# Patient Record
Sex: MALE | Employment: OTHER | URBAN - METROPOLITAN AREA
[De-identification: names, ages, dates, MRNs, and addresses within clinical notes are randomized per-mention and may not be internally consistent; named-entity substitution may affect disease eponyms.]

---

## 2017-03-13 ENCOUNTER — TELEPHONE (OUTPATIENT)
Dept: CARDIOLOGY CLINIC | Age: 77
End: 2017-03-13

## 2017-03-13 DIAGNOSIS — R06.09 EXERTIONAL DYSPNEA: Primary | ICD-10-CM

## 2017-04-19 ENCOUNTER — OFFICE VISIT (OUTPATIENT)
Dept: CARDIOLOGY CLINIC | Age: 77
End: 2017-04-19

## 2017-04-19 VITALS
SYSTOLIC BLOOD PRESSURE: 110 MMHG | HEIGHT: 67 IN | HEART RATE: 60 BPM | DIASTOLIC BLOOD PRESSURE: 70 MMHG | WEIGHT: 184.4 LBS | BODY MASS INDEX: 28.94 KG/M2

## 2017-04-19 DIAGNOSIS — I25.10 ASHD (ARTERIOSCLEROTIC HEART DISEASE): Chronic | ICD-10-CM

## 2017-04-19 DIAGNOSIS — R06.09 EXERTIONAL DYSPNEA: ICD-10-CM

## 2017-04-19 DIAGNOSIS — Z95.1 S/P CABG X 2: Chronic | ICD-10-CM

## 2017-04-19 DIAGNOSIS — J44.9 COPD, MODERATE (HCC): Chronic | ICD-10-CM

## 2017-04-19 DIAGNOSIS — I25.810 CORONARY ARTERY DISEASE INVOLVING CORONARY BYPASS GRAFT OF NATIVE HEART WITHOUT ANGINA PECTORIS: Primary | ICD-10-CM

## 2017-04-19 DIAGNOSIS — R11.0 NAUSEA: ICD-10-CM

## 2017-04-19 DIAGNOSIS — E78.2 HYPERLIPIDEMIA, MIXED: Chronic | ICD-10-CM

## 2017-04-19 DIAGNOSIS — I21.11 ST ELEVATION MYOCARDIAL INFARCTION INVOLVING RIGHT CORONARY ARTERY (HCC): ICD-10-CM

## 2017-04-19 DIAGNOSIS — I73.9 PVD (PERIPHERAL VASCULAR DISEASE) (HCC): Chronic | ICD-10-CM

## 2017-04-19 DIAGNOSIS — I10 ESSENTIAL HYPERTENSION: Chronic | ICD-10-CM

## 2017-04-19 PROCEDURE — 99213 OFFICE O/P EST LOW 20 MIN: CPT | Performed by: INTERNAL MEDICINE

## 2017-04-19 RX ORDER — HYDROCHLOROTHIAZIDE 12.5 MG/1
12.5 TABLET ORAL DAILY
COMMUNITY
End: 2018-01-10

## 2017-04-24 ENCOUNTER — TELEPHONE (OUTPATIENT)
Dept: CARDIOLOGY CLINIC | Age: 77
End: 2017-04-24

## 2017-05-02 ENCOUNTER — HOSPITAL ENCOUNTER (OUTPATIENT)
Dept: GENERAL RADIOLOGY | Age: 77
Discharge: HOME | End: 2017-05-02
Attending: INTERNAL MEDICINE | Admitting: INTERNAL MEDICINE

## 2017-05-02 LAB
ALBUMIN SERPL-MCNC: 4.5 GM/DL (ref 3.4–5)
ALP BLD-CCNC: 112 IU/L (ref 40–129)
ALT SERPL-CCNC: 12 U/L (ref 10–40)
ANION GAP SERPL CALCULATED.3IONS-SCNC: 13 MMOL/L (ref 4–16)
AST SERPL-CCNC: 16 IU/L (ref 15–37)
BACTERIA: NEGATIVE /HPF
BASOPHILS ABSOLUTE: 0 K/CU MM
BASOPHILS RELATIVE PERCENT: 0.5 % (ref 0–1)
BILIRUB SERPL-MCNC: 0.5 MG/DL (ref 0–1)
BILIRUBIN URINE: NEGATIVE MG/DL
BLOOD, URINE: NEGATIVE
BUN BLDV-MCNC: 11 MG/DL (ref 6–23)
CALCIUM SERPL-MCNC: 9.4 MG/DL (ref 8.3–10.6)
CHLORIDE BLD-SCNC: 102 MMOL/L (ref 99–110)
CHOLESTEROL: 226 MG/DL
CLARITY: CLEAR
CO2: 28 MMOL/L (ref 21–32)
COLOR: ABNORMAL
CREAT SERPL-MCNC: 1.1 MG/DL (ref 0.9–1.3)
DIFFERENTIAL TYPE: ABNORMAL
EOSINOPHILS ABSOLUTE: 0.1 K/CU MM
EOSINOPHILS RELATIVE PERCENT: 2.2 % (ref 0–3)
ESTIMATED AVERAGE GLUCOSE: 131 MG/DL
GFR AFRICAN AMERICAN: >60 ML/MIN/1.73M2
GFR NON-AFRICAN AMERICAN: >60 ML/MIN/1.73M2
GLUCOSE FASTING: 87 MG/DL (ref 70–99)
GLUCOSE, URINE: NEGATIVE MG/DL
HBA1C MFR BLD: 6.2 % (ref 4.2–6.3)
HCT VFR BLD CALC: 46.7 % (ref 42–52)
HDLC SERPL-MCNC: 54 MG/DL
HEMOGLOBIN: 15.1 GM/DL (ref 13.5–18)
IMMATURE NEUTROPHIL %: 0.2 % (ref 0–0.43)
KETONES, URINE: NEGATIVE MG/DL
LDL CHOLESTEROL DIRECT: 148 MG/DL
LEUKOCYTE ESTERASE, URINE: NEGATIVE
LYMPHOCYTES ABSOLUTE: 1.3 K/CU MM
LYMPHOCYTES RELATIVE PERCENT: 20.6 % (ref 24–44)
MAGNESIUM: 2.2 MG/DL (ref 1.8–2.4)
MCH RBC QN AUTO: 30.1 PG (ref 27–31)
MCHC RBC AUTO-ENTMCNC: 32.3 % (ref 32–36)
MCV RBC AUTO: 93.2 FL (ref 78–100)
MONOCYTES ABSOLUTE: 0.5 K/CU MM
MONOCYTES RELATIVE PERCENT: 8.6 % (ref 0–4)
MUCUS: ABNORMAL HPF
NITRITE URINE, QUANTITATIVE: NEGATIVE
NUCLEATED RBC %: 0 %
PDW BLD-RTO: 13.8 % (ref 11.7–14.9)
PH, URINE: 5 (ref 5–8)
PLATELET # BLD: 125 K/CU MM (ref 140–440)
PMV BLD AUTO: 12.3 FL (ref 7.5–11.1)
POTASSIUM SERPL-SCNC: 4.3 MMOL/L (ref 3.5–5.1)
PROTEIN UA: NEGATIVE MG/DL
RBC # BLD: 5.01 M/CU MM (ref 4.6–6.2)
RBC URINE: <1 /HPF (ref 0–3)
SEGMENTED NEUTROPHILS ABSOLUTE COUNT: 4.3 K/CU MM
SEGMENTED NEUTROPHILS RELATIVE PERCENT: 67.9 % (ref 36–66)
SODIUM BLD-SCNC: 143 MMOL/L (ref 135–145)
SPECIFIC GRAVITY UA: 1.01 (ref 1–1.03)
SQUAMOUS EPITHELIAL: <1 /HPF
T4 FREE: 1.38 NG/DL (ref 0.9–1.8)
TOTAL IMMATURE NEUTOROPHIL: 0.01 K/CU MM
TOTAL NUCLEATED RBC: 0 K/CU MM
TOTAL PROTEIN: 6.9 GM/DL (ref 6.4–8.2)
TRIGL SERPL-MCNC: 224 MG/DL
TSH HIGH SENSITIVITY: 0.6 UIU/ML (ref 0.27–4.2)
URIC ACID: 3.7 MG/DL (ref 3.5–7.2)
UROBILINOGEN, URINE: NORMAL MG/DL (ref 0.2–1)
WBC # BLD: 6.3 K/CU MM (ref 4–10.5)
WBC UA: 1 /HPF (ref 0–2)

## 2017-05-09 ENCOUNTER — TELEPHONE (OUTPATIENT)
Dept: CARDIOLOGY CLINIC | Age: 77
End: 2017-05-09

## 2017-05-10 ENCOUNTER — NURSE ONLY (OUTPATIENT)
Dept: CARDIOLOGY CLINIC | Age: 77
End: 2017-05-10

## 2017-05-10 DIAGNOSIS — R07.89 OTHER CHEST PAIN: Primary | ICD-10-CM

## 2017-05-22 ENCOUNTER — HOSPITAL ENCOUNTER (OUTPATIENT)
Dept: GENERAL RADIOLOGY | Age: 77
Discharge: HOME | End: 2017-05-22
Attending: INTERNAL MEDICINE | Admitting: INTERNAL MEDICINE

## 2017-05-22 DIAGNOSIS — Z01.811 PRE-OP CHEST EXAM: ICD-10-CM

## 2017-05-22 LAB
ANION GAP SERPL CALCULATED.3IONS-SCNC: 14 MMOL/L (ref 4–16)
APTT: 27.1 SECONDS (ref 21.2–33)
BUN BLDV-MCNC: 14 MG/DL (ref 6–23)
CALCIUM SERPL-MCNC: 9.6 MG/DL (ref 8.3–10.6)
CHLORIDE BLD-SCNC: 100 MMOL/L (ref 99–110)
CO2: 29 MMOL/L (ref 21–32)
CREAT SERPL-MCNC: 1 MG/DL (ref 0.9–1.3)
GFR AFRICAN AMERICAN: >60 ML/MIN/1.73M2
GFR NON-AFRICAN AMERICAN: >60 ML/MIN/1.73M2
GLUCOSE BLD-MCNC: 102 MG/DL (ref 70–140)
HCT VFR BLD CALC: 47.3 % (ref 42–52)
HEMOGLOBIN: 15.1 GM/DL (ref 13.5–18)
INR BLD: 0.91 INDEX
MCH RBC QN AUTO: 29.7 PG (ref 27–31)
MCHC RBC AUTO-ENTMCNC: 31.9 % (ref 32–36)
MCV RBC AUTO: 92.9 FL (ref 78–100)
PDW BLD-RTO: 13.4 % (ref 11.7–14.9)
PLATELET # BLD: 152 K/CU MM (ref 140–440)
PMV BLD AUTO: 12.2 FL (ref 7.5–11.1)
POTASSIUM SERPL-SCNC: 3.8 MMOL/L (ref 3.5–5.1)
PROTHROMBIN TIME: 10.4 SECONDS (ref 9.12–12.5)
RBC # BLD: 5.09 M/CU MM (ref 4.6–6.2)
SODIUM BLD-SCNC: 143 MMOL/L (ref 135–145)
WBC # BLD: 5.5 K/CU MM (ref 4–10.5)

## 2017-05-23 ENCOUNTER — SURG/PROC ORDERS (OUTPATIENT)
Dept: CARDIOLOGY | Age: 77
End: 2017-05-23

## 2017-05-23 RX ORDER — DIAZEPAM 5 MG/1
5 TABLET ORAL
Status: CANCELLED | OUTPATIENT
Start: 2017-05-23 | End: 2017-05-23

## 2017-05-23 RX ORDER — SODIUM CHLORIDE 0.9 % (FLUSH) 0.9 %
10 SYRINGE (ML) INJECTION EVERY 12 HOURS SCHEDULED
Status: CANCELLED | OUTPATIENT
Start: 2017-05-23

## 2017-05-23 RX ORDER — SODIUM CHLORIDE 9 MG/ML
INJECTION, SOLUTION INTRAVENOUS CONTINUOUS
Status: CANCELLED | OUTPATIENT
Start: 2017-05-23

## 2017-05-23 RX ORDER — SODIUM CHLORIDE 0.9 % (FLUSH) 0.9 %
10 SYRINGE (ML) INJECTION PRN
Status: CANCELLED | OUTPATIENT
Start: 2017-05-23

## 2017-05-23 RX ORDER — DIPHENHYDRAMINE HCL 25 MG
25 TABLET ORAL
Status: CANCELLED | OUTPATIENT
Start: 2017-05-23 | End: 2017-05-23

## 2017-05-24 PROBLEM — I20.0 UNSTABLE ANGINA (HCC): Status: ACTIVE | Noted: 2017-05-24

## 2017-05-30 ENCOUNTER — HOSPITAL ENCOUNTER (OUTPATIENT)
Dept: GENERAL RADIOLOGY | Age: 77
Discharge: HOME | End: 2017-05-30
Attending: UROLOGY | Admitting: UROLOGY

## 2017-05-30 LAB — PROSTATE SPECIFIC ANTIGEN: 0.11 NG/ML (ref 0–4)

## 2017-05-31 ENCOUNTER — TELEPHONE (OUTPATIENT)
Dept: CARDIOLOGY CLINIC | Age: 77
End: 2017-05-31

## 2017-06-02 ENCOUNTER — TELEPHONE (OUTPATIENT)
Dept: CARDIOLOGY CLINIC | Age: 77
End: 2017-06-02

## 2017-06-05 ENCOUNTER — OFFICE VISIT (OUTPATIENT)
Dept: CARDIOLOGY CLINIC | Age: 77
End: 2017-06-05

## 2017-06-05 VITALS
HEART RATE: 60 BPM | BODY MASS INDEX: 28.09 KG/M2 | WEIGHT: 179 LBS | HEIGHT: 67 IN | DIASTOLIC BLOOD PRESSURE: 62 MMHG | SYSTOLIC BLOOD PRESSURE: 130 MMHG

## 2017-06-05 DIAGNOSIS — I73.9 PVD (PERIPHERAL VASCULAR DISEASE) (HCC): Chronic | ICD-10-CM

## 2017-06-05 DIAGNOSIS — I10 ESSENTIAL HYPERTENSION: Chronic | ICD-10-CM

## 2017-06-05 DIAGNOSIS — I21.11 ST ELEVATION MYOCARDIAL INFARCTION INVOLVING RIGHT CORONARY ARTERY (HCC): ICD-10-CM

## 2017-06-05 DIAGNOSIS — Z95.1 S/P CABG X 2: Chronic | ICD-10-CM

## 2017-06-05 DIAGNOSIS — I25.10 ASHD (ARTERIOSCLEROTIC HEART DISEASE): Chronic | ICD-10-CM

## 2017-06-05 DIAGNOSIS — I25.810 CORONARY ARTERY DISEASE INVOLVING CORONARY BYPASS GRAFT OF NATIVE HEART WITHOUT ANGINA PECTORIS: Primary | ICD-10-CM

## 2017-06-05 DIAGNOSIS — E78.2 HYPERLIPIDEMIA, MIXED: Chronic | ICD-10-CM

## 2017-06-05 PROCEDURE — 99213 OFFICE O/P EST LOW 20 MIN: CPT | Performed by: INTERNAL MEDICINE

## 2017-06-05 RX ORDER — ROSUVASTATIN CALCIUM 20 MG/1
20 TABLET, COATED ORAL DAILY
Qty: 30 TABLET | Refills: 5 | Status: SHIPPED | OUTPATIENT
Start: 2017-06-05 | End: 2018-01-10

## 2017-06-08 ENCOUNTER — HOSPITAL ENCOUNTER (OUTPATIENT)
Dept: GENERAL RADIOLOGY | Age: 77
Discharge: HOME | End: 2017-06-08
Attending: INTERNAL MEDICINE | Admitting: INTERNAL MEDICINE

## 2017-06-08 LAB
ALBUMIN SERPL-MCNC: 4.1 GM/DL (ref 3.4–5)
ALP BLD-CCNC: 109 IU/L (ref 40–129)
ALT SERPL-CCNC: 19 U/L (ref 10–40)
AST SERPL-CCNC: 16 IU/L (ref 15–37)
BILIRUB SERPL-MCNC: 0.5 MG/DL (ref 0–1)
BILIRUBIN DIRECT: 0.2 MG/DL (ref 0–0.3)
BILIRUBIN, INDIRECT: 0.3 MG/DL (ref 0–0.7)
CHOLESTEROL: 188 MG/DL
HDLC SERPL-MCNC: 43 MG/DL
LDL CHOLESTEROL CALCULATED: 87 MG/DL
TOTAL PROTEIN: 6.7 GM/DL (ref 6.4–8.2)
TRIGL SERPL-MCNC: 292 MG/DL

## 2017-06-09 ENCOUNTER — TELEPHONE (OUTPATIENT)
Dept: CARDIOLOGY CLINIC | Age: 77
End: 2017-06-09

## 2017-06-14 ENCOUNTER — TELEPHONE (OUTPATIENT)
Dept: CARDIOLOGY CLINIC | Age: 77
End: 2017-06-14

## 2017-06-14 DIAGNOSIS — E78.2 HYPERLIPIDEMIA, MIXED: Primary | Chronic | ICD-10-CM

## 2017-06-14 RX ORDER — FENOFIBRATE 145 MG/1
145 TABLET, COATED ORAL DAILY
Qty: 90 TABLET | Refills: 3 | Status: SHIPPED | OUTPATIENT
Start: 2017-06-14 | End: 2018-06-02 | Stop reason: SDUPTHER

## 2017-12-04 ENCOUNTER — HOSPITAL ENCOUNTER (OUTPATIENT)
Dept: GENERAL RADIOLOGY | Age: 77
Discharge: HOME | End: 2017-12-04
Attending: UROLOGY | Admitting: UROLOGY

## 2017-12-04 LAB — PROSTATE SPECIFIC ANTIGEN: 0.07 NG/ML (ref 0–4)

## 2018-01-10 ENCOUNTER — OFFICE VISIT (OUTPATIENT)
Dept: CARDIOLOGY CLINIC | Age: 78
End: 2018-01-10

## 2018-01-10 VITALS
WEIGHT: 183.2 LBS | HEIGHT: 68 IN | BODY MASS INDEX: 27.77 KG/M2 | DIASTOLIC BLOOD PRESSURE: 80 MMHG | HEART RATE: 70 BPM | SYSTOLIC BLOOD PRESSURE: 110 MMHG

## 2018-01-10 DIAGNOSIS — I25.810 CORONARY ARTERY DISEASE INVOLVING CORONARY BYPASS GRAFT OF NATIVE HEART WITHOUT ANGINA PECTORIS: Primary | ICD-10-CM

## 2018-01-10 DIAGNOSIS — I21.11 ST ELEVATION MYOCARDIAL INFARCTION INVOLVING RIGHT CORONARY ARTERY (HCC): ICD-10-CM

## 2018-01-10 DIAGNOSIS — I10 ESSENTIAL HYPERTENSION: Chronic | ICD-10-CM

## 2018-01-10 DIAGNOSIS — I73.9 PVD (PERIPHERAL VASCULAR DISEASE) (HCC): Chronic | ICD-10-CM

## 2018-01-10 DIAGNOSIS — E78.2 HYPERLIPIDEMIA, MIXED: Chronic | ICD-10-CM

## 2018-01-10 DIAGNOSIS — Z95.1 S/P CABG X 2: Chronic | ICD-10-CM

## 2018-01-10 DIAGNOSIS — R06.09 EXERTIONAL DYSPNEA: ICD-10-CM

## 2018-01-10 DIAGNOSIS — J44.9 COPD, MODERATE (HCC): Chronic | ICD-10-CM

## 2018-01-10 PROCEDURE — G8926 SPIRO NO PERF OR DOC: HCPCS | Performed by: INTERNAL MEDICINE

## 2018-01-10 PROCEDURE — 1036F TOBACCO NON-USER: CPT | Performed by: INTERNAL MEDICINE

## 2018-01-10 PROCEDURE — G8427 DOCREV CUR MEDS BY ELIG CLIN: HCPCS | Performed by: INTERNAL MEDICINE

## 2018-01-10 PROCEDURE — 3023F SPIROM DOC REV: CPT | Performed by: INTERNAL MEDICINE

## 2018-01-10 PROCEDURE — 4040F PNEUMOC VAC/ADMIN/RCVD: CPT | Performed by: INTERNAL MEDICINE

## 2018-01-10 PROCEDURE — G8484 FLU IMMUNIZE NO ADMIN: HCPCS | Performed by: INTERNAL MEDICINE

## 2018-01-10 PROCEDURE — 99214 OFFICE O/P EST MOD 30 MIN: CPT | Performed by: INTERNAL MEDICINE

## 2018-01-10 PROCEDURE — 1123F ACP DISCUSS/DSCN MKR DOCD: CPT | Performed by: INTERNAL MEDICINE

## 2018-01-10 PROCEDURE — G8419 CALC BMI OUT NRM PARAM NOF/U: HCPCS | Performed by: INTERNAL MEDICINE

## 2018-01-10 PROCEDURE — G8598 ASA/ANTIPLAT THER USED: HCPCS | Performed by: INTERNAL MEDICINE

## 2018-01-10 RX ORDER — CLOPIDOGREL BISULFATE 75 MG/1
75 TABLET ORAL DAILY
Status: ON HOLD | COMMUNITY
End: 2019-04-30 | Stop reason: HOSPADM

## 2018-01-10 RX ORDER — OMEPRAZOLE 20 MG/1
20 CAPSULE, DELAYED RELEASE ORAL DAILY
COMMUNITY
End: 2018-09-25

## 2018-01-12 ENCOUNTER — PROCEDURE VISIT (OUTPATIENT)
Dept: CARDIOLOGY CLINIC | Age: 78
End: 2018-01-12

## 2018-01-12 DIAGNOSIS — I10 ESSENTIAL HYPERTENSION: Chronic | ICD-10-CM

## 2018-01-12 DIAGNOSIS — E78.2 HYPERLIPIDEMIA, MIXED: Chronic | ICD-10-CM

## 2018-01-12 DIAGNOSIS — R06.09 EXERTIONAL DYSPNEA: Primary | ICD-10-CM

## 2018-01-12 DIAGNOSIS — I73.9 PVD (PERIPHERAL VASCULAR DISEASE) (HCC): Chronic | ICD-10-CM

## 2018-01-12 DIAGNOSIS — I21.11 ST ELEVATION MYOCARDIAL INFARCTION INVOLVING RIGHT CORONARY ARTERY (HCC): ICD-10-CM

## 2018-01-12 DIAGNOSIS — J44.9 COPD, MODERATE (HCC): Chronic | ICD-10-CM

## 2018-01-12 DIAGNOSIS — Z95.1 S/P CABG X 2: Chronic | ICD-10-CM

## 2018-01-12 DIAGNOSIS — I25.810 CORONARY ARTERY DISEASE INVOLVING CORONARY BYPASS GRAFT OF NATIVE HEART WITHOUT ANGINA PECTORIS: ICD-10-CM

## 2018-01-12 LAB
LV EF: 58 %
LVEF MODALITY: NORMAL

## 2018-01-12 PROCEDURE — 93306 TTE W/DOPPLER COMPLETE: CPT | Performed by: INTERNAL MEDICINE

## 2018-01-15 ENCOUNTER — TELEPHONE (OUTPATIENT)
Dept: CARDIOLOGY CLINIC | Age: 78
End: 2018-01-15

## 2018-01-15 NOTE — TELEPHONE ENCOUNTER
Notified Pt of echo results. Verbalized understanding. Left ventricular systolic function is normal with an ejection fraction of 55-60%. Grade I diastolic dysfunction. Mildly dilated left atrium. Moderate mitral regurgitation. Mild to moderate tricuspid regurgitation. No evidence of pericardial effusion.

## 2018-01-24 ENCOUNTER — HOSPITAL ENCOUNTER (OUTPATIENT)
Dept: GENERAL RADIOLOGY | Age: 78
Discharge: HOME | End: 2018-01-24
Attending: INTERNAL MEDICINE | Admitting: INTERNAL MEDICINE

## 2018-01-24 DIAGNOSIS — J98.01 ACUTE BRONCHOSPASM: ICD-10-CM

## 2018-01-24 DIAGNOSIS — J20.9 ACUTE BRONCHITIS, UNSPECIFIED ORGANISM: ICD-10-CM

## 2018-01-30 ENCOUNTER — HOSPITAL ENCOUNTER (OUTPATIENT)
Dept: GENERAL RADIOLOGY | Age: 78
Discharge: HOME | End: 2018-01-30
Attending: INTERNAL MEDICINE | Admitting: INTERNAL MEDICINE

## 2018-01-30 LAB — PRO-BNP: 436.8 PG/ML

## 2018-02-01 LAB — MYCOPLASMA PNEUMONIAE IGM: 0.13

## 2018-05-07 ENCOUNTER — HOSPITAL ENCOUNTER (OUTPATIENT)
Dept: GENERAL RADIOLOGY | Age: 78
Discharge: HOME | End: 2018-05-07
Attending: INTERNAL MEDICINE | Admitting: INTERNAL MEDICINE

## 2018-05-07 ENCOUNTER — HOSPITAL ENCOUNTER (OUTPATIENT)
Dept: OTHER | Age: 78
Discharge: HOME | End: 2018-05-07
Attending: INTERNAL MEDICINE | Admitting: INTERNAL MEDICINE

## 2018-05-07 LAB
ALBUMIN SERPL-MCNC: 4.4 GM/DL (ref 3.4–5)
ALP BLD-CCNC: 70 IU/L (ref 40–128)
ALT SERPL-CCNC: 12 U/L (ref 10–40)
ANION GAP SERPL CALCULATED.3IONS-SCNC: 13 MMOL/L (ref 4–16)
AST SERPL-CCNC: 15 IU/L (ref 15–37)
BASOPHILS ABSOLUTE: 0 K/CU MM
BASOPHILS RELATIVE PERCENT: 0.4 % (ref 0–1)
BILIRUB SERPL-MCNC: 0.2 MG/DL (ref 0–1)
BUN BLDV-MCNC: 20 MG/DL (ref 6–23)
CALCIUM SERPL-MCNC: 9.4 MG/DL (ref 8.3–10.6)
CHLORIDE BLD-SCNC: 105 MMOL/L (ref 99–110)
CHOLESTEROL: 200 MG/DL
CO2: 24 MMOL/L (ref 21–32)
CREAT SERPL-MCNC: 1.1 MG/DL (ref 0.9–1.3)
DIFFERENTIAL TYPE: ABNORMAL
EOSINOPHILS ABSOLUTE: 0.2 K/CU MM
EOSINOPHILS RELATIVE PERCENT: 3.3 % (ref 0–3)
ESTIMATED AVERAGE GLUCOSE: 134 MG/DL
GFR AFRICAN AMERICAN: >60 ML/MIN/1.73M2
GFR NON-AFRICAN AMERICAN: >60 ML/MIN/1.73M2
GLUCOSE FASTING: 102 MG/DL (ref 70–99)
HBA1C MFR BLD: 6.3 % (ref 4.2–6.3)
HCT VFR BLD CALC: 40.7 % (ref 42–52)
HDLC SERPL-MCNC: 55 MG/DL
HEMOGLOBIN: 13 GM/DL (ref 13.5–18)
IMMATURE NEUTROPHIL %: 0.3 % (ref 0–0.43)
LDL CHOLESTEROL DIRECT: 138 MG/DL
LYMPHOCYTES ABSOLUTE: 1.7 K/CU MM
LYMPHOCYTES RELATIVE PERCENT: 24 % (ref 24–44)
MCH RBC QN AUTO: 29.7 PG (ref 27–31)
MCHC RBC AUTO-ENTMCNC: 31.9 % (ref 32–36)
MCV RBC AUTO: 92.9 FL (ref 78–100)
MONOCYTES ABSOLUTE: 0.7 K/CU MM
MONOCYTES RELATIVE PERCENT: 9.4 % (ref 0–4)
NUCLEATED RBC %: 0 %
PDW BLD-RTO: 13.6 % (ref 11.7–14.9)
PLATELET # BLD: 180 K/CU MM (ref 140–440)
PMV BLD AUTO: 12.7 FL (ref 7.5–11.1)
POTASSIUM SERPL-SCNC: 4 MMOL/L (ref 3.5–5.1)
RBC # BLD: 4.38 M/CU MM (ref 4.6–6.2)
SEGMENTED NEUTROPHILS ABSOLUTE COUNT: 4.3 K/CU MM
SEGMENTED NEUTROPHILS RELATIVE PERCENT: 62.6 % (ref 36–66)
SODIUM BLD-SCNC: 142 MMOL/L (ref 135–145)
TOTAL IMMATURE NEUTOROPHIL: 0.02 K/CU MM
TOTAL NUCLEATED RBC: 0 K/CU MM
TOTAL PROTEIN: 6.4 GM/DL (ref 6.4–8.2)
TRIGL SERPL-MCNC: 115 MG/DL
TSH HIGH SENSITIVITY: 0.94 UIU/ML (ref 0.27–4.2)
URIC ACID: 1.9 MG/DL (ref 3.5–7.2)
WBC # BLD: 6.9 K/CU MM (ref 4–10.5)

## 2018-05-10 LAB
HEMOCCULT SP1 STL QL: NEGATIVE
OCCULT BLOOD 2: NEGATIVE
OCCULT BLOOD 3: NEGATIVE

## 2018-06-04 ENCOUNTER — HOSPITAL ENCOUNTER (OUTPATIENT)
Dept: GENERAL RADIOLOGY | Age: 78
Discharge: HOME | End: 2018-06-04
Attending: UROLOGY | Admitting: UROLOGY

## 2018-06-04 LAB — PROSTATE SPECIFIC ANTIGEN: 0.06 NG/ML (ref 0–4)

## 2018-06-04 RX ORDER — FENOFIBRATE 145 MG/1
145 TABLET, COATED ORAL DAILY
Qty: 90 TABLET | Refills: 3 | Status: SHIPPED | OUTPATIENT
Start: 2018-06-04 | End: 2018-09-25

## 2018-09-25 ENCOUNTER — OFFICE VISIT (OUTPATIENT)
Dept: CARDIOLOGY CLINIC | Age: 78
End: 2018-09-25
Payer: COMMERCIAL

## 2018-09-25 VITALS
DIASTOLIC BLOOD PRESSURE: 80 MMHG | BODY MASS INDEX: 28.98 KG/M2 | SYSTOLIC BLOOD PRESSURE: 136 MMHG | WEIGHT: 185 LBS | HEART RATE: 60 BPM

## 2018-09-25 DIAGNOSIS — I10 ESSENTIAL HYPERTENSION: Chronic | ICD-10-CM

## 2018-09-25 DIAGNOSIS — I25.810 CORONARY ARTERY DISEASE INVOLVING CORONARY BYPASS GRAFT OF NATIVE HEART WITHOUT ANGINA PECTORIS: Primary | ICD-10-CM

## 2018-09-25 DIAGNOSIS — J44.9 COPD, MODERATE (HCC): Chronic | ICD-10-CM

## 2018-09-25 DIAGNOSIS — E78.2 HYPERLIPIDEMIA, MIXED: Chronic | ICD-10-CM

## 2018-09-25 DIAGNOSIS — I21.11 ST ELEVATION MYOCARDIAL INFARCTION INVOLVING RIGHT CORONARY ARTERY (HCC): ICD-10-CM

## 2018-09-25 DIAGNOSIS — I73.9 PVD (PERIPHERAL VASCULAR DISEASE) (HCC): Chronic | ICD-10-CM

## 2018-09-25 DIAGNOSIS — Z95.1 S/P CABG X 2: Chronic | ICD-10-CM

## 2018-09-25 PROCEDURE — G8926 SPIRO NO PERF OR DOC: HCPCS | Performed by: INTERNAL MEDICINE

## 2018-09-25 PROCEDURE — G8427 DOCREV CUR MEDS BY ELIG CLIN: HCPCS | Performed by: INTERNAL MEDICINE

## 2018-09-25 PROCEDURE — 1101F PT FALLS ASSESS-DOCD LE1/YR: CPT | Performed by: INTERNAL MEDICINE

## 2018-09-25 PROCEDURE — 3023F SPIROM DOC REV: CPT | Performed by: INTERNAL MEDICINE

## 2018-09-25 PROCEDURE — G8419 CALC BMI OUT NRM PARAM NOF/U: HCPCS | Performed by: INTERNAL MEDICINE

## 2018-09-25 PROCEDURE — 4040F PNEUMOC VAC/ADMIN/RCVD: CPT | Performed by: INTERNAL MEDICINE

## 2018-09-25 PROCEDURE — G8598 ASA/ANTIPLAT THER USED: HCPCS | Performed by: INTERNAL MEDICINE

## 2018-09-25 PROCEDURE — 1123F ACP DISCUSS/DSCN MKR DOCD: CPT | Performed by: INTERNAL MEDICINE

## 2018-09-25 PROCEDURE — 1036F TOBACCO NON-USER: CPT | Performed by: INTERNAL MEDICINE

## 2018-09-25 PROCEDURE — 99213 OFFICE O/P EST LOW 20 MIN: CPT | Performed by: INTERNAL MEDICINE

## 2018-09-25 RX ORDER — M-VIT,TX,IRON,MINS/CALC/FOLIC 27MG-0.4MG
1 TABLET ORAL DAILY
COMMUNITY
End: 2019-07-15

## 2018-09-25 RX ORDER — HYDROCHLOROTHIAZIDE 12.5 MG/1
12.5 CAPSULE, GELATIN COATED ORAL DAILY
Status: ON HOLD | COMMUNITY
End: 2018-10-04 | Stop reason: ALTCHOICE

## 2018-09-25 RX ORDER — ROSUVASTATIN CALCIUM 5 MG/1
5 TABLET, COATED ORAL DAILY
COMMUNITY
End: 2019-04-01

## 2018-09-25 NOTE — PROGRESS NOTES
Cheryl Toscano is a 69 y.o. male who has    CHIEF COMPLAINT AS FOLLOWS:  CHEST PAIN: Patient denies any C/O chest pains at this time. SOB: No C/O SOB at this time. LEG EDEMA: B/L Lower extremity edema is present but no change over previous. PALPITATIONS: Denies any C/O Palpitations                                  DIZZINESS: No C/O Dizziness                           SYNCOPE: None   OTHER:                                     HPI: Patient is here for F/U on his CAD, HTN & Dyslipidemia problems. He does not have any new complaints at this time.     Christa Melendez has the following history recorded in care path:  Patient Active Problem List    Diagnosis Date Noted    Unstable angina ([facility] 05/24/2017     Priority: High    Exertional dyspnea      Priority: High    Diarrhea      Priority: High    Nausea      Priority: High    Coronary artery disease involving coronary bypass graft of native heart without angina pectoris      Priority: High    STEMI (ST elevation myocardial infarction) ([facility] 05/14/2014     Priority: Low    ASHD (arteriosclerotic heart disease) 05/14/2014     Priority: Low    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/2014     Priority: Low    HTN (hypertension) 05/14/2014     Priority: Low    Hyperlipidemia, mixed 05/14/2014     Priority: Low    COPD, moderate ([facility] 05/14/2014     Priority: Low    PVD (peripheral vascular disease) ([facility] 05/14/2014     Priority: Low     Current Outpatient Prescriptions   Medication Sig Dispense Refill    rosuvastatin (CRESTOR) 5 MG tablet Take 5 mg by mouth daily      hydrochlorothiazide (MICROZIDE) 12.5 MG capsule Take 12.5 mg by mouth daily      Multiple Vitamins-Minerals (THERAPEUTIC MULTIVITAMIN-MINERALS) tablet Take 1 tablet by mouth daily      aspirin 81 MG tablet Take 81 mg by mouth daily      clopidogrel (PLAVIX) 75 MG tablet Take 75 mg by mouth daily      amLODIPine HDL 55 05/07/2018    LDLCALC 87 06/08/2017    LDLDIRECT 138 (H) 05/07/2018     Lab Results   Component Value Date    ALT 12 05/07/2018    AST 15 05/07/2018     BMP:    Lab Results   Component Value Date     05/07/2018    K 4.0 05/07/2018     05/07/2018    CO2 24 05/07/2018    BUN 20 05/07/2018    CREATININE 1.1 05/07/2018     CMP:   Lab Results   Component Value Date     05/07/2018    K 4.0 05/07/2018     05/07/2018    CO2 24 05/07/2018    BUN 20 05/07/2018    PROT 6.4 05/07/2018    PROT 6.6 09/16/2012     TSH:    Lab Results   Component Value Date    TSHHS 0.935 05/07/2018       QUALITY MEASURES REVIEWED:  1.CAD:Patient is taking anti platelet agent:Yes  2. DYSLIPIDEMIA: Patient is on cholesterol lowering medication:Yes  3. Beta-Blocker therapy for CAD, if prior Myocardial Infarction:Yes  4. Counselled regarding smoking cessation. 5. Atrial fibrillation & anticoagulation therapy No  6. Discussed weight management strategies. Impression:    1. Coronary artery disease involving coronary bypass graft of native heart without angina pectoris    2. S/P CABG x 2    3. Essential hypertension    4. Hyperlipidemia, mixed    5. COPD, moderate ([address]    6. PVD (peripheral vascular disease) ([facility]    7. ST elevation myocardial infarction involving right coronary artery [facility])       Patient Active Problem List   Diagnosis Code    STEMI (ST elevation myocardial infarction) ([facility] I21.3    ASHD (arteriosclerotic heart disease) I25.10    S/P CABG x 2 Z95.1    HTN (hypertension) I10    Hyperlipidemia, mixed E78.2    COPD, moderate ([address] J44.9    PVD (peripheral vascular disease) ([facility] I73.9    Exertional dyspnea R06.09    Diarrhea R19.7    Nausea R11.0    Coronary artery disease involving coronary bypass graft of native heart without angina pectoris I25.810    Unstable angina ([facility] I20.0       Assessment & Plan:    CAD:Yes   clinically stable.  Patient is on optimal medical regimen ( see medication

## 2018-09-25 NOTE — LETTER
[address] [facility]  [address] [place] [address]  Phone: [phone]  Fax: [phone]    Jean-Paul Nguyen MD        September 25, 2018     Don Cuadra MD  [address] [place], [place] [id number]    Patient: Mary Ballesteros  MR Number: [id number]  YOB: 1940  Date of Visit: 9/25/2018    Dear Dr. Jenaro Pacheco Win:    Thank you for the request for consultation for Katherin Sultana to me for the evaluation of CAD. Below are the relevant portions of my assessment and plan of care. If you have questions, please do not hesitate to call me. I look forward to following Kiara Beal along with you.     Sincerely,        Jean-Paul Nguyen MD

## 2018-10-04 ENCOUNTER — APPOINTMENT (OUTPATIENT)
Dept: CT IMAGING | Age: 78
DRG: 390 | End: 2018-10-04
Payer: COMMERCIAL

## 2018-10-04 ENCOUNTER — HOSPITAL ENCOUNTER (INPATIENT)
Age: 78
LOS: 2 days | Discharge: HOME | DRG: 390 | End: 2018-10-06
Attending: HOSPITALIST | Admitting: HOSPITALIST
Payer: COMMERCIAL

## 2018-10-04 DIAGNOSIS — K56.609 SMALL BOWEL OBSTRUCTION (HCC): Primary | ICD-10-CM

## 2018-10-04 LAB
ALBUMIN SERPL-MCNC: 4.4 GM/DL (ref 3.4–5)
ALP BLD-CCNC: 135 IU/L (ref 40–129)
ALT SERPL-CCNC: 12 U/L (ref 10–40)
ANION GAP SERPL CALCULATED.3IONS-SCNC: 15 MMOL/L (ref 4–16)
AST SERPL-CCNC: 18 IU/L (ref 15–37)
BACTERIA: NEGATIVE /HPF
BASOPHILS ABSOLUTE: 0.1 K/CU MM
BASOPHILS RELATIVE PERCENT: 0.4 % (ref 0–1)
BILIRUB SERPL-MCNC: 0.4 MG/DL (ref 0–1)
BILIRUBIN URINE: NEGATIVE MG/DL
BLOOD, URINE: NEGATIVE
BUN BLDV-MCNC: 16 MG/DL (ref 6–23)
CALCIUM SERPL-MCNC: 9.4 MG/DL (ref 8.3–10.6)
CHLORIDE BLD-SCNC: 98 MMOL/L (ref 99–110)
CLARITY: CLEAR
CO2: 25 MMOL/L (ref 21–32)
COLOR: ABNORMAL
CREAT SERPL-MCNC: 1 MG/DL (ref 0.9–1.3)
DIFFERENTIAL TYPE: ABNORMAL
EOSINOPHILS ABSOLUTE: 0.1 K/CU MM
EOSINOPHILS RELATIVE PERCENT: 0.7 % (ref 0–3)
GFR AFRICAN AMERICAN: >60 ML/MIN/1.73M2
GFR NON-AFRICAN AMERICAN: >60 ML/MIN/1.73M2
GLUCOSE BLD-MCNC: 141 MG/DL (ref 70–99)
GLUCOSE, URINE: NEGATIVE MG/DL
HCT VFR BLD CALC: 47.7 % (ref 42–52)
HEMOGLOBIN: 15.6 GM/DL (ref 13.5–18)
IMMATURE NEUTROPHIL %: 0.2 % (ref 0–0.43)
KETONES, URINE: NEGATIVE MG/DL
LACTATE: 1.3 MMOL/L (ref 0.4–2)
LEUKOCYTE ESTERASE, URINE: NEGATIVE
LIPASE: 33 IU/L (ref 13–60)
LYMPHOCYTES ABSOLUTE: 1 K/CU MM
LYMPHOCYTES RELATIVE PERCENT: 7.5 % (ref 24–44)
MCH RBC QN AUTO: 30.2 PG (ref 27–31)
MCHC RBC AUTO-ENTMCNC: 32.7 % (ref 32–36)
MCV RBC AUTO: 92.4 FL (ref 78–100)
MONOCYTES ABSOLUTE: 0.6 K/CU MM
MONOCYTES RELATIVE PERCENT: 4.5 % (ref 0–4)
MUCUS: ABNORMAL HPF
NITRITE URINE, QUANTITATIVE: NEGATIVE
NUCLEATED RBC %: 0 %
PDW BLD-RTO: 12.7 % (ref 11.7–14.9)
PH, URINE: 6 (ref 5–8)
PLATELET # BLD: 174 K/CU MM (ref 140–440)
PMV BLD AUTO: 12.4 FL (ref 7.5–11.1)
POTASSIUM SERPL-SCNC: 3.2 MMOL/L (ref 3.5–5.1)
PROTEIN UA: 30 MG/DL
RBC # BLD: 5.16 M/CU MM (ref 4.6–6.2)
RBC URINE: ABNORMAL /HPF (ref 0–3)
SEGMENTED NEUTROPHILS ABSOLUTE COUNT: 12 K/CU MM
SEGMENTED NEUTROPHILS RELATIVE PERCENT: 86.7 % (ref 36–66)
SODIUM BLD-SCNC: 138 MMOL/L (ref 135–145)
SPECIFIC GRAVITY UA: 1.05 (ref 1–1.03)
TOTAL IMMATURE NEUTOROPHIL: 0.03 K/CU MM
TOTAL NUCLEATED RBC: 0 K/CU MM
TOTAL PROTEIN: 7.8 GM/DL (ref 6.4–8.2)
TRICHOMONAS: ABNORMAL /HPF
TROPONIN T: <0.01 NG/ML
UROBILINOGEN, URINE: NORMAL MG/DL (ref 0.2–1)
WBC # BLD: 13.8 K/CU MM (ref 4–10.5)
WBC UA: <1 /HPF (ref 0–2)

## 2018-10-04 PROCEDURE — 80053 COMPREHEN METABOLIC PANEL: CPT

## 2018-10-04 PROCEDURE — 93005 ELECTROCARDIOGRAM TRACING: CPT | Performed by: EMERGENCY MEDICINE

## 2018-10-04 PROCEDURE — 83605 ASSAY OF LACTIC ACID: CPT

## 2018-10-04 PROCEDURE — 81001 URINALYSIS AUTO W/SCOPE: CPT

## 2018-10-04 PROCEDURE — 36415 COLL VENOUS BLD VENIPUNCTURE: CPT

## 2018-10-04 PROCEDURE — 96365 THER/PROPH/DIAG IV INF INIT: CPT

## 2018-10-04 PROCEDURE — 1200000000 HC SEMI PRIVATE

## 2018-10-04 PROCEDURE — 96361 HYDRATE IV INFUSION ADD-ON: CPT

## 2018-10-04 PROCEDURE — 2580000003 HC RX 258: Performed by: PHYSICIAN ASSISTANT

## 2018-10-04 PROCEDURE — 84484 ASSAY OF TROPONIN QUANT: CPT

## 2018-10-04 PROCEDURE — 6360000002 HC RX W HCPCS: Performed by: PHYSICIAN ASSISTANT

## 2018-10-04 PROCEDURE — 74177 CT ABD & PELVIS W/CONTRAST: CPT

## 2018-10-04 PROCEDURE — 83690 ASSAY OF LIPASE: CPT

## 2018-10-04 PROCEDURE — 85025 COMPLETE CBC W/AUTO DIFF WBC: CPT

## 2018-10-04 PROCEDURE — 96375 TX/PRO/DX INJ NEW DRUG ADDON: CPT

## 2018-10-04 PROCEDURE — 6360000004 HC RX CONTRAST MEDICATION

## 2018-10-04 PROCEDURE — 99285 EMERGENCY DEPT VISIT HI MDM: CPT

## 2018-10-04 RX ORDER — AMLODIPINE BESYLATE 10 MG/1
10 TABLET ORAL DAILY
Status: DISCONTINUED | OUTPATIENT
Start: 2018-10-05 | End: 2018-10-06 | Stop reason: HOSPADM

## 2018-10-04 RX ORDER — ONDANSETRON 2 MG/ML
4 INJECTION INTRAMUSCULAR; INTRAVENOUS EVERY 6 HOURS PRN
Status: DISCONTINUED | OUTPATIENT
Start: 2018-10-04 | End: 2018-10-06 | Stop reason: HOSPADM

## 2018-10-04 RX ORDER — ASPIRIN 81 MG/1
81 TABLET, CHEWABLE ORAL DAILY
Status: DISCONTINUED | OUTPATIENT
Start: 2018-10-05 | End: 2018-10-06 | Stop reason: HOSPADM

## 2018-10-04 RX ORDER — SODIUM CHLORIDE 0.9 % (FLUSH) 0.9 %
10 SYRINGE (ML) INJECTION EVERY 12 HOURS SCHEDULED
Status: DISCONTINUED | OUTPATIENT
Start: 2018-10-04 | End: 2018-10-06 | Stop reason: HOSPADM

## 2018-10-04 RX ORDER — MORPHINE SULFATE 4 MG/ML
4 INJECTION, SOLUTION INTRAMUSCULAR; INTRAVENOUS EVERY 30 MIN PRN
Status: DISCONTINUED | OUTPATIENT
Start: 2018-10-04 | End: 2018-10-04 | Stop reason: SDUPTHER

## 2018-10-04 RX ORDER — MORPHINE SULFATE 4 MG/ML
2 INJECTION, SOLUTION INTRAMUSCULAR; INTRAVENOUS EVERY 6 HOURS PRN
Status: DISCONTINUED | OUTPATIENT
Start: 2018-10-04 | End: 2018-10-06 | Stop reason: HOSPADM

## 2018-10-04 RX ORDER — 0.9 % SODIUM CHLORIDE 0.9 %
500 INTRAVENOUS SOLUTION INTRAVENOUS ONCE
Status: COMPLETED | OUTPATIENT
Start: 2018-10-04 | End: 2018-10-04

## 2018-10-04 RX ORDER — SODIUM CHLORIDE 0.9 % (FLUSH) 0.9 %
10 SYRINGE (ML) INJECTION PRN
Status: DISCONTINUED | OUTPATIENT
Start: 2018-10-04 | End: 2018-10-06 | Stop reason: HOSPADM

## 2018-10-04 RX ORDER — ONDANSETRON 2 MG/ML
4 INJECTION INTRAMUSCULAR; INTRAVENOUS EVERY 30 MIN PRN
Status: DISCONTINUED | OUTPATIENT
Start: 2018-10-04 | End: 2018-10-04 | Stop reason: SDUPTHER

## 2018-10-04 RX ORDER — CLOPIDOGREL BISULFATE 75 MG/1
75 TABLET ORAL DAILY
Status: DISCONTINUED | OUTPATIENT
Start: 2018-10-05 | End: 2018-10-06 | Stop reason: HOSPADM

## 2018-10-04 RX ORDER — SODIUM CHLORIDE 9 MG/ML
INJECTION, SOLUTION INTRAVENOUS CONTINUOUS
Status: DISCONTINUED | OUTPATIENT
Start: 2018-10-04 | End: 2018-10-06 | Stop reason: HOSPADM

## 2018-10-04 RX ORDER — POTASSIUM CHLORIDE 7.45 MG/ML
10 INJECTION INTRAVENOUS
Status: COMPLETED | OUTPATIENT
Start: 2018-10-04 | End: 2018-10-04

## 2018-10-04 RX ADMIN — POTASSIUM CHLORIDE 10 MEQ: 10 INJECTION, SOLUTION INTRAVENOUS at 22:55

## 2018-10-04 RX ADMIN — IOPAMIDOL 75 ML: 755 INJECTION, SOLUTION INTRAVENOUS at 21:39

## 2018-10-04 RX ADMIN — POTASSIUM CHLORIDE 10 MEQ: 10 INJECTION, SOLUTION INTRAVENOUS at 23:50

## 2018-10-04 RX ADMIN — SODIUM CHLORIDE 500 ML: 9 INJECTION, SOLUTION INTRAVENOUS at 20:49

## 2018-10-04 RX ADMIN — ONDANSETRON 4 MG: 2 INJECTION INTRAMUSCULAR; INTRAVENOUS at 20:49

## 2018-10-04 RX ADMIN — MORPHINE SULFATE 4 MG: 4 INJECTION INTRAVENOUS at 20:49

## 2018-10-04 ASSESSMENT — PAIN DESCRIPTION - PAIN TYPE
TYPE: ACUTE PAIN
TYPE: ACUTE PAIN

## 2018-10-04 ASSESSMENT — PAIN SCALES - GENERAL
PAINLEVEL_OUTOF10: 9
PAINLEVEL_OUTOF10: 0
PAINLEVEL_OUTOF10: 9
PAINLEVEL_OUTOF10: 5

## 2018-10-04 ASSESSMENT — PAIN DESCRIPTION - LOCATION
LOCATION: ABDOMEN
LOCATION: ABDOMEN

## 2018-10-04 ASSESSMENT — PAIN DESCRIPTION - FREQUENCY: FREQUENCY: INTERMITTENT

## 2018-10-04 ASSESSMENT — PAIN DESCRIPTION - ONSET: ONSET: ON-GOING

## 2018-10-04 ASSESSMENT — PAIN DESCRIPTION - DESCRIPTORS: DESCRIPTORS: ACHING

## 2018-10-05 LAB
ANION GAP SERPL CALCULATED.3IONS-SCNC: 12 MMOL/L (ref 4–16)
BUN BLDV-MCNC: 12 MG/DL (ref 6–23)
CALCIUM SERPL-MCNC: 8.4 MG/DL (ref 8.3–10.6)
CHLORIDE BLD-SCNC: 104 MMOL/L (ref 99–110)
CO2: 24 MMOL/L (ref 21–32)
CREAT SERPL-MCNC: 0.9 MG/DL (ref 0.9–1.3)
EKG ATRIAL RATE: 80 BPM
EKG DIAGNOSIS: NORMAL
EKG P AXIS: 78 DEGREES
EKG P-R INTERVAL: 162 MS
EKG Q-T INTERVAL: 432 MS
EKG QRS DURATION: 146 MS
EKG QTC CALCULATION (BAZETT): 498 MS
EKG R AXIS: 30 DEGREES
EKG T AXIS: 52 DEGREES
EKG VENTRICULAR RATE: 80 BPM
GFR AFRICAN AMERICAN: >60 ML/MIN/1.73M2
GFR NON-AFRICAN AMERICAN: >60 ML/MIN/1.73M2
GLUCOSE BLD-MCNC: 109 MG/DL (ref 70–99)
HCT VFR BLD CALC: 41.2 % (ref 42–52)
HEMOGLOBIN: 13 GM/DL (ref 13.5–18)
MCH RBC QN AUTO: 29.1 PG (ref 27–31)
MCHC RBC AUTO-ENTMCNC: 31.6 % (ref 32–36)
MCV RBC AUTO: 92.4 FL (ref 78–100)
PDW BLD-RTO: 12.8 % (ref 11.7–14.9)
PLATELET # BLD: 151 K/CU MM (ref 140–440)
PMV BLD AUTO: 12.6 FL (ref 7.5–11.1)
POTASSIUM SERPL-SCNC: 3.7 MMOL/L (ref 3.5–5.1)
RBC # BLD: 4.46 M/CU MM (ref 4.6–6.2)
SODIUM BLD-SCNC: 140 MMOL/L (ref 135–145)
WBC # BLD: 9.4 K/CU MM (ref 4–10.5)

## 2018-10-05 PROCEDURE — 2580000003 HC RX 258: Performed by: HOSPITALIST

## 2018-10-05 PROCEDURE — 85027 COMPLETE CBC AUTOMATED: CPT

## 2018-10-05 PROCEDURE — 6370000000 HC RX 637 (ALT 250 FOR IP): Performed by: HOSPITALIST

## 2018-10-05 PROCEDURE — S0028 INJECTION, FAMOTIDINE, 20 MG: HCPCS | Performed by: HOSPITALIST

## 2018-10-05 PROCEDURE — 2500000003 HC RX 250 WO HCPCS: Performed by: HOSPITALIST

## 2018-10-05 PROCEDURE — 36415 COLL VENOUS BLD VENIPUNCTURE: CPT

## 2018-10-05 PROCEDURE — 80048 BASIC METABOLIC PNL TOTAL CA: CPT

## 2018-10-05 PROCEDURE — 6360000002 HC RX W HCPCS: Performed by: HOSPITALIST

## 2018-10-05 PROCEDURE — 93010 ELECTROCARDIOGRAM REPORT: CPT | Performed by: INTERNAL MEDICINE

## 2018-10-05 PROCEDURE — 1200000000 HC SEMI PRIVATE

## 2018-10-05 RX ORDER — PROMETHAZINE HYDROCHLORIDE 25 MG/ML
12.5 INJECTION, SOLUTION INTRAMUSCULAR; INTRAVENOUS EVERY 6 HOURS PRN
Status: DISCONTINUED | OUTPATIENT
Start: 2018-10-05 | End: 2018-10-06 | Stop reason: HOSPADM

## 2018-10-05 RX ORDER — ZOLPIDEM TARTRATE 5 MG/1
5 TABLET ORAL NIGHTLY PRN
Status: DISCONTINUED | OUTPATIENT
Start: 2018-10-05 | End: 2018-10-06 | Stop reason: HOSPADM

## 2018-10-05 RX ADMIN — ONDANSETRON 4 MG: 2 INJECTION INTRAMUSCULAR; INTRAVENOUS at 00:15

## 2018-10-05 RX ADMIN — ASPIRIN 81 MG CHEWABLE TABLET 81 MG: 81 TABLET CHEWABLE at 08:50

## 2018-10-05 RX ADMIN — ENOXAPARIN SODIUM 40 MG: 40 INJECTION SUBCUTANEOUS at 08:50

## 2018-10-05 RX ADMIN — AMLODIPINE BESYLATE 10 MG: 10 TABLET ORAL at 08:50

## 2018-10-05 RX ADMIN — FAMOTIDINE 20 MG: 10 INJECTION, SOLUTION INTRAVENOUS at 08:50

## 2018-10-05 RX ADMIN — SODIUM CHLORIDE: 9 INJECTION, SOLUTION INTRAVENOUS at 22:13

## 2018-10-05 RX ADMIN — AMPICILLIN SODIUM AND SULBACTAM SODIUM 1.5 G: 1; .5 INJECTION, POWDER, FOR SOLUTION INTRAMUSCULAR; INTRAVENOUS at 00:15

## 2018-10-05 RX ADMIN — ZOLPIDEM TARTRATE 5 MG: 5 TABLET ORAL at 21:48

## 2018-10-05 RX ADMIN — AMPICILLIN SODIUM AND SULBACTAM SODIUM 1.5 G: 1; .5 INJECTION, POWDER, FOR SOLUTION INTRAMUSCULAR; INTRAVENOUS at 05:00

## 2018-10-05 RX ADMIN — AMPICILLIN SODIUM AND SULBACTAM SODIUM 1.5 G: 1; .5 INJECTION, POWDER, FOR SOLUTION INTRAMUSCULAR; INTRAVENOUS at 11:22

## 2018-10-05 RX ADMIN — FAMOTIDINE 20 MG: 10 INJECTION, SOLUTION INTRAVENOUS at 22:12

## 2018-10-05 RX ADMIN — PROMETHAZINE HYDROCHLORIDE 12.5 MG: 25 INJECTION, SOLUTION INTRAMUSCULAR; INTRAVENOUS at 02:37

## 2018-10-05 RX ADMIN — MORPHINE SULFATE 2 MG: 4 INJECTION INTRAVENOUS at 00:15

## 2018-10-05 RX ADMIN — FAMOTIDINE 20 MG: 10 INJECTION, SOLUTION INTRAVENOUS at 00:02

## 2018-10-05 RX ADMIN — AMPICILLIN SODIUM AND SULBACTAM SODIUM 1.5 G: 1; .5 INJECTION, POWDER, FOR SOLUTION INTRAMUSCULAR; INTRAVENOUS at 17:24

## 2018-10-05 RX ADMIN — CLOPIDOGREL BISULFATE 75 MG: 75 TABLET ORAL at 08:50

## 2018-10-05 RX ADMIN — SODIUM CHLORIDE, PRESERVATIVE FREE 10 ML: 5 INJECTION INTRAVENOUS at 00:02

## 2018-10-05 RX ADMIN — SODIUM CHLORIDE: 9 INJECTION, SOLUTION INTRAVENOUS at 00:02

## 2018-10-05 RX ADMIN — SODIUM CHLORIDE: 9 INJECTION, SOLUTION INTRAVENOUS at 11:22

## 2018-10-05 RX ADMIN — SODIUM CHLORIDE, PRESERVATIVE FREE 10 ML: 5 INJECTION INTRAVENOUS at 22:13

## 2018-10-05 RX ADMIN — AMPICILLIN SODIUM AND SULBACTAM SODIUM 1.5 G: 1; .5 INJECTION, POWDER, FOR SOLUTION INTRAMUSCULAR; INTRAVENOUS at 23:30

## 2018-10-05 ASSESSMENT — PAIN DESCRIPTION - ONSET: ONSET: ON-GOING

## 2018-10-05 ASSESSMENT — PAIN DESCRIPTION - DESCRIPTORS: DESCRIPTORS: ACHING

## 2018-10-05 ASSESSMENT — PAIN SCALES - GENERAL: PAINLEVEL_OUTOF10: 7

## 2018-10-05 ASSESSMENT — PAIN DESCRIPTION - FREQUENCY: FREQUENCY: INTERMITTENT

## 2018-10-05 ASSESSMENT — PAIN DESCRIPTION - LOCATION: LOCATION: ABDOMEN

## 2018-10-05 ASSESSMENT — PAIN DESCRIPTION - PAIN TYPE: TYPE: ACUTE PAIN

## 2018-10-05 NOTE — H&P
is a 69 y.o.  male  With pmh s/f cad, htn, hld presents to ER with symptoms abd pain, nausea, vomitting . He did have 2 BMs earlier toay and was tolerating oral intake. He continued to have abd pain and hoping it would improve. He took some pepto that made him nauseas. His eval in the ED showed a CT abdomen that showed SBO with abrubpt transition point noted on CT 2/2 inflammation v/s adhesion. Review of Systems   Ten point ROS reviewed and negative, unless as noted below. GENERAL:  Denies fever, chills, night sweats, or changes in weight. EYES:  Denies recent visual changes. ENT:  Denies ear pain, hearing loss or tinnitus  RESP:  Denies any cough, dyspnea, or wheezing. CV:  Denies any chest pain with exertion or at rest, palpitations, syncope, or edema. GI:  Positive for nausea, vomiting,& abdominal pain,negative for heartburn, changes in bowel habit, melena or rectal bleeding  MUSCULOSKELETAL:  Denies any joint swelling, joint pain, or loss of range of motion. NEURO:  Denies any headaches, tremors, dizziness, vertigo, memory loss, confusion, weakness, numbness or tingling. PSYCH:  Denies any sleeping problems, history of abuse, marital discord. HEME/LYMPHATIC/IMMUNO:  Denies , bruising, bleeding abnormalities   ENDO:  Denies any heat or cold intolerance, panemiaolyuria or polydipsia. Objective:   No intake or output data in the 24 hours ending 10/04/18 2244   Vitals:   Vitals:    10/04/18 1850   BP: (!) 141/94   Pulse: 80   Resp: 15   Temp: 99.6 °F (37.6 °C)   SpO2: 96%     Physical Exam:   Gen:  Elderly male in no distress. Comfortable. Pain controlled. EYES:Lids and lashes normal, pupils equal, round ,extra ocular muscles intact, sclera clear, conjunctiva normal  ENT:  Normocephalic, oral pharynx with moist mucus membranes  NECK:  Supple, symmetrical, trachea midline, no adenopathy,  LUNGS:  Clear to auscultate bilaterally, no rales ronchi or wheezing noted.   CARDIOVASCULAR:  regular rate

## 2018-10-05 NOTE — ED PROVIDER NOTES
Triage Chief Complaint:   Abdominal Pain    [place]:  Alyson Melchor is a 69 y.o. male that presents today to the emergency department complaining of abdominal pain. Abdominal pains been ongoing ×5 hours. Sharp in nature diffuse throughout. With associated nausea vomiting. Patient states that he had 2 large bowel movements today with no complications. He had a bowel movement after onset of abdominal pain hoping that would help but it did not. He tried to take some Pepto-Bismol. However that is made him nauseous. No history of abdominal surgery in the past.  No flank pain or urinary symptoms no testicle or penile pain. No chest pain or shortness of breath. ROS:  REVIEW OF SYSTEMS    At least 10 systems reviewed      All other review of systems are negative  See HPI and nursing notes for additional information       Past Medical History:   Diagnosis Date    CAD (coronary artery disease)     Cancer ([facility]     prostate    History of cardiac catheterization 05/24/2017    Critical Single vessel CAD with chronic occlusion of RCA. No significant disease of the other vessels. SVBG to RCA is patent with mild Proximal disease. LIMA to LAD did not mature. Normal LV systolic function & wall motion. LVEF is 55 %. Patient tolerated the procedure well. No immediate complications.  History of echocardiogram 01/12/2018    Left ventricular systolic function is normal with an ejection fraction of55-60%. Grade I diastolic dysfunction. Mildly dilated left atrium. Moderate mitral regurgitation. Mild to moderate tricuspid regurgitation. No evidence of pericardial effusion.  HTN (hypertension)     Hyperlipidemia      Past Surgical History:   Procedure Laterality Date    BACK SURGERY      CARDIAC SURGERY      cabg    COLONOSCOPY  4/14/15    divertics    CORONARY ANGIOPLASTY WITH STENT PLACEMENT  05/14/2014    X2     History reviewed. No pertinent family history.   Social History     Social History    Marital status:

## 2018-10-05 NOTE — PROGRESS NOTES
Hospitalist Progress Note      Name:  Rocío Kc /Age/Sex:   (80 y.o. male)   MRN & CSN:  [id number] & [id number] Admission Date/Time: 10/4/2018  8:03 PM   Location:  [address] [place] [address] PCP: Angelique Alas MD       Rocío Kc is a 69 y.o.  male  who presents with Abdominal Pain      Assessment and Plan:   1. Small bowel obstruction   Per CT abd: dilated small bowel loops in RLL , possible adhesions vs inflammatory/infection  IV Unasyn Day #1  NPO  MIVF  antiemetics and pain mx prn  Surgery following     2. HTN  -stable  Cont norvasc, hold tenormin.     3. H/o cad  Cont asa and plavix  Ok for sips with meds.     4. H/o prostate cancer               Diet Diet NPO Effective Now   Code Status Full Code     Medications:   Medications:    influenza virus vaccine  0.5 mL Intramuscular Once    amLODIPine  10 mg Oral Daily    aspirin  81 mg Oral Daily    clopidogrel  75 mg Oral Daily    sodium chloride flush  10 mL Intravenous 2 times per day    enoxaparin  40 mg Subcutaneous Daily    ampicillin-sulbactam  1.5 g Intravenous Q6H    famotidine (PEPCID) injection  20 mg Intravenous BID      Infusions:    sodium chloride 100 mL/hr at 10/05/18 0002     PRN Meds:   promethazine 12.5 mg Q6H PRN   sodium chloride flush 10 mL PRN   magnesium hydroxide 30 mL Daily PRN   ondansetron 4 mg Q6H PRN   morphine 2 mg Q6H PRN     Subjective:     Feels better this am, less pain and nausea  Objective:      Intake/Output Summary (Last 24 hours) at 10/05/18 1038  Last data filed at 10/05/18 0553   Gross per 24 hour   Intake              685 ml   Output              150 ml   Net              535 ml      Vitals:   Vitals:    10/05/18 0509   BP: 135/68   Pulse: 68   Resp: 16   Temp: 98.7 °F (37.1 °C)   SpO2: 90%     Physical Exam:   Gen:  awake, alert, cooperative, no apparent distress  Head/Eyes:  Normocephalic atraumatic, EOMI   NECK:   symmetrical, trachea midline  LUNGS: Normal Effort   CARDIOVASCULAR:  Normal

## 2018-10-05 NOTE — CONSULTS
pulsations or fluid  shifts. No inguinal hernias. No inguinal lymphadenopathy. EXTREMITIES:  Negative for erythema, edema, cellulitis, or cyanosis. ASSESSMENT AND PLAN:  A 78-year-old  male who presents with  abdominal pain with associated nausea and vomiting. CT findings consistent  with a distal small bowel obstruction secondary to possibly inflammatory  process. At this time he is clinically improved and we will just follow  his clinical course. If he does okay, I will start a clear liquid diet at  this time. If this would recur over this hospitalization, he may need an  exploratory laparoscopy to examine that portion of his distal bowel. I  would definitely eventually want to rule out an inflammatory or possibly  neoplastic process if this would occur. My partner, Dr. Milly Poole will be  on call over the course of this weekend and I will discuss the patient with  him but at this time we will continue with conservative management with IV  fluids, clear liquid diet, serial abdominal examination. I would like to thank the Hospitalist Service for giving me an opportunity  to assist in this patient's surgical care and evaluation.         Sachin Modi MD  D: 10/05/2018 6:59:54       T: 10/05/2018 8:03:53     [email or web address]  Job#: [id number]     Doc#: [id number]    CC:  <>

## 2018-10-06 VITALS
DIASTOLIC BLOOD PRESSURE: 70 MMHG | RESPIRATION RATE: 16 BRPM | OXYGEN SATURATION: 95 % | HEART RATE: 58 BPM | HEIGHT: 67 IN | TEMPERATURE: 98.7 F | BODY MASS INDEX: 27.54 KG/M2 | SYSTOLIC BLOOD PRESSURE: 147 MMHG | WEIGHT: 175.44 LBS

## 2018-10-06 PROCEDURE — 2580000003 HC RX 258: Performed by: HOSPITALIST

## 2018-10-06 PROCEDURE — 6360000002 HC RX W HCPCS: Performed by: HOSPITALIST

## 2018-10-06 PROCEDURE — 6370000000 HC RX 637 (ALT 250 FOR IP): Performed by: HOSPITALIST

## 2018-10-06 RX ADMIN — AMLODIPINE BESYLATE 10 MG: 10 TABLET ORAL at 08:42

## 2018-10-06 RX ADMIN — CLOPIDOGREL BISULFATE 75 MG: 75 TABLET ORAL at 08:42

## 2018-10-06 RX ADMIN — AMPICILLIN SODIUM AND SULBACTAM SODIUM 1.5 G: 1; .5 INJECTION, POWDER, FOR SOLUTION INTRAMUSCULAR; INTRAVENOUS at 05:07

## 2018-10-06 RX ADMIN — ASPIRIN 81 MG CHEWABLE TABLET 81 MG: 81 TABLET CHEWABLE at 08:42

## 2018-10-06 NOTE — PROGRESS NOTES
Progress Note    Subjective:      Karen David Patient feels well . Passing flatus no pain hungry     Objective:     BP (!) 147/70   Pulse 58   Temp 98.7 °F (37.1 °C) (Oral)   Resp 16   Ht 5' 7\" (1.702 m)   Wt 175 lb 7 oz (79.6 kg)   SpO2 95%   BMI 27.48 kg/m²     In: 2014 [P. O.:680; I.V.:1234]  Out: -          General: Well apearing  Abdomen: abdomen soft not tender  Lungs: clear  Other: NA    Labs:   CBC:   Lab Results   Component Value Date    WBC 9.4 10/05/2018    RBC 4.46 10/05/2018    HGB 13.0 10/05/2018    HCT 41.2 10/05/2018    MCV 92.4 10/05/2018    MCH 29.1 10/05/2018    MCHC 31.6 10/05/2018    RDW 12.8 10/05/2018     10/05/2018    MPV 12.6 10/05/2018        Assessment:     PSBO resolved     Plan:   Regular diet  If tolerating food without pain home today

## 2018-10-15 PROBLEM — B37.0 THRUSH: Status: ACTIVE | Noted: 2018-10-15

## 2018-10-28 ENCOUNTER — APPOINTMENT (OUTPATIENT)
Dept: GENERAL RADIOLOGY | Age: 78
End: 2018-10-28
Payer: COMMERCIAL

## 2018-10-28 ENCOUNTER — HOSPITAL ENCOUNTER (EMERGENCY)
Age: 78
Discharge: HOME | End: 2018-10-29
Payer: COMMERCIAL

## 2018-10-28 VITALS
HEIGHT: 67 IN | RESPIRATION RATE: 18 BRPM | BODY MASS INDEX: 28.41 KG/M2 | TEMPERATURE: 98.3 F | DIASTOLIC BLOOD PRESSURE: 67 MMHG | OXYGEN SATURATION: 97 % | SYSTOLIC BLOOD PRESSURE: 141 MMHG | WEIGHT: 181 LBS | HEART RATE: 83 BPM

## 2018-10-28 DIAGNOSIS — R05.9 COUGH: Primary | ICD-10-CM

## 2018-10-28 PROCEDURE — 99283 EMERGENCY DEPT VISIT LOW MDM: CPT

## 2018-10-28 PROCEDURE — 71046 X-RAY EXAM CHEST 2 VIEWS: CPT

## 2018-10-29 ENCOUNTER — HOSPITAL ENCOUNTER (EMERGENCY)
Age: 78
Discharge: HOME | End: 2018-10-29
Attending: EMERGENCY MEDICINE
Payer: COMMERCIAL

## 2018-10-29 VITALS
HEART RATE: 80 BPM | HEIGHT: 67 IN | TEMPERATURE: 98.4 F | DIASTOLIC BLOOD PRESSURE: 84 MMHG | RESPIRATION RATE: 18 BRPM | SYSTOLIC BLOOD PRESSURE: 154 MMHG | WEIGHT: 181 LBS | OXYGEN SATURATION: 97 % | BODY MASS INDEX: 28.41 KG/M2

## 2018-10-29 DIAGNOSIS — R05.9 COUGH: Primary | ICD-10-CM

## 2018-10-29 DIAGNOSIS — Z87.09 HISTORY OF COPD: ICD-10-CM

## 2018-10-29 DIAGNOSIS — R06.2 WHEEZES: ICD-10-CM

## 2018-10-29 PROCEDURE — 6370000000 HC RX 637 (ALT 250 FOR IP): Performed by: PHYSICIAN ASSISTANT

## 2018-10-29 PROCEDURE — 93005 ELECTROCARDIOGRAM TRACING: CPT

## 2018-10-29 PROCEDURE — 93005 ELECTROCARDIOGRAM TRACING: CPT | Performed by: PHYSICIAN ASSISTANT

## 2018-10-29 PROCEDURE — 94761 N-INVAS EAR/PLS OXIMETRY MLT: CPT

## 2018-10-29 PROCEDURE — 94640 AIRWAY INHALATION TREATMENT: CPT

## 2018-10-29 PROCEDURE — 93010 ELECTROCARDIOGRAM REPORT: CPT | Performed by: INTERNAL MEDICINE

## 2018-10-29 PROCEDURE — 99283 EMERGENCY DEPT VISIT LOW MDM: CPT

## 2018-10-29 RX ORDER — GUAIFENESIN AND CODEINE PHOSPHATE 100; 10 MG/5ML; MG/5ML
10 SOLUTION ORAL 3 TIMES DAILY PRN
Qty: 100 ML | Refills: 0 | Status: SHIPPED | OUTPATIENT
Start: 2018-10-29 | End: 2018-11-05

## 2018-10-29 RX ORDER — BENZONATATE 100 MG/1
100 CAPSULE ORAL ONCE
Status: COMPLETED | OUTPATIENT
Start: 2018-10-29 | End: 2018-10-29

## 2018-10-29 RX ORDER — IPRATROPIUM BROMIDE AND ALBUTEROL SULFATE 2.5; .5 MG/3ML; MG/3ML
1 SOLUTION RESPIRATORY (INHALATION) ONCE
Status: COMPLETED | OUTPATIENT
Start: 2018-10-29 | End: 2018-10-29

## 2018-10-29 RX ORDER — AZITHROMYCIN 250 MG/1
TABLET, FILM COATED ORAL
Qty: 1 PACKET | Refills: 0 | Status: SHIPPED | OUTPATIENT
Start: 2018-10-29 | End: 2018-11-08

## 2018-10-29 RX ORDER — ALBUTEROL SULFATE 90 UG/1
2 AEROSOL, METERED RESPIRATORY (INHALATION) EVERY 4 HOURS PRN
Qty: 1 INHALER | Refills: 1 | Status: SHIPPED | OUTPATIENT
Start: 2018-10-29 | End: 2020-02-05

## 2018-10-29 RX ORDER — CODEINE PHOSPHATE AND GUAIFENESIN 10; 100 MG/5ML; MG/5ML
5 SOLUTION ORAL EVERY 4 HOURS PRN
Status: DISCONTINUED | OUTPATIENT
Start: 2018-10-29 | End: 2018-10-29 | Stop reason: HOSPADM

## 2018-10-29 RX ORDER — IPRATROPIUM BROMIDE AND ALBUTEROL SULFATE 2.5; .5 MG/3ML; MG/3ML
3 SOLUTION RESPIRATORY (INHALATION) ONCE
Status: COMPLETED | OUTPATIENT
Start: 2018-10-29 | End: 2018-10-29

## 2018-10-29 RX ADMIN — IPRATROPIUM BROMIDE AND ALBUTEROL SULFATE 3 AMPULE: .5; 3 SOLUTION RESPIRATORY (INHALATION) at 00:24

## 2018-10-29 RX ADMIN — GUAIFENESIN AND CODEINE PHOSPHATE 5 ML: 10; 100 LIQUID ORAL at 06:11

## 2018-10-29 RX ADMIN — IPRATROPIUM BROMIDE AND ALBUTEROL SULFATE 1 AMPULE: .5; 3 SOLUTION RESPIRATORY (INHALATION) at 05:50

## 2018-10-29 RX ADMIN — BENZONATATE 100 MG: 100 CAPSULE ORAL at 06:11

## 2018-10-29 ASSESSMENT — PAIN DESCRIPTION - LOCATION: LOCATION: CHEST

## 2018-10-29 ASSESSMENT — PAIN SCALES - GENERAL: PAINLEVEL_OUTOF10: 9

## 2018-10-29 ASSESSMENT — PAIN DESCRIPTION - FREQUENCY: FREQUENCY: CONTINUOUS

## 2018-10-29 ASSESSMENT — PAIN DESCRIPTION - PAIN TYPE: TYPE: ACUTE PAIN

## 2018-10-29 NOTE — ED PROVIDER NOTES
P Axis 60 degrees    R Axis 45 degrees    T Axis 51 degrees    Diagnosis       Normal sinus rhythm  Right bundle branch block  Abnormal ECG  When compared with ECG of 29-OCT-2018 00:12,  No significant change was found        Radiographs (if obtained):  [] The following radiograph was interpreted by myself in the absence of a radiologist:   [] Radiologist's Report Reviewed:  XR CHEST STANDARD (2 VW)   Final Result   No acute findings. EKG (if obtained):   Please See Note of attending physician for EKG interpretation. Chart review shows recent radiograph(s):  Xr Chest Standard (2 Vw)    Result Date: 10/28/2018  EXAMINATION: TWO VIEWS OF THE CHEST 10/28/2018 11:24 pm COMPARISON: Chest radiograph dated February 19, 2018 HISTORY: ORDERING SYSTEM PROVIDED HISTORY: cough TECHNOLOGIST PROVIDED HISTORY: Reason for exam:->cough Ordering Physician Provided Reason for Exam: cough Acuity: Acute Type of Exam: Initial Mechanism of Injury: cough Relevant Medical/Surgical History: cough FINDINGS: Median sternotomy wires are noted. The cardiomediastinal silhouette is stable. There is no focal consolidation, pleural effusion, or pneumothorax. There no evidence of edema. No acute findings. Ct Abdomen Pelvis W Iv Contrast    Result Date: 10/4/2018  EXAMINATION: CT OF THE ABDOMEN AND PELVIS WITH CONTRAST 10/4/2018 9:45 pm TECHNIQUE: CT of the abdomen and pelvis was performed with the administration of intravenous contrast. Multiplanar reformatted images are provided for review. Dose modulation, iterative reconstruction, and/or weight based adjustment of the mA/kV was utilized to reduce the radiation dose to as low as reasonably achievable. COMPARISON: CT abdomen/pelvis from 06/20/2014 HISTORY: ORDERING SYSTEM PROVIDED HISTORY: abdominal pain TECHNOLOGIST PROVIDED HISTORY: IV contrast only. Thank you.  Ordering Physician Provided Reason for Exam: midline abd pain FINDINGS: Lung bases:  Visualized lung bases are well aerated without focal airspace consolidation, lung nodule or lung mass. No pleural or pericardial effusion. Heart size appears within normal limits. Organs: The liver has normal size and contours. Fluid attenuating hypodensity in the liver is compatible with a benign hepatic cysts. No suspicious intrahepatic mass lesion identified. No intra or extrahepatic biliary ductal dilatation. The gallbladder is present. The spleen, pancreas and adrenal glands have a normal contrast-enhanced CT appearance. The kidneys are symmetric in size and contrast-enhanced CT appearance. No suspicious renal lesions identified. No obstructive uropathy. GI/bowel: There are decompressed small bowel loops with mucosal thickening and hyperenhancement of the wall in the right lower quadrant of the abdomen. The proximal small bowel loops are dilated measuring up to 3.4 cm. No mural thickening or adjacent inflammatory changes identified. The appendix is normal and nondilated. Peritoneum/retroperitoneum: There is a small amount of free fluid in the abdomen. No lymphadenopathy or free air identified in the abdomen or pelvis. Aorta has normal course and caliber. Visualized vascular structures are otherwise grossly unremarkable. Pelvis: There are calcifications within an unenlarged prostate. . The urinary bladder is unremarkable. Bones/soft tissues: Visualized osseous structures are mildly demineralized. There multilevel degenerative disc disease severe at L4-5 and L5-S1. there are advanced hypertrophic degenerative changes of both hip joints with joint space narrowing, osteophytosis and subchondral cystic change. No suspicious osseous lesions are identified.      There are mildly dilated small bowel loops in the right lower quadrant of the abdomen which come to an abrupt transition point in area of small bowel with mucosal thickening and mural hyperenhancement, compatible with small-bowel obstruction, possibly secondary to to the ED were discussed. The plan is to discharge from the ED at this time, and the patient is in stable condition. The patient acknowledged understanding is agreeable with this plan. The patient and/or family and I have discussed the diagnosis and risks, and we agree with discharging home to follow-up with their primary care, specialist or referral doctor. Questions addressed. Disposition and follow-up agreed upon. Specific discharge instructions explained. We have discussed the symptoms which are most concerning that necessitate immediate return. We also discussed returning to the Emergency Department immediately if new or worsening symptoms occur.        I independently managed patient today in the ED.        BP (!) 141/67   Pulse 83   Temp 98.3 °F (36.8 °C) (Oral)   Resp 18   Ht 5' 7\" (1.702 m)   Wt 181 lb (82.1 kg)   SpO2 97%   BMI 28.35 kg/m²       Clinical Impression:  1. Cough        Disposition referral (if applicable): MD Yamil Chong 137  [phone]    In 2 days      Disposition medications (if applicable):  New Prescriptions    AZITHROMYCIN (ZITHROMAX) 250 MG TABLET    Take 2 tablets (500 mg) on Day 1, followed by 1 tablet (250 mg) once daily on Days 2 through 5. GUAIFENESIN-CODEINE (GUAIFENESIN AC) 100-10 MG/5ML SYRUP    Take 10 mLs by mouth 3 times daily as needed for Cough for up to 7 days. .         Comment: Please note this report has been produced using speech recognition software and may contain errors related to that system including errors in grammar, punctuation, and spelling, as well as words and phrases that may be inappropriate. If there are any questions or concerns please feel free to contact the dictating provider for clarification.       Yanely Hutchinson, [address] [place] [address]  10/29/18 6913

## 2018-10-29 NOTE — ED PROVIDER NOTES
I independently examined and evaluated Abilio Tucker. In brief their history revealed presented for cough. Onset approximately 12 hours. Context is patient notes nonproductive cough with associated wheezes for approximately 12 hours. He was seen approximately 5-6 hours ago in emergency department at which time symptomatically treatment alleviated symptoms. Return home and return to sleep. Upon awakening he notes cough and wheezes progressively returning. Otherwise no chest pain, palpitations. No shortness of breath.     Patient does not smoke. Patient has a long history of smoking in the past-quit × 15yrs. patient has history of COPD-states current symptoms feel similar to previous COPD exacerbations. Patient tells me he has no chest pain this feels like his COPD. Otherwise he states he feels fine. He denies other questions or concerns. Their focused exam revealed alert and oriented male resting in bed no distress he's very pleasant head is cephalic atraumatic sclerae clear airway normal lungs have slight expiratory wheezing no rhonchi no rales no distress no exertional muscle use. Heart is regular rate rhythm 2+ pulses throughout abdomen soft nontender bowel sounds present normal.  Cranial nerves are intact 5 out of 5 strength throughout skin has no rash or swelling. No calf pain. ED course: Patient seen with PA's please see their note patient here with cough congestion COPD exacerbation symptoms. He tells me he has no chest pain he does have wheezing and cough. He is a former smoker denies any DVT or PE risk factors. Patient states again this feels like his COPD his vital signs are stable he was given treatments and steroids here he states he feels better he would like to go home he does not feel like this is his heart.   Patient denies other questions he appears well at this time this appears to be a COPD exacerbation we'll treat him as such is no extremity swelling he is afebrile he's had no chest pain or distress given return precautions medications follow-up and instructions. Patient discharged. All diagnostic, treatment, and disposition decisions were made by myself in conjunction with the Advanced Practice Provider. For all further details of the patient's emergency department visit, please see the Advanced Practice Provider's documentation.         Latoya Mcneill, DO  10/30/18 0917

## 2018-10-31 LAB
EKG ATRIAL RATE: 74 BPM
EKG DIAGNOSIS: NORMAL
EKG P AXIS: 60 DEGREES
EKG P-R INTERVAL: 168 MS
EKG Q-T INTERVAL: 440 MS
EKG QRS DURATION: 154 MS
EKG QTC CALCULATION (BAZETT): 488 MS
EKG R AXIS: 45 DEGREES
EKG T AXIS: 51 DEGREES
EKG VENTRICULAR RATE: 74 BPM

## 2018-11-04 ENCOUNTER — APPOINTMENT (OUTPATIENT)
Dept: GENERAL RADIOLOGY | Age: 78
End: 2018-11-04
Payer: COMMERCIAL

## 2018-11-04 ENCOUNTER — HOSPITAL ENCOUNTER (EMERGENCY)
Age: 78
Discharge: HOME | End: 2018-11-04
Payer: COMMERCIAL

## 2018-11-04 VITALS
WEIGHT: 180 LBS | BODY MASS INDEX: 28.25 KG/M2 | SYSTOLIC BLOOD PRESSURE: 161 MMHG | TEMPERATURE: 98 F | OXYGEN SATURATION: 99 % | RESPIRATION RATE: 25 BRPM | DIASTOLIC BLOOD PRESSURE: 78 MMHG | HEART RATE: 90 BPM | HEIGHT: 67 IN

## 2018-11-04 DIAGNOSIS — E87.6 HYPOKALEMIA: ICD-10-CM

## 2018-11-04 DIAGNOSIS — J44.1 COPD EXACERBATION (HCC): Primary | ICD-10-CM

## 2018-11-04 LAB
ALBUMIN SERPL-MCNC: 4.1 GM/DL (ref 3.4–5)
ALP BLD-CCNC: 119 IU/L (ref 40–129)
ALT SERPL-CCNC: 10 U/L (ref 10–40)
ANION GAP SERPL CALCULATED.3IONS-SCNC: 12 MMOL/L (ref 4–16)
AST SERPL-CCNC: 15 IU/L (ref 15–37)
BASOPHILS ABSOLUTE: 0.1 K/CU MM
BASOPHILS RELATIVE PERCENT: 0.7 % (ref 0–1)
BILIRUB SERPL-MCNC: 0.3 MG/DL (ref 0–1)
BUN BLDV-MCNC: 17 MG/DL (ref 6–23)
CALCIUM SERPL-MCNC: 9.1 MG/DL (ref 8.3–10.6)
CHLORIDE BLD-SCNC: 99 MMOL/L (ref 99–110)
CO2: 26 MMOL/L (ref 21–32)
CREAT SERPL-MCNC: 0.9 MG/DL (ref 0.9–1.3)
DIFFERENTIAL TYPE: ABNORMAL
EOSINOPHILS ABSOLUTE: 0.5 K/CU MM
EOSINOPHILS RELATIVE PERCENT: 5.9 % (ref 0–3)
GFR AFRICAN AMERICAN: >60 ML/MIN/1.73M2
GFR NON-AFRICAN AMERICAN: >60 ML/MIN/1.73M2
GLUCOSE BLD-MCNC: 129 MG/DL (ref 70–99)
HCT VFR BLD CALC: 42.1 % (ref 42–52)
HEMOGLOBIN: 13.7 GM/DL (ref 13.5–18)
IMMATURE NEUTROPHIL %: 0.4 % (ref 0–0.43)
LYMPHOCYTES ABSOLUTE: 1.8 K/CU MM
LYMPHOCYTES RELATIVE PERCENT: 21.8 % (ref 24–44)
MCH RBC QN AUTO: 29.7 PG (ref 27–31)
MCHC RBC AUTO-ENTMCNC: 32.5 % (ref 32–36)
MCV RBC AUTO: 91.1 FL (ref 78–100)
MONOCYTES ABSOLUTE: 0.7 K/CU MM
MONOCYTES RELATIVE PERCENT: 9 % (ref 0–4)
NUCLEATED RBC %: 0 %
PDW BLD-RTO: 13.1 % (ref 11.7–14.9)
PLATELET # BLD: 158 K/CU MM (ref 140–440)
PMV BLD AUTO: 12.2 FL (ref 7.5–11.1)
POTASSIUM SERPL-SCNC: 2.9 MMOL/L (ref 3.5–5.1)
PRO-BNP: 73.23 PG/ML
RBC # BLD: 4.62 M/CU MM (ref 4.6–6.2)
REASON FOR REJECTION: NORMAL
REJECTED TEST: NORMAL
SEGMENTED NEUTROPHILS ABSOLUTE COUNT: 5.1 K/CU MM
SEGMENTED NEUTROPHILS RELATIVE PERCENT: 62.2 % (ref 36–66)
SODIUM BLD-SCNC: 137 MMOL/L (ref 135–145)
SOURCE: NORMAL
TOTAL IMMATURE NEUTOROPHIL: 0.03 K/CU MM
TOTAL NUCLEATED RBC: 0 K/CU MM
TOTAL PROTEIN: 7.2 GM/DL (ref 6.4–8.2)
TROPONIN T: <0.01 NG/ML
WBC # BLD: 8.2 K/CU MM (ref 4–10.5)

## 2018-11-04 PROCEDURE — 94640 AIRWAY INHALATION TREATMENT: CPT

## 2018-11-04 PROCEDURE — 93005 ELECTROCARDIOGRAM TRACING: CPT | Performed by: PHYSICIAN ASSISTANT

## 2018-11-04 PROCEDURE — 84484 ASSAY OF TROPONIN QUANT: CPT

## 2018-11-04 PROCEDURE — 85025 COMPLETE CBC W/AUTO DIFF WBC: CPT

## 2018-11-04 PROCEDURE — 94761 N-INVAS EAR/PLS OXIMETRY MLT: CPT

## 2018-11-04 PROCEDURE — 6370000000 HC RX 637 (ALT 250 FOR IP): Performed by: PHYSICIAN ASSISTANT

## 2018-11-04 PROCEDURE — 94664 DEMO&/EVAL PT USE INHALER: CPT

## 2018-11-04 PROCEDURE — 80053 COMPREHEN METABOLIC PANEL: CPT

## 2018-11-04 PROCEDURE — 96375 TX/PRO/DX INJ NEW DRUG ADDON: CPT

## 2018-11-04 PROCEDURE — 96366 THER/PROPH/DIAG IV INF ADDON: CPT

## 2018-11-04 PROCEDURE — 93010 ELECTROCARDIOGRAM REPORT: CPT | Performed by: INTERNAL MEDICINE

## 2018-11-04 PROCEDURE — 6360000002 HC RX W HCPCS: Performed by: PHYSICIAN ASSISTANT

## 2018-11-04 PROCEDURE — 71046 X-RAY EXAM CHEST 2 VIEWS: CPT

## 2018-11-04 PROCEDURE — 99284 EMERGENCY DEPT VISIT MOD MDM: CPT

## 2018-11-04 PROCEDURE — 96365 THER/PROPH/DIAG IV INF INIT: CPT

## 2018-11-04 PROCEDURE — 83880 ASSAY OF NATRIURETIC PEPTIDE: CPT

## 2018-11-04 RX ORDER — IPRATROPIUM BROMIDE AND ALBUTEROL SULFATE 2.5; .5 MG/3ML; MG/3ML
3 SOLUTION RESPIRATORY (INHALATION) ONCE
Status: COMPLETED | OUTPATIENT
Start: 2018-11-04 | End: 2018-11-04

## 2018-11-04 RX ORDER — IPRATROPIUM BROMIDE AND ALBUTEROL SULFATE 2.5; .5 MG/3ML; MG/3ML
2 SOLUTION RESPIRATORY (INHALATION) ONCE
Status: COMPLETED | OUTPATIENT
Start: 2018-11-04 | End: 2018-11-04

## 2018-11-04 RX ORDER — CODEINE PHOSPHATE AND GUAIFENESIN 10; 100 MG/5ML; MG/5ML
5 SOLUTION ORAL EVERY 4 HOURS PRN
Status: DISCONTINUED | OUTPATIENT
Start: 2018-11-04 | End: 2018-11-04 | Stop reason: HOSPADM

## 2018-11-04 RX ORDER — POTASSIUM CHLORIDE 20 MEQ/1
40 TABLET, EXTENDED RELEASE ORAL ONCE
Status: COMPLETED | OUTPATIENT
Start: 2018-11-04 | End: 2018-11-04

## 2018-11-04 RX ORDER — POTASSIUM CHLORIDE 7.45 MG/ML
10 INJECTION INTRAVENOUS PRN
Status: DISCONTINUED | OUTPATIENT
Start: 2018-11-04 | End: 2018-11-04

## 2018-11-04 RX ORDER — METHYLPREDNISOLONE SODIUM SUCCINATE 125 MG/2ML
125 INJECTION, POWDER, LYOPHILIZED, FOR SOLUTION INTRAMUSCULAR; INTRAVENOUS ONCE
Status: COMPLETED | OUTPATIENT
Start: 2018-11-04 | End: 2018-11-04

## 2018-11-04 RX ORDER — POTASSIUM CHLORIDE 7.45 MG/ML
20 INJECTION INTRAVENOUS ONCE
Status: COMPLETED | OUTPATIENT
Start: 2018-11-04 | End: 2018-11-04

## 2018-11-04 RX ADMIN — METHYLPREDNISOLONE SODIUM SUCCINATE 125 MG: 125 INJECTION, POWDER, LYOPHILIZED, FOR SOLUTION INTRAMUSCULAR; INTRAVENOUS at 04:01

## 2018-11-04 RX ADMIN — GUAIFENESIN AND CODEINE PHOSPHATE 5 ML: 10; 100 LIQUID ORAL at 04:01

## 2018-11-04 RX ADMIN — POTASSIUM CHLORIDE 40 MEQ: 20 TABLET, EXTENDED RELEASE ORAL at 06:19

## 2018-11-04 RX ADMIN — POTASSIUM CHLORIDE 20 MEQ: 7.46 INJECTION, SOLUTION INTRAVENOUS at 06:29

## 2018-11-04 RX ADMIN — IPRATROPIUM BROMIDE AND ALBUTEROL SULFATE 2 AMPULE: .5; 3 SOLUTION RESPIRATORY (INHALATION) at 05:11

## 2018-11-04 RX ADMIN — IPRATROPIUM BROMIDE AND ALBUTEROL SULFATE 3 AMPULE: .5; 3 SOLUTION RESPIRATORY (INHALATION) at 03:52

## 2018-11-04 NOTE — ED TRIAGE NOTES
Patient presents to ED with complaints of cough and shortness of breath. Reports on going the last couple days. Reports finished antibiotics.

## 2018-11-05 LAB
EKG ATRIAL RATE: 70 BPM
EKG DIAGNOSIS: NORMAL
EKG P AXIS: 87 DEGREES
EKG P-R INTERVAL: 168 MS
EKG Q-T INTERVAL: 452 MS
EKG QRS DURATION: 140 MS
EKG QTC CALCULATION (BAZETT): 488 MS
EKG R AXIS: 53 DEGREES
EKG T AXIS: 59 DEGREES
EKG VENTRICULAR RATE: 70 BPM

## 2018-11-12 ENCOUNTER — HOSPITAL ENCOUNTER (OUTPATIENT)
Dept: GENERAL RADIOLOGY | Age: 78
Discharge: HOME | End: 2018-11-12
Payer: COMMERCIAL

## 2018-11-12 DIAGNOSIS — Z87.19 HISTORY OF SMALL BOWEL OBSTRUCTION: ICD-10-CM

## 2018-11-12 PROCEDURE — 74245 FL UGI W SMALL BOWEL: CPT

## 2018-11-19 ENCOUNTER — HOSPITAL ENCOUNTER (OUTPATIENT)
Age: 78
Discharge: HOME | End: 2018-11-19
Payer: COMMERCIAL

## 2018-11-19 LAB
ANION GAP SERPL CALCULATED.3IONS-SCNC: 12 MMOL/L (ref 4–16)
BUN BLDV-MCNC: 12 MG/DL (ref 6–23)
CALCIUM SERPL-MCNC: 9.1 MG/DL (ref 8.3–10.6)
CHLORIDE BLD-SCNC: 103 MMOL/L (ref 99–110)
CO2: 26 MMOL/L (ref 21–32)
CREAT SERPL-MCNC: 0.8 MG/DL (ref 0.9–1.3)
GFR AFRICAN AMERICAN: >60 ML/MIN/1.73M2
GFR NON-AFRICAN AMERICAN: >60 ML/MIN/1.73M2
GLUCOSE BLD-MCNC: 105 MG/DL (ref 70–99)
MAGNESIUM: 2.2 MG/DL (ref 1.8–2.4)
POTASSIUM SERPL-SCNC: 3.9 MMOL/L (ref 3.5–5.1)
SODIUM BLD-SCNC: 141 MMOL/L (ref 135–145)

## 2018-11-19 PROCEDURE — 83735 ASSAY OF MAGNESIUM: CPT

## 2018-11-19 PROCEDURE — 36415 COLL VENOUS BLD VENIPUNCTURE: CPT

## 2018-11-19 PROCEDURE — 80048 BASIC METABOLIC PNL TOTAL CA: CPT

## 2018-11-28 ENCOUNTER — HOSPITAL ENCOUNTER (OUTPATIENT)
Age: 78
Discharge: HOME | End: 2018-11-28
Payer: COMMERCIAL

## 2018-11-28 LAB — PROSTATE SPECIFIC ANTIGEN: 0.06 NG/ML (ref 0–4)

## 2018-11-28 PROCEDURE — 84153 ASSAY OF PSA TOTAL: CPT

## 2018-11-28 PROCEDURE — G0103 PSA SCREENING: HCPCS

## 2019-04-01 ENCOUNTER — TELEPHONE (OUTPATIENT)
Dept: CARDIOLOGY CLINIC | Age: 79
End: 2019-04-01

## 2019-04-01 ENCOUNTER — OFFICE VISIT (OUTPATIENT)
Dept: CARDIOLOGY CLINIC | Age: 79
End: 2019-04-01
Payer: COMMERCIAL

## 2019-04-01 VITALS
HEIGHT: 66 IN | SYSTOLIC BLOOD PRESSURE: 120 MMHG | DIASTOLIC BLOOD PRESSURE: 80 MMHG | HEART RATE: 60 BPM | BODY MASS INDEX: 28.87 KG/M2 | WEIGHT: 179.6 LBS

## 2019-04-01 DIAGNOSIS — Z95.1 S/P CABG X 2: Chronic | ICD-10-CM

## 2019-04-01 DIAGNOSIS — I25.810 CORONARY ARTERY DISEASE INVOLVING CORONARY BYPASS GRAFT OF NATIVE HEART WITHOUT ANGINA PECTORIS: Primary | ICD-10-CM

## 2019-04-01 DIAGNOSIS — I10 ESSENTIAL HYPERTENSION: Chronic | ICD-10-CM

## 2019-04-01 DIAGNOSIS — I21.11 ST ELEVATION MYOCARDIAL INFARCTION INVOLVING RIGHT CORONARY ARTERY (HCC): ICD-10-CM

## 2019-04-01 DIAGNOSIS — I73.9 PVD (PERIPHERAL VASCULAR DISEASE) (HCC): Chronic | ICD-10-CM

## 2019-04-01 DIAGNOSIS — E78.2 HYPERLIPIDEMIA, MIXED: Chronic | ICD-10-CM

## 2019-04-01 PROCEDURE — 4040F PNEUMOC VAC/ADMIN/RCVD: CPT | Performed by: INTERNAL MEDICINE

## 2019-04-01 PROCEDURE — G8598 ASA/ANTIPLAT THER USED: HCPCS | Performed by: INTERNAL MEDICINE

## 2019-04-01 PROCEDURE — 1036F TOBACCO NON-USER: CPT | Performed by: INTERNAL MEDICINE

## 2019-04-01 PROCEDURE — G8427 DOCREV CUR MEDS BY ELIG CLIN: HCPCS | Performed by: INTERNAL MEDICINE

## 2019-04-01 PROCEDURE — 1123F ACP DISCUSS/DSCN MKR DOCD: CPT | Performed by: INTERNAL MEDICINE

## 2019-04-01 PROCEDURE — 99214 OFFICE O/P EST MOD 30 MIN: CPT | Performed by: INTERNAL MEDICINE

## 2019-04-01 PROCEDURE — G8419 CALC BMI OUT NRM PARAM NOF/U: HCPCS | Performed by: INTERNAL MEDICINE

## 2019-04-01 RX ORDER — ROSUVASTATIN CALCIUM 20 MG/1
20 TABLET, COATED ORAL DAILY
Qty: 30 TABLET | Refills: 5 | Status: ON HOLD | OUTPATIENT
Start: 2019-04-01 | End: 2019-04-30 | Stop reason: SDUPTHER

## 2019-04-01 RX ORDER — ROSUVASTATIN CALCIUM 5 MG/1
20 TABLET, COATED ORAL DAILY
Qty: 30 TABLET | Refills: 5 | Status: SHIPPED | OUTPATIENT
Start: 2019-04-01 | End: 2019-04-01 | Stop reason: SDUPTHER

## 2019-04-01 RX ORDER — OMEPRAZOLE 20 MG/1
20 CAPSULE, DELAYED RELEASE ORAL DAILY
COMMUNITY
End: 2020-10-29 | Stop reason: SDUPTHER

## 2019-04-01 RX ORDER — HYDROCHLOROTHIAZIDE 12.5 MG/1
12.5 CAPSULE, GELATIN COATED ORAL DAILY
COMMUNITY
End: 2019-05-28

## 2019-04-01 NOTE — PROGRESS NOTES
Juarez Deutsch is a 69 y.o. male who has    CHIEF COMPLAINT AS FOLLOWS:  CHEST PAIN: Patient denies any C/O chest pains at this time. SOB: No C/O SOB at this time. LEG EDEMA: No leg edema   PALPITATIONS: Denies any C/O Palpitations                                   DIZZINESS: No C/O Dizziness                          SYNCOPE: None   OTHER:                                     HPI: Patient is here for F/U on his CAD, HTN & Dyslipidemia problems. He does not have any complaints at this time. Mikal Desouza has the following history recorded in care path:  Patient Active Problem List    Diagnosis Date Noted    Unstable angina ([facility] 05/24/2017     Priority: High    Exertional dyspnea      Priority: High    Diarrhea      Priority: High    Nausea      Priority: High    Coronary artery disease involving coronary bypass graft of native heart without angina pectoris      Priority: High    STEMI (ST elevation myocardial infarction) ([facility] 05/14/2014     Priority: Low    ASHD (arteriosclerotic heart disease) 05/14/2014     Priority: Low    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/2014     Priority: Low    HTN (hypertension) 05/14/2014     Priority: Low    Hyperlipidemia, mixed 05/14/2014     Priority: Low    COPD, moderate ([facility] 05/14/2014     Priority: Low    PVD (peripheral vascular disease) ([facility] 05/14/2014     Priority: Low    Thrush 10/15/2018    SBO (small bowel obstruction) ([facility] 10/04/2018     Current Outpatient Medications   Medication Sig Dispense Refill    omeprazole (PRILOSEC) 20 MG delayed release capsule Take 20 mg by mouth daily      hydrochlorothiazide (MICROZIDE) 12.5 MG capsule Take 12.5 mg by mouth daily      albuterol sulfate HFA (PROVENTIL HFA) 108 (90 Base) MCG/ACT inhaler Inhale 2 puffs into the lungs every 4 hours as needed for Wheezing or Shortness of Breath With spacer (and mask if indicated). Thanks.  1 Component Value Date    CKTOTAL 147 04/04/2016    TROPONINT <0.010 11/04/2018     BNP:    Lab Results   Component Value Date    BNP 23 09/16/2012     PT/INR:    Lab Results   Component Value Date    INR 1.00 10/02/2017     Lab Results   Component Value Date    LABA1C 6.3 05/07/2018    LABA1C 6.2 05/02/2017     Lab Results   Component Value Date    WBC 8.2 11/04/2018    HCT 42.1 11/04/2018    MCV 91.1 11/04/2018     11/04/2018     Lab Results   Component Value Date    CHOL 200 (H) 05/07/2018    TRIG 115 05/07/2018    HDL 55 05/07/2018    LDLCALC 87 06/08/2017    LDLDIRECT 138 (H) 05/07/2018     Lab Results   Component Value Date    ALT 10 11/04/2018    AST 15 11/04/2018     BMP:    Lab Results   Component Value Date     11/19/2018    K 3.9 11/19/2018     11/19/2018    CO2 26 11/19/2018    BUN 12 11/19/2018    CREATININE 0.8 11/19/2018     CMP:   Lab Results   Component Value Date     11/19/2018    K 3.9 11/19/2018     11/19/2018    CO2 26 11/19/2018    BUN 12 11/19/2018    PROT 7.2 11/04/2018    PROT 6.6 09/16/2012     TSH:    Lab Results   Component Value Date    TSHHS 0.935 05/07/2018       QUALITY MEASURES REVIEWED:  1.CAD:Patient is taking anti platelet agent:Yes  2. DYSLIPIDEMIA: Patient is on cholesterol lowering medication:Yes  3. Beta-Blocker therapy for CAD, if prior Myocardial Infarction:Yes  4. Counselled regarding smoking cessation. 5. Atrial fibrillation & anticoagulation therapy No  6. Discussed weight management strategies. Impression:    1. Coronary artery disease involving coronary bypass graft of native heart without angina pectoris    2. S/P CABG x 2    3. Essential hypertension    4. Hyperlipidemia, mixed    5. PVD (peripheral vascular disease) ([facility]    6.  ST elevation myocardial infarction involving right coronary artery [facility])       Patient Active Problem List   Diagnosis Code    STEMI (ST elevation myocardial infarction) ([facility] I21.3    ASHD (arteriosclerotic heart disease) I25.10    S/P CABG x 2 Z95.1    HTN (hypertension) I10    Hyperlipidemia, mixed E78.2    COPD, moderate (HCC) J44.9    PVD (peripheral vascular disease) ([facility] I73.9    Exertional dyspnea R06.09    Diarrhea R19.7    Nausea R11.0    Coronary artery disease involving coronary bypass graft of native heart without angina pectoris I25.810    Unstable angina (HCC) I20.0    SBO (small bowel obstruction) ([facility] K56.609    Thrush B37.0       Assessment & Plan:    CAD:Yes. clinically stable. Patient is on optimal medical regimen ( see medication list above )  -     CORONARY ARTERY DISEASE: Patient is currently  asymptomatic from CAD. - changes in  treatment:   no           - Testing ordered:  no  [facility] classification: 1  [place] RISK SCORE:  DANNY RISK SCORE:HTN:well controlled on current medical regimen, see list above. - changes in  treatment:   no   CARDIOMYOPATHY: None known   CONGESTIVE HEART FAILURE: NO KNOWN HISTORY.      VHD: No significant VHD noted  DYSLIPIDEMIA: Patient's profile is at / near Goal.no                                HDL is low                                Tolerating current medical regimen wellyes,                                                               See most recent Lab values in Labs section above. OTHER RELEVANT DIAGNOSIS:as noted in patient's active problem list:  TESTS ORDERED: Lipid profile & LFTs                                    All previously ordered tests reviewed. ARRHYTHMIAS: None known   MEDICATIONS: Increase Crestor to 20 mg daily. Office f/u in six months. Primary/secondary prevention is the goal by aggressive risk modification, healthy and therapeutic life style changes for cardiovascular risk reduction. Various goals are discussed and multiple questions answered.

## 2019-04-01 NOTE — PATIENT INSTRUCTIONS
CAD:Yes. clinically stable. Patient is on optimal medical regimen ( see medication list above )  -     CORONARY ARTERY DISEASE: Patient is currently  asymptomatic from CAD. - changes in  treatment:   no           - Testing ordered:  no  [facility] classification: 1  [place] RISK SCORE:  DANNY RISK SCORE:HTN:well controlled on current medical regimen, see list above. - changes in  treatment:   no   CARDIOMYOPATHY: None known   CONGESTIVE HEART FAILURE: NO KNOWN HISTORY.      VHD: No significant VHD noted  DYSLIPIDEMIA: Patient's profile is at / near Goal.no                                HDL is low                                Tolerating current medical regimen wellyes,                                                               See most recent Lab values in Labs section above. OTHER RELEVANT DIAGNOSIS:as noted in patient's active problem list:  TESTS ORDERED: Lipid profile & LFTs                                    All previously ordered tests reviewed. ARRHYTHMIAS: None known   MEDICATIONS: Increase Crestor to 20 mg daily. Office f/u in six months. Primary/secondary prevention is the goal by aggressive risk modification, healthy and therapeutic life style changes for cardiovascular risk reduction. Various goals are discussed and multiple questions answered.

## 2019-04-01 NOTE — LETTER
[address] [facility]  [address] [facility] [address]  Phone: [phone]  Fax: [phone]    Avelina Patterson MD        April 1, 2019     Gonzalo Rodríguez MD  [address] [place], [place] [id number]    Patient: Marta Hendrix  MR Number: [id number]  YOB: 1940  Date of Visit: 4/1/2019    Dear Dr. Andrea Hamilton Win:    Thank you for the request for consultation for Corin Wilder to me for the evaluation of CAD. Below are the relevant portions of my assessment and plan of care. If you have questions, please do not hesitate to call me. I look forward to following Crsi Elam along with you.     Sincerely,        Avelina Patterson MD

## 2019-04-05 ENCOUNTER — HOSPITAL ENCOUNTER (OUTPATIENT)
Age: 79
Discharge: HOME | End: 2019-04-05
Payer: COMMERCIAL

## 2019-04-05 LAB
ALBUMIN SERPL-MCNC: 4.2 GM/DL (ref 3.4–5)
ALP BLD-CCNC: 110 IU/L (ref 40–129)
ALT SERPL-CCNC: 9 U/L (ref 10–40)
AST SERPL-CCNC: 16 IU/L (ref 15–37)
BILIRUB SERPL-MCNC: 0.6 MG/DL (ref 0–1)
BILIRUBIN DIRECT: 0.2 MG/DL (ref 0–0.3)
BILIRUBIN, INDIRECT: 0.4 MG/DL (ref 0–0.7)
CHOLESTEROL: 154 MG/DL
HDLC SERPL-MCNC: 51 MG/DL
LDL CHOLESTEROL DIRECT: 87 MG/DL
TOTAL PROTEIN: 6.8 GM/DL (ref 6.4–8.2)
TRIGL SERPL-MCNC: 111 MG/DL

## 2019-04-05 PROCEDURE — 83721 ASSAY OF BLOOD LIPOPROTEIN: CPT

## 2019-04-05 PROCEDURE — 80061 LIPID PANEL: CPT

## 2019-04-05 PROCEDURE — 80076 HEPATIC FUNCTION PANEL: CPT

## 2019-04-05 PROCEDURE — 36415 COLL VENOUS BLD VENIPUNCTURE: CPT

## 2019-04-20 ENCOUNTER — HOSPITAL ENCOUNTER (EMERGENCY)
Age: 79
Discharge: HOME | End: 2019-04-20
Attending: EMERGENCY MEDICINE
Payer: COMMERCIAL

## 2019-04-20 ENCOUNTER — APPOINTMENT (OUTPATIENT)
Dept: GENERAL RADIOLOGY | Age: 79
End: 2019-04-20
Payer: COMMERCIAL

## 2019-04-20 VITALS
DIASTOLIC BLOOD PRESSURE: 70 MMHG | WEIGHT: 173 LBS | TEMPERATURE: 97.9 F | OXYGEN SATURATION: 96 % | BODY MASS INDEX: 27.15 KG/M2 | RESPIRATION RATE: 21 BRPM | HEART RATE: 72 BPM | SYSTOLIC BLOOD PRESSURE: 118 MMHG | HEIGHT: 67 IN

## 2019-04-20 DIAGNOSIS — J44.1 COPD EXACERBATION (HCC): Primary | ICD-10-CM

## 2019-04-20 DIAGNOSIS — I49.3 FREQUENT PVCS: ICD-10-CM

## 2019-04-20 LAB
ALBUMIN SERPL-MCNC: 4.4 GM/DL (ref 3.4–5)
ALP BLD-CCNC: 115 IU/L (ref 40–129)
ALT SERPL-CCNC: 11 U/L (ref 10–40)
ANION GAP SERPL CALCULATED.3IONS-SCNC: 11 MMOL/L (ref 4–16)
AST SERPL-CCNC: 15 IU/L (ref 15–37)
BASOPHILS ABSOLUTE: 0.1 K/CU MM
BASOPHILS RELATIVE PERCENT: 0.7 % (ref 0–1)
BILIRUB SERPL-MCNC: 0.5 MG/DL (ref 0–1)
BUN BLDV-MCNC: 14 MG/DL (ref 6–23)
CALCIUM SERPL-MCNC: 9.6 MG/DL (ref 8.3–10.6)
CHLORIDE BLD-SCNC: 100 MMOL/L (ref 99–110)
CO2: 26 MMOL/L (ref 21–32)
CREAT SERPL-MCNC: 1.1 MG/DL (ref 0.9–1.3)
DIFFERENTIAL TYPE: ABNORMAL
EOSINOPHILS ABSOLUTE: 0.9 K/CU MM
EOSINOPHILS RELATIVE PERCENT: 9 % (ref 0–3)
GFR AFRICAN AMERICAN: >60 ML/MIN/1.73M2
GFR NON-AFRICAN AMERICAN: >60 ML/MIN/1.73M2
GLUCOSE BLD-MCNC: 108 MG/DL (ref 70–99)
HCT VFR BLD CALC: 45.5 % (ref 42–52)
HEMOGLOBIN: 14.6 GM/DL (ref 13.5–18)
IMMATURE NEUTROPHIL %: 0.2 % (ref 0–0.43)
LYMPHOCYTES ABSOLUTE: 1.6 K/CU MM
LYMPHOCYTES RELATIVE PERCENT: 17.1 % (ref 24–44)
MAGNESIUM: 2 MG/DL (ref 1.8–2.4)
MCH RBC QN AUTO: 28.7 PG (ref 27–31)
MCHC RBC AUTO-ENTMCNC: 32.1 % (ref 32–36)
MCV RBC AUTO: 89.4 FL (ref 78–100)
MONOCYTES ABSOLUTE: 0.9 K/CU MM
MONOCYTES RELATIVE PERCENT: 9 % (ref 0–4)
NUCLEATED RBC %: 0 %
PDW BLD-RTO: 13.3 % (ref 11.7–14.9)
PLATELET # BLD: 170 K/CU MM (ref 140–440)
PMV BLD AUTO: 12.3 FL (ref 7.5–11.1)
POTASSIUM SERPL-SCNC: 3.3 MMOL/L (ref 3.5–5.1)
PRO-BNP: 957.7 PG/ML
RBC # BLD: 5.09 M/CU MM (ref 4.6–6.2)
SEGMENTED NEUTROPHILS ABSOLUTE COUNT: 6.1 K/CU MM
SEGMENTED NEUTROPHILS RELATIVE PERCENT: 64 % (ref 36–66)
SODIUM BLD-SCNC: 137 MMOL/L (ref 135–145)
TOTAL IMMATURE NEUTOROPHIL: 0.02 K/CU MM
TOTAL NUCLEATED RBC: 0 K/CU MM
TOTAL PROTEIN: 7.6 GM/DL (ref 6.4–8.2)
TROPONIN T: <0.01 NG/ML
WBC # BLD: 9.6 K/CU MM (ref 4–10.5)

## 2019-04-20 PROCEDURE — 71045 X-RAY EXAM CHEST 1 VIEW: CPT

## 2019-04-20 PROCEDURE — 96365 THER/PROPH/DIAG IV INF INIT: CPT

## 2019-04-20 PROCEDURE — 83880 ASSAY OF NATRIURETIC PEPTIDE: CPT

## 2019-04-20 PROCEDURE — 99285 EMERGENCY DEPT VISIT HI MDM: CPT

## 2019-04-20 PROCEDURE — 94640 AIRWAY INHALATION TREATMENT: CPT

## 2019-04-20 PROCEDURE — 84484 ASSAY OF TROPONIN QUANT: CPT

## 2019-04-20 PROCEDURE — 85025 COMPLETE CBC W/AUTO DIFF WBC: CPT

## 2019-04-20 PROCEDURE — 80053 COMPREHEN METABOLIC PANEL: CPT

## 2019-04-20 PROCEDURE — 6370000000 HC RX 637 (ALT 250 FOR IP): Performed by: EMERGENCY MEDICINE

## 2019-04-20 PROCEDURE — 94761 N-INVAS EAR/PLS OXIMETRY MLT: CPT

## 2019-04-20 PROCEDURE — 93005 ELECTROCARDIOGRAM TRACING: CPT | Performed by: EMERGENCY MEDICINE

## 2019-04-20 PROCEDURE — 6360000002 HC RX W HCPCS: Performed by: EMERGENCY MEDICINE

## 2019-04-20 PROCEDURE — 83735 ASSAY OF MAGNESIUM: CPT

## 2019-04-20 RX ORDER — IPRATROPIUM BROMIDE AND ALBUTEROL SULFATE 2.5; .5 MG/3ML; MG/3ML
1 SOLUTION RESPIRATORY (INHALATION) ONCE
Status: COMPLETED | OUTPATIENT
Start: 2019-04-20 | End: 2019-04-20

## 2019-04-20 RX ORDER — MAGNESIUM SULFATE IN WATER 40 MG/ML
2 INJECTION, SOLUTION INTRAVENOUS ONCE
Status: COMPLETED | OUTPATIENT
Start: 2019-04-20 | End: 2019-04-20

## 2019-04-20 RX ORDER — DOXYCYCLINE HYCLATE 100 MG
100 TABLET ORAL ONCE
Status: COMPLETED | OUTPATIENT
Start: 2019-04-20 | End: 2019-04-20

## 2019-04-20 RX ORDER — ALBUTEROL SULFATE 90 UG/1
2 AEROSOL, METERED RESPIRATORY (INHALATION) EVERY 4 HOURS PRN
Qty: 1 INHALER | Refills: 1 | Status: ON HOLD | OUTPATIENT
Start: 2019-04-20 | End: 2019-04-30 | Stop reason: HOSPADM

## 2019-04-20 RX ORDER — PREDNISONE 10 MG/1
60 TABLET ORAL DAILY
Qty: 30 TABLET | Refills: 0 | Status: SHIPPED | OUTPATIENT
Start: 2019-04-20 | End: 2019-04-25

## 2019-04-20 RX ORDER — PREDNISONE 20 MG/1
60 TABLET ORAL ONCE
Status: COMPLETED | OUTPATIENT
Start: 2019-04-20 | End: 2019-04-20

## 2019-04-20 RX ORDER — DOXYCYCLINE HYCLATE 100 MG
100 TABLET ORAL 2 TIMES DAILY
Qty: 20 TABLET | Refills: 0 | Status: ON HOLD | OUTPATIENT
Start: 2019-04-20 | End: 2019-04-30 | Stop reason: HOSPADM

## 2019-04-20 RX ADMIN — IPRATROPIUM BROMIDE AND ALBUTEROL SULFATE 1 AMPULE: .5; 3 SOLUTION RESPIRATORY (INHALATION) at 14:50

## 2019-04-20 RX ADMIN — IPRATROPIUM BROMIDE AND ALBUTEROL SULFATE 1 AMPULE: .5; 3 SOLUTION RESPIRATORY (INHALATION) at 12:55

## 2019-04-20 RX ADMIN — MAGNESIUM SULFATE HEPTAHYDRATE 2 G: 40 INJECTION, SOLUTION INTRAVENOUS at 13:50

## 2019-04-20 RX ADMIN — DOXYCYCLINE HYCLATE 100 MG: 100 TABLET, COATED ORAL at 12:45

## 2019-04-20 RX ADMIN — PREDNISONE 60 MG: 20 TABLET ORAL at 12:45

## 2019-04-20 ASSESSMENT — ENCOUNTER SYMPTOMS
VOMITING: 0
NAUSEA: 1
RHINORRHEA: 0
DIARRHEA: 0
BACK PAIN: 0
COUGH: 1
ABDOMINAL PAIN: 0
EYE REDNESS: 0
CONSTIPATION: 0
WHEEZING: 1
SORE THROAT: 0
SHORTNESS OF BREATH: 1

## 2019-04-20 NOTE — ED NOTES
Discharge instructions reviewed with patient and family. Medications and follow up were discussed.  Patient and family denies further questions and verbalizes understanding     Aaliyah Quintana RN  04/20/19 [id number]

## 2019-04-20 NOTE — ED PROVIDER NOTES
Triage Chief Complaint:   Shortness of Breath and Cough    [employer]:  Marta Salazar is a 69 y.o. male that presents with having waxing and waning symptoms of productive cough and shortness of breath over the last month. Today he started to have associated nausea but no vomiting. He does complain of abdominal pain but states it is only from coughing and does not have any pain when he is not coughing. He is also had a decreased appetite. He denies any diarrhea. No chest pain or fevers. He does note mild swelling in his bilateral lower extremities. No history of blood clots. No recent hospitalizations or surgeries. No recent travel. No known sick contacts. He was given a course of azithromycin and guaifenesin at the beginning of the month and then placed on Claritin and all of which did not improve his symptoms. Patient does have a past history of smoking and has previously been diagnosed with COPD.    ROS:   Review of Systems   Constitutional: Positive for appetite change (decreased). Negative for chills and fever. HENT: Negative for congestion, rhinorrhea and sore throat. Eyes: Negative for redness and visual disturbance. Respiratory: Positive for cough, shortness of breath and wheezing. Cardiovascular: Negative for chest pain and leg swelling. Gastrointestinal: Positive for nausea. Negative for abdominal pain, constipation, diarrhea and vomiting. Genitourinary: Negative for dysuria and frequency. Musculoskeletal: Negative for arthralgias and back pain. Skin: Negative for rash and wound. Neurological: Negative for syncope and headaches. Psychiatric/Behavioral: Negative for hallucinations and suicidal ideas. Past Medical History:   Diagnosis Date    CAD (coronary artery disease)     Cancer ([facility]     prostate    History of cardiac catheterization 05/24/2017    Critical Single vessel CAD with chronic occlusion of RCA. No significant disease of the other vessels. SVBG to RCA is patent with mild Proximal disease. LIMA to LAD did not mature. Normal LV systolic function & wall motion. LVEF is 55 %. Patient tolerated the procedure well. No immediate complications.  History of echocardiogram 2018    Left ventricular systolic function is normal with an ejection fraction of55-60%. Grade I diastolic dysfunction. Mildly dilated left atrium. Moderate mitral regurgitation. Mild to moderate tricuspid regurgitation. No evidence of pericardial effusion.  HTN (hypertension)     Hyperlipidemia      Past Surgical History:   Procedure Laterality Date    BACK SURGERY      CARDIAC SURGERY      cabg    COLONOSCOPY  4/14/15    divertics    CORONARY ANGIOPLASTY WITH STENT PLACEMENT  05/14/2014    X2     History reviewed. No pertinent family history.   Social History     Socioeconomic History    Marital status:      Spouse name: Not on file    Number of children: Not on file    Years of education: Not on file    Highest education level: Not on file   Occupational History    Not on file   Social Needs    Financial resource strain: Not on file    Food insecurity:     Worry: Not on file     Inability: Not on file    Transportation needs:     Medical: Not on file     Non-medical: Not on file   Tobacco Use    Smoking status: Former Smoker     Packs/day: 3.00     Years: 30.00     Pack years: 90.00     Types: Cigarettes     Last attempt to quit: 2006     Years since quittin.3    Smokeless tobacco: Never Used   Substance and Sexual Activity    Alcohol use: No    Drug use: No    Sexual activity: Not Currently   Lifestyle    Physical activity:     Days per week: Not on file     Minutes per session: Not on file    Stress: Not on file   Relationships    Social connections:     Talks on phone: Not on file     Gets together: Not on file     Attends [employer] witness service: Not on file     Active member of club or organization: Not on file     Attends meetings of clubs or organizations: Not on file Relationship status: Not on file    Intimate partner violence:     Fear of current or ex partner: Not on file     Emotionally abused: Not on file     Physically abused: Not on file     Forced sexual activity: Not on file   Other Topics Concern    Not on file   Social History Narrative    Not on file     Current Facility-Administered Medications   Medication Dose Route Frequency Provider Last Rate Last Dose    ipratropium-albuterol (DUONEB) nebulizer solution 1 ampule  1 ampule Inhalation Once Collin Estrella MD         Current Outpatient Medications   Medication Sig Dispense Refill    predniSONE (DELTASONE) 10 MG tablet Take 6 tablets by mouth daily for 5 days 30 tablet 0    doxycycline hyclate (VIBRA-TABS) 100 MG tablet Take 1 tablet by mouth 2 times daily for 10 days 20 tablet 0    albuterol sulfate HFA (PROVENTIL HFA) 108 (90 Base) MCG/ACT inhaler Inhale 2 puffs into the lungs every 4 hours as needed for Wheezing or Shortness of Breath With spacer (and mask if indicated). Thanks. 1 Inhaler 1    RA LORATADINE 10 MG tablet take 1 tablet by mouth once daily  0    omeprazole (PRILOSEC) 20 MG delayed release capsule Take 20 mg by mouth daily      hydrochlorothiazide (MICROZIDE) 12.5 MG capsule Take 12.5 mg by mouth daily      Compression Stockings MISC by Does not apply route Thigh high, 20 to 30 mm of Hg 1 each 3    rosuvastatin (CRESTOR) 20 MG tablet Take 1 tablet by mouth daily 30 tablet 5    albuterol sulfate HFA (PROVENTIL HFA) 108 (90 Base) MCG/ACT inhaler Inhale 2 puffs into the lungs every 4 hours as needed for Wheezing or Shortness of Breath With spacer (and mask if indicated). Thanks.  1 Inhaler 1    nystatin (MYCOSTATIN) 256883 UNIT/ML suspension Take 5 mLs by mouth 4 times daily 1 Bottle 1    Multiple Vitamins-Minerals (THERAPEUTIC MULTIVITAMIN-MINERALS) tablet Take 1 tablet by mouth daily      aspirin 81 MG tablet Take 81 mg by mouth daily      clopidogrel (PLAVIX) 75 MG tablet Take 75 100 FL    MCH 28.7 27 - 31 PG    MCHC 32.1 32.0 - 36.0 %    RDW 13.3 11.7 - 14.9 %    Platelets 962 753 - 015 K/CU MM    MPV 12.3 (H) 7.5 - 11.1 FL    Differential Type AUTOMATED DIFFERENTIAL     Segs Relative 64.0 36 - 66 %    Lymphocytes % 17.1 (L) 24 - 44 %    Monocytes % 9.0 (H) 0 - 4 %    Eosinophils % 9.0 (H) 0 - 3 %    Basophils % 0.7 0 - 1 %    Segs Absolute 6.1 K/CU MM    Lymphocytes # 1.6 K/CU MM    Monocytes # 0.9 K/CU MM    Eosinophils # 0.9 K/CU MM    Basophils # 0.1 K/CU MM    Nucleated RBC % 0.0 %    Total Nucleated RBC 0.0 K/CU MM    Total Immature Neutrophil 0.02 K/CU MM    Immature Neutrophil % 0.2 0 - 0.43 %   Comprehensive Metabolic Panel w/ Reflex to MG   Result Value Ref Range    Sodium 137 135 - 145 MMOL/L    Potassium 3.3 (L) 3.5 - 5.1 MMOL/L    Chloride 100 99 - 110 mMol/L    CO2 26 21 - 32 MMOL/L    BUN 14 6 - 23 MG/DL    CREATININE 1.1 0.9 - 1.3 MG/DL    Glucose 108 (H) 70 - 99 MG/DL    Calcium 9.6 8.3 - 10.6 MG/DL    Alb 4.4 3.4 - 5.0 GM/DL    Total Protein 7.6 6.4 - 8.2 GM/DL    Total Bilirubin 0.5 0.0 - 1.0 MG/DL    ALT 11 10 - 40 U/L    AST 15 15 - 37 IU/L    Alkaline Phosphatase 115 40 - 129 IU/L    GFR Non-African American >60 >60 mL/min/1.73m2    GFR African American >60 >60 mL/min/1.73m2    Anion Gap 11 4 - 16   Troponin   Result Value Ref Range    Troponin T <0.010 <0.01 NG/ML   Brain Natriuretic Peptide   Result Value Ref Range    Pro-.7 (H) <300 PG/ML   Magnesium   Result Value Ref Range    Magnesium 2.0 1.8 - 2.4 mg/dl   EKG 12 Lead   Result Value Ref Range    Ventricular Rate 83 BPM    Atrial Rate 83 BPM    P-R Interval 166 ms    QRS Duration 134 ms    Q-T Interval 418 ms    QTc Calculation (Bazett) 491 ms    P Axis 118 degrees    R Axis 82 degrees    T Axis 42 degrees    Diagnosis       Sinus rhythm with frequent premature ventricular complexes  Right bundle branch block  Abnormal ECG  When compared with ECG of 04-NOV-2018 04:45,  premature ventricular complexes are now present        Radiographs (if obtained):  [] The following radiograph was interpreted by myself in the absence of a radiologist:  [x]Radiologist's Report Reviewed:  XR CHEST PORTABLE   Final Result   No acute process. Stable exam.               EKG (if obtained): (All EKG's are interpreted by myself in the absence of a cardiologist)  Sinus rhythm with frequent PVCs. Rate of 83. OH interval 166, , . Nonspecific ST segments and T waves. Right bundle branch block. Impression: Abnormal EKG. When compared to previous EKG from 11/4/18, the PVCs are new, otherwise no significant changes. MDM:  Differential diagnoses considered include COPD exacerbation, pneumonia, bronchitis, viral URI, CHF exacerbation, pneumothorax. Patient presenting to the emergency department with symptoms consistent with reactive airway disease exacerbation. Patient's pulse ox is normal.  Based on clinical presentation history and physical exam I do not see any evidence to suggest pulmonary embolus, acute coronary syndrome or aortic dissection. Chest x-ray shows no acute cardiopulmonary abnormalities. No pneumothorax and no pneumonia. On reassessment after emergency department therapy, including aerosol treatments and steroids, wheezing is improved but still significantly present. He was then given magnesium and an additional breathing treatment after which his wheezing is significantly improved and patient states their shortness of breath is improved. He as maintained normal oxygen saturations on room air throughout his stay in the emergency department. Cardiac monitor revealed bigemeny with a heart rate of 79 as interpreted by me. Patient presented with shortness of breath and cardiac monitor was ordered to monitor for dysrhythmia.     I did consult with Dr. Serna Light - on call cardio - and discussed patient's history, ED presentation/course including any pertinent laboratory findings and imaging

## 2019-04-20 NOTE — ED NOTES
Vini Barraza RT called for breathing treatment, informed that discharge is pending until tx is done     Shaq, RN  04/20/19 [id number]

## 2019-04-21 PROCEDURE — 93010 ELECTROCARDIOGRAM REPORT: CPT | Performed by: INTERNAL MEDICINE

## 2019-04-23 LAB
EKG ATRIAL RATE: 83 BPM
EKG DIAGNOSIS: NORMAL
EKG P AXIS: 118 DEGREES
EKG P-R INTERVAL: 166 MS
EKG Q-T INTERVAL: 418 MS
EKG QRS DURATION: 134 MS
EKG QTC CALCULATION (BAZETT): 491 MS
EKG R AXIS: 82 DEGREES
EKG T AXIS: 42 DEGREES
EKG VENTRICULAR RATE: 83 BPM

## 2019-04-27 ENCOUNTER — HOSPITAL ENCOUNTER (INPATIENT)
Age: 79
LOS: 3 days | Discharge: HOME | DRG: 251 | End: 2019-04-30
Attending: EMERGENCY MEDICINE | Admitting: HOSPITALIST
Payer: COMMERCIAL

## 2019-04-27 ENCOUNTER — APPOINTMENT (OUTPATIENT)
Dept: GENERAL RADIOLOGY | Age: 79
DRG: 251 | End: 2019-04-27
Payer: COMMERCIAL

## 2019-04-27 DIAGNOSIS — R00.1 BRADYCARDIA: ICD-10-CM

## 2019-04-27 DIAGNOSIS — R42 LIGHTHEADEDNESS: Primary | ICD-10-CM

## 2019-04-27 DIAGNOSIS — E87.6 HYPOKALEMIA: ICD-10-CM

## 2019-04-27 LAB
ALBUMIN SERPL-MCNC: 3.8 GM/DL (ref 3.4–5)
ALP BLD-CCNC: 82 IU/L (ref 40–129)
ALT SERPL-CCNC: 38 U/L (ref 10–40)
ANION GAP SERPL CALCULATED.3IONS-SCNC: 13 MMOL/L (ref 4–16)
AST SERPL-CCNC: 13 IU/L (ref 15–37)
BASOPHILS ABSOLUTE: 0 K/CU MM
BASOPHILS RELATIVE PERCENT: 0.1 % (ref 0–1)
BILIRUB SERPL-MCNC: 0.2 MG/DL (ref 0–1)
BUN BLDV-MCNC: 35 MG/DL (ref 6–23)
CALCIUM SERPL-MCNC: 9.2 MG/DL (ref 8.3–10.6)
CHLORIDE BLD-SCNC: 99 MMOL/L (ref 99–110)
CO2: 26 MMOL/L (ref 21–32)
CREAT SERPL-MCNC: 1.4 MG/DL (ref 0.9–1.3)
DIFFERENTIAL TYPE: ABNORMAL
EOSINOPHILS ABSOLUTE: 0.6 K/CU MM
EOSINOPHILS RELATIVE PERCENT: 5.1 % (ref 0–3)
GFR AFRICAN AMERICAN: 59 ML/MIN/1.73M2
GFR NON-AFRICAN AMERICAN: 49 ML/MIN/1.73M2
GLUCOSE BLD-MCNC: 139 MG/DL (ref 70–99)
HCT VFR BLD CALC: 46.7 % (ref 42–52)
HEMOGLOBIN: 15 GM/DL (ref 13.5–18)
IMMATURE NEUTROPHIL %: 1.1 % (ref 0–0.43)
LYMPHOCYTES ABSOLUTE: 2.9 K/CU MM
LYMPHOCYTES RELATIVE PERCENT: 23.3 % (ref 24–44)
MAGNESIUM: 2 MG/DL (ref 1.8–2.4)
MAGNESIUM: 2.1 MG/DL (ref 1.8–2.4)
MCH RBC QN AUTO: 28.7 PG (ref 27–31)
MCHC RBC AUTO-ENTMCNC: 32.1 % (ref 32–36)
MCV RBC AUTO: 89.3 FL (ref 78–100)
MONOCYTES ABSOLUTE: 1.2 K/CU MM
MONOCYTES RELATIVE PERCENT: 9.5 % (ref 0–4)
NUCLEATED RBC %: 0 %
PDW BLD-RTO: 13.9 % (ref 11.7–14.9)
PLATELET # BLD: 172 K/CU MM (ref 140–440)
PMV BLD AUTO: 12.9 FL (ref 7.5–11.1)
POTASSIUM SERPL-SCNC: 3.1 MMOL/L (ref 3.5–5.1)
RBC # BLD: 5.23 M/CU MM (ref 4.6–6.2)
SEGMENTED NEUTROPHILS ABSOLUTE COUNT: 7.5 K/CU MM
SEGMENTED NEUTROPHILS RELATIVE PERCENT: 60.9 % (ref 36–66)
SODIUM BLD-SCNC: 138 MMOL/L (ref 135–145)
TOTAL IMMATURE NEUTOROPHIL: 0.14 K/CU MM
TOTAL NUCLEATED RBC: 0 K/CU MM
TOTAL PROTEIN: 6.3 GM/DL (ref 6.4–8.2)
TROPONIN T: <0.01 NG/ML
TROPONIN T: <0.01 NG/ML
TSH HIGH SENSITIVITY: 1.77 UIU/ML (ref 0.27–4.2)
WBC # BLD: 12.3 K/CU MM (ref 4–10.5)

## 2019-04-27 PROCEDURE — 6370000000 HC RX 637 (ALT 250 FOR IP): Performed by: HOSPITALIST

## 2019-04-27 PROCEDURE — 2140000000 HC CCU INTERMEDIATE R&B

## 2019-04-27 PROCEDURE — 93005 ELECTROCARDIOGRAM TRACING: CPT | Performed by: EMERGENCY MEDICINE

## 2019-04-27 PROCEDURE — 84484 ASSAY OF TROPONIN QUANT: CPT

## 2019-04-27 PROCEDURE — 93226 XTRNL ECG REC<48 HR SCAN A/R: CPT

## 2019-04-27 PROCEDURE — 2580000003 HC RX 258: Performed by: HOSPITALIST

## 2019-04-27 PROCEDURE — 85025 COMPLETE CBC W/AUTO DIFF WBC: CPT

## 2019-04-27 PROCEDURE — 80053 COMPREHEN METABOLIC PANEL: CPT

## 2019-04-27 PROCEDURE — 36415 COLL VENOUS BLD VENIPUNCTURE: CPT

## 2019-04-27 PROCEDURE — 84443 ASSAY THYROID STIM HORMONE: CPT

## 2019-04-27 PROCEDURE — 71045 X-RAY EXAM CHEST 1 VIEW: CPT

## 2019-04-27 PROCEDURE — 83735 ASSAY OF MAGNESIUM: CPT

## 2019-04-27 PROCEDURE — 99285 EMERGENCY DEPT VISIT HI MDM: CPT

## 2019-04-27 PROCEDURE — 6370000000 HC RX 637 (ALT 250 FOR IP): Performed by: EMERGENCY MEDICINE

## 2019-04-27 RX ORDER — POTASSIUM CHLORIDE 1.5 G/1.77G
40 POWDER, FOR SOLUTION ORAL PRN
Status: DISCONTINUED | OUTPATIENT
Start: 2019-04-27 | End: 2019-04-30 | Stop reason: HOSPADM

## 2019-04-27 RX ORDER — SODIUM CHLORIDE 0.9 % (FLUSH) 0.9 %
10 SYRINGE (ML) INJECTION EVERY 12 HOURS SCHEDULED
Status: DISCONTINUED | OUTPATIENT
Start: 2019-04-27 | End: 2019-04-30 | Stop reason: HOSPADM

## 2019-04-27 RX ORDER — MAGNESIUM SULFATE 1 G/100ML
1 INJECTION INTRAVENOUS PRN
Status: DISCONTINUED | OUTPATIENT
Start: 2019-04-27 | End: 2019-04-30 | Stop reason: HOSPADM

## 2019-04-27 RX ORDER — PANTOPRAZOLE SODIUM 40 MG/1
40 TABLET, DELAYED RELEASE ORAL
Status: DISCONTINUED | OUTPATIENT
Start: 2019-04-28 | End: 2019-04-30 | Stop reason: HOSPADM

## 2019-04-27 RX ORDER — ONDANSETRON 2 MG/ML
4 INJECTION INTRAMUSCULAR; INTRAVENOUS EVERY 6 HOURS PRN
Status: DISCONTINUED | OUTPATIENT
Start: 2019-04-27 | End: 2019-04-30 | Stop reason: HOSPADM

## 2019-04-27 RX ORDER — DOXYCYCLINE HYCLATE 100 MG
100 TABLET ORAL 2 TIMES DAILY
Status: COMPLETED | OUTPATIENT
Start: 2019-04-27 | End: 2019-04-30

## 2019-04-27 RX ORDER — SODIUM CHLORIDE 9 MG/ML
INJECTION, SOLUTION INTRAVENOUS CONTINUOUS
Status: DISPENSED | OUTPATIENT
Start: 2019-04-27 | End: 2019-04-28

## 2019-04-27 RX ORDER — IPRATROPIUM BROMIDE AND ALBUTEROL SULFATE 2.5; .5 MG/3ML; MG/3ML
1 SOLUTION RESPIRATORY (INHALATION)
Status: DISCONTINUED | OUTPATIENT
Start: 2019-04-28 | End: 2019-04-30 | Stop reason: HOSPADM

## 2019-04-27 RX ORDER — ROSUVASTATIN CALCIUM 20 MG/1
20 TABLET, COATED ORAL DAILY
Status: DISCONTINUED | OUTPATIENT
Start: 2019-04-28 | End: 2019-04-30 | Stop reason: HOSPADM

## 2019-04-27 RX ORDER — HYDROCHLOROTHIAZIDE 12.5 MG/1
12.5 TABLET ORAL DAILY
Status: DISCONTINUED | OUTPATIENT
Start: 2019-04-28 | End: 2019-04-30 | Stop reason: HOSPADM

## 2019-04-27 RX ORDER — SODIUM CHLORIDE 0.9 % (FLUSH) 0.9 %
10 SYRINGE (ML) INJECTION PRN
Status: DISCONTINUED | OUTPATIENT
Start: 2019-04-27 | End: 2019-04-30 | Stop reason: HOSPADM

## 2019-04-27 RX ORDER — ALBUTEROL SULFATE 90 UG/1
2 AEROSOL, METERED RESPIRATORY (INHALATION) EVERY 4 HOURS PRN
Status: DISCONTINUED | OUTPATIENT
Start: 2019-04-27 | End: 2019-04-30 | Stop reason: HOSPADM

## 2019-04-27 RX ORDER — POTASSIUM CHLORIDE 20 MEQ/1
40 TABLET, EXTENDED RELEASE ORAL ONCE
Status: COMPLETED | OUTPATIENT
Start: 2019-04-27 | End: 2019-04-27

## 2019-04-27 RX ORDER — POTASSIUM CHLORIDE 7.45 MG/ML
10 INJECTION INTRAVENOUS PRN
Status: DISCONTINUED | OUTPATIENT
Start: 2019-04-27 | End: 2019-04-30 | Stop reason: HOSPADM

## 2019-04-27 RX ORDER — SODIUM CHLORIDE 9 MG/ML
INJECTION, SOLUTION INTRAVENOUS ONCE
Status: DISCONTINUED | OUTPATIENT
Start: 2019-04-27 | End: 2019-04-27

## 2019-04-27 RX ORDER — AMLODIPINE BESYLATE 10 MG/1
10 TABLET ORAL DAILY
Status: DISCONTINUED | OUTPATIENT
Start: 2019-04-28 | End: 2019-04-30 | Stop reason: HOSPADM

## 2019-04-27 RX ORDER — M-VIT,TX,IRON,MINS/CALC/FOLIC 27MG-0.4MG
1 TABLET ORAL DAILY
Status: DISCONTINUED | OUTPATIENT
Start: 2019-04-28 | End: 2019-04-30 | Stop reason: HOSPADM

## 2019-04-27 RX ORDER — POTASSIUM CHLORIDE 20 MEQ/1
40 TABLET, EXTENDED RELEASE ORAL PRN
Status: DISCONTINUED | OUTPATIENT
Start: 2019-04-27 | End: 2019-04-30 | Stop reason: HOSPADM

## 2019-04-27 RX ORDER — ASPIRIN 81 MG/1
81 TABLET, CHEWABLE ORAL DAILY
Status: DISCONTINUED | OUTPATIENT
Start: 2019-04-28 | End: 2019-04-30 | Stop reason: HOSPADM

## 2019-04-27 RX ORDER — CLOPIDOGREL BISULFATE 75 MG/1
75 TABLET ORAL DAILY
Status: DISCONTINUED | OUTPATIENT
Start: 2019-04-28 | End: 2019-04-29

## 2019-04-27 RX ADMIN — SODIUM CHLORIDE, PRESERVATIVE FREE 10 ML: 5 INJECTION INTRAVENOUS at 23:34

## 2019-04-27 RX ADMIN — MAGNESIUM OXIDE TAB 400 MG (241.3 MG ELEMENTAL MG) 800 MG: 400 (241.3 MG) TAB at 21:27

## 2019-04-27 RX ADMIN — POTASSIUM CHLORIDE 40 MEQ: 20 TABLET, EXTENDED RELEASE ORAL at 21:11

## 2019-04-27 RX ADMIN — DOXYCYCLINE HYCLATE 100 MG: 100 TABLET, COATED ORAL at 23:34

## 2019-04-27 RX ADMIN — SODIUM CHLORIDE: 9 INJECTION, SOLUTION INTRAVENOUS at 23:35

## 2019-04-28 LAB
ANION GAP SERPL CALCULATED.3IONS-SCNC: 13 MMOL/L (ref 4–16)
BUN BLDV-MCNC: 27 MG/DL (ref 6–23)
CALCIUM SERPL-MCNC: 9.1 MG/DL (ref 8.3–10.6)
CHLORIDE BLD-SCNC: 102 MMOL/L (ref 99–110)
CO2: 26 MMOL/L (ref 21–32)
CREAT SERPL-MCNC: 1 MG/DL (ref 0.9–1.3)
EKG ATRIAL RATE: 68 BPM
EKG ATRIAL RATE: 69 BPM
EKG DIAGNOSIS: NORMAL
EKG DIAGNOSIS: NORMAL
EKG P AXIS: 31 DEGREES
EKG P AXIS: 59 DEGREES
EKG P-R INTERVAL: 132 MS
EKG Q-T INTERVAL: 404 MS
EKG Q-T INTERVAL: 416 MS
EKG QRS DURATION: 134 MS
EKG QRS DURATION: 142 MS
EKG QTC CALCULATION (BAZETT): 432 MS
EKG QTC CALCULATION (BAZETT): 442 MS
EKG R AXIS: 60 DEGREES
EKG R AXIS: 73 DEGREES
EKG T AXIS: 12 DEGREES
EKG T AXIS: 29 DEGREES
EKG VENTRICULAR RATE: 68 BPM
EKG VENTRICULAR RATE: 69 BPM
GFR AFRICAN AMERICAN: >60 ML/MIN/1.73M2
GFR NON-AFRICAN AMERICAN: >60 ML/MIN/1.73M2
GLUCOSE BLD-MCNC: 87 MG/DL (ref 70–99)
HCT VFR BLD CALC: 49.5 % (ref 42–52)
HEMOGLOBIN: 15.6 GM/DL (ref 13.5–18)
MAGNESIUM: 2.1 MG/DL (ref 1.8–2.4)
MCH RBC QN AUTO: 28.6 PG (ref 27–31)
MCHC RBC AUTO-ENTMCNC: 31.5 % (ref 32–36)
MCV RBC AUTO: 90.8 FL (ref 78–100)
PDW BLD-RTO: 14.1 % (ref 11.7–14.9)
PLATELET # BLD: 156 K/CU MM (ref 140–440)
PMV BLD AUTO: 12.6 FL (ref 7.5–11.1)
POTASSIUM SERPL-SCNC: 3.5 MMOL/L (ref 3.5–5.1)
RBC # BLD: 5.45 M/CU MM (ref 4.6–6.2)
SODIUM BLD-SCNC: 141 MMOL/L (ref 135–145)
TROPONIN T: <0.01 NG/ML
TSH HIGH SENSITIVITY: 1.53 UIU/ML (ref 0.27–4.2)
WBC # BLD: 11.2 K/CU MM (ref 4–10.5)

## 2019-04-28 PROCEDURE — 94761 N-INVAS EAR/PLS OXIMETRY MLT: CPT

## 2019-04-28 PROCEDURE — 84484 ASSAY OF TROPONIN QUANT: CPT

## 2019-04-28 PROCEDURE — 2580000003 HC RX 258: Performed by: HOSPITALIST

## 2019-04-28 PROCEDURE — 96372 THER/PROPH/DIAG INJ SC/IM: CPT

## 2019-04-28 PROCEDURE — 6370000000 HC RX 637 (ALT 250 FOR IP): Performed by: INTERNAL MEDICINE

## 2019-04-28 PROCEDURE — 6370000000 HC RX 637 (ALT 250 FOR IP): Performed by: HOSPITALIST

## 2019-04-28 PROCEDURE — 2140000000 HC CCU INTERMEDIATE R&B

## 2019-04-28 PROCEDURE — 94640 AIRWAY INHALATION TREATMENT: CPT

## 2019-04-28 PROCEDURE — 85027 COMPLETE CBC AUTOMATED: CPT

## 2019-04-28 PROCEDURE — 84443 ASSAY THYROID STIM HORMONE: CPT

## 2019-04-28 PROCEDURE — 36415 COLL VENOUS BLD VENIPUNCTURE: CPT

## 2019-04-28 PROCEDURE — 99223 1ST HOSP IP/OBS HIGH 75: CPT | Performed by: INTERNAL MEDICINE

## 2019-04-28 PROCEDURE — 93010 ELECTROCARDIOGRAM REPORT: CPT | Performed by: INTERNAL MEDICINE

## 2019-04-28 PROCEDURE — 80048 BASIC METABOLIC PNL TOTAL CA: CPT

## 2019-04-28 PROCEDURE — 83735 ASSAY OF MAGNESIUM: CPT

## 2019-04-28 PROCEDURE — 6360000002 HC RX W HCPCS: Performed by: HOSPITALIST

## 2019-04-28 RX ORDER — DILTIAZEM HYDROCHLORIDE 120 MG/1
120 CAPSULE, COATED, EXTENDED RELEASE ORAL DAILY
Status: DISCONTINUED | OUTPATIENT
Start: 2019-04-28 | End: 2019-04-30 | Stop reason: HOSPADM

## 2019-04-28 RX ADMIN — DILTIAZEM HYDROCHLORIDE 120 MG: 120 CAPSULE, COATED, EXTENDED RELEASE ORAL at 16:01

## 2019-04-28 RX ADMIN — ASPIRIN 81 MG 81 MG: 81 TABLET ORAL at 09:53

## 2019-04-28 RX ADMIN — ROSUVASTATIN CALCIUM 20 MG: 20 TABLET, FILM COATED ORAL at 10:15

## 2019-04-28 RX ADMIN — DOXYCYCLINE HYCLATE 100 MG: 100 TABLET, COATED ORAL at 21:55

## 2019-04-28 RX ADMIN — PANTOPRAZOLE SODIUM 40 MG: 40 TABLET, DELAYED RELEASE ORAL at 06:09

## 2019-04-28 RX ADMIN — ENOXAPARIN SODIUM 40 MG: 40 INJECTION SUBCUTANEOUS at 09:54

## 2019-04-28 RX ADMIN — IPRATROPIUM BROMIDE AND ALBUTEROL SULFATE 1 AMPULE: .5; 3 SOLUTION RESPIRATORY (INHALATION) at 12:40

## 2019-04-28 RX ADMIN — IPRATROPIUM BROMIDE AND ALBUTEROL SULFATE 1 AMPULE: .5; 3 SOLUTION RESPIRATORY (INHALATION) at 08:02

## 2019-04-28 RX ADMIN — IPRATROPIUM BROMIDE AND ALBUTEROL SULFATE 1 AMPULE: .5; 3 SOLUTION RESPIRATORY (INHALATION) at 16:07

## 2019-04-28 RX ADMIN — SODIUM CHLORIDE, PRESERVATIVE FREE 10 ML: 5 INJECTION INTRAVENOUS at 09:53

## 2019-04-28 RX ADMIN — SODIUM CHLORIDE, PRESERVATIVE FREE 10 ML: 5 INJECTION INTRAVENOUS at 21:56

## 2019-04-28 RX ADMIN — CLOPIDOGREL BISULFATE 75 MG: 75 TABLET ORAL at 09:53

## 2019-04-28 RX ADMIN — IPRATROPIUM BROMIDE AND ALBUTEROL SULFATE 1 AMPULE: .5; 3 SOLUTION RESPIRATORY (INHALATION) at 20:17

## 2019-04-28 RX ADMIN — MULTIPLE VITAMINS W/ MINERALS TAB 1 TABLET: TAB at 09:53

## 2019-04-28 RX ADMIN — DOXYCYCLINE HYCLATE 100 MG: 100 TABLET, COATED ORAL at 09:53

## 2019-04-28 RX ADMIN — HYDROCHLOROTHIAZIDE 12.5 MG: 12.5 TABLET ORAL at 09:54

## 2019-04-28 RX ADMIN — AMLODIPINE BESYLATE 10 MG: 10 TABLET ORAL at 09:53

## 2019-04-28 RX ADMIN — POTASSIUM CHLORIDE 40 MEQ: 20 TABLET, EXTENDED RELEASE ORAL at 09:55

## 2019-04-28 ASSESSMENT — PAIN SCALES - GENERAL
PAINLEVEL_OUTOF10: 0

## 2019-04-28 NOTE — PLAN OF CARE
Problem: Cardiac:  Goal: Ability to maintain vital signs within normal range will improve  Description  Ability to maintain vital signs within normal range will improve  4/28/2019 0217 by Dexter Quinonez RN  Outcome: Ongoing  4/27/2019 2237 by Dexter Quinonez RN  Outcome: Ongoing  Goal: Cardiovascular alteration will improve  Description  Cardiovascular alteration will improve  4/28/2019 0217 by Dexter Quinonez RN  Outcome: Ongoing  4/27/2019 2237 by Dexter Quinonez RN  Outcome: Ongoing     Problem: Health Behavior:  Goal: Will modify at least one risk factor affecting health status  Description  Will modify at least one risk factor affecting health status  4/28/2019 0217 by Dexter Quinonez RN  Outcome: Ongoing  4/27/2019 2237 by Dexter Quinonez RN  Outcome: Ongoing  Goal: Identification of resources available to assist in meeting health care needs will improve  Description  Identification of resources available to assist in meeting health care needs will improve  4/28/2019 0217 by Dexter Quinonez RN  Outcome: Ongoing  4/27/2019 2237 by Dexter Quinonez RN  Outcome: Ongoing     Problem: Physical Regulation:  Goal: Complications related to the disease process, condition or treatment will be avoided or minimized  Description  Complications related to the disease process, condition or treatment will be avoided or minimized  4/28/2019 0217 by Dexter Quinonez RN  Outcome: Ongoing  4/27/2019 2237 by Dexter Quinonez RN  Outcome: Ongoing

## 2019-04-28 NOTE — PROGRESS NOTES
Two person skin assessment completed upon admission withfestus Hernandez RN. No skin issues noted. Will monitor.

## 2019-04-28 NOTE — ED PROVIDER NOTES
History    Not on file   Social Needs    Financial resource strain: Not on file    Food insecurity:     Worry: Not on file     Inability: Not on file    Transportation needs:     Medical: Not on file     Non-medical: Not on file   Tobacco Use    Smoking status: Former Smoker     Packs/day: 3.00     Years: 30.00     Pack years: 90.00     Types: Cigarettes     Last attempt to quit: 2006     Years since quittin.3    Smokeless tobacco: Never Used   Substance and Sexual Activity    Alcohol use: No    Drug use: No    Sexual activity: Not Currently   Lifestyle    Physical activity:     Days per week: Not on file     Minutes per session: Not on file    Stress: Not on file   Relationships    Social connections:     Talks on phone: Not on file     Gets together: Not on file     Attends Hinduism service: Not on file     Active member of club or organization: Not on file     Attends meetings of clubs or organizations: Not on file     Relationship status: Not on file    Intimate partner violence:     Fear of current or ex partner: Not on file     Emotionally abused: Not on file     Physically abused: Not on file     Forced sexual activity: Not on file   Other Topics Concern    Not on file   Social History Narrative    Not on file     Current Facility-Administered Medications   Medication Dose Route Frequency Provider Last Rate Last Dose    potassium chloride (KLOR-CON M) extended release tablet 40 mEq  40 mEq Oral Once Jose Antonio Mcghee MD        magnesium oxide (MAG-OX) tablet 800 mg  800 mg Oral Once Jose Antonio Mcghee MD         Current Outpatient Medications   Medication Sig Dispense Refill    doxycycline hyclate (VIBRA-TABS) 100 MG tablet Take 1 tablet by mouth 2 times daily for 10 days 20 tablet 0    albuterol sulfate HFA (PROVENTIL HFA) 108 (90 Base) MCG/ACT inhaler Inhale 2 puffs into the lungs every 4 hours as needed for Wheezing or Shortness of Breath With spacer (and mask if indicated). Thanks.  1 Inhaler 1    RA LORATADINE 10 MG tablet take 1 tablet by mouth once daily  0    omeprazole (PRILOSEC) 20 MG delayed release capsule Take 20 mg by mouth daily      hydrochlorothiazide (MICROZIDE) 12.5 MG capsule Take 12.5 mg by mouth daily      Compression Stockings MISC by Does not apply route Thigh high, 20 to 30 mm of Hg 1 each 3    rosuvastatin (CRESTOR) 20 MG tablet Take 1 tablet by mouth daily 30 tablet 5    albuterol sulfate HFA (PROVENTIL HFA) 108 (90 Base) MCG/ACT inhaler Inhale 2 puffs into the lungs every 4 hours as needed for Wheezing or Shortness of Breath With spacer (and mask if indicated). Thanks. 1 Inhaler 1    nystatin (MYCOSTATIN) 898184 UNIT/ML suspension Take 5 mLs by mouth 4 times daily 1 Bottle 1    Multiple Vitamins-Minerals (THERAPEUTIC MULTIVITAMIN-MINERALS) tablet Take 1 tablet by mouth daily      aspirin 81 MG tablet Take 81 mg by mouth daily      clopidogrel (PLAVIX) 75 MG tablet Take 75 mg by mouth daily      amLODIPine (NORVASC) 10 MG tablet Take 10 mg by mouth daily      atenolol (TENORMIN) 50 MG tablet Take 10 mg by mouth daily. No Known Allergies    Nursing Notes Reviewed    Physical Exam:  ED Triage Vitals [04/27/19 1947]   Enc Vitals Group      BP (!) 124/47      Pulse (!) 31      Resp 16      Temp 97.8 °F (36.6 °C)      Temp Source Oral      SpO2 96 %      Weight 173 lb (78.5 kg)      Height 5' 7\" (1.702 m)      Head Circumference       Peak Flow       Pain Score       Pain Loc       Pain Edu? Excl. in [address]? GENERAL APPEARANCE: Awake and alert. Cooperative. No acute distress. HEAD: Normocephalic. Atraumatic. EYES: EOM's grossly intact. Sclera anicteric. ENT: Mucous membranes are moist. Tolerates saliva. No trismus. NECK: Supple. Trachea midline. HEART: Bradycardiac and regular. Radial pulses 2+. LUNGS: Respirations unlabored. CTAB  ABDOMEN: Soft. Non-tender. No guarding or rebound. EXTREMITIES: No acute deformities.  No edema  SKIN: Warm and dry.  NEUROLOGICAL: No gross facial drooping. Moves all 4 extremities spontaneously. PSYCHIATRIC: Normal mood.     I have reviewed and interpreted all of the currently available lab results from this visit (if applicable):  Results for orders placed or performed during the hospital encounter of 04/27/19   CBC Auto Differential   Result Value Ref Range    WBC 12.3 (H) 4.0 - 10.5 K/CU MM    RBC 5.23 4.6 - 6.2 M/CU MM    Hemoglobin 15.0 13.5 - 18.0 GM/DL    Hematocrit 46.7 42 - 52 %    MCV 89.3 78 - 100 FL    MCH 28.7 27 - 31 PG    MCHC 32.1 32.0 - 36.0 %    RDW 13.9 11.7 - 14.9 %    Platelets 693 183 - 935 K/CU MM    MPV 12.9 (H) 7.5 - 11.1 FL    Differential Type AUTOMATED DIFFERENTIAL     Segs Relative 60.9 36 - 66 %    Lymphocytes % 23.3 (L) 24 - 44 %    Monocytes % 9.5 (H) 0 - 4 %    Eosinophils % 5.1 (H) 0 - 3 %    Basophils % 0.1 0 - 1 %    Segs Absolute 7.5 K/CU MM    Lymphocytes # 2.9 K/CU MM    Monocytes # 1.2 K/CU MM    Eosinophils # 0.6 K/CU MM    Basophils # 0.0 K/CU MM    Nucleated RBC % 0.0 %    Total Nucleated RBC 0.0 K/CU MM    Total Immature Neutrophil 0.14 K/CU MM    Immature Neutrophil % 1.1 (H) 0 - 0.43 %   Comprehensive Metabolic Panel w/ Reflex to MG   Result Value Ref Range    Sodium 138 135 - 145 MMOL/L    Potassium 3.1 (L) 3.5 - 5.1 MMOL/L    Chloride 99 99 - 110 mMol/L    CO2 26 21 - 32 MMOL/L    BUN 35 (H) 6 - 23 MG/DL    CREATININE 1.4 (H) 0.9 - 1.3 MG/DL    Glucose 139 (H) 70 - 99 MG/DL    Calcium 9.2 8.3 - 10.6 MG/DL    Alb 3.8 3.4 - 5.0 GM/DL    Total Protein 6.3 (L) 6.4 - 8.2 GM/DL    Total Bilirubin 0.2 0.0 - 1.0 MG/DL    ALT 38 10 - 40 U/L    AST 13 (L) 15 - 37 IU/L    Alkaline Phosphatase 82 40 - 129 IU/L    GFR Non- 49 (L) >60 mL/min/1.73m2    GFR  59 (L) >60 mL/min/1.73m2    Anion Gap 13 4 - 16   Troponin   Result Value Ref Range    Troponin T <0.010 <0.01 NG/ML   TSH without Reflex   Result Value Ref Range    TSH, High Sensitivity 1.770 0.270 - 4.20 uIu/ml   Magnesium   Result Value Ref Range    Magnesium 2.1 1.8 - 2.4 mg/dl   EKG 12 Lead   Result Value Ref Range    Ventricular Rate 69 BPM    Atrial Rate 69 BPM    QRS Duration 142 ms    Q-T Interval 404 ms    QTc Calculation (Bazett) 432 ms    P Axis 59 degrees    R Axis 73 degrees    T Axis 12 degrees    Diagnosis       Undetermined rhythm  Right bundle branch block  Abnormal ECG  When compared with ECG of 27-APR-2019 19:39,  Current undetermined rhythm precludes rhythm comparison, needs review        Radiographs (if obtained):  [] The following radiograph was interpreted by myself in the absence of a radiologist:  [x] Radiologist's Report Reviewed:    EKG (if obtained): (All EKG's are interpreted by myself in the absence of a cardiologist)  Sinus bradycardia with normal axis. PVCs in a pattern of bigeminy. No specific ST or T-wave changes. No pathologic Q waves. Right bundle-branch block. QTC is normal.    MDM:  Plan of care is discussed thoroughly with the patient and family if present. If performed, all imaging and lab work also discussed with patient. All relevant prior results and chart reviewed if available. At presentation, the patient is well-appearing. He has a heart rate of about 30. EKG shows bradycardia with PVCs in the pattern of bigeminy. These are nonconducted PVCs. He is otherwise asymptomatic at this time although does complain of some mild lightheadedness over the past day. Blood pressure is normal.  He is placed on telemetry. Plan to evaluate for electrolyte abnormalities, hypothyroidism. Do not suspect acute coronary syndrome, pulmonary embolism at this time. No new medication changes except for doxycycline and prednisone which are likely not contributing at this time. Patient denies any overdose of his atenolol. Workup significant for hypokalemia. This is repleted. Patient remains bradycardic with a heart rate around 30, asymptomatic.   Admitted this time for further telemetry monitoring and evaluation. Clinical Impression:  1. Lightheadedness    2. Bradycardia    3.  Hypokalemia      (Please note that portions of this note may have been completed with a voice recognition program. Efforts were made to edit the dictations but occasionally words are mis-transcribed.)    MD Lanette Head MD  04/27/19 2056

## 2019-04-28 NOTE — H&P
Department of Internal Medicine  Hospitalist  Attending History and Physical      CHIEF COMPLAINT:  dizziness    Reason for Admission:  Symptomatic bradycardia    History Obtained From:  patient    HISTORY OF PRESENT ILLNESS:      The patient is a 69 y.o. male with significant past medical history of CAD, COPD, HTN who was seen on 4/20 for COPD exacerbation and discharged on doxy and steroid taper. He presents today as he had some dizziness and his niece who is a nurse checked his HR and it was 28. His BP was stable as per pt. He is on a BB. Denies any chest pain or worsening shortness of breath. Past Medical History:        Diagnosis Date    CAD (coronary artery disease)     Cancer ([facility] [address]     prostate    History of cardiac catheterization 05/24/2017    Critical Single vessel CAD with chronic occlusion of RCA. No significant disease of the other vessels. SVBG to RCA is patent with mild Proximal disease. LIMA to LAD did not mature. Normal LV systolic function & wall motion. LVEF is 55 %. Patient tolerated the procedure well. No immediate complications.  History of echocardiogram 01/12/2018    Left ventricular systolic function is normal with an ejection fraction of55-60%. Grade I diastolic dysfunction. Mildly dilated left atrium. Moderate mitral regurgitation. Mild to moderate tricuspid regurgitation. No evidence of pericardial effusion.  HTN (hypertension)     Hyperlipidemia      Past Surgical History:        Procedure Laterality Date    BACK SURGERY      CARDIAC SURGERY      cabg    COLONOSCOPY  4/14/15    divertics    CORONARY ANGIOPLASTY WITH STENT PLACEMENT  05/14/2014    X2     I  Medications Prior to Admission:    No current facility-administered medications on file prior to encounter.       Current Outpatient Medications on File Prior to Encounter   Medication Sig Dispense Refill    doxycycline hyclate (VIBRA-TABS) 100 MG tablet Take 1 tablet by mouth 2 times daily for 10 days 20 tablet 0    albuterol sulfate HFA (PROVENTIL HFA) 108 (90 Base) MCG/ACT inhaler Inhale 2 puffs into the lungs every 4 hours as needed for Wheezing or Shortness of Breath With spacer (and mask if indicated). Thanks. 1 Inhaler 1    RA LORATADINE 10 MG tablet take 1 tablet by mouth once daily  0    omeprazole (PRILOSEC) 20 MG delayed release capsule Take 20 mg by mouth daily      hydrochlorothiazide (MICROZIDE) 12.5 MG capsule Take 12.5 mg by mouth daily      Compression Stockings MISC by Does not apply route Thigh high, 20 to 30 mm of Hg 1 each 3    rosuvastatin (CRESTOR) 20 MG tablet Take 1 tablet by mouth daily 30 tablet 5    albuterol sulfate HFA (PROVENTIL HFA) 108 (90 Base) MCG/ACT inhaler Inhale 2 puffs into the lungs every 4 hours as needed for Wheezing or Shortness of Breath With spacer (and mask if indicated). Thanks. 1 Inhaler 1    nystatin (MYCOSTATIN) 589124 UNIT/ML suspension Take 5 mLs by mouth 4 times daily 1 Bottle 1    Multiple Vitamins-Minerals (THERAPEUTIC MULTIVITAMIN-MINERALS) tablet Take 1 tablet by mouth daily      aspirin 81 MG tablet Take 81 mg by mouth daily      clopidogrel (PLAVIX) 75 MG tablet Take 75 mg by mouth daily      amLODIPine (NORVASC) 10 MG tablet Take 10 mg by mouth daily      atenolol (TENORMIN) 50 MG tablet Take 10 mg by mouth daily. Allergies:  Patient has no known allergies.     Social History:   Social History     Socioeconomic History    Marital status:      Spouse name: Not on file    Number of children: Not on file    Years of education: Not on file    Highest education level: Not on file   Occupational History    Not on file   Social Needs    Financial resource strain: Not on file    Food insecurity:     Worry: Not on file     Inability: Not on file    Transportation needs:     Medical: Not on file     Non-medical: Not on file   Tobacco Use    Smoking status: Former Smoker     Packs/day: 3.00     Years: 30.00     Pack years: 90.00     Types: to auscultation bilaterally, no crackles or wheezing  CARDIOVASCULAR:  Normal apical impulse, regular rate and rhythm, normal S1 and S2, no S3 or S4, and no murmur noted  ABDOMEN:  No scars, normal bowel sounds, soft, non-distended, non-tender, no masses palpated, no hepatosplenomegally  MUSCULOSKELETAL:  There is no redness, warmth, or swelling of the joints. Full range of motion noted. Motor strength is 5 out of 5 all extremities bilaterally. Tone is normal.  NEUROLOGIC:  Awake, alert, oriented to name, place and time. Cranial nerves II-XII are grossly intact. Motor is 5 out of 5 bilaterally.     SKIN:  no bruising or bleeding    DATA:  CXR  NAD  EKG 69 RBBB  ASSESSMENT AND PLAN:    Symptomatic Bradycardia  -TSH wnl and hold BB  -echo   -serial CE, consult cardio  -replace electrolytes  leukoytosis  -due to recent steroid taper  Hypokalemia  -replace and check mag  ACute kidney injury  -IVF  HTN  -CCB, thiazide  CAD  -ASA, plavix  COPD  -duonebs, finish course of doxy  DVT prophylaxis  -lovenox

## 2019-04-28 NOTE — CONSULTS
CARDIOLOGY CONSULT NOTE   Reason for consultation:  Bradycardia and pvc    Referring physician:  Primitivo Hopkins MD     Primary care physician: Gaile Mcardle, MD      Dear  Dr. Primitivo Hopkins MD   Thanks for the consult. Chief Complaints :  Chief Complaint   Patient presents with    Bradycardia    Dizziness        History of present illness:Brett is a 69 y. o.year old who presents from home due to episodes of feeling short of breath, dizziness. He checked his blood pressure at home and the blood pressure monitor. Showed  heart rate was in 30s. His niece who is a nurse advised him to come to the hospital.  EKG and telemetry shows frequent bigeminy, some he has sudden episodes of shortness of breath. He never passed out. Kim Steve He sees Dr. Sada Turpin. History of query artery disease. He had a heart attack many years ago. Echo last year showed preserved EF with moderate mitral regurgitation. He takes atenolol at home, which was held during this hospitalization. His heart rate has been ranging anywhere from 60s to 80s, but he has very frequent PVCs and bigeminy. Potassium was 3.1. Magnesium was normal    Past medical history:    has a past medical history of CAD (coronary artery disease), Cancer ([facility] [address] History of cardiac catheterization, History of echocardiogram, HTN (hypertension), and Hyperlipidemia. Past surgical history:   has a past surgical history that includes back surgery; Cardiac surgery; Coronary angioplasty with stent (05/14/2014); and Colonoscopy (4/14/15). Social History:   reports that he quit smoking about 13 years ago. His smoking use included cigarettes. He has a 90.00 pack-year smoking history. He has never used smokeless tobacco. He reports that he does not drink alcohol or use drugs.   Family history:   no family history of CAD, STROKE of DM at early age    No Known Allergies      albuterol sulfate  (90 Base) MCG/ACT inhaler 2 puff Q4H PRN   amLODIPine (NORVASC) tablet 10 mg Daily aspirin chewable tablet 81 mg Daily   clopidogrel (PLAVIX) tablet 75 mg Daily   ipratropium-albuterol (DUONEB) nebulizer solution 1 ampule Q4H WA   pantoprazole (PROTONIX) tablet 40 mg QAM AC   hydrochlorothiazide (HYDRODIURIL) tablet 12.5 mg Daily   therapeutic multivitamin-minerals 1 tablet Daily   rosuvastatin (CRESTOR) tablet 20 mg Daily   potassium chloride (KLOR-CON M) extended release tablet 40 mEq PRN   Or    potassium chloride (KLOR-CON) packet 40 mEq PRN   Or    potassium chloride 10 mEq/100 mL IVPB (Peripheral Line) PRN   magnesium sulfate 1 g in dextrose 5% 100 mL IVPB PRN   sodium chloride flush 0.9 % injection 10 mL 2 times per day   sodium chloride flush 0.9 % injection 10 mL PRN   magnesium hydroxide (MILK OF MAGNESIA) 400 MG/5ML suspension 30 mL Daily PRN   ondansetron (ZOFRAN) injection 4 mg Q6H PRN   enoxaparin (LOVENOX) injection 40 mg Daily   doxycycline hyclate (VIBRA-TABS) tablet 100 mg BID     Current Facility-Administered Medications   Medication Dose Route Frequency Provider Last Rate Last Dose    albuterol sulfate  (90 Base) MCG/ACT inhaler 2 puff  2 puff Inhalation Q4H PRN J Carlos Walter MD        amLODIPine (NORVASC) tablet 10 mg  10 mg Oral Daily J Carlos Walter MD   10 mg at 04/28/19 0953    aspirin chewable tablet 81 mg  81 mg Oral Daily J Carlos Walter MD   81 mg at 04/28/19 0953    clopidogrel (PLAVIX) tablet 75 mg  75 mg Oral Daily J Carlos Walter MD   75 mg at 04/28/19 0953    ipratropium-albuterol (DUONEB) nebulizer solution 1 ampule  1 ampule Inhalation Q4H WA J Carlos Walter MD   1 ampule at 04/28/19 1240    pantoprazole (PROTONIX) tablet 40 mg  40 mg Oral QAM AC J Carlos Walter MD   40 mg at 04/28/19 0609    hydrochlorothiazide (HYDRODIURIL) tablet 12.5 mg  12.5 mg Oral Daily J Carlos Walter MD   12.5 mg at 04/28/19 0954    therapeutic multivitamin-minerals 1 tablet  1 tablet Oral Daily J Carlos Walter MD   1 tablet at 04/28/19 0953    rosuvastatin (CRESTOR) tablet 20 mg  20 mg myself)    Chest Xray:(reviewed by myself)  Xr Chest Portable    Result Date: 4/27/2019  EXAMINATION: SINGLE XRAY VIEW OF THE CHEST 4/27/2019 8:01 pm COMPARISON: Chest radiograph performed 04/20/2019. HISTORY: ORDERING SYSTEM PROVIDED HISTORY: bradycardia TECHNOLOGIST PROVIDED HISTORY: Reason for exam:->bradycardia Ordering Physician Provided Reason for Exam: bradycardia Acuity: Unknown Type of Exam: Unknown FINDINGS: There is no acute consolidation or effusion. There is no pneumothorax. The mediastinal structures are stable. The upper abdomen is unremarkable. The extrathoracic soft tissues are unremarkable. No acute cardiopulmonary process. Xr Chest Portable    Result Date: 4/20/2019  EXAMINATION: SINGLE XRAY VIEW OF THE CHEST 4/20/2019 12:08 pm COMPARISON: 04/20/2019 HISTORY: ORDERING SYSTEM PROVIDED HISTORY: shortness of breath TECHNOLOGIST PROVIDED HISTORY: Reason for exam:->shortness of breath Ordering Physician Provided Reason for Exam: shortness of breath Acuity: Acute Type of Exam: Initial FINDINGS: Median sternotomy wires are noted. Atherosclerotic calcifications of the aorta. Mild bibasilar atelectasis. The lungs are without acute focal process. There is no effusion or pneumothorax. The cardiomediastinal silhouette is stable. The osseous structures are stable. No acute process. Stable exam.       All labs, medications and tests reviewed by myself including data  from outside source , patient and available family . Continue all other medications of all above medical condition listed as is. Impression:  Active Problems:    Bradycardia  Resolved Problems:    * No resolved hospital problems. *      Assessment: 69 y. o.year old with PMH of  has a past medical history of CAD (coronary artery disease), Cancer ([facility] [address] History of cardiac catheterization, History of echocardiogram, HTN (hypertension), and Hyperlipidemia.       Plan and Recommendations:    PVC/ Bigemini  Check echo and stress

## 2019-04-29 ENCOUNTER — APPOINTMENT (OUTPATIENT)
Dept: NUCLEAR MEDICINE | Age: 79
DRG: 251 | End: 2019-04-29
Payer: COMMERCIAL

## 2019-04-29 LAB
ACTIVATED CLOTTING TIME, LOW RANGE: 282 SEC
ANION GAP SERPL CALCULATED.3IONS-SCNC: 17 MMOL/L (ref 4–16)
BUN BLDV-MCNC: 20 MG/DL (ref 6–23)
CALCIUM SERPL-MCNC: 8.5 MG/DL (ref 8.3–10.6)
CHLORIDE BLD-SCNC: 104 MMOL/L (ref 99–110)
CO2: 18 MMOL/L (ref 21–32)
CREAT SERPL-MCNC: 0.9 MG/DL (ref 0.9–1.3)
GFR AFRICAN AMERICAN: >60 ML/MIN/1.73M2
GFR NON-AFRICAN AMERICAN: >60 ML/MIN/1.73M2
GLUCOSE BLD-MCNC: 118 MG/DL (ref 70–99)
LV EF: 58 %
LV EF: 68 %
LVEF MODALITY: NORMAL
LVEF MODALITY: NORMAL
POTASSIUM SERPL-SCNC: 3.9 MMOL/L (ref 3.5–5.1)
SODIUM BLD-SCNC: 139 MMOL/L (ref 135–145)

## 2019-04-29 PROCEDURE — C1894 INTRO/SHEATH, NON-LASER: HCPCS

## 2019-04-29 PROCEDURE — 6370000000 HC RX 637 (ALT 250 FOR IP)

## 2019-04-29 PROCEDURE — 2709999900 HC NON-CHARGEABLE SUPPLY

## 2019-04-29 PROCEDURE — 6360000002 HC RX W HCPCS

## 2019-04-29 PROCEDURE — 78452 HT MUSCLE IMAGE SPECT MULT: CPT

## 2019-04-29 PROCEDURE — 93005 ELECTROCARDIOGRAM TRACING: CPT | Performed by: INTERNAL MEDICINE

## 2019-04-29 PROCEDURE — 80048 BASIC METABOLIC PNL TOTAL CA: CPT

## 2019-04-29 PROCEDURE — 6360000004 HC RX CONTRAST MEDICATION

## 2019-04-29 PROCEDURE — APPNB60 APP NON BILLABLE TIME 46-60 MINS: Performed by: NURSE PRACTITIONER

## 2019-04-29 PROCEDURE — 2580000003 HC RX 258

## 2019-04-29 PROCEDURE — 6370000000 HC RX 637 (ALT 250 FOR IP): Performed by: NURSE PRACTITIONER

## 2019-04-29 PROCEDURE — 92937 PRQ TRLUML REVSC CAB GRF 1: CPT

## 2019-04-29 PROCEDURE — 93455 CORONARY ART/GRFT ANGIO S&I: CPT | Performed by: INTERNAL MEDICINE

## 2019-04-29 PROCEDURE — C1725 CATH, TRANSLUMIN NON-LASER: HCPCS

## 2019-04-29 PROCEDURE — B2111ZZ FLUOROSCOPY OF MULTIPLE CORONARY ARTERIES USING LOW OSMOLAR CONTRAST: ICD-10-PCS | Performed by: INTERNAL MEDICINE

## 2019-04-29 PROCEDURE — 93306 TTE W/DOPPLER COMPLETE: CPT

## 2019-04-29 PROCEDURE — 6370000000 HC RX 637 (ALT 250 FOR IP): Performed by: INTERNAL MEDICINE

## 2019-04-29 PROCEDURE — C1887 CATHETER, GUIDING: HCPCS

## 2019-04-29 PROCEDURE — 99233 SBSQ HOSP IP/OBS HIGH 50: CPT | Performed by: INTERNAL MEDICINE

## 2019-04-29 PROCEDURE — 2140000000 HC CCU INTERMEDIATE R&B

## 2019-04-29 PROCEDURE — C1760 CLOSURE DEV, VASC: HCPCS

## 2019-04-29 PROCEDURE — 02703ZZ DILATION OF CORONARY ARTERY, ONE ARTERY, PERCUTANEOUS APPROACH: ICD-10-PCS | Performed by: INTERNAL MEDICINE

## 2019-04-29 PROCEDURE — C1769 GUIDE WIRE: HCPCS

## 2019-04-29 PROCEDURE — 6370000000 HC RX 637 (ALT 250 FOR IP): Performed by: HOSPITALIST

## 2019-04-29 PROCEDURE — 36415 COLL VENOUS BLD VENIPUNCTURE: CPT

## 2019-04-29 PROCEDURE — A9500 TC99M SESTAMIBI: HCPCS | Performed by: INTERNAL MEDICINE

## 2019-04-29 PROCEDURE — 93225 XTRNL ECG REC<48 HRS REC: CPT

## 2019-04-29 PROCEDURE — 2500000003 HC RX 250 WO HCPCS

## 2019-04-29 PROCEDURE — 2580000003 HC RX 258: Performed by: HOSPITALIST

## 2019-04-29 PROCEDURE — 99223 1ST HOSP IP/OBS HIGH 75: CPT | Performed by: INTERNAL MEDICINE

## 2019-04-29 PROCEDURE — 94761 N-INVAS EAR/PLS OXIMETRY MLT: CPT

## 2019-04-29 PROCEDURE — APPSS30 APP SPLIT SHARED TIME 16-30 MINUTES: Performed by: NURSE PRACTITIONER

## 2019-04-29 PROCEDURE — 6360000002 HC RX W HCPCS: Performed by: HOSPITALIST

## 2019-04-29 PROCEDURE — 92928 PRQ TCAT PLMT NTRAC ST 1 LES: CPT | Performed by: INTERNAL MEDICINE

## 2019-04-29 PROCEDURE — 85347 COAGULATION TIME ACTIVATED: CPT

## 2019-04-29 PROCEDURE — 94640 AIRWAY INHALATION TREATMENT: CPT

## 2019-04-29 PROCEDURE — 96374 THER/PROPH/DIAG INJ IV PUSH: CPT

## 2019-04-29 PROCEDURE — 3430000000 HC RX DIAGNOSTIC RADIOPHARMACEUTICAL: Performed by: INTERNAL MEDICINE

## 2019-04-29 PROCEDURE — 93017 CV STRESS TEST TRACING ONLY: CPT

## 2019-04-29 PROCEDURE — 2580000003 HC RX 258: Performed by: INTERNAL MEDICINE

## 2019-04-29 PROCEDURE — 93455 CORONARY ART/GRFT ANGIO S&I: CPT

## 2019-04-29 PROCEDURE — 6360000002 HC RX W HCPCS: Performed by: INTERNAL MEDICINE

## 2019-04-29 PROCEDURE — B2121ZZ FLUOROSCOPY OF SINGLE CORONARY ARTERY BYPASS GRAFT USING LOW OSMOLAR CONTRAST: ICD-10-PCS | Performed by: INTERNAL MEDICINE

## 2019-04-29 RX ORDER — SODIUM CHLORIDE 0.9 % (FLUSH) 0.9 %
10 SYRINGE (ML) INJECTION EVERY 12 HOURS SCHEDULED
Status: DISCONTINUED | OUTPATIENT
Start: 2019-04-29 | End: 2019-04-30 | Stop reason: HOSPADM

## 2019-04-29 RX ORDER — PRASUGREL 10 MG/1
10 TABLET, FILM COATED ORAL DAILY
Status: DISCONTINUED | OUTPATIENT
Start: 2019-04-30 | End: 2019-04-30 | Stop reason: HOSPADM

## 2019-04-29 RX ORDER — SODIUM CHLORIDE 0.9 % (FLUSH) 0.9 %
10 SYRINGE (ML) INJECTION PRN
Status: DISCONTINUED | OUTPATIENT
Start: 2019-04-29 | End: 2019-04-30 | Stop reason: HOSPADM

## 2019-04-29 RX ORDER — ASPIRIN 81 MG/1
81 TABLET, CHEWABLE ORAL DAILY
Status: DISCONTINUED | OUTPATIENT
Start: 2019-04-30 | End: 2019-04-30 | Stop reason: HOSPADM

## 2019-04-29 RX ORDER — LANOLIN ALCOHOL/MO/W.PET/CERES
3 CREAM (GRAM) TOPICAL NIGHTLY PRN
Status: DISCONTINUED | OUTPATIENT
Start: 2019-04-29 | End: 2019-04-30 | Stop reason: HOSPADM

## 2019-04-29 RX ORDER — SODIUM CHLORIDE 9 MG/ML
INJECTION, SOLUTION INTRAVENOUS CONTINUOUS
Status: DISCONTINUED | OUTPATIENT
Start: 2019-04-29 | End: 2019-04-30 | Stop reason: HOSPADM

## 2019-04-29 RX ORDER — ACETAMINOPHEN 325 MG/1
650 TABLET ORAL EVERY 4 HOURS PRN
Status: DISCONTINUED | OUTPATIENT
Start: 2019-04-29 | End: 2019-04-30 | Stop reason: HOSPADM

## 2019-04-29 RX ADMIN — IPRATROPIUM BROMIDE AND ALBUTEROL SULFATE 1 AMPULE: .5; 3 SOLUTION RESPIRATORY (INHALATION) at 08:14

## 2019-04-29 RX ADMIN — PANTOPRAZOLE SODIUM 40 MG: 40 TABLET, DELAYED RELEASE ORAL at 07:04

## 2019-04-29 RX ADMIN — HYDROCHLOROTHIAZIDE 12.5 MG: 12.5 TABLET ORAL at 21:10

## 2019-04-29 RX ADMIN — DOXYCYCLINE HYCLATE 100 MG: 100 TABLET, COATED ORAL at 21:10

## 2019-04-29 RX ADMIN — DILTIAZEM HYDROCHLORIDE 120 MG: 120 CAPSULE, COATED, EXTENDED RELEASE ORAL at 13:11

## 2019-04-29 RX ADMIN — MULTIPLE VITAMINS W/ MINERALS TAB 1 TABLET: TAB at 13:11

## 2019-04-29 RX ADMIN — MELATONIN TAB 3 MG 3 MG: 3 TAB at 21:10

## 2019-04-29 RX ADMIN — ROSUVASTATIN CALCIUM 20 MG: 20 TABLET, FILM COATED ORAL at 13:11

## 2019-04-29 RX ADMIN — Medication 10 MILLICURIE: at 07:20

## 2019-04-29 RX ADMIN — AMLODIPINE BESYLATE 10 MG: 10 TABLET ORAL at 13:16

## 2019-04-29 RX ADMIN — ONDANSETRON 4 MG: 2 INJECTION INTRAMUSCULAR; INTRAVENOUS at 03:58

## 2019-04-29 RX ADMIN — SODIUM CHLORIDE: 9 INJECTION, SOLUTION INTRAVENOUS at 18:00

## 2019-04-29 RX ADMIN — REGADENOSON 0.4 MG: 0.08 INJECTION, SOLUTION INTRAVENOUS at 09:09

## 2019-04-29 RX ADMIN — SODIUM CHLORIDE, PRESERVATIVE FREE 10 ML: 5 INJECTION INTRAVENOUS at 13:14

## 2019-04-29 RX ADMIN — DOXYCYCLINE HYCLATE 100 MG: 100 TABLET, COATED ORAL at 13:11

## 2019-04-29 RX ADMIN — Medication 30 MILLICURIE: at 09:10

## 2019-04-29 ASSESSMENT — ENCOUNTER SYMPTOMS
EYE ITCHING: 0
BACK PAIN: 0
SINUS PAIN: 0
DIARRHEA: 0
CHEST TIGHTNESS: 0
CONSTIPATION: 0
ABDOMINAL PAIN: 0
EYE REDNESS: 0
SHORTNESS OF BREATH: 0
VOMITING: 0
SORE THROAT: 0
ABDOMINAL DISTENTION: 0
COUGH: 0
NAUSEA: 0
SINUS PRESSURE: 0
COLOR CHANGE: 0

## 2019-04-29 ASSESSMENT — PAIN SCALES - GENERAL
PAINLEVEL_OUTOF10: 0

## 2019-04-29 NOTE — CONSULTS
Procedure Laterality Date    BACK SURGERY      CARDIAC SURGERY      cabg    COLONOSCOPY  4/14/15    divertics    CORONARY ANGIOPLASTY WITH STENT PLACEMENT  05/14/2014    X2       Family history: History reviewed. No pertinent family history. Social history :  reports that he quit smoking about 13 years ago. His smoking use included cigarettes. He has a 90.00 pack-year smoking history. He has never used smokeless tobacco. He reports that he does not drink alcohol or use drugs. No Known Allergies    No current facility-administered medications on file prior to encounter. Current Outpatient Medications on File Prior to Encounter   Medication Sig Dispense Refill    doxycycline hyclate (VIBRA-TABS) 100 MG tablet Take 1 tablet by mouth 2 times daily for 10 days 20 tablet 0    albuterol sulfate HFA (PROVENTIL HFA) 108 (90 Base) MCG/ACT inhaler Inhale 2 puffs into the lungs every 4 hours as needed for Wheezing or Shortness of Breath With spacer (and mask if indicated). Thanks. 1 Inhaler 1    RA LORATADINE 10 MG tablet take 1 tablet by mouth once daily  0    omeprazole (PRILOSEC) 20 MG delayed release capsule Take 20 mg by mouth daily      rosuvastatin (CRESTOR) 20 MG tablet Take 1 tablet by mouth daily 30 tablet 5    Multiple Vitamins-Minerals (THERAPEUTIC MULTIVITAMIN-MINERALS) tablet Take 1 tablet by mouth daily      aspirin 81 MG tablet Take 81 mg by mouth daily      clopidogrel (PLAVIX) 75 MG tablet Take 75 mg by mouth daily      amLODIPine (NORVASC) 10 MG tablet Take 10 mg by mouth daily      atenolol (TENORMIN) 50 MG tablet Take 10 mg by mouth daily.       hydrochlorothiazide (MICROZIDE) 12.5 MG capsule Take 12.5 mg by mouth daily      Compression Stockings MISC by Does not apply route Thigh high, 20 to 30 mm of Hg 1 each 3    albuterol sulfate HFA (PROVENTIL HFA) 108 (90 Base) MCG/ACT inhaler Inhale 2 puffs into the lungs every 4 hours as needed for Wheezing or Shortness of Breath With spacer (and mask if indicated). Thanks. 1 Inhaler 1    nystatin (MYCOSTATIN) 943977 UNIT/ML suspension Take 5 mLs by mouth 4 times daily 1 Bottle 1       Review of Systems:   Review of Systems   Constitutional: Negative for activity change, appetite change, chills, fatigue and fever. HENT: Negative for congestion, sinus pressure, sinus pain and sore throat. Eyes: Negative for redness and itching. Respiratory: Negative for cough, chest tightness and shortness of breath. Cardiovascular: Negative for chest pain, palpitations and leg swelling. Gastrointestinal: Negative for abdominal distention, abdominal pain, constipation, diarrhea, nausea and vomiting. Genitourinary: Negative for difficulty urinating and dysuria. Musculoskeletal: Positive for arthralgias. Negative for back pain and gait problem. Skin: Negative for color change, pallor, rash and wound. Neurological: Positive for dizziness and light-headedness. Negative for syncope. Psychiatric/Behavioral: Negative for confusion. The patient is not nervous/anxious. Physical Examination:    BP (!) 139/51   Pulse 68   Temp 98.4 °F (36.9 °C) (Oral)   Resp 14   Ht 5' 7\" (1.702 m)   Wt 177 lb 6.4 oz (80.5 kg)   SpO2 97%   BMI 27.78 kg/m²    Wt Readings from Last 3 Encounters:   04/27/19 177 lb 6.4 oz (80.5 kg)   04/20/19 173 lb (78.5 kg)   04/10/19 173 lb (78.5 kg)     Body mass index is 27.78 kg/m². Physical Exam   Constitutional: He is oriented to person, place, and time. He appears well-developed and well-nourished. HENT:   Head: Normocephalic and atraumatic. Eyes: Pupils are equal, round, and reactive to light. Conjunctivae are normal. Right eye exhibits no discharge. Left eye exhibits no discharge. Neck: Neck supple. No JVD present. No thyromegaly present. Cardiovascular: Normal rate, regular rhythm, normal heart sounds and intact distal pulses. Exam reveals no gallop and no friction rub.    No murmur heard.  Pulmonary/Chest: Effort normal and breath sounds normal. No stridor. No respiratory distress. He has no wheezes. He has no rales. Abdominal: Soft. Bowel sounds are normal. He exhibits no distension. There is no tenderness. Musculoskeletal: He exhibits no edema or deformity. Neurological: He is alert and oriented to person, place, and time. Skin: Skin is warm and dry. Psychiatric: Judgment and thought content normal.       CBC:   Lab Results   Component Value Date    WBC 11.2 04/28/2019    HGB 15.6 04/28/2019    HCT 49.5 04/28/2019     04/28/2019     Lipids:   Lab Results   Component Value Date    CHOL 154 04/05/2019    TRIG 111 04/05/2019    HDL 51 04/05/2019    LDLCALC 87 06/08/2017    LDLDIRECT 87 04/05/2019     PT/INR:   Lab Results   Component Value Date    INR 1.00 10/02/2017        BMP:    Lab Results   Component Value Date     04/29/2019    K 3.9 04/29/2019     04/29/2019    CO2 18 (L) 04/29/2019    BUN 20 04/29/2019     CMP:   Lab Results   Component Value Date    AST 13 (L) 04/27/2019    ALT 38 04/27/2019    PROT 6.3 (L) 04/27/2019    BILITOT 0.2 04/27/2019    ALKPHOS 82 04/27/2019     TSH:  No results found for: TSH    EKG Interpretation:        IMPRESSION / RECOMMENDATIONS:     Premature Ventricular Contractions  Symptomatic Bradycardia  CAD S/ P CABG  HTN  HLD    EKG reviewed  Patient having bigem and quadgem PVC's  PVC is from LV endocardial mid to apical area  I recommend ischemic work up to assess for PVC source first.    Atenolol on hold due to bradycardia    ECHO and Stress test pending  K is 3.9  Mag 2.1  Recommend to keep Mag above 2  Recommend to keep K 4.0-5.0    Patient may need to have LHC to assess if PVC's are secondary to ischemia    Thanks again for allowing me to participate in care of thispatient. Please call me if you have any questions. With best regards.     Malou Bonner MD 04/29/19

## 2019-04-29 NOTE — CARE COORDINATION
.CM met with pt for d/c planning. Introduced self and updated white board. Pt lives alone and is independent with ADL's. Pt drives, has a PCP, has insurance, and is able to afford medication. Pt denies having any DME or services. [facility] offered and pt refused. Pt denies any needs at this time. D/c plan is home alone, no needs. CM will continue to follow.   TE

## 2019-04-29 NOTE — PROGRESS NOTES
Nutrition Assessment (Low Risk)    Type and Reason for Visit: Initial, Positive Nutrition Screen(wt loss, poor appetite)    Nutrition Recommendations:   Continue current general diet     Nutrition Assessment:  Adequate intake, consuming all per pt. Denies poor intake/wt loss. Wt stable over past 3 months per Epic history, BMI 27.8. Patient assessed for nutritional risk. Deemed to be at low risk at this time. Will continue to monitor for changes in status.      Malnutrition Assessment:  · Malnutrition Status: No malnutrition    Nutrition Risk Level   Risk Level: Low    Nutrition Diagnosis:   · Problem: No nutrition diagnosis at this time    Nutrition Intervention:  Food and/or Delivery: Continue current diet  Nutrition Education/Counseling/Coordination of Care:  Continued Inpatient Monitoring, Education not appropriate at this time, Coordination of Care      Electronically signed by Maria Elena Cain RD, LD on 4/29/19 at 11:24 AM    Contact Number: [phone]

## 2019-04-29 NOTE — PROGRESS NOTES
[address] [facility],Third Floor  Phone: [phone]    Fax [phone]                  Dejuan Boyd MD, Otto Barahona MD, Sharif Sims MD, MD Bonnie Coleman MD Elida Christ, MD Kei Durbin, APRSUZANNE Crespo, APRSUZANNE Mckenzie, APRN    Cardiology Progress Note     Today's Plan: Stress test and echo    Admit Date:  4/27/2019    Consult reason/ Seen today for:  Bradycardia and PVC's    Subjective and  Overnight Events:  Daniele Fuchs states that he can not sleep in these beds. He was so uncomfortable last night he took all the equipment off and slept for a bit on the couch in the room. He denies any chest pain or shortness of breath today. Bigemini on ekg     Assessment / Plan / Recommendation:     1. PVC/ Bigemini on ekg, stress test shows ischemia , echo show s  2. Check echo and stress test to rul eout ischemia today  3. Tolerating cardizem, less PVC's noted today per tele tech. 4. TSH normal  5. 24 hour holter in place   6. EPS consult   7. ASCVD: H/o MI many years ago, on aspirin and plavix   8. Syncope /Dizziness : check orthostatics, compression stockings, echo shows nSR  9. DVT prophylaxis if no contraindication  6. Dyslipidemia: continue statins       History of Presenting Illness:    Chief complain on admission : 69 y. o.year old who is admitted for  Chief Complaint   Patient presents with    Bradycardia    Dizziness        Past medical history:    has a past medical history of CAD (coronary artery disease), Cancer ([facility] [address] History of cardiac catheterization, History of echocardiogram, HTN (hypertension), and Hyperlipidemia. Past surgical history:   has a past surgical history that includes back surgery; Cardiac surgery; Coronary angioplasty with stent (05/14/2014); and Colonoscopy (4/14/15). Social History:   reports that he quit smoking about 13 years ago. His smoking use included cigarettes.  He has a 90.00 pack-year smoking history. He has never used smokeless tobacco. He reports that he does not drink alcohol or use drugs. Family history:  family history is not on file. No Known Allergies    Review of Systems:   All 14 systems were reviewed and are negative  Except for the positive findings  which as documented     BP (!) 139/51   Pulse 68   Temp 98.4 °F (36.9 °C) (Oral)   Resp 14   Ht 5' 7\" (1.702 m)   Wt 177 lb 6.4 oz (80.5 kg)   SpO2 97%   BMI 27.78 kg/m²       Intake/Output Summary (Last 24 hours) at 4/29/2019 1125  Last data filed at 4/29/2019 0800  Gross per 24 hour   Intake 315 ml   Output 900 ml   Net -585 ml       Physical Exam:  Constitutional:  Well developed, Well nourished, No acute distress, Non-toxic appearance. HENT:  Normocephalic, Atraumatic, Bilateral external ears normal, Oropharynx moist, No oral exudates, Nose normal. Neck- Normal range of motion, No tenderness, Supple, No stridor. Eyes:  PERRL, EOMI, Conjunctiva normal, No discharge. Respiratory:  Normal breath sounds, No respiratory distress, No wheezing, No chest tenderness. Cardiovascular:  Normal heart rate, Normal rhythm, No murmurs appreciated, No rubs appreciated, No gallops appreciated, JVP not elevated  Abdomen/GI:  Bowel sounds normal, Soft, No tenderness, No masses, No pulsatile masses. Musculoskeletal:  Intact distal pulses, No edema, No tenderness, No cyanosis, No clubbing. Good range of motion in all major joints. No tenderness to palpation or major deformities noted. Back- No tenderness. Integument:  Warm, Dry, No erythema, No rash. Lymphatic:  No lymphadenopathy noted. Neurologic:  Alert & oriented x 3, Normal motor function, Normal sensory function, No focal deficits noted.    Psychiatric:  Affect  and  Mood : improved    Medications:    diltiazem  120 mg Oral Daily    amLODIPine  10 mg Oral Daily    aspirin  81 mg Oral Daily    clopidogrel  75 mg Oral Daily    ipratropium-albuterol  1 ampule Inhalation Q4H WA    pantoprazole  40 mg Oral QAM AC    hydrochlorothiazide  12.5 mg Oral Daily    therapeutic multivitamin-minerals  1 tablet Oral Daily    rosuvastatin  20 mg Oral Daily    sodium chloride flush  10 mL Intravenous 2 times per day    enoxaparin  40 mg Subcutaneous Daily    doxycycline hyclate  100 mg Oral BID       technetium sestamibi, technetium sestamibi, albuterol sulfate HFA, potassium chloride **OR** potassium alternative oral replacement **OR** potassium chloride, magnesium sulfate, sodium chloride flush, magnesium hydroxide, ondansetron    Lab Data:  CBC:   Recent Labs     04/27/19 2000 04/28/19 0347   WBC 12.3* 11.2*   HGB 15.0 15.6   HCT 46.7 49.5   MCV 89.3 90.8    156     BMP:   Recent Labs     04/27/19 2000 04/28/19 0347 04/29/19  0450    141 139   K 3.1* 3.5 3.9   CL 99 102 104   CO2 26 26 18*   BUN 35* 27* 20   CREATININE 1.4* 1.0 0.9     PT/INR: No results for input(s): PROTIME, INR in the last 72 hours. BNP:  No results for input(s): PROBNP in the last 72 hours. TROPONIN:   Recent Labs     04/27/19 2000 04/27/19 2150 04/28/19 0347   TROPONINT <0.010 <0.010 <0.010        ECHO :   Echocardiogram pending today    Impression:  Active Problems:    Bradycardia  Resolved Problems:    * No resolved hospital problems. *       All labs, medications and tests reviewed by myself , continue all other medications of all above medical condition listed as is except for changes mentioned above. Thank you very much for consult , please call with questions. Electronically signed by MIGUEL Mercado CNP on 4/29/2019 at 11:25 AM      I have seen ,spoken to  and examined this patient personally, independently of the nurse practitioner. I have reviewed the hospital care given to date and reviewed all pertinent labs and imaging. The plan was developed mutually at the time of the visit with the patient,  Cullen Hatch   and myself.  I have spoken with patient,

## 2019-04-29 NOTE — PROGRESS NOTES
Patient returned to room 3110 S/P RCA grafting POBA. Patient alert painfree, without pain, hematoma, ecchymosis, active bleeding.  Report and care transferred to [facility]

## 2019-04-29 NOTE — PROGRESS NOTES
Hospitalist Progress Note      Name:  Mikal Desouza /Age/Sex:   [facility]69 [facility] male)   MRN & CSN:  [id number] & [id number] Admission Date/Time: 2019  7:47 PM   Location:  [facility]0/3110-A PCP: Adriel Argueta MD         Hospital Day: 3    Assessment and Plan:   Mikal Desouza is a 69 y.o.  male     Symptomatic Bradycardia  -TSH wnl and hold BB  -echo   -stress test as per cardiology  -replace electrolytes    leukoytosis  -due to recent steroid taper    Hypokalemia  -replaced and check mag    Acute kidney injury  -IVF    HTN  -CCB, thiazide    CAD  -ASA, Plavix    COPD  -duonebs, finish course of doxy    DVT prophylaxis  -lovenox    History of Present Illness:     No dyspnea at rest, asked if he could go home    Objective: Intake/Output Summary (Last 24 hours) at 2019 0018  Last data filed at 2019 1915  Gross per 24 hour   Intake 1717 ml   Output 750 ml   Net 967 ml      Vitals:   Vitals:    19 2145   BP: 137/66   Pulse: 67   Resp: 20   Temp: 98.3 °F (36.8 °C)   SpO2: 97%     Physical Exam:      Physical Exam   Constitutional: He appears well-developed. Pulmonary/Chest: Effort normal.   Neurological: He is alert.          Medications:   Medications:    diltiazem  120 mg Oral Daily    amLODIPine  10 mg Oral Daily    aspirin  81 mg Oral Daily    clopidogrel  75 mg Oral Daily    ipratropium-albuterol  1 ampule Inhalation Q4H WA    pantoprazole  40 mg Oral QAM AC    hydrochlorothiazide  12.5 mg Oral Daily    therapeutic multivitamin-minerals  1 tablet Oral Daily    rosuvastatin  20 mg Oral Daily    sodium chloride flush  10 mL Intravenous 2 times per day    enoxaparin  40 mg Subcutaneous Daily    doxycycline hyclate  100 mg Oral BID      Infusions:   PRN Meds:   albuterol sulfate HFA 2 puff Q4H PRN   potassium chloride 40 mEq PRN   Or     potassium alternative oral replacement 40 mEq PRN   Or     potassium chloride 10 mEq PRN   magnesium sulfate 1 g PRN   sodium chloride flush 10 mL PRN   magnesium hydroxide 30 mL Daily PRN   ondansetron 4 mg Q6H PRN         Electronically signed by Antione Rand MD on 4/29/2019 at 12:18 AM

## 2019-04-30 VITALS
HEART RATE: 65 BPM | WEIGHT: 175.3 LBS | BODY MASS INDEX: 27.51 KG/M2 | TEMPERATURE: 97.8 F | OXYGEN SATURATION: 98 % | SYSTOLIC BLOOD PRESSURE: 142 MMHG | RESPIRATION RATE: 20 BRPM | DIASTOLIC BLOOD PRESSURE: 59 MMHG | HEIGHT: 67 IN

## 2019-04-30 PROBLEM — R00.1 BRADYCARDIA: Status: RESOLVED | Noted: 2019-04-27 | Resolved: 2019-04-30

## 2019-04-30 LAB
ANION GAP SERPL CALCULATED.3IONS-SCNC: 10 MMOL/L (ref 4–16)
BUN BLDV-MCNC: 15 MG/DL (ref 6–23)
CALCIUM SERPL-MCNC: 8.8 MG/DL (ref 8.3–10.6)
CHLORIDE BLD-SCNC: 102 MMOL/L (ref 99–110)
CO2: 28 MMOL/L (ref 21–32)
CREAT SERPL-MCNC: 1 MG/DL (ref 0.9–1.3)
EKG ATRIAL RATE: 77 BPM
EKG DIAGNOSIS: NORMAL
EKG P AXIS: 75 DEGREES
EKG P-R INTERVAL: 166 MS
EKG Q-T INTERVAL: 420 MS
EKG QRS DURATION: 140 MS
EKG QTC CALCULATION (BAZETT): 539 MS
EKG R AXIS: 59 DEGREES
EKG T AXIS: 55 DEGREES
EKG VENTRICULAR RATE: 99 BPM
GFR AFRICAN AMERICAN: >60 ML/MIN/1.73M2
GFR NON-AFRICAN AMERICAN: >60 ML/MIN/1.73M2
GLUCOSE BLD-MCNC: 102 MG/DL (ref 70–99)
HCT VFR BLD CALC: 42.1 % (ref 42–52)
HEMOGLOBIN: 13.4 GM/DL (ref 13.5–18)
MCH RBC QN AUTO: 28.9 PG (ref 27–31)
MCHC RBC AUTO-ENTMCNC: 31.8 % (ref 32–36)
MCV RBC AUTO: 90.9 FL (ref 78–100)
PDW BLD-RTO: 14.6 % (ref 11.7–14.9)
PLATELET # BLD: 115 K/CU MM (ref 140–440)
PMV BLD AUTO: 12.6 FL (ref 7.5–11.1)
POTASSIUM SERPL-SCNC: 3.8 MMOL/L (ref 3.5–5.1)
RBC # BLD: 4.63 M/CU MM (ref 4.6–6.2)
SODIUM BLD-SCNC: 140 MMOL/L (ref 135–145)
WBC # BLD: 10.1 K/CU MM (ref 4–10.5)

## 2019-04-30 PROCEDURE — 93010 ELECTROCARDIOGRAM REPORT: CPT | Performed by: INTERNAL MEDICINE

## 2019-04-30 PROCEDURE — APPSS30 APP SPLIT SHARED TIME 16-30 MINUTES: Performed by: NURSE PRACTITIONER

## 2019-04-30 PROCEDURE — 2580000003 HC RX 258: Performed by: INTERNAL MEDICINE

## 2019-04-30 PROCEDURE — 6370000000 HC RX 637 (ALT 250 FOR IP): Performed by: INTERNAL MEDICINE

## 2019-04-30 PROCEDURE — 80048 BASIC METABOLIC PNL TOTAL CA: CPT

## 2019-04-30 PROCEDURE — 36415 COLL VENOUS BLD VENIPUNCTURE: CPT

## 2019-04-30 PROCEDURE — 85027 COMPLETE CBC AUTOMATED: CPT

## 2019-04-30 RX ORDER — PRASUGREL 10 MG/1
10 TABLET, FILM COATED ORAL DAILY
Qty: 30 TABLET | Refills: 11 | Status: SHIPPED | OUTPATIENT
Start: 2019-05-01 | End: 2020-10-29 | Stop reason: ALTCHOICE

## 2019-04-30 RX ORDER — DILTIAZEM HYDROCHLORIDE 120 MG/1
120 CAPSULE, COATED, EXTENDED RELEASE ORAL DAILY
Qty: 30 CAPSULE | Refills: 3 | Status: SHIPPED | OUTPATIENT
Start: 2019-05-01 | End: 2020-02-05

## 2019-04-30 RX ORDER — ROSUVASTATIN CALCIUM 20 MG/1
40 TABLET, COATED ORAL DAILY
Qty: 30 TABLET | Refills: 5 | Status: SHIPPED | OUTPATIENT
Start: 2019-04-30 | End: 2019-07-15 | Stop reason: DRUGHIGH

## 2019-04-30 RX ADMIN — PANTOPRAZOLE SODIUM 40 MG: 40 TABLET, DELAYED RELEASE ORAL at 06:30

## 2019-04-30 RX ADMIN — PRASUGREL 10 MG: 10 TABLET, FILM COATED ORAL at 09:08

## 2019-04-30 RX ADMIN — MULTIPLE VITAMINS W/ MINERALS TAB 1 TABLET: TAB at 09:09

## 2019-04-30 RX ADMIN — HYDROCHLOROTHIAZIDE 12.5 MG: 12.5 TABLET ORAL at 09:08

## 2019-04-30 RX ADMIN — AMLODIPINE BESYLATE 10 MG: 10 TABLET ORAL at 09:09

## 2019-04-30 RX ADMIN — ACETAMINOPHEN 650 MG: 325 TABLET ORAL at 03:25

## 2019-04-30 RX ADMIN — DILTIAZEM HYDROCHLORIDE 120 MG: 120 CAPSULE, COATED, EXTENDED RELEASE ORAL at 09:09

## 2019-04-30 RX ADMIN — ASPIRIN 81 MG 81 MG: 81 TABLET ORAL at 09:09

## 2019-04-30 RX ADMIN — SODIUM CHLORIDE, PRESERVATIVE FREE 10 ML: 5 INJECTION INTRAVENOUS at 09:09

## 2019-04-30 RX ADMIN — DOXYCYCLINE HYCLATE 100 MG: 100 TABLET, COATED ORAL at 09:08

## 2019-04-30 RX ADMIN — SODIUM CHLORIDE: 9 INJECTION, SOLUTION INTRAVENOUS at 03:27

## 2019-04-30 RX ADMIN — ROSUVASTATIN CALCIUM 20 MG: 20 TABLET, FILM COATED ORAL at 09:08

## 2019-04-30 ASSESSMENT — PAIN SCALES - GENERAL: PAINLEVEL_OUTOF10: 4

## 2019-04-30 NOTE — DISCHARGE SUMMARY
Discharge Summary    Name:  Quang Bergeron /Age/Sex:   (69 y.o. male)   MRN & CSN:  [id number] & [id number] Admission Date/Time: 2019  7:47 PM   Attending:  Bobo Garg MD Discharging Physician: Bobo Garg MD     HPI:     As per admission H&P    \"The patient is a 69 y.o. male with significant past medical history of CAD, COPD, HTN who was seen on  for COPD exacerbation and discharged on doxy and steroid taper. He presents today as he had some dizziness and his niece who is a nurse checked his HR and it was 28. His BP was stable as per pt. He is on a BB. Denies any chest pain or worsening shortness of breath\". Hospital Course:   Quang Bergeron is a 69 y.o.  male  who presents with bradycardia    Symptomatic Bradycardia   possibly due to ischemia in RCA territory + atenolol therapy  -TSH wnl and hold BB  -echo: EF normal  -stress test: has ischemia     CAD s/p CABG   now with 99% lesion of SVG to RCA s/p balloon angioplasty   -DAPT for 1 year:  -ASA, and clopidogrel changed to prasugrel this admission      Hypokalemia - resolved    Acute kidney injury  -cr 1.4 on admit  -improved with IVF     HTN  -CCB, thiazide,  - BB held due to bradycardia     COPD  -cont home meds        The patient expressed appropriate understanding of and agreement with the discharge recommendations, medications, and plan.      Consults this admission:  IP CONSULT TO HOSPITALIST  IP CONSULT TO CARDIOLOGY  IP CONSULT TO CARDIAC REHAB    Discharge Instruction:   Follow up appointments:   Primary care physician:  within 2 weeks    Diet:  cardiac diet   Activity: activity as tolerated  Disposition: Discharged to:   [x]Home, []C, []SNF, []Acute Rehab, []Hospice   Condition on discharge: Stable    Discharge Medications:      Jono Cruz   Home Medication Instructions [facility]:[id number]    Printed on:19 1231   Medication Information                      albuterol sulfate HFA (PROVENTIL HFA) 108 (90 Base) MCG/ACT inhaler  Inhale 2 puffs into the lungs every 4 hours as needed for Wheezing or Shortness of Breath With spacer (and mask if indicated). Thanks. amLODIPine (NORVASC) 10 MG tablet  Take 10 mg by mouth daily             aspirin 81 MG tablet  Take 1 tablet by mouth daily             Compression Stockings MISC  by Does not apply route Thigh high, 20 to 30 mm of Hg             diltiazem (CARDIZEM CD) 120 MG extended release capsule  Take 1 capsule by mouth daily             hydrochlorothiazide (MICROZIDE) 12.5 MG capsule  Take 12.5 mg by mouth daily             Multiple Vitamins-Minerals (THERAPEUTIC MULTIVITAMIN-MINERALS) tablet  Take 1 tablet by mouth daily             nystatin (MYCOSTATIN) 794611 UNIT/ML suspension  Take 5 mLs by mouth 4 times daily             omeprazole (PRILOSEC) 20 MG delayed release capsule  Take 20 mg by mouth daily             prasugrel (EFFIENT) 10 MG TABS  Take 1 tablet by mouth daily             RA LORATADINE 10 MG tablet  take 1 tablet by mouth once daily             rosuvastatin (CRESTOR) 20 MG tablet  Take 2 tablets by mouth daily                 Objective Findings at Discharge:   BP (!) 142/59   Pulse 65   Temp 97.8 °F (36.6 °C) (Oral)   Resp 20   Ht 5' 7\" (1.702 m)   Wt 175 lb 4.8 oz (79.5 kg)   SpO2 98%   BMI 27.46 kg/m²            PHYSICAL EXAM   GEN Awake male, sitting upright in bed in no apparent distress. RESP Clear to auscultation, no wheezes, rales or rhonchi. Symmetric chest movement while on room air. CARDIO/VASC S1/S2 auscultated. Regular rate without appreciable murmurs, rubs, or gallops. No peripheral edema. GI Abdomen is soft without significant tenderness, masses, or guarding. Bowel sounds are normoactive. MSK No gross joint deformities. Spontaneous movement of all extremities  SKIN Normal coloration, warm, dry. NEURO Cranial nerves appear grossly intact, normal speech, no lateralizing weakness. PSYCH Awake, alert, oriented x 4.   Affect appropriate. BMP/CBC  Recent Labs     04/27/19 2000 04/28/19  0347 04/29/19  0450 04/30/19  0433    141 139 140   K 3.1* 3.5 3.9 3.8   CL 99 102 104 102   CO2 26 26 18* 28   BUN 35* 27* 20 15   CREATININE 1.4* 1.0 0.9 1.0   WBC 12.3* 11.2*  --  10.1   HCT 46.7 49.5  --  42.1    156  --  115*       IMAGING:    NM MYOCARDIAL SPECT REST EXERCISE OR RX [[id number]] Collected: 04/29/19 0907      Order Status: Completed Updated: 04/29/19 1252     Narrative:       Cardiac Perfusion Imaging      Demographics      Patient Name      Tim RUSSELL       Date of study        04/29/2019      Date of Birth     1940         Gender               Male      Age               69 year(s)         Race                       Patient Number    [id number]         Room Number          9370      Visit Number      [id number]          Height               67 inches      Corporate ID      [id number]           Weight               173 pounds      Accession Number  [id number]                                           [address]      Ordering          [facility]   Physician         MD                 Cardiologist         MD      Conclusions      Summary   ECG portion of stress test is negative for ischemia by diagnostic criteria. Frequent PVC   Normal EF 68 % with normal ventricular contractility.    Medium size moderate inferior ischemia   Abnormal stress images   Abnormal stress test      Recommendation   Needs cardiac cath      Signatures      ------------------------------------------------------------------   Electronically signed by Michelle Crespo MD (Interpreting   cardiologist) on 04/29/2019 at 12:52 ------------------------------------------------------------------     Procedure  Procedure Type:      Nuclear Stress Test:Pharmacological, Myocardial Perfusion Imaging with   Pharm, NM MYOCARDIAL SPECT REST EXERCISE OR RX     Indications: CAD and abnormal rest ECG. Risk Factors      The patient risk factors include:prior PCI;prior CABG;peripheral arterial   disease, hypercholesterolemia, hypertension, chronic lung disease and prior   MI . Stress Protocols      Resting ECG   Normal sinus rhythm. bigemini      Resting HR:77 bpm     Resting BP:163/59 mmHg      Pre-stress physical exam: Within normal limits. Stress Protocol:Pharmacologic - Lexiscan     Peak HR:96 bpm                   HR response: Appropriate   Peak BP:163/59 mmHg              BP response: Normal resting BP -   Predicted HR: 143 bpm            appropriate response   % of predicted HR: 67            HR/BP product:[id number]      Exercise duration: 01:05 min   Reason for termination:Completed      ECG Findings   nsr with bigemini      Arrhythmias   frequent pvc      Symptoms   No symptoms with Lexiscan infusion. Complications   Procedure complication was none. Stress Interpretation   ECG portion of stress test is negative for ischemia by diagnostic criteria. Frequent PVC     Procedure Medications    - Lexiscan I.V. bolus (over 15sec.) 0.4 mg admininstered @ 04/29/2019 09:10. Imaging Protocols      Rest                             Stress      Isotope:Sestamibi 99mTc          Isotope: Sestamibi 99mTc   Isotope dose:11 mCi              Isotope dose:32.7 mCi   Administration route: I.V. Administration route: I.V.    Injection Date:04/29/2019 07:20  Injection Date:04/29/2019 09:10   Scan Date:04/29/2019 08:20       Scan Date:04/29/2019 10:10      Technique:        SPECT          Technique:        Gated                                                      SPECT      Perfusion Interpretation      Normal EF 68 % with normal ventricular contractility. Medium size moderate inferior ischemia     Imaging Results       Summed scores        - Summed stress score: 6        - Summed rest score: 0        - Summed difference score:     6      Rest ejection   Ejection fraction:68 %   EDV :68 ml   ESV :22 ml   Stroke volume :46 ml     Medical History      Accession#:  [id number]     Admission Medications  +-------------+------+-------------+----------+---------+-------+  ! Name         ! Dosage! Times per day! Start date! Stop date! Details! +-------------+------+-------------+----------+---------+-------+  ! Aspirin (any)!      !             !          !         !       !  +-------------+------+-------------+----------+---------+-------+  ! Clopidogrel  !      !             !          !         !       !  +-------------+------+-------------+----------+---------+-------+    Admission Data  Admission date: 04/27/2019 Admission Time: 19:47    Hospital Status: Inpatient. XR CHEST PORTABLE [[id number]] Collected: 04/27/19 2012     Order Status: Completed Updated: 04/27/19 2017     Narrative:       EXAMINATION:  SINGLE XRAY VIEW OF THE CHEST    4/27/2019 8:01 pm    COMPARISON:  Chest radiograph performed 04/20/2019. HISTORY:  ORDERING SYSTEM PROVIDED HISTORY: bradycardia  TECHNOLOGIST PROVIDED HISTORY:  Reason for exam:->bradycardia  Ordering Physician Provided Reason for Exam: bradycardia  Acuity: Unknown  Type of Exam: Unknown    FINDINGS:  There is no acute consolidation or effusion. There is no pneumothorax. The  mediastinal structures are stable. The upper abdomen is unremarkable. The  extrathoracic soft tissues are unremarkable. Impression:       No acute cardiopulmonary process.              Discharge Time of 35 minutes    Electronically signed by Randell Reyes MD on 4/30/2019 at 12:31 PM

## 2019-04-30 NOTE — PROGRESS NOTES
[address] [facility],Third [address]  Phone: [phone]    Fax [phone]                  Shawn Montemayor MD, Felix Hernandez MD, Agapito Aldana MD, MD Arielle Paula MD Custer Ginger, MD Francy Gata, MIGUEL Valencia, MIGUEL Jaeger, MIGUEL    Cardiology Progress Note     Today's Plan: discharge home    Admit Date:  4/27/2019    Consult reason/ Seen today for:  Bradycardia and PVC's    Subjective and  Overnight Events:  Arno Gowers is up in the chair today and states he feels great. He denies any chest pain or shortness of breath today. No PVC's this morning. Assessment / Plan / Recommendation:     1. PVC/ Bigemini on ekg, stress test shows ischemia, Cath 4/29/2019 Stent to RCA  2. Check echo EF 55-60% mild MR  3. Tolerating cardizem, no PVC's noted today per tele tech. 4. TSH normal  5. 24 hour holter pending  6. EPS consult   7. ASCVD: H/o MI many years ago, on aspirin and plavix   8. Syncope /Dizziness : check orthostatics, compression stockings  9. DVT prophylaxis if no contraindication  6. Dyslipidemia: continue statins       History of Presenting Illness:    Chief complain on admission : 69 y. o.year old who is admitted for  Chief Complaint   Patient presents with    Bradycardia    Dizziness        Past medical history:    has a past medical history of CAD (coronary artery disease), Cancer ([address] History of cardiac catheterization, History of echocardiogram, HTN (hypertension), and Hyperlipidemia. Past surgical history:   has a past surgical history that includes back surgery; Cardiac surgery; Coronary angioplasty with stent (05/14/2014); and Colonoscopy (4/14/15). Social History:   reports that he quit smoking about 13 years ago. His smoking use included cigarettes. He has a 90.00 pack-year smoking history.  He has never used smokeless tobacco. He reports that he does not drink alcohol or use drugs. Family history:  family history is not on file. No Known Allergies    Review of Systems:   All 14 systems were reviewed and are negative  Except for the positive findings  which as documented     BP (!) 142/59   Pulse 65   Temp 97.8 °F (36.6 °C) (Oral)   Resp 20   Ht 5' 7\" (1.702 m)   Wt 175 lb 4.8 oz (79.5 kg)   SpO2 98%   BMI 27.46 kg/m²       Intake/Output Summary (Last 24 hours) at 4/30/2019 1044  Last data filed at 4/30/2019 0536  Gross per 24 hour   Intake 1188.75 ml   Output 700 ml   Net 488.75 ml       Physical Exam:  Constitutional:  Well developed, Well nourished, No acute distress, Non-toxic appearance. HENT:  Normocephalic, Atraumatic, Bilateral external ears normal, Oropharynx moist, No oral exudates, Nose normal. Neck- Normal range of motion, No tenderness, Supple, No stridor. Eyes:  PERRL, EOMI, Conjunctiva normal, No discharge. Respiratory:  Normal breath sounds, No respiratory distress, No wheezing, No chest tenderness. Cardiovascular:  Normal heart rate, Normal rhythm, No murmurs appreciated, No rubs appreciated, No gallops appreciated, JVP not elevated  Abdomen/GI:  Bowel sounds normal, Soft, No tenderness, No masses, No pulsatile masses. Musculoskeletal:  Intact distal pulses, No edema, No tenderness, No cyanosis, No clubbing. Good range of motion in all major joints. No tenderness to palpation or major deformities noted. Back- No tenderness. Integument:  Warm, Dry, No erythema, No rash. R groin site is without hematoma, bleeding, or bruise noted. Tegaderm in place  Lymphatic:  No lymphadenopathy noted. Neurologic:  Alert & oriented x 3, Normal motor function, Normal sensory function, No focal deficits noted.    Psychiatric:  Affect  and  Mood : anxious to go home    Medications:    sodium chloride flush  10 mL Intravenous 2 times per day    aspirin  81 mg Oral Daily    prasugrel  10 mg Oral Daily    diltiazem  120 mg Oral Daily    amLODIPine  10 mg Oral Daily  aspirin  81 mg Oral Daily    ipratropium-albuterol  1 ampule Inhalation Q4H WA    pantoprazole  40 mg Oral QAM AC    hydrochlorothiazide  12.5 mg Oral Daily    therapeutic multivitamin-minerals  1 tablet Oral Daily    rosuvastatin  20 mg Oral Daily    sodium chloride flush  10 mL Intravenous 2 times per day      sodium chloride 75 mL/hr at 04/30/19 0327     sodium chloride flush, acetaminophen, magnesium hydroxide, melatonin, albuterol sulfate HFA, potassium chloride **OR** potassium alternative oral replacement **OR** potassium chloride, magnesium sulfate, sodium chloride flush, magnesium hydroxide, ondansetron    Lab Data:  CBC:   Recent Labs     04/27/19 2000 04/28/19 0347 04/30/19  0433   WBC 12.3* 11.2* 10.1   HGB 15.0 15.6 13.4*   HCT 46.7 49.5 42.1   MCV 89.3 90.8 90.9    156 115*     BMP:   Recent Labs     04/28/19 0347 04/29/19  0450 04/30/19  0433    139 140   K 3.5 3.9 3.8    104 102   CO2 26 18* 28   BUN 27* 20 15   CREATININE 1.0 0.9 1.0     PT/INR: No results for input(s): PROTIME, INR in the last 72 hours. BNP:  No results for input(s): PROBNP in the last 72 hours. TROPONIN:   Recent Labs     04/27/19 2000 04/27/19  2150 04/28/19 0347   TROPONINT <0.010 <0.010 <0.010        ECHO :   Echocardiogram 4/29/2019  Summary   Patient in atrial fibrillation during exam   Left ventricular function is normal, EF is estimated at 55-60 %.   Mildly dilated left atrium.  Rainell David enlarged right ventricle cavity.   Trace to mild mitral regurgitation is present.   No evidence of pericardial effusion.   No evidence of pleural effusion. Impression:  Active Problems:    Bradycardia    Lightheadedness    PVC (premature ventricular contraction)  Resolved Problems:    * No resolved hospital problems. *       All labs, medications and tests reviewed by myself , continue all other medications of all above medical condition listed as is except for changes mentioned above.     Thank you very much for consult , please call with questions.     Electronically signed by MIGUEL Narvaez CNP on 4/30/2019 at 10:44 AM

## 2019-05-01 LAB
ACQUISITION DURATION: NORMAL S
AVERAGE HEART RATE: 73 BPM
EKG DIAGNOSIS: NORMAL
FASTEST SUPRAVENTRICULAR RATE: 108 BPM
HOOKUP DATE: NORMAL
HOOKUP TIME: NORMAL
LONGEST SUPRAVENTRICULAR RATE: 108 BPM
MAX HEART RATE TIME/DATE: NORMAL
MAX HEART RATE: 100 BPM
MIN HEART RATE TIME/DATE: NORMAL
MIN HEART RATE: 58 BPM
NUMBER OF FASTEST SUPRAVENTRICULAR BEATS: 3
NUMBER OF LONGEST SUPRAVENTRICULAR BEATS: 3
NUMBER OF QRS COMPLEXES: NORMAL
NUMBER OF SUPRAVENTRICULAR BEATS IN RUNS: 3
NUMBER OF SUPRAVENTRICULAR COUPLETS: 85
NUMBER OF SUPRAVENTRICULAR ECTOPICS: 802
NUMBER OF SUPRAVENTRICULAR ISOLATED BEATS: 629
NUMBER OF SUPRAVENTRICULAR RUNS: 1
NUMBER OF VENTRICULAR BEATS IN RUNS: 0
NUMBER OF VENTRICULAR BIGEMINAL CYCLES: NORMAL
NUMBER OF VENTRICULAR COUPLETS: 404
NUMBER OF VENTRICULAR ECTOPICS: NORMAL
NUMBER OF VENTRICULAR ISOLATED BEATS: NORMAL
NUMBER OF VENTRICULAR RUNS: 0

## 2019-05-09 ENCOUNTER — HOSPITAL ENCOUNTER (EMERGENCY)
Age: 79
Discharge: HOME | End: 2019-05-09
Attending: EMERGENCY MEDICINE
Payer: COMMERCIAL

## 2019-05-09 VITALS
WEIGHT: 165 LBS | DIASTOLIC BLOOD PRESSURE: 70 MMHG | BODY MASS INDEX: 26.52 KG/M2 | HEART RATE: 81 BPM | OXYGEN SATURATION: 97 % | RESPIRATION RATE: 23 BRPM | HEIGHT: 66 IN | SYSTOLIC BLOOD PRESSURE: 161 MMHG | TEMPERATURE: 98.1 F

## 2019-05-09 DIAGNOSIS — I49.8 BIGEMINY: Primary | ICD-10-CM

## 2019-05-09 DIAGNOSIS — E87.6 HYPOKALEMIA: ICD-10-CM

## 2019-05-09 LAB
ALBUMIN SERPL-MCNC: 3.7 GM/DL (ref 3.4–5)
ALP BLD-CCNC: 92 IU/L (ref 40–129)
ALT SERPL-CCNC: 36 U/L (ref 10–40)
ANION GAP SERPL CALCULATED.3IONS-SCNC: 14 MMOL/L (ref 4–16)
AST SERPL-CCNC: 34 IU/L (ref 15–37)
BASOPHILS ABSOLUTE: 0 K/CU MM
BASOPHILS RELATIVE PERCENT: 0.4 % (ref 0–1)
BILIRUB SERPL-MCNC: 0.4 MG/DL (ref 0–1)
BUN BLDV-MCNC: 13 MG/DL (ref 6–23)
CALCIUM SERPL-MCNC: 9 MG/DL (ref 8.3–10.6)
CHLORIDE BLD-SCNC: 96 MMOL/L (ref 99–110)
CO2: 24 MMOL/L (ref 21–32)
CREAT SERPL-MCNC: 1 MG/DL (ref 0.9–1.3)
DIFFERENTIAL TYPE: ABNORMAL
EOSINOPHILS ABSOLUTE: 0.1 K/CU MM
EOSINOPHILS RELATIVE PERCENT: 2.6 % (ref 0–3)
GFR AFRICAN AMERICAN: >60 ML/MIN/1.73M2
GFR NON-AFRICAN AMERICAN: >60 ML/MIN/1.73M2
GLUCOSE BLD-MCNC: 109 MG/DL (ref 70–99)
HCT VFR BLD CALC: 43.4 % (ref 42–52)
HEMOGLOBIN: 14 GM/DL (ref 13.5–18)
IMMATURE NEUTROPHIL %: 0.2 % (ref 0–0.43)
LYMPHOCYTES ABSOLUTE: 0.9 K/CU MM
LYMPHOCYTES RELATIVE PERCENT: 17.1 % (ref 24–44)
MAGNESIUM: 2.1 MG/DL (ref 1.8–2.4)
MCH RBC QN AUTO: 28.9 PG (ref 27–31)
MCHC RBC AUTO-ENTMCNC: 32.3 % (ref 32–36)
MCV RBC AUTO: 89.7 FL (ref 78–100)
MONOCYTES ABSOLUTE: 0.8 K/CU MM
MONOCYTES RELATIVE PERCENT: 15.5 % (ref 0–4)
PDW BLD-RTO: 13.6 % (ref 11.7–14.9)
PLATELET # BLD: ABNORMAL K/CU MM (ref 140–440)
PMV BLD AUTO: 12.8 FL (ref 7.5–11.1)
POTASSIUM SERPL-SCNC: ABNORMAL MMOL/L (ref 3.5–5.1)
RBC # BLD: 4.84 M/CU MM (ref 4.6–6.2)
SEGMENTED NEUTROPHILS ABSOLUTE COUNT: 3.3 K/CU MM
SEGMENTED NEUTROPHILS RELATIVE PERCENT: 64.2 % (ref 36–66)
SODIUM BLD-SCNC: 134 MMOL/L (ref 135–145)
TOTAL IMMATURE NEUTOROPHIL: 0.01 K/CU MM
TOTAL PROTEIN: 6.6 GM/DL (ref 6.4–8.2)
TROPONIN T: <0.01 NG/ML
WBC # BLD: 5.1 K/CU MM (ref 4–10.5)

## 2019-05-09 PROCEDURE — 6370000000 HC RX 637 (ALT 250 FOR IP): Performed by: EMERGENCY MEDICINE

## 2019-05-09 PROCEDURE — 84484 ASSAY OF TROPONIN QUANT: CPT

## 2019-05-09 PROCEDURE — 85025 COMPLETE CBC W/AUTO DIFF WBC: CPT

## 2019-05-09 PROCEDURE — 36415 COLL VENOUS BLD VENIPUNCTURE: CPT

## 2019-05-09 PROCEDURE — 83735 ASSAY OF MAGNESIUM: CPT

## 2019-05-09 PROCEDURE — 80053 COMPREHEN METABOLIC PANEL: CPT

## 2019-05-09 PROCEDURE — 93005 ELECTROCARDIOGRAM TRACING: CPT | Performed by: EMERGENCY MEDICINE

## 2019-05-09 PROCEDURE — 99284 EMERGENCY DEPT VISIT MOD MDM: CPT

## 2019-05-09 RX ORDER — POTASSIUM CHLORIDE 20 MEQ/1
40 TABLET, EXTENDED RELEASE ORAL ONCE
Status: COMPLETED | OUTPATIENT
Start: 2019-05-09 | End: 2019-05-09

## 2019-05-09 RX ADMIN — POTASSIUM CHLORIDE 40 MEQ: 20 TABLET, EXTENDED RELEASE ORAL at 13:58

## 2019-05-09 NOTE — CARE COORDINATION
CM - TR-3 _Pt a possible readmission risk. Pt here in Er today for a low HR --reported to be in the 30\"s . Pt explained on his last admission , he had some stents placed during the admission and that this seemed to solve the problem vs nay further interventions with a pacemakers. Denied any associated symptoms -shortness of breath or CP nausea --pt does have COPD Hx---pt treated and released home with follow up care . Pt declined any [facility] offers.   Valarie Rodriguez / Jorgito Oakes / [facility]

## 2019-05-09 NOTE — ED NOTES
[place],lactic acid,venous ph and type & screen was drawn on patient     Carolina Billem  05/09/19 5067

## 2019-05-09 NOTE — ED NOTES
1423 repaged Dr Jasmyn Earl  05/09/19 1424  1430 Dr Juliet Goodell returned call      Michael [facility]  05/09/19 1437

## 2019-05-09 NOTE — ED TRIAGE NOTES
Patient presented to the ED after assessing his vitals at home and noting that his heart rate was very low. Patient denies shortness of breath, chest pain or nausea. He has a bigeminal electrical rhythm with a mechanical pulse of 39. Patient is currently in bed in a position of comfort. Respirations are equal and clear, non labored with symmetrical chest  rise and fall.

## 2019-05-09 NOTE — ED PROVIDER NOTES
Triage Chief Complaint:   Other (pulse at home was 38 - 40's, was told by Dr Dio Lea when it gets low to go to hospital)    Native:  Marta Salazar is a 69 y.o. male that presents after it was noted his pulse was low this morning. He called his PCP who told him to come to the ER. Patient denies any chest pain, shortness of breath, lightheadedness or dizziness. No nausea or vomiting. Abnormal abdominal feel for the last few months. Denies abdominal pain. Decreased appetite at home. No significant weight loss. Patient states his not concerned about the abdominal symptoms and is only concerned about the low heart rate. ROS:   Review of Systems   Constitutional: Negative for chills and fever. HENT: Negative for congestion, rhinorrhea and sore throat. Eyes: Negative for redness and visual disturbance. Respiratory: Negative for cough and shortness of breath. Cardiovascular: Negative for chest pain and leg swelling. Gastrointestinal: Negative for abdominal pain, nausea and vomiting. Genitourinary: Negative for dysuria and frequency. Musculoskeletal: Negative for arthralgias and back pain. Skin: Negative for rash and wound. Neurological: Negative for dizziness, syncope, light-headedness and headaches. Psychiatric/Behavioral: Negative for hallucinations and suicidal ideas. Past Medical History:   Diagnosis Date    CAD (coronary artery disease)     Cancer ([facility] [address]     prostate    History of cardiac catheterization 05/24/2017    Critical Single vessel CAD with chronic occlusion of RCA. No significant disease of the other vessels. SVBG to RCA is patent with mild Proximal disease. LIMA to LAD did not mature. Normal LV systolic function & wall motion. LVEF is 55 %. Patient tolerated the procedure well. No immediate complications.  History of echocardiogram 01/12/2018    Left ventricular systolic function is normal with an ejection fraction of55-60%. Grade I diastolic dysfunction.  Mildly dilated left atrium. Moderate mitral regurgitation. Mild to moderate tricuspid regurgitation. No evidence of pericardial effusion.  HTN (hypertension)     Hyperlipidemia      Past Surgical History:   Procedure Laterality Date    BACK SURGERY      CARDIAC SURGERY      cabg    COLONOSCOPY  4/14/15    divertics    CORONARY ANGIOPLASTY WITH STENT PLACEMENT  05/14/2014    X2     History reviewed. No pertinent family history.   Social History     Socioeconomic History    Marital status:      Spouse name: Not on file    Number of children: Not on file    Years of education: Not on file    Highest education level: Not on file   Occupational History    Not on file   Social Needs    Financial resource strain: Not on file    Food insecurity:     Worry: Not on file     Inability: Not on file    Transportation needs:     Medical: Not on file     Non-medical: Not on file   Tobacco Use    Smoking status: Former Smoker     Packs/day: 3.00     Years: 30.00     Pack years: 90.00     Types: Cigarettes     Last attempt to quit: 2006     Years since quittin.3    Smokeless tobacco: Never Used   Substance and Sexual Activity    Alcohol use: No    Drug use: No    Sexual activity: Not Currently   Lifestyle    Physical activity:     Days per week: Not on file     Minutes per session: Not on file    Stress: Not on file   Relationships    Social connections:     Talks on phone: Not on file     Gets together: Not on file     Attends Confucianism service: Not on file     Active member of club or organization: Not on file     Attends meetings of clubs or organizations: Not on file     Relationship status: Not on file    Intimate partner violence:     Fear of current or ex partner: Not on file     Emotionally abused: Not on file     Physically abused: Not on file     Forced sexual activity: Not on file   Other Topics Concern    Not on file   Social History Narrative    Not on file     No current facility-administered medications for this encounter. Current Outpatient Medications   Medication Sig Dispense Refill    rosuvastatin (CRESTOR) 20 MG tablet Take 2 tablets by mouth daily 30 tablet 5    diltiazem (CARDIZEM CD) 120 MG extended release capsule Take 1 capsule by mouth daily 30 capsule 3    prasugrel (EFFIENT) 10 MG TABS Take 1 tablet by mouth daily 30 tablet 11    aspirin 81 MG tablet Take 1 tablet by mouth daily 30 tablet 11    RA LORATADINE 10 MG tablet take 1 tablet by mouth once daily  0    omeprazole (PRILOSEC) 20 MG delayed release capsule Take 20 mg by mouth daily      hydrochlorothiazide (MICROZIDE) 12.5 MG capsule Take 12.5 mg by mouth daily      Compression Stockings MISC by Does not apply route Thigh high, 20 to 30 mm of Hg 1 each 3    albuterol sulfate HFA (PROVENTIL HFA) 108 (90 Base) MCG/ACT inhaler Inhale 2 puffs into the lungs every 4 hours as needed for Wheezing or Shortness of Breath With spacer (and mask if indicated). Thanks. 1 Inhaler 1    nystatin (MYCOSTATIN) 642562 UNIT/ML suspension Take 5 mLs by mouth 4 times daily 1 Bottle 1    Multiple Vitamins-Minerals (THERAPEUTIC MULTIVITAMIN-MINERALS) tablet Take 1 tablet by mouth daily      amLODIPine (NORVASC) 10 MG tablet Take 10 mg by mouth daily       No Known Allergies    Nursing Notes Reviewed     Physical Exam:   ED Triage Vitals   Enc Vitals Group      BP       Pulse       Resp       Temp       Temp src       SpO2       Weight       Height       Head Circumference       Peak Flow       Pain Score       Pain Loc       Pain Edu? Excl. in [address]? BP (!) 161/70   Pulse 81   Temp 98.1 °F (36.7 °C)   Resp 23   Ht 5' 6\" (1.676 m)   Wt 165 lb (74.8 kg)   SpO2 97%   BMI 26.63 kg/m²   My pulse ox interpretation is - normal  Physical Exam   Constitutional: He appears well-developed and well-nourished. No distress. HENT:   Head: Normocephalic and atraumatic. Eyes: Conjunctivae are normal. Right eye exhibits no discharge.  Left eye exhibits no discharge. Cardiovascular: Normal rate and intact distal pulses. A regularly irregular rhythm present. Pulses:       Radial pulses are 2+ on the right side, and 2+ on the left side. Pulmonary/Chest: Effort normal and breath sounds normal. No respiratory distress. Abdominal: Soft. He exhibits no distension. There is no tenderness. There is no rebound and no guarding. Musculoskeletal: Normal range of motion. He exhibits no edema, tenderness or deformity. Neurological: He is alert. No cranial nerve deficit. Skin: Skin is warm and dry. Capillary refill takes less than 2 seconds. He is not diaphoretic. Psychiatric: He has a normal mood and affect.  His behavior is normal.       I have reviewed and interpreted all of the currently available lab results from this visit (if applicable):  Results for orders placed or performed during the hospital encounter of 05/09/19   CBC Auto Differential   Result Value Ref Range    WBC 5.1 4.0 - 10.5 K/CU MM    RBC 4.84 4.6 - 6.2 M/CU MM    Hemoglobin 14.0 13.5 - 18.0 GM/DL    Hematocrit 43.4 42 - 52 %    MCV 89.7 78 - 100 FL    MCH 28.9 27 - 31 PG    MCHC 32.3 32.0 - 36.0 %    RDW 13.6 11.7 - 14.9 %    Platelets 128  RESULTS CONFIRMED BY SMEAR REVIEW   140 - 440 K/CU MM    MPV 12.8 (H) 7.5 - 11.1 FL    Immature Neutrophil % 0.2 0 - 0.43 %    Segs Relative 64.2 36 - 66 %    Eosinophils % 2.6 0 - 3 %    Basophils % 0.4 0 - 1 %    Lymphocytes % 17.1 (L) 24 - 44 %    Monocytes % 15.5 (H) 0 - 4 %    Total Immature Neutrophil 0.01 K/CU MM    Segs Absolute 3.3 K/CU MM    Eosinophils # 0.1 K/CU MM    Basophils # 0.0 K/CU MM    Lymphocytes # 0.9 K/CU MM    Monocytes # 0.8 K/CU MM    Differential Type AUTOMATED DIFFERENTIAL    Comprehensive Metabolic Panel w/ Reflex to MG   Result Value Ref Range    Sodium 134 (L) 135 - 145 MMOL/L    Potassium (LL) 3.5 - 5.1 MMOL/L     3.0  K CALLED AT ED, DR JACINTO AT 1339 5.9.19 BY CARMELITA MLT  RESULTS READ BACK      Chloride 96 (L) 99 - 110 mMol/L    CO2 24 21 - 32 MMOL/L    BUN 13 6 - 23 MG/DL    CREATININE 1.0 0.9 - 1.3 MG/DL    Glucose 109 (H) 70 - 99 MG/DL    Calcium 9.0 8.3 - 10.6 MG/DL    Alb 3.7 3.4 - 5.0 GM/DL    Total Protein 6.6 6.4 - 8.2 GM/DL    Total Bilirubin 0.4 0.0 - 1.0 MG/DL    ALT 36 10 - 40 U/L    AST 34 15 - 37 IU/L    Alkaline Phosphatase 92 40 - 129 IU/L    GFR Non-African American >60 >60 mL/min/1.73m2    GFR African American >60 >60 mL/min/1.73m2    Anion Gap 14 4 - 16   Troponin   Result Value Ref Range    Troponin T <0.010 <0.01 NG/ML   Magnesium   Result Value Ref Range    Magnesium 2.1 1.8 - 2.4 mg/dl   EKG 12 Lead   Result Value Ref Range    Ventricular Rate 77 BPM    Atrial Rate 77 BPM    P-R Interval 168 ms    QRS Duration 146 ms    Q-T Interval 442 ms    QTc Calculation (Bazett) 500 ms    P Axis 60 degrees    R Axis -27 degrees    T Axis 23 degrees    Diagnosis       Sinus rhythm with frequent premature ventricular complexes in a pattern of bigeminy  Right bundle branch block  Abnormal ECG  When compared with ECG of 29-APR-2019 17:30,  QRS axis shifted left  T wave inversion now evident in Inferior leads        Radiographs (if obtained):  [] The following radiograph was interpreted by myself in the absence of a radiologist:  [x]Radiologist's Report Reviewed:  No orders to display         EKG (if obtained): (All EKG's are interpreted by myself in the absence of a cardiologist)  Sinus rhythm with frequent PVCs in a pattern of bigeminy. Rate of 77. [place] interval 138, . QTc 500. Nonspecific ST segments and T waves. Right bundle-branch block. Impression: Abnormal ECG. When compared to previous EKG from 4/29/19, there are no significant changes. MDM:  Differential diagnoses considered include electrolyte abnormality, chronic arrhythmia, cardiac ischemia. Patient does not have any chest pain or any other symptoms associated with the bigeminy.   He has been noted to have this pattern for multiple months now. I do not suspect he actually had bradycardia this morning, suspect they were only noting one of the beats of the bigeminy pattern 1 taking his pulse. Basic labs were obtained and showed a decreased potassium level which was replaced orally. They're otherwise unremarkable. Troponin is negative. I do not suspect acute coronary syndrome. I did consult with Dr. Lincoln Griffith - on call for pt's cardiologist - and discussed patient's history, ED presentation/course including any pertinent laboratory findings and imaging study findings. He recommends close follow up in the clinic. Patient has remained stable and asymptomatic throughout his time in the emergency department. Will discharge him home in stable condition. Plan of care explained to patient. Concerning signs and symptoms warranting a return visit to the Emergency Department were explained in detail. All questions and concerns were addressed to the patient's satisfaction. Patient understood and agreed with plan. The likelihood of other entities in the differential is insufficient to justify any further testing for them. This was explained to the patient. The patient was advised that persistent or worsening symptoms would requirefurther evaluation. Clinical Impression:  1. Bigeminy    2. Hypokalemia          Ernesto Armando MD       Please note that portions of this note may have been complete with a voice recognition program.  Effortswere made to edit the dictations, but occasional words are mis-transcribed.          Ernesto Armando MD  05/10/19 [id number]

## 2019-05-10 LAB
EKG ATRIAL RATE: 77 BPM
EKG DIAGNOSIS: NORMAL
EKG P AXIS: 60 DEGREES
EKG P-R INTERVAL: 168 MS
EKG Q-T INTERVAL: 442 MS
EKG QRS DURATION: 146 MS
EKG QTC CALCULATION (BAZETT): 500 MS
EKG R AXIS: -27 DEGREES
EKG T AXIS: 23 DEGREES
EKG VENTRICULAR RATE: 77 BPM

## 2019-05-10 PROCEDURE — 93010 ELECTROCARDIOGRAM REPORT: CPT | Performed by: INTERNAL MEDICINE

## 2019-05-10 ASSESSMENT — ENCOUNTER SYMPTOMS
SHORTNESS OF BREATH: 0
RHINORRHEA: 0
BACK PAIN: 0
EYE REDNESS: 0
ABDOMINAL PAIN: 0
COUGH: 0
NAUSEA: 0
SORE THROAT: 0
VOMITING: 0

## 2019-05-13 ENCOUNTER — OFFICE VISIT (OUTPATIENT)
Dept: CARDIOLOGY CLINIC | Age: 79
End: 2019-05-13
Payer: COMMERCIAL

## 2019-05-13 VITALS
DIASTOLIC BLOOD PRESSURE: 60 MMHG | SYSTOLIC BLOOD PRESSURE: 138 MMHG | HEART RATE: 56 BPM | BODY MASS INDEX: 27.87 KG/M2 | WEIGHT: 173.4 LBS | HEIGHT: 66 IN

## 2019-05-13 DIAGNOSIS — Z95.1 S/P CABG X 2: Chronic | ICD-10-CM

## 2019-05-13 DIAGNOSIS — I10 ESSENTIAL HYPERTENSION: Chronic | ICD-10-CM

## 2019-05-13 DIAGNOSIS — I25.810 CORONARY ARTERY DISEASE INVOLVING CORONARY BYPASS GRAFT OF NATIVE HEART WITHOUT ANGINA PECTORIS: Primary | ICD-10-CM

## 2019-05-13 DIAGNOSIS — E78.2 HYPERLIPIDEMIA, MIXED: Chronic | ICD-10-CM

## 2019-05-13 DIAGNOSIS — I73.9 PVD (PERIPHERAL VASCULAR DISEASE) (HCC): Chronic | ICD-10-CM

## 2019-05-13 DIAGNOSIS — J44.9 COPD, MODERATE (HCC): Chronic | ICD-10-CM

## 2019-05-13 DIAGNOSIS — I21.11 ST ELEVATION MYOCARDIAL INFARCTION INVOLVING RIGHT CORONARY ARTERY (HCC): ICD-10-CM

## 2019-05-13 PROCEDURE — G8926 SPIRO NO PERF OR DOC: HCPCS | Performed by: INTERNAL MEDICINE

## 2019-05-13 PROCEDURE — 99213 OFFICE O/P EST LOW 20 MIN: CPT | Performed by: INTERNAL MEDICINE

## 2019-05-13 PROCEDURE — 1123F ACP DISCUSS/DSCN MKR DOCD: CPT | Performed by: INTERNAL MEDICINE

## 2019-05-13 PROCEDURE — 1036F TOBACCO NON-USER: CPT | Performed by: INTERNAL MEDICINE

## 2019-05-13 PROCEDURE — 4040F PNEUMOC VAC/ADMIN/RCVD: CPT | Performed by: INTERNAL MEDICINE

## 2019-05-13 PROCEDURE — G8427 DOCREV CUR MEDS BY ELIG CLIN: HCPCS | Performed by: INTERNAL MEDICINE

## 2019-05-13 PROCEDURE — G8598 ASA/ANTIPLAT THER USED: HCPCS | Performed by: INTERNAL MEDICINE

## 2019-05-13 PROCEDURE — 3023F SPIROM DOC REV: CPT | Performed by: INTERNAL MEDICINE

## 2019-05-13 PROCEDURE — 1111F DSCHRG MED/CURRENT MED MERGE: CPT | Performed by: INTERNAL MEDICINE

## 2019-05-13 PROCEDURE — G8419 CALC BMI OUT NRM PARAM NOF/U: HCPCS | Performed by: INTERNAL MEDICINE

## 2019-05-13 RX ORDER — POTASSIUM CHLORIDE 750 MG/1
10 TABLET, EXTENDED RELEASE ORAL 2 TIMES DAILY
COMMUNITY
End: 2020-02-05 | Stop reason: SDUPTHER

## 2019-05-13 RX ORDER — MAGNESIUM OXIDE 400 MG/1
400 TABLET ORAL DAILY
COMMUNITY
End: 2020-10-29 | Stop reason: SDUPTHER

## 2019-05-13 RX ORDER — AMLODIPINE BESYLATE 5 MG/1
5 TABLET ORAL DAILY
COMMUNITY
End: 2019-07-15

## 2019-05-13 NOTE — PATIENT INSTRUCTIONS
CAD:Yes   clinically stable. Patient is on optimal medical regimen ( see medication list above )  -     CORONARY ARTERY DISEASE: Patient is currently  asymptomatic from CAD. - changes in  treatment:   no           - Testing ordered:  no  [facility] classification: 1  [place] RISK SCORE:  DANNY RISK SCORE:  HTN:well controlled on current medical regimen, see list above. - changes in  treatment:   no   CARDIOMYOPATHY: None known   CONGESTIVE HEART FAILURE: NO KNOWN HISTORY.   VHD: No significant VHD noted  DYSLIPIDEMIA: Patient's profile is at / near [employer],                                                       Tolerating current medical regimen wellyes,                                                               See most recent Lab values in Labs section above. OTHER RELEVANT DIAGNOSIS:as noted in patient's active problem list:  TESTS ORDERED: ETT                                             All previously ordered tests reviewed. ARRHYTHMIAS: None known   MEDICATIONS: CPM. Refer to Cardiac rehab. Office f/u in three months. Primary/secondary prevention is the goal by aggressive risk modification, healthy and therapeutic life style changes for cardiovascular risk reduction. Various goals are discussed and multiple questions answered.

## 2019-05-13 NOTE — PROGRESS NOTES
Diandra Trujillo is a 69 y.o. male who has    CHIEF COMPLAINT AS FOLLOWS:  CHEST PAIN: Patient denies any C/O chest pains at this time. SOB: No C/O SOB at this time. LEG EDEMA: No leg edema   PALPITATIONS: Denies any C/O Palpitations                                   DIZZINESS: No C/O Dizziness                          SYNCOPE: None   OTHER:                                     HPI: Patient is here for F/U on his CAD, HTN & Dyslipidemia problems. He does not have any complaints at this time.     Binu Newman has the following history recorded in care path:  Patient Active Problem List    Diagnosis Date Noted    Unstable angina ([facility] 05/24/2017     Priority: High    Exertional dyspnea      Priority: High    Diarrhea      Priority: High    Nausea      Priority: High    Coronary artery disease involving coronary bypass graft of native heart without angina pectoris      Priority: High    STEMI (ST elevation myocardial infarction) ([facility] 05/14/2014     Priority: Low    ASHD (arteriosclerotic heart disease) 05/14/2014     Priority: Low    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/2014     Priority: Low    HTN (hypertension) 05/14/2014     Priority: Low    Hyperlipidemia, mixed 05/14/2014     Priority: Low    COPD, moderate ([facility] 05/14/2014     Priority: Low    PVD (peripheral vascular disease) ([facility] 05/14/2014     Priority: Low    PVC (premature ventricular contraction)     Thrush 10/15/2018    SBO [facility] bowel obstruction) [facility] 10/04/2018     Current Outpatient Medications   Medication Sig Dispense Refill    magnesium oxide (MAG-OX) 400 MG tablet Take 400 mg by mouth daily      amLODIPine (NORVASC) 5 MG tablet Take 5 mg by mouth daily      potassium chloride (KLOR-CON M) 10 MEQ extended release tablet Take 10 mEq by mouth 2 times daily      rosuvastatin (CRESTOR) 20 MG tablet Take 2 tablets by mouth daily 30 tablet 5    diltiazem (CARDIZEM CD) 120 MG extended release capsule Take 1 capsule by mouth daily 30 capsule 3    prasugrel (EFFIENT) 10 MG TABS Take 1 tablet by mouth daily 30 tablet 11    aspirin 81 MG tablet Take 1 tablet by mouth daily 30 tablet 11    omeprazole (PRILOSEC) 20 MG delayed release capsule Take 20 mg by mouth daily      hydrochlorothiazide (MICROZIDE) 12.5 MG capsule Take 12.5 mg by mouth daily      Compression Stockings MISC by Does not apply route Thigh high, 20 to 30 mm of Hg 1 each 3    albuterol sulfate HFA (PROVENTIL HFA) 108 (90 Base) MCG/ACT inhaler Inhale 2 puffs into the lungs every 4 hours as needed for Wheezing or Shortness of Breath With spacer (and mask if indicated). Thanks. 1 Inhaler 1    nystatin (MYCOSTATIN) 594558 UNIT/ML suspension Take 5 mLs by mouth 4 times daily 1 Bottle 1    Multiple Vitamins-Minerals (THERAPEUTIC MULTIVITAMIN-MINERALS) tablet Take 1 tablet by mouth daily       No current facility-administered medications for this visit. Allergies: Patient has no known allergies. Past Medical History:   Diagnosis Date    CAD (coronary artery disease)     Cancer ([facility] [address]     prostate    History of cardiac catheterization 05/24/2017    Critical Single vessel CAD with chronic occlusion of RCA. No significant disease of the other vessels. SVBG to RCA is patent with mild Proximal disease. LIMA to LAD did not mature. Normal LV systolic function & wall motion. LVEF is 55 %. Patient tolerated the procedure well. No immediate complications.  History of echocardiogram 01/12/2018    Left ventricular systolic function is normal with an ejection fraction of55-60%. Grade I diastolic dysfunction. Mildly dilated left atrium. Moderate mitral regurgitation. Mild to moderate tricuspid regurgitation. No evidence of pericardial effusion.     HTN (hypertension)     Hyperlipidemia      Past Surgical History:   Procedure Laterality Date    BACK SURGERY      CARDIAC SURGERY      cabg    COLONOSCOPY  4/14/15 treatment:   no           - Testing ordered:  no  [facility] classification: 1  [place] RISK SCORE:  DANNY RISK SCORE:  HTN:well controlled on current medical regimen, see list above. - changes in  treatment:   no   CARDIOMYOPATHY: None known   CONGESTIVE HEART FAILURE: NO KNOWN HISTORY.   VHD: No significant VHD noted  DYSLIPIDEMIA: Patient's profile is at / near [employer],                                                       Tolerating current medical regimen wellyes,                                                               See most recent Lab values in Labs section above. OTHER RELEVANT DIAGNOSIS:as noted in patient's active problem list:  TESTS ORDERED: ETT                                             All previously ordered tests reviewed. ARRHYTHMIAS: None known   MEDICATIONS: CPM. Refer to Cardiac rehab. Office f/u in three months. Primary/secondary prevention is the goal by aggressive risk modification, healthy and therapeutic life style changes for cardiovascular risk reduction. Various goals are discussed and multiple questions answered.

## 2019-05-13 NOTE — LETTER
[address] [facility]  [address] [place] [id number]  Phone: [phone]  Fax: [phone]    Phyllis Patel MD        May 13, 2019     Edward Boswell MD  [address] [place] [address]    Patient: Sofie Morin  MR Number: [id number]  YOB: 1940  Date of Visit: 5/13/2019    Dear Dr. Sara Kwon Win:    Thank you for the request for consultation for Gabriella Rank to me for the evaluation of CAD. Below are the relevant portions of my assessment and plan of care. If you have questions, please do not hesitate to call me. I look forward to following Nafisa Bateman along with you.     Sincerely,        Phyllis Patel MD

## 2019-05-28 ENCOUNTER — TELEPHONE (OUTPATIENT)
Dept: CARDIOLOGY CLINIC | Age: 79
End: 2019-05-28

## 2019-05-28 ENCOUNTER — PROCEDURE VISIT (OUTPATIENT)
Dept: CARDIOLOGY CLINIC | Age: 79
End: 2019-05-28
Payer: COMMERCIAL

## 2019-05-28 DIAGNOSIS — I25.810 CORONARY ARTERY DISEASE INVOLVING CORONARY BYPASS GRAFT OF NATIVE HEART WITHOUT ANGINA PECTORIS: ICD-10-CM

## 2019-05-28 DIAGNOSIS — E78.2 HYPERLIPIDEMIA, MIXED: Chronic | ICD-10-CM

## 2019-05-28 DIAGNOSIS — J44.9 COPD, MODERATE (HCC): Chronic | ICD-10-CM

## 2019-05-28 DIAGNOSIS — I73.9 PVD (PERIPHERAL VASCULAR DISEASE) (HCC): Chronic | ICD-10-CM

## 2019-05-28 DIAGNOSIS — I10 ESSENTIAL HYPERTENSION: Chronic | ICD-10-CM

## 2019-05-28 DIAGNOSIS — I21.11 ST ELEVATION MYOCARDIAL INFARCTION INVOLVING RIGHT CORONARY ARTERY (HCC): ICD-10-CM

## 2019-05-28 DIAGNOSIS — Z95.1 S/P CABG X 2: Chronic | ICD-10-CM

## 2019-05-28 PROCEDURE — 93015 CV STRESS TEST SUPVJ I&R: CPT | Performed by: INTERNAL MEDICINE

## 2019-05-28 RX ORDER — METOPROLOL TARTRATE 50 MG/1
50 TABLET, FILM COATED ORAL 2 TIMES DAILY
Qty: 60 TABLET | Refills: 5 | Status: SHIPPED | OUTPATIENT
Start: 2019-05-28 | End: 2019-05-29

## 2019-05-29 ENCOUNTER — APPOINTMENT (OUTPATIENT)
Dept: GENERAL RADIOLOGY | Age: 79
End: 2019-05-29
Payer: COMMERCIAL

## 2019-05-29 ENCOUNTER — HOSPITAL ENCOUNTER (EMERGENCY)
Age: 79
Discharge: HOME | End: 2019-05-29
Payer: COMMERCIAL

## 2019-05-29 VITALS
TEMPERATURE: 97.9 F | HEART RATE: 72 BPM | WEIGHT: 150 LBS | SYSTOLIC BLOOD PRESSURE: 135 MMHG | DIASTOLIC BLOOD PRESSURE: 52 MMHG | HEIGHT: 66 IN | OXYGEN SATURATION: 100 % | RESPIRATION RATE: 23 BRPM | BODY MASS INDEX: 24.11 KG/M2

## 2019-05-29 DIAGNOSIS — R00.1 BRADYCARDIA: ICD-10-CM

## 2019-05-29 DIAGNOSIS — T50.905A ADVERSE EFFECT OF DRUG, INITIAL ENCOUNTER: Primary | ICD-10-CM

## 2019-05-29 LAB
ALBUMIN SERPL-MCNC: 3.8 GM/DL (ref 3.4–5)
ALP BLD-CCNC: 103 IU/L (ref 40–129)
ALT SERPL-CCNC: 11 U/L (ref 10–40)
ANION GAP SERPL CALCULATED.3IONS-SCNC: 13 MMOL/L (ref 4–16)
AST SERPL-CCNC: 14 IU/L (ref 15–37)
BASOPHILS ABSOLUTE: 0 K/CU MM
BASOPHILS RELATIVE PERCENT: 0.5 % (ref 0–1)
BILIRUB SERPL-MCNC: 0.5 MG/DL (ref 0–1)
BUN BLDV-MCNC: 18 MG/DL (ref 6–23)
CALCIUM SERPL-MCNC: 9 MG/DL (ref 8.3–10.6)
CHLORIDE BLD-SCNC: 102 MMOL/L (ref 99–110)
CO2: 22 MMOL/L (ref 21–32)
CREAT SERPL-MCNC: 1.1 MG/DL (ref 0.9–1.3)
DIFFERENTIAL TYPE: ABNORMAL
EOSINOPHILS ABSOLUTE: 0.1 K/CU MM
EOSINOPHILS RELATIVE PERCENT: 1.8 % (ref 0–3)
GFR AFRICAN AMERICAN: >60 ML/MIN/1.73M2
GFR NON-AFRICAN AMERICAN: >60 ML/MIN/1.73M2
GLUCOSE BLD-MCNC: 107 MG/DL (ref 70–99)
HCT VFR BLD CALC: 42.2 % (ref 42–52)
HEMOGLOBIN: 13.1 GM/DL (ref 13.5–18)
IMMATURE NEUTROPHIL %: 0.4 % (ref 0–0.43)
LYMPHOCYTES ABSOLUTE: 1.2 K/CU MM
LYMPHOCYTES RELATIVE PERCENT: 16.2 % (ref 24–44)
MCH RBC QN AUTO: 28.7 PG (ref 27–31)
MCHC RBC AUTO-ENTMCNC: 31 % (ref 32–36)
MCV RBC AUTO: 92.5 FL (ref 78–100)
MONOCYTES ABSOLUTE: 0.6 K/CU MM
MONOCYTES RELATIVE PERCENT: 8.1 % (ref 0–4)
NUCLEATED RBC %: 0 %
PDW BLD-RTO: 14.1 % (ref 11.7–14.9)
PLATELET # BLD: 170 K/CU MM (ref 140–440)
PMV BLD AUTO: 12.2 FL (ref 7.5–11.1)
POTASSIUM SERPL-SCNC: 3.7 MMOL/L (ref 3.5–5.1)
RBC # BLD: 4.56 M/CU MM (ref 4.6–6.2)
SEGMENTED NEUTROPHILS ABSOLUTE COUNT: 5.6 K/CU MM
SEGMENTED NEUTROPHILS RELATIVE PERCENT: 73 % (ref 36–66)
SODIUM BLD-SCNC: 137 MMOL/L (ref 135–145)
TOTAL IMMATURE NEUTOROPHIL: 0.03 K/CU MM
TOTAL NUCLEATED RBC: 0 K/CU MM
TOTAL PROTEIN: 6.6 GM/DL (ref 6.4–8.2)
TROPONIN T: <0.01 NG/ML
WBC # BLD: 7.6 K/CU MM (ref 4–10.5)

## 2019-05-29 PROCEDURE — 84484 ASSAY OF TROPONIN QUANT: CPT

## 2019-05-29 PROCEDURE — 71045 X-RAY EXAM CHEST 1 VIEW: CPT

## 2019-05-29 PROCEDURE — 93005 ELECTROCARDIOGRAM TRACING: CPT | Performed by: PHYSICIAN ASSISTANT

## 2019-05-29 PROCEDURE — 85025 COMPLETE CBC W/AUTO DIFF WBC: CPT

## 2019-05-29 PROCEDURE — 99284 EMERGENCY DEPT VISIT MOD MDM: CPT

## 2019-05-29 PROCEDURE — 36415 COLL VENOUS BLD VENIPUNCTURE: CPT

## 2019-05-29 PROCEDURE — 93010 ELECTROCARDIOGRAM REPORT: CPT | Performed by: INTERNAL MEDICINE

## 2019-05-29 PROCEDURE — 80053 COMPREHEN METABOLIC PANEL: CPT

## 2019-05-29 RX ORDER — METOPROLOL SUCCINATE 25 MG/1
12.5 TABLET, EXTENDED RELEASE ORAL 2 TIMES DAILY
Qty: 30 TABLET | Refills: 0 | Status: SHIPPED | OUTPATIENT
Start: 2019-05-29 | End: 2019-06-21 | Stop reason: SDUPTHER

## 2019-05-29 NOTE — ED PROVIDER NOTES
eMERGENCY dEPARTMENT eNCOUnter        PCP: Jose Bravo MD    CHIEF COMPLAINT    Chief Complaint   Patient presents with    Other     pt states he thinks his new medicine is making him sleepy        HPI    Nahun Ellis is a 69 y.o. male with past medical history of hypertension, hyperlipidemia, COPD, CAD who presents with concern for medication reaction. Patient states he took first dose of 50 mg of metoprolol this morning and states that shortly after he began feeling very sleepy, checked his vitals and states that his pulse was low and blood pressure was low. He states pulse was between 30 and 40s, unsure of blood pressure reading. He denies any other associated symptoms including lightheadedness, chest pain or shortness of breath. Patient states he was seen by cardiology yesterday and had a stress test done. REVIEW OF SYSTEMS    Constitutional:  Denies fever, chills. HENT:  Denies sore throat or ear pain   Cardiovascular:  See HPI. Denies palpitations,  Denies syncope   Respiratory:    See HPI. Denies shortness of breath, or hemoptysis    GI:  Denies abdominal pain, nausea, vomiting, or diarrhea  :  Denies any urinary symptoms   Musculoskeletal:   Denies back pain,   Skin:  Denies rash  Neurologic:  Denies lightheadedness, dizziness, headache, focal weakness or sensory changes   Endocrine:  Denies polyuria or polydypsia   Lymphatic:  Denies swollen glands     All other review of systems are negative  See HPI and nursing notes for additional information     PAST MEDICAL & SURGICAL HISTORY    Past Medical History:   Diagnosis Date    CAD (coronary artery disease)     Cancer ([facility] [address]     prostate    History of cardiac catheterization 05/24/2017    Critical Single vessel CAD with chronic occlusion of RCA. No significant disease of the other vessels. SVBG to RCA is patent with mild Proximal disease. LIMA to LAD did not mature. Normal LV systolic function & wall motion. LVEF is 55 %. Patient tolerated the procedure well. No immediate complications.  History of echocardiogram 01/12/2018    Left ventricular systolic function is normal with an ejection fraction of55-60%. Grade I diastolic dysfunction. Mildly dilated left atrium. Moderate mitral regurgitation. Mild to moderate tricuspid regurgitation. No evidence of pericardial effusion.  HTN (hypertension)     Hyperlipidemia      Past Surgical History:   Procedure Laterality Date    BACK SURGERY      CARDIAC SURGERY      cabg    COLONOSCOPY  4/14/15    divertics    CORONARY ANGIOPLASTY WITH STENT PLACEMENT  05/14/2014    X2       CURRENT MEDICATIONS    Current Outpatient Rx   Medication Sig Dispense Refill    metoprolol succinate (TOPROL XL) 25 MG extended release tablet Take 0.5 tablets by mouth 2 times daily 30 tablet 0    magnesium oxide (MAG-OX) 400 MG tablet Take 400 mg by mouth daily      amLODIPine (NORVASC) 5 MG tablet Take 5 mg by mouth daily      potassium chloride (KLOR-CON M) 10 MEQ extended release tablet Take 10 mEq by mouth 2 times daily      rosuvastatin (CRESTOR) 20 MG tablet Take 2 tablets by mouth daily (Patient taking differently: Take 20 mg by mouth daily ) 30 tablet 5    diltiazem (CARDIZEM CD) 120 MG extended release capsule Take 1 capsule by mouth daily 30 capsule 3    prasugrel (EFFIENT) 10 MG TABS Take 1 tablet by mouth daily 30 tablet 11    aspirin 81 MG tablet Take 1 tablet by mouth daily 30 tablet 11    omeprazole (PRILOSEC) 20 MG delayed release capsule Take 20 mg by mouth daily      Compression Stockings MISC by Does not apply route Thigh high, 20 to 30 mm of Hg 1 each 3    albuterol sulfate HFA (PROVENTIL HFA) 108 (90 Base) MCG/ACT inhaler Inhale 2 puffs into the lungs every 4 hours as needed for Wheezing or Shortness of Breath With spacer (and mask if indicated). Thanks.  1 Inhaler 1    nystatin (MYCOSTATIN) 849878 UNIT/ML suspension Take 5 mLs by mouth 4 times daily 1 Bottle 1    Multiple Vitamins-Minerals (THERAPEUTIC MULTIVITAMIN-MINERALS) tablet Take 1 tablet by mouth daily         ALLERGIES    No Known Allergies    SOCIAL & FAMILY HISTORY    Social History     Socioeconomic History    Marital status:      Spouse name: None    Number of children: None    Years of education: None    Highest education level: None   Occupational History    None   Social Needs    Financial resource strain: None    Food insecurity:     Worry: None     Inability: None    Transportation needs:     Medical: None     Non-medical: None   Tobacco Use    Smoking status: Former Smoker     Packs/day: 3.00     Years: 30.00     Pack years: 90.00     Types: Cigarettes     Last attempt to quit: 2006     Years since quittin.4    Smokeless tobacco: Never Used   Substance and Sexual Activity    Alcohol use: No    Drug use: No    Sexual activity: Not Currently   Lifestyle    Physical activity:     Days per week: None     Minutes per session: None    Stress: None   Relationships    Social connections:     Talks on phone: None     Gets together: None     Attends Jehovah's witness service: None     Active member of club or organization: None     Attends meetings of clubs or organizations: None     Relationship status: None    Intimate partner violence:     Fear of current or ex partner: None     Emotionally abused: None     Physically abused: None     Forced sexual activity: None   Other Topics Concern    None   Social History Narrative    None     History reviewed. No pertinent family history.     PHYSICAL EXAM    VITAL SIGNS: BP (!) 135/57   Pulse 66   Temp 97.9 °F (36.6 °C) (Oral)   Resp 18   Ht 5' 6\" (1.676 m)   Wt 150 lb (68 kg)   SpO2 100%   BMI 24.21 kg/m²    Constitutional:  Well developed, well nourished, no acute distress   HENT:  Atraumatic, moist mucus membranes  Neck/Lymphatics: supple, no JVD, no swollen nodes  Respiratory:  Lungs Clear, no retractions   Cardiovascular:  Rate regular, regular Rhythm,  no mL/min/1.73m2    GFR African American >60 >60 mL/min/1.73m2    Anion Gap 13 4 - 16   Troponin   Result Value Ref Range    Troponin T <0.010 <0.01 NG/ML   EKG 12 Lead   Result Value Ref Range    Ventricular Rate 84 BPM    Atrial Rate 75 BPM    P-R Interval 156 ms    QRS Duration 134 ms    Q-T Interval 418 ms    QTc Calculation (Bazett) 493 ms    P Axis 47 degrees    R Axis 53 degrees    T Axis -10 degrees    Diagnosis       Sinus rhythm with frequent premature ventricular complexes  Right bundle branch block  Abnormal ECG  When compared with ECG of 09-MAY-2019 12:51,  No significant change was found             ED COURSE & MEDICAL DECISION MAKING     Vital signs and nursing notes reviewed during ED course. Patient care and presentation staffed with supervising MD.  All pertinent Lab data and radiographic results reviewed with patient at bedside. Patient presents as above. On arrival, blood pressure 158/68, pulse of 67. He is afebrile, oxygenating at 98% on room air and denies any current pain or symptoms. States does still feel little bit sleepy, however this is also improved. On chart review, patient did have a stress test done yesterday which was nondiagnostic due to right bundle branch block, PVCs improved with exercise. Lab work without evidence of emergent process. Troponin is negative, chest x-ray without acute process. EKG obtained and interpreted by supervising physician without acute changes from previous. Throughout telemetry monitoring in the emergency Department, patient's pulse remains between 60 and 80 bpm, normal blood pressure. He remains asymptomatic and is ambulatory in the ED without difficulty or lightheadedness. I spoke with Dr. Bibi Mosquera who was on-call for patient's cardiologist, Dr. Shaheen Muhammad. He recommends decreasing patient's metoprolol dose to 12.5 mg twice a day and closely following up in the office.  This discussed with patient who was agreeable with this treatment plan and comfortable with discharge. This time, low suspicion for emergent etiology of symptoms, suspect more likely attributed to adverse effect of first dose of metoprolol. Strict return precautions are discussed with patient and he agrees to return with any new or worsening symptoms. The patient and/or the family were informed of the results of any tests/labs/imaging, the treatment plan, and time was allotted to answer questions. See chart for details of medications given during the ED stay. Clinical  IMPRESSION    1. Adverse effect of drug, initial encounter    2. Bradycardia          Diagnosis and plan discussed in detail with patient who understands and agrees. Patient agrees to return emergency department if symptoms worsen or any new symptoms develop. Comment: Please note this report has been produced using speech recognition software and may contain errors related to that system including errors in grammar, punctuation, and spelling, as well as words and phrases that may be inappropriate. If there are any questions or concerns please feel free to contact the dictating provider for clarification.         WILL Jovel  05/29/19 [id number]

## 2019-05-29 NOTE — ED PROVIDER NOTES
This EKG was interpreted by me. Rate is 84, rhythm is sinus with frequent PVCs. right bundle-branch block present. . MI and QT intervals are within normal limits. There is no ST segment or T wave changes. ST-T wave abnormalities. This EKG was compared to previous EKG from date 5/9/19.      Tiara Apodaca DO  05/29/19 1289

## 2019-05-29 NOTE — ED TRIAGE NOTES
Pt reports starting a new medication and it makes him sleepy but also has lowered his BP and heart rate

## 2019-06-04 ENCOUNTER — HOSPITAL ENCOUNTER (OUTPATIENT)
Age: 79
Discharge: HOME | End: 2019-06-04
Payer: COMMERCIAL

## 2019-06-04 LAB — PROSTATE SPECIFIC ANTIGEN: 0.06 NG/ML (ref 0–4)

## 2019-06-04 PROCEDURE — G0103 PSA SCREENING: HCPCS

## 2019-06-04 PROCEDURE — 36415 COLL VENOUS BLD VENIPUNCTURE: CPT

## 2019-06-05 ENCOUNTER — APPOINTMENT (OUTPATIENT)
Dept: GENERAL RADIOLOGY | Age: 79
End: 2019-06-05
Payer: COMMERCIAL

## 2019-06-05 ENCOUNTER — HOSPITAL ENCOUNTER (EMERGENCY)
Age: 79
Discharge: HOME | End: 2019-06-05
Payer: COMMERCIAL

## 2019-06-05 VITALS
WEIGHT: 165 LBS | SYSTOLIC BLOOD PRESSURE: 149 MMHG | BODY MASS INDEX: 26.52 KG/M2 | RESPIRATION RATE: 15 BRPM | HEART RATE: 48 BPM | HEIGHT: 66 IN | TEMPERATURE: 98.3 F | OXYGEN SATURATION: 97 % | DIASTOLIC BLOOD PRESSURE: 76 MMHG

## 2019-06-05 DIAGNOSIS — Z87.898 HISTORY OF BRADYCARDIA: Primary | ICD-10-CM

## 2019-06-05 LAB
ALBUMIN SERPL-MCNC: 4.1 GM/DL (ref 3.4–5)
ALP BLD-CCNC: 105 IU/L (ref 40–129)
ALT SERPL-CCNC: 12 U/L (ref 10–40)
ANION GAP SERPL CALCULATED.3IONS-SCNC: 8 MMOL/L (ref 4–16)
AST SERPL-CCNC: 13 IU/L (ref 15–37)
BASOPHILS ABSOLUTE: 0 K/CU MM
BASOPHILS RELATIVE PERCENT: 0.4 % (ref 0–1)
BILIRUB SERPL-MCNC: 0.4 MG/DL (ref 0–1)
BUN BLDV-MCNC: 12 MG/DL (ref 6–23)
CALCIUM SERPL-MCNC: 9.1 MG/DL (ref 8.3–10.6)
CHLORIDE BLD-SCNC: 108 MMOL/L (ref 99–110)
CO2: 27 MMOL/L (ref 21–32)
CREAT SERPL-MCNC: 1 MG/DL (ref 0.9–1.3)
DIFFERENTIAL TYPE: ABNORMAL
EKG ATRIAL RATE: 75 BPM
EKG DIAGNOSIS: NORMAL
EKG P AXIS: 47 DEGREES
EKG P-R INTERVAL: 156 MS
EKG Q-T INTERVAL: 418 MS
EKG QRS DURATION: 134 MS
EKG QTC CALCULATION (BAZETT): 493 MS
EKG R AXIS: 53 DEGREES
EKG T AXIS: -10 DEGREES
EKG VENTRICULAR RATE: 84 BPM
EOSINOPHILS ABSOLUTE: 0.3 K/CU MM
EOSINOPHILS RELATIVE PERCENT: 3.6 % (ref 0–3)
GFR AFRICAN AMERICAN: >60 ML/MIN/1.73M2
GFR NON-AFRICAN AMERICAN: >60 ML/MIN/1.73M2
GLUCOSE BLD-MCNC: 100 MG/DL (ref 70–99)
HCT VFR BLD CALC: 43.8 % (ref 42–52)
HEMOGLOBIN: 13.6 GM/DL (ref 13.5–18)
IMMATURE NEUTROPHIL %: 0.3 % (ref 0–0.43)
LYMPHOCYTES ABSOLUTE: 1.4 K/CU MM
LYMPHOCYTES RELATIVE PERCENT: 18.9 % (ref 24–44)
MAGNESIUM: 2 MG/DL (ref 1.8–2.4)
MCH RBC QN AUTO: 28.9 PG (ref 27–31)
MCHC RBC AUTO-ENTMCNC: 31.1 % (ref 32–36)
MCV RBC AUTO: 93 FL (ref 78–100)
MONOCYTES ABSOLUTE: 0.6 K/CU MM
MONOCYTES RELATIVE PERCENT: 7.3 % (ref 0–4)
NUCLEATED RBC %: 0 %
PDW BLD-RTO: 14.3 % (ref 11.7–14.9)
PLATELET # BLD: 127 K/CU MM (ref 140–440)
PMV BLD AUTO: 13 FL (ref 7.5–11.1)
POTASSIUM SERPL-SCNC: 3.8 MMOL/L (ref 3.5–5.1)
RBC # BLD: 4.71 M/CU MM (ref 4.6–6.2)
SEGMENTED NEUTROPHILS ABSOLUTE COUNT: 5.3 K/CU MM
SEGMENTED NEUTROPHILS RELATIVE PERCENT: 69.5 % (ref 36–66)
SODIUM BLD-SCNC: 143 MMOL/L (ref 135–145)
TOTAL IMMATURE NEUTOROPHIL: 0.02 K/CU MM
TOTAL NUCLEATED RBC: 0 K/CU MM
TOTAL PROTEIN: 6.7 GM/DL (ref 6.4–8.2)
TROPONIN T: <0.01 NG/ML
TSH HIGH SENSITIVITY: 0.48 UIU/ML (ref 0.27–4.2)
WBC # BLD: 7.6 K/CU MM (ref 4–10.5)

## 2019-06-05 PROCEDURE — 84484 ASSAY OF TROPONIN QUANT: CPT

## 2019-06-05 PROCEDURE — 84443 ASSAY THYROID STIM HORMONE: CPT

## 2019-06-05 PROCEDURE — 93005 ELECTROCARDIOGRAM TRACING: CPT | Performed by: EMERGENCY MEDICINE

## 2019-06-05 PROCEDURE — 80053 COMPREHEN METABOLIC PANEL: CPT

## 2019-06-05 PROCEDURE — 99285 EMERGENCY DEPT VISIT HI MDM: CPT

## 2019-06-05 PROCEDURE — 85025 COMPLETE CBC W/AUTO DIFF WBC: CPT

## 2019-06-05 PROCEDURE — 83735 ASSAY OF MAGNESIUM: CPT

## 2019-06-05 PROCEDURE — 71046 X-RAY EXAM CHEST 2 VIEWS: CPT

## 2019-06-05 NOTE — ED PROVIDER NOTES
ED attending EKG interpretation (I otherwise did not participate in the care of this patient)    EKG Interpretation  Interpreted by me  Compared to 5/29/19  Rhythm: normal sinus   Rate: normal 70  Axis: normal  Ectopy: occasional PVCs  Conduction: RBBB  ST Segments: no acute change  T Waves: no acute change  Clinical Impression: normal sinus rhythm, RBBB, occasional PVCs         Toni Salvador MD  06/05/19 [id number]

## 2019-06-06 PROCEDURE — 93010 ELECTROCARDIOGRAM REPORT: CPT | Performed by: INTERNAL MEDICINE

## 2019-06-06 NOTE — ED PROVIDER NOTES
EMERGENCY DEPARTMENT ENCOUNTER        PCP: Ken Farrell MD    CHIEF COMPLAINT    Chief Complaint   Patient presents with    Other     pt states he went to [facility] this morning and they told him he had a low heart rate in the 30s; pt's current HR 65; pt denies dizziness at this time       SAM Hall is a 69 y.o. male former smoker with PMH of CAD, cancer, HTN, HLD who presents with concern for low heart rate. Onset was a few weeks ago. Context is patient reports that he has been going to [facility] to have his heart rate checked and they told him today as well several times in the past that his heart rate has been in the 30s. He reports that today it was 38 bpm. He reports that a machine that he puts his wrist into checks his heart rate. Patient reports that he feels fine, but right a told him he should come to the ER because of low heart rate. Patient reports that he is fine and does not want to be here. He reports that he does not feel there is anything wrong with him. Patient reports that he is in his baseline state of health. Patient reports that he does take metoprolol and has been taking it as prescribed. He denies any chest pain, shortness of breath, numbness, tingling, palpitations, weakness, dizziness, lightheadedness, syncope. Patient reports cardiologist is Dr. Kenyatta Pacheco. REVIEW OF SYSTEMS    Constitutional:  Denies fever, chills. HENT:  Denies sore throat or ear pain   Cardiovascular:  Denies chest pain, palpitations,  Denies syncope, no extremity edema.   Respiratory:    Denies cough, shortness of breath, or hemoptysis    GI:  Denies abdominal pain, nausea, vomiting, or diarrhea  :  Denies any urinary symptoms  Musculoskeletal:  Denies back pain, no extremity swelling  Skin:  Denies rash  Neurologic:  Denies lightheadedness, dizziness, headache, focal weakness or sensory changes   Endocrine:  Denies polyuria or polydypsia   Lymphatic:  Denies swollen glands     All other 11    omeprazole (PRILOSEC) 20 MG delayed release capsule Take 20 mg by mouth daily      Compression Stockings MISC by Does not apply route Thigh high, 20 to 30 mm of Hg 1 each 3    albuterol sulfate HFA (PROVENTIL HFA) 108 (90 Base) MCG/ACT inhaler Inhale 2 puffs into the lungs every 4 hours as needed for Wheezing or Shortness of Breath With spacer (and mask if indicated). Thanks.  1 Inhaler 1    nystatin (MYCOSTATIN) 683078 UNIT/ML suspension Take 5 mLs by mouth 4 times daily 1 Bottle 1    Multiple Vitamins-Minerals (THERAPEUTIC MULTIVITAMIN-MINERALS) tablet Take 1 tablet by mouth daily         ALLERGIES    No Known Allergies    SOCIAL & FAMILY HISTORY    Social History     Socioeconomic History    Marital status:      Spouse name: None    Number of children: None    Years of education: None    Highest education level: None   Occupational History    None   Social Needs    Financial resource strain: None    Food insecurity:     Worry: None     Inability: None    Transportation needs:     Medical: None     Non-medical: None   Tobacco Use    Smoking status: Former Smoker     Packs/day: 3.00     Years: 30.00     Pack years: 90.00     Types: Cigarettes     Last attempt to quit: 2006     Years since quittin.4    Smokeless tobacco: Never Used   Substance and Sexual Activity    Alcohol use: No    Drug use: No    Sexual activity: Not Currently   Lifestyle    Physical activity:     Days per week: None     Minutes per session: None    Stress: None   Relationships    Social connections:     Talks on phone: None     Gets together: None     Attends Pentecostal service: None     Active member of club or organization: None     Attends meetings of clubs or organizations: None     Relationship status: None    Intimate partner violence:     Fear of current or ex partner: None     Emotionally abused: None     Physically abused: None     Forced sexual activity: None   Other Topics Concern    None Social History Narrative    None     History reviewed. No pertinent family history. PHYSICAL EXAM    VITAL SIGNS: BP (!) 149/76   Pulse (!) 48   Temp 98.3 °F (36.8 °C) (Oral)   Resp 15   Ht 5' 6\" (1.676 m)   Wt 165 lb (74.8 kg)   SpO2 97%   BMI 26.63 kg/m²    Constitutional:  Well developed, well nourished, no acute distress   HENT:  Atraumatic, moist mucus membranes  Neck/Lymphatics: supple, no JVD, no swollen nodes  Respiratory:  Lungs Clear, no retractions, no wheezing, rhonchi, rales, no accessory muscle usage  Cardiovascular:  Rate regular, regular Rhythm,  no murmurs/rubs/gallops. No carotid bruits or murmurs heard in carotids. Vascular: Radial and DP pulses 2+ equal bilaterally  GI:  Soft, nontender, normal bowel sounds  Musculoskeletal:  No acute gross deformities. Compartments are soft in bilateral lower extremities. No calf or thigh tenderness to palpation bilaterally and no lower extremity edema bilaterally.   Integument:  Skin warm and dry, no petechiae   Neurologic:  Alert & oriented, normal speech  Psych: Pleasant affect, no hallucinations        LABS:  Results for orders placed or performed during the hospital encounter of 06/05/19   CBC auto diff   Result Value Ref Range    WBC 7.6 4.0 - 10.5 K/CU MM    RBC 4.71 4.6 - 6.2 M/CU MM    Hemoglobin 13.6 13.5 - 18.0 GM/DL    Hematocrit 43.8 42 - 52 %    MCV 93.0 78 - 100 FL    MCH 28.9 27 - 31 PG    MCHC 31.1 (L) 32.0 - 36.0 %    RDW 14.3 11.7 - 14.9 %    Platelets 062 (L) 450 - 440 K/CU MM    MPV 13.0 (H) 7.5 - 11.1 FL    Differential Type AUTOMATED DIFFERENTIAL     Segs Relative 69.5 (H) 36 - 66 %    Lymphocytes % 18.9 (L) 24 - 44 %    Monocytes % 7.3 (H) 0 - 4 %    Eosinophils % 3.6 (H) 0 - 3 %    Basophils % 0.4 0 - 1 %    Segs Absolute 5.3 K/CU MM    Lymphocytes # 1.4 K/CU MM    Monocytes # 0.6 K/CU MM    Eosinophils # 0.3 K/CU MM    Basophils # 0.0 K/CU MM    Nucleated RBC % 0.0 %    Total Nucleated RBC 0.0 K/CU MM    Total Immature Neutrophil 0.02 K/CU MM    Immature Neutrophil % 0.3 0 - 0.43 %   CMP   Result Value Ref Range    Sodium 143 135 - 145 MMOL/L    Potassium 3.8 3.5 - 5.1 MMOL/L    Chloride 108 99 - 110 mMol/L    CO2 27 21 - 32 MMOL/L    BUN 12 6 - 23 MG/DL    CREATININE 1.0 0.9 - 1.3 MG/DL    Glucose 100 (H) 70 - 99 MG/DL    Calcium 9.1 8.3 - 10.6 MG/DL    Alb 4.1 3.4 - 5.0 GM/DL    Total Protein 6.7 6.4 - 8.2 GM/DL    Total Bilirubin 0.4 0.0 - 1.0 MG/DL    ALT 12 10 - 40 U/L    AST 13 (L) 15 - 37 IU/L    Alkaline Phosphatase 105 40 - 129 IU/L    GFR Non-African American >60 >60 mL/min/1.73m2    GFR African American >60 >60 mL/min/1.73m2    Anion Gap 8 4 - 16   Troponin   Result Value Ref Range    Troponin T <0.010 <0.01 NG/ML   TSH without Reflex   Result Value Ref Range    TSH, High Sensitivity 0.481 0.270 - 4.20 uIu/ml   Magnesium   Result Value Ref Range    Magnesium 2.0 1.8 - 2.4 mg/dl   EKG 12 Lead   Result Value Ref Range    Ventricular Rate 70 BPM    Atrial Rate 70 BPM    P-R Interval 154 ms    QRS Duration 134 ms    Q-T Interval 442 ms    QTc Calculation (Bazett) 477 ms    P Axis 63 degrees    R Axis 68 degrees    T Axis 27 degrees    Diagnosis       Sinus rhythm with occasional premature ventricular complexes  Right bundle branch block  Abnormal ECG  When compared with ECG of 29-MAY-2019 11:28,  Nonspecific T wave abnormality, improved in Inferior leads           EKG: EKG Interpretation  Please see ED physician's note for EKG interpretation- Dr. Rohini Lindsay      RADIOLOGY/PROCEDURES    XR CHEST STANDARD (2 VW)   Final Result   No acute abnormality. ED COURSE & MEDICAL DECISION MAKING       Vital signs and nursing notes reviewed during ED course. I have independently evaluated this patient. Supervising physician present in the Emergency Department, available for consultation, throughout entirety of patient care. Patient presents as above.  Patient placed on telemetry monitoring as well as continuous pulse oximetry monitoring. While in the ED today, labs, imaging, and EKG were obtained. For EKG interpretation- please see ED physician's note. Labs without clinically significant derangements. Troponin negative. Chest xray reveals no acute abnormality-no consolidation concerning for pneumonia, no pleural effusion, no pneumothorax. Pulses are equal in all extremities, no ripping or tearing pain, no mediastinum-low clinical suspicion for AAA/thoracic dissection. Patient has no chest pain and denies palpitations. He reports that he has been seen in the past for his low heart rate and was told things are okay. Recent ED visit on 5/29/19 reviewed today. At that time his metoprolol dosage was lowered. I consulted today with on-call cardiologist for patient's cardiologist, Dr. Torres Fernandez, and we discussed the patient's case. Cardiologist advises no further medication adjustments and for patient to follow-up in clinic. Patient is requesting to be discharged at this time. Patient is comfortable with discharge home with close outpatient follow up with cardiologist. Patient is nontoxic appearing. Vital signs are stable- patient's heart rate was typically between 60 and 80 during his stay in the ED. However just prior to discharge his heart rate was charted at 48 beats per minutes. I was not informed of this, but as patient is asymptomatic throughout stay in ED and adamant about discharge. We discussed the need for follow-up with cardiologist for recheck as soon as possible. Patient agreeable with plan and does not want any further workup in the ED. All pertinent Lab data and radiographic results reviewed with patient at bedside. The patient and/or the family were informed of the results of any tests/labs/imaging, the treatment plan, and time was allotted to answer questions. Diagnosis, disposition, and treatment plan reviewed in detail with patient.  Patient understands and agrees to follow up with cardiologist for recheck as soon as possible and with PCP for recheck as needed. Patient understands and agrees to return to ED for new or worsening symptoms- strict return precautions given. See chart for details of medications given during the ED stay. Clinical  IMPRESSION    1. History of bradycardia        Disposition referral (if applicable):  Pablo Sorensen MD  [address] [facility]  [phone]    Call today  Recheck as soon as possible    Corinne Moeller, MD  [address] [place] [id number] [phone]      As needed    [facility] Emergency Department  [place] [id number]  [phone]  Go to   Return to ED if symptoms worsen or new symptoms      Disposition medications (if applicable):  Discharge Medication List as of 6/5/2019  4:00 PM          Comment: Please note this report has been produced using speech recognition software and may contain errors related to that system including errors in grammar, punctuation, and spelling, as well as words and phrases that may be inappropriate. If there are any questions or concerns please feel free to contact the dictating provider for clarification.          Ilya Forrest PA-C  06/05/19 2287

## 2019-06-11 ENCOUNTER — TELEPHONE (OUTPATIENT)
Dept: CARDIOLOGY CLINIC | Age: 79
End: 2019-06-11

## 2019-06-11 NOTE — TELEPHONE ENCOUNTER
Cardiac rehab sent over a letter stating the patient cancelled his appointment because he felt that he didn't need it. Letter scanned into media.

## 2019-06-14 LAB
EKG ATRIAL RATE: 70 BPM
EKG DIAGNOSIS: NORMAL
EKG P AXIS: 63 DEGREES
EKG P-R INTERVAL: 154 MS
EKG Q-T INTERVAL: 442 MS
EKG QRS DURATION: 134 MS
EKG QTC CALCULATION (BAZETT): 477 MS
EKG R AXIS: 68 DEGREES
EKG T AXIS: 27 DEGREES
EKG VENTRICULAR RATE: 70 BPM

## 2019-06-21 RX ORDER — METOPROLOL SUCCINATE 25 MG/1
12.5 TABLET, EXTENDED RELEASE ORAL 2 TIMES DAILY
Qty: 30 TABLET | Refills: 3 | Status: SHIPPED | OUTPATIENT
Start: 2019-06-21 | End: 2019-07-24 | Stop reason: ALTCHOICE

## 2019-07-15 ENCOUNTER — HOSPITAL ENCOUNTER (EMERGENCY)
Age: 79
Discharge: HOME | End: 2019-07-15
Attending: EMERGENCY MEDICINE
Payer: COMMERCIAL

## 2019-07-15 ENCOUNTER — TELEPHONE (OUTPATIENT)
Dept: CARDIOLOGY CLINIC | Age: 79
End: 2019-07-15

## 2019-07-15 ENCOUNTER — APPOINTMENT (OUTPATIENT)
Dept: GENERAL RADIOLOGY | Age: 79
End: 2019-07-15
Payer: COMMERCIAL

## 2019-07-15 VITALS
SYSTOLIC BLOOD PRESSURE: 157 MMHG | HEART RATE: 57 BPM | TEMPERATURE: 98 F | DIASTOLIC BLOOD PRESSURE: 68 MMHG | WEIGHT: 170 LBS | BODY MASS INDEX: 26.68 KG/M2 | OXYGEN SATURATION: 100 % | RESPIRATION RATE: 14 BRPM | HEIGHT: 67 IN

## 2019-07-15 DIAGNOSIS — I49.3 FREQUENT PVCS: Primary | ICD-10-CM

## 2019-07-15 DIAGNOSIS — R06.02 SHORTNESS OF BREATH: ICD-10-CM

## 2019-07-15 DIAGNOSIS — R06.02 SOB (SHORTNESS OF BREATH): ICD-10-CM

## 2019-07-15 DIAGNOSIS — I49.3 PVC'S (PREMATURE VENTRICULAR CONTRACTIONS): Primary | ICD-10-CM

## 2019-07-15 LAB
ALBUMIN SERPL-MCNC: 3.9 GM/DL (ref 3.4–5)
ALP BLD-CCNC: 108 IU/L (ref 40–129)
ALT SERPL-CCNC: 9 U/L (ref 10–40)
ANION GAP SERPL CALCULATED.3IONS-SCNC: 10 MMOL/L (ref 4–16)
AST SERPL-CCNC: 14 IU/L (ref 15–37)
BASOPHILS ABSOLUTE: 0 K/CU MM
BASOPHILS RELATIVE PERCENT: 0.3 % (ref 0–1)
BILIRUB SERPL-MCNC: 0.4 MG/DL (ref 0–1)
BUN BLDV-MCNC: 13 MG/DL (ref 6–23)
CALCIUM SERPL-MCNC: 9.2 MG/DL (ref 8.3–10.6)
CHLORIDE BLD-SCNC: 105 MMOL/L (ref 99–110)
CO2: 22 MMOL/L (ref 21–32)
CREAT SERPL-MCNC: 0.9 MG/DL (ref 0.9–1.3)
DIFFERENTIAL TYPE: ABNORMAL
EOSINOPHILS ABSOLUTE: 0.2 K/CU MM
EOSINOPHILS RELATIVE PERCENT: 3.3 % (ref 0–3)
GFR AFRICAN AMERICAN: >60 ML/MIN/1.73M2
GFR NON-AFRICAN AMERICAN: >60 ML/MIN/1.73M2
GLUCOSE BLD-MCNC: 99 MG/DL (ref 70–99)
HCT VFR BLD CALC: 45.7 % (ref 42–52)
HEMOGLOBIN: 14.4 GM/DL (ref 13.5–18)
IMMATURE NEUTROPHIL %: 0.2 % (ref 0–0.43)
LYMPHOCYTES ABSOLUTE: 1.2 K/CU MM
LYMPHOCYTES RELATIVE PERCENT: 18.6 % (ref 24–44)
MAGNESIUM: 2 MG/DL (ref 1.8–2.4)
MCH RBC QN AUTO: 28.5 PG (ref 27–31)
MCHC RBC AUTO-ENTMCNC: 31.5 % (ref 32–36)
MCV RBC AUTO: 90.3 FL (ref 78–100)
MONOCYTES ABSOLUTE: 0.6 K/CU MM
MONOCYTES RELATIVE PERCENT: 8.9 % (ref 0–4)
NUCLEATED RBC %: 0 %
PDW BLD-RTO: 14.7 % (ref 11.7–14.9)
PLATELET # BLD: 111 K/CU MM (ref 140–440)
PMV BLD AUTO: 13.3 FL (ref 7.5–11.1)
POTASSIUM SERPL-SCNC: 4 MMOL/L (ref 3.5–5.1)
PRO-BNP: 751.1 PG/ML
RBC # BLD: 5.06 M/CU MM (ref 4.6–6.2)
SEGMENTED NEUTROPHILS ABSOLUTE COUNT: 4.3 K/CU MM
SEGMENTED NEUTROPHILS RELATIVE PERCENT: 68.7 % (ref 36–66)
SODIUM BLD-SCNC: 137 MMOL/L (ref 135–145)
T4 FREE: 1.18 NG/DL (ref 0.9–1.8)
TOTAL IMMATURE NEUTOROPHIL: 0.01 K/CU MM
TOTAL NUCLEATED RBC: 0 K/CU MM
TOTAL PROTEIN: 6.6 GM/DL (ref 6.4–8.2)
TROPONIN T: <0.01 NG/ML
TROPONIN T: <0.01 NG/ML
TSH HIGH SENSITIVITY: 1.01 UIU/ML (ref 0.27–4.2)
WBC # BLD: 6.3 K/CU MM (ref 4–10.5)

## 2019-07-15 PROCEDURE — 85025 COMPLETE CBC W/AUTO DIFF WBC: CPT

## 2019-07-15 PROCEDURE — 84439 ASSAY OF FREE THYROXINE: CPT

## 2019-07-15 PROCEDURE — 36415 COLL VENOUS BLD VENIPUNCTURE: CPT

## 2019-07-15 PROCEDURE — 80053 COMPREHEN METABOLIC PANEL: CPT

## 2019-07-15 PROCEDURE — 71045 X-RAY EXAM CHEST 1 VIEW: CPT

## 2019-07-15 PROCEDURE — 83735 ASSAY OF MAGNESIUM: CPT

## 2019-07-15 PROCEDURE — 84484 ASSAY OF TROPONIN QUANT: CPT

## 2019-07-15 PROCEDURE — 84443 ASSAY THYROID STIM HORMONE: CPT

## 2019-07-15 PROCEDURE — 93010 ELECTROCARDIOGRAM REPORT: CPT | Performed by: INTERNAL MEDICINE

## 2019-07-15 PROCEDURE — 99285 EMERGENCY DEPT VISIT HI MDM: CPT

## 2019-07-15 PROCEDURE — 93005 ELECTROCARDIOGRAM TRACING: CPT | Performed by: PHYSICIAN ASSISTANT

## 2019-07-15 PROCEDURE — 83880 ASSAY OF NATRIURETIC PEPTIDE: CPT

## 2019-07-15 RX ORDER — ROSUVASTATIN CALCIUM 20 MG/1
20 TABLET, COATED ORAL DAILY
COMMUNITY
End: 2019-10-01 | Stop reason: SDUPTHER

## 2019-07-15 RX ORDER — HYDROCHLOROTHIAZIDE 12.5 MG/1
12.5 TABLET ORAL DAILY
COMMUNITY

## 2019-07-15 RX ORDER — NITROGLYCERIN 0.4 MG/1
0.4 TABLET SUBLINGUAL EVERY 5 MIN PRN
COMMUNITY
End: 2022-03-29 | Stop reason: SDUPTHER

## 2019-07-15 RX ORDER — METOPROLOL TARTRATE 50 MG/1
50 TABLET, FILM COATED ORAL 2 TIMES DAILY
COMMUNITY
End: 2019-07-15 | Stop reason: CLARIF

## 2019-07-15 NOTE — ED PROVIDER NOTES
Radiologist's Report Reviewed:  XR CHEST PORTABLE   Final Result   1. No active pulmonary disease. EKG Interpretation  Please see ED physician's note for EKG interpretation      Chart review shows recent radiographs:  No results found. ED COURSE & MEDICAL DECISION MAKING       Vital signs and nursing notes reviewed during ED course. I have independently evaluated this patient . Supervising physician - Dr. Karl Chin - was present in ED and available for consultation throughout entirety of patient's care. All pertinent Lab data and radiographic results reviewed with patient at bedside. The patient and/or the family were informed of the results of any tests/labs/imaging, the treatment plan, and time was allotted to answer questions. Clinical Impression:  1. Frequent PVCs    2. Shortness of breath        Patient presents with shortness of breath and \"abnormal heartbeats. \"  On exam, very well-appearing pleasant 27-year-old male, alert and oriented, no acute respiratory distress. Vitals are stable but noted to be mildly bradycardic on exam.  Lungs are clear to auscultation. No lower extremity pitting edema. Calves are symmetrical.  Patient is placed on telemetry and continuous pulse ox. He is asymptomatic for any chest pain at this time. Labs today reassuring. No leukocytosis, normal hemoglobin. MP within normal limits, no electrolyte disturbance. Normal troponin and BNP for age. Normal magnesium TSH and free T4 levels. Chest x-ray shows no evidence of acute vascular congestion. EKG with sinus arrhythmia frequent PVCs. Patient appears to be perfusing well. Presentation is concerning for bradycardia with PVCs. He is reporting that his symptoms began after recent change in his metoprolol medication which was decreased from 25 mg to 12.5 mg. We will plan to consult with his cardiologist for further recommendation.   May benefit from electrophysiology evaluation for nonemergent days.  New prescriptions for the metoprolol tartrate 25 mg twice daily was called in by ED pharmacist and previous duplicate prescriptions were canceled by ED pharmacist.     I did discuss this patient's history, ED presentation/course with my attending physician - Dr. Rosie Johnson - who has also seen and evaluated this patient. He/she does agree that discharge is reasonable at this time with cardiology evaluation. Please see his/her note for additional details of their evaluation. Diagnosis and plan discussed in detail with patient who understands and agrees. Disposition referral (if applicable):  No follow-up provider specified.     Disposition medications (if applicable):  Discharge Medication List as of 7/15/2019  2:06 PM      START taking these medications    Details   metoprolol tartrate (LOPRESSOR) 25 MG tablet Take 1 tablet by mouth 2 times daily for 20 days, Disp-40 tablet, R-0Print               (Please note that portions of this note may have been completed with a voice recognition program. Efforts were made to edit the dictations but occasionally words are mis-transcribed.)          Delmi Serna PA-C  07/15/19 [id number]

## 2019-07-15 NOTE — ED PROVIDER NOTES
I independently examined and evaluated Trevon Brito. In brief their history revealed shortness of breath. States is been going on for about 2 days. Denies chest pain. Denies palpitations. Their focused exam revealed awake, alert, well-hydrated, well-nourished, and in no acute distress. Mucous membranes are moist. Speech is clear. Breathing is unlabored. Skin is dry. Mental status is normal. The patient has normal gait, moves all extremities, and is without facial droop. This EKG was interpreted by me. Rate is 85, rhythm is sinus, with sinus arrhythmia, frequent PVCs. RBBB. TX and QT intervals are within normal limits. There is no ST segment or T wave changes. This EKG was compared to previous EKG from date 6/5/19    ED course: 81-year-old male who presented with shortness of breath. Was found to have frequent PVCs. Troponin was negative. Not having any chest pain. Chest x-ray is nonacute. Did consult with cardiology as there was question if he needed a pacemaker at some point. They want to do an event monitor and follow-up outpatient. Plan at this point will be discharged home with outpatient follow-up with cardiology. All diagnostic, treatment, and disposition decisions were made by myself in conjunction with the Advanced Practice Provider. For all further details of the patient's emergency department visit, please see the Advanced Practice Provider's documentation.       Camila Samuel DO  07/15/19 [id number]

## 2019-07-15 NOTE — PROGRESS NOTES
Medication History  [facility]    Patient Name: Rod Yeboah 1940     Medication history has been completed by: Keith Raman CPhT    Source(s) of information: Patient and Insurance claims    Primary Care Physician: Karen Cruz MD     Pharmacy: [address] [place] [address] as of 07/15/2019    (No Known Allergies)        Prior to Admission medications    Medication Sig Start Date End Date Taking? Authorizing Provider   rosuvastatin (CRESTOR) 20 MG tablet Take 20 mg by mouth daily   Yes Historical Provider, MD   hydrochlorothiazide (HYDRODIURIL) 12.5 MG tablet Take 12.5 mg by mouth daily   Yes Historical Provider, MD   metoprolol succinate (TOPROL XL) 25 MG extended release tablet Take 0.5 tablets by mouth 2 times daily 6/21/19  Yes Lola Lopez MD   magnesium oxide (MAG-OX) 400 MG tablet Take 400 mg by mouth daily   Yes Historical Provider, MD   potassium chloride (KLOR-CON M) 10 MEQ extended release tablet Take 10 mEq by mouth 2 times daily   Yes Historical Provider, MD   diltiazem (CARDIZEM CD) 120 MG extended release capsule Take 1 capsule by mouth daily 5/1/19  Yes Brigid Caldwell MD   prasugrel (EFFIENT) 10 MG TABS Take 1 tablet by mouth daily 5/1/19  Yes Brigid Caldwell MD   aspirin 81 MG tablet Take 1 tablet by mouth daily 4/30/19  Yes Brigid Caldwell MD   omeprazole (PRILOSEC) 20 MG delayed release capsule Take 20 mg by mouth daily   Yes Historical Provider, MD   Compression Stockings MISC by Does not apply route Thigh high, 20 to 30 mm of Hg 4/1/19  Yes Lola Lopez MD   nitroGLYCERIN (NITROSTAT) 0.4 MG SL tablet Place 0.4 mg under the tongue every 5 minutes as needed for Chest pain up to max of 3 total doses. If no relief after 1 dose, call 911. Historical Provider, MD   albuterol sulfate HFA (PROVENTIL HFA) 108 (90 Base) MCG/ACT inhaler Inhale 2 puffs into the lungs every 4 hours as needed for Wheezing or Shortness of Breath With spacer (and mask if indicated). Thanks.

## 2019-07-15 NOTE — ED NOTES
Lyons was drawn on patient     Katherine Vanessa.  [facility]  07/15/19 1047

## 2019-07-16 ENCOUNTER — NURSE ONLY (OUTPATIENT)
Dept: CARDIOLOGY CLINIC | Age: 79
End: 2019-07-16
Payer: COMMERCIAL

## 2019-07-16 DIAGNOSIS — R06.02 SOB (SHORTNESS OF BREATH): ICD-10-CM

## 2019-07-16 DIAGNOSIS — I49.3 PVC'S (PREMATURE VENTRICULAR CONTRACTIONS): Primary | ICD-10-CM

## 2019-07-16 NOTE — PROGRESS NOTES
7 days e-cardio monitor placed.  # [id number]. Instructed patient on how to record the event and to call monitoring center at [phone] if any problems arise. Instructed patient to disconnect the lead wires from the electrodes before bathing or showering and reattach them afterwards. Instructed patient that the electrodes should be changed every 3 days or if they no longer adhere to the skin. Patient to mail package after the monitor has ended. Patient verbalized understanding.

## 2019-07-23 LAB
EKG ATRIAL RATE: 60 BPM
EKG DIAGNOSIS: NORMAL
EKG P AXIS: 71 DEGREES
EKG P-R INTERVAL: 174 MS
EKG Q-T INTERVAL: 416 MS
EKG QRS DURATION: 134 MS
EKG QTC CALCULATION (BAZETT): 495 MS
EKG R AXIS: 68 DEGREES
EKG T AXIS: 29 DEGREES
EKG VENTRICULAR RATE: 85 BPM

## 2019-07-24 ENCOUNTER — INITIAL CONSULT (OUTPATIENT)
Dept: CARDIOLOGY CLINIC | Age: 79
End: 2019-07-24
Payer: COMMERCIAL

## 2019-07-24 VITALS
WEIGHT: 172.4 LBS | HEART RATE: 74 BPM | BODY MASS INDEX: 27.06 KG/M2 | HEIGHT: 67 IN | SYSTOLIC BLOOD PRESSURE: 150 MMHG | DIASTOLIC BLOOD PRESSURE: 80 MMHG

## 2019-07-24 DIAGNOSIS — I49.3 PVC (PREMATURE VENTRICULAR CONTRACTION): Primary | ICD-10-CM

## 2019-07-24 DIAGNOSIS — R00.1 BRADYCARDIA: ICD-10-CM

## 2019-07-24 PROCEDURE — G8419 CALC BMI OUT NRM PARAM NOF/U: HCPCS | Performed by: INTERNAL MEDICINE

## 2019-07-24 PROCEDURE — G8598 ASA/ANTIPLAT THER USED: HCPCS | Performed by: INTERNAL MEDICINE

## 2019-07-24 PROCEDURE — 1036F TOBACCO NON-USER: CPT | Performed by: INTERNAL MEDICINE

## 2019-07-24 PROCEDURE — 93000 ELECTROCARDIOGRAM COMPLETE: CPT | Performed by: INTERNAL MEDICINE

## 2019-07-24 PROCEDURE — 4040F PNEUMOC VAC/ADMIN/RCVD: CPT | Performed by: INTERNAL MEDICINE

## 2019-07-24 PROCEDURE — 1123F ACP DISCUSS/DSCN MKR DOCD: CPT | Performed by: INTERNAL MEDICINE

## 2019-07-24 PROCEDURE — 99212 OFFICE O/P EST SF 10 MIN: CPT | Performed by: INTERNAL MEDICINE

## 2019-07-24 PROCEDURE — G8427 DOCREV CUR MEDS BY ELIG CLIN: HCPCS | Performed by: INTERNAL MEDICINE

## 2019-07-24 RX ORDER — METOPROLOL TARTRATE 50 MG/1
50 TABLET, FILM COATED ORAL 2 TIMES DAILY
Qty: 60 TABLET | Refills: 2 | Status: SHIPPED | OUTPATIENT
Start: 2019-07-24 | End: 2019-10-20 | Stop reason: SDUPTHER

## 2019-07-24 RX ORDER — METOPROLOL TARTRATE 50 MG/1
50 TABLET, FILM COATED ORAL 2 TIMES DAILY
COMMUNITY
End: 2019-07-24 | Stop reason: SDUPTHER

## 2019-07-24 ASSESSMENT — ENCOUNTER SYMPTOMS
DIARRHEA: 0
VOMITING: 0
ABDOMINAL DISTENTION: 0
SHORTNESS OF BREATH: 0
SINUS PRESSURE: 0
ABDOMINAL PAIN: 0
CONSTIPATION: 0
COLOR CHANGE: 0
NAUSEA: 0
SINUS PAIN: 0
EYE ITCHING: 0
BACK PAIN: 0
COUGH: 0
EYE REDNESS: 0
SORE THROAT: 0
CHEST TIGHTNESS: 0

## 2019-07-24 NOTE — PROGRESS NOTES
Electrophysiology FU Note      Reason for consultation: Premature Ventricular Contractions    Chief complaint: Symptomatic Bradycardia    Referring physician: Dr Neris Gutierres    Primary care physician: Jane Sanchez MD      History of Present Illness: This visit (7/24/2019)      Chief Complaint   Patient presents with   Jenna Belcher patient here for PPM eval. Pt denies any CP, SOB, Palpatations, and dizziness. Since cath he is feeling better Labs, Medication and history reviewed. Previous visit:      Ant Mae is a 69year old male who presented to the Emergency Room with complaints of dizziness and shortness of breath. He has recently been treated for COPD exacerbation. He reports that he began to have dizziness at home which was sudden onset. Patient reports that his niece is a nurse and she had checked his HR and found it to be in the 30's. He reports that although he was dizzy, he did not lose consciousness. He reports that he takes atenolol as prescribed. He denies CP, worsening shortness of breath, edema. Electrophysiology was consulted for frequent bigeminy and quadrageminy premature ventricular contractions. Per cardiology he is to have an ECHO, Stress test and Holter Monitor placed today. He has a past medical history of CAD S/P CABG, HTN, HLD, COPD, PVD, Shortness of breath with exertion. Past medical history:   Past Medical History:   Diagnosis Date    CAD (coronary artery disease)     Cancer ([facility] [address]     prostate    History of cardiac catheterization 05/24/2017    Critical Single vessel CAD with chronic occlusion of RCA. No significant disease of the other vessels. SVBG to RCA is patent with mild Proximal disease. LIMA to LAD did not mature. Normal LV systolic function & wall motion. LVEF is 55 %. Patient tolerated the procedure well. No immediate complications.     History of echocardiogram 01/12/2018    Left ventricular systolic function is normal with

## 2019-07-26 ENCOUNTER — TELEPHONE (OUTPATIENT)
Dept: CARDIOLOGY CLINIC | Age: 79
End: 2019-07-26

## 2019-07-26 NOTE — TELEPHONE ENCOUNTER
Advised pt of monitor result and Dr. Saloni Madden wanting to see him sooner than October. Pt verbalized understanding.  OV 7/30/19 @ 1:00

## 2019-07-30 ENCOUNTER — OFFICE VISIT (OUTPATIENT)
Dept: CARDIOLOGY CLINIC | Age: 79
End: 2019-07-30
Payer: COMMERCIAL

## 2019-07-30 VITALS
BODY MASS INDEX: 27.25 KG/M2 | WEIGHT: 173.6 LBS | DIASTOLIC BLOOD PRESSURE: 62 MMHG | SYSTOLIC BLOOD PRESSURE: 126 MMHG | HEIGHT: 67 IN | HEART RATE: 64 BPM

## 2019-07-30 DIAGNOSIS — I21.11 ST ELEVATION MYOCARDIAL INFARCTION INVOLVING RIGHT CORONARY ARTERY (HCC): ICD-10-CM

## 2019-07-30 DIAGNOSIS — I10 ESSENTIAL HYPERTENSION: Chronic | ICD-10-CM

## 2019-07-30 DIAGNOSIS — E78.2 HYPERLIPIDEMIA, MIXED: Chronic | ICD-10-CM

## 2019-07-30 DIAGNOSIS — I73.9 PVD (PERIPHERAL VASCULAR DISEASE) (HCC): Chronic | ICD-10-CM

## 2019-07-30 DIAGNOSIS — I49.3 PVC (PREMATURE VENTRICULAR CONTRACTION): ICD-10-CM

## 2019-07-30 DIAGNOSIS — I25.810 CORONARY ARTERY DISEASE INVOLVING CORONARY BYPASS GRAFT OF NATIVE HEART WITHOUT ANGINA PECTORIS: Primary | ICD-10-CM

## 2019-07-30 DIAGNOSIS — Z95.1 S/P CABG X 2: Chronic | ICD-10-CM

## 2019-07-30 PROCEDURE — 4040F PNEUMOC VAC/ADMIN/RCVD: CPT | Performed by: INTERNAL MEDICINE

## 2019-07-30 PROCEDURE — G8427 DOCREV CUR MEDS BY ELIG CLIN: HCPCS | Performed by: INTERNAL MEDICINE

## 2019-07-30 PROCEDURE — 99214 OFFICE O/P EST MOD 30 MIN: CPT | Performed by: INTERNAL MEDICINE

## 2019-07-30 PROCEDURE — G8419 CALC BMI OUT NRM PARAM NOF/U: HCPCS | Performed by: INTERNAL MEDICINE

## 2019-07-30 PROCEDURE — 1036F TOBACCO NON-USER: CPT | Performed by: INTERNAL MEDICINE

## 2019-07-30 PROCEDURE — 1123F ACP DISCUSS/DSCN MKR DOCD: CPT | Performed by: INTERNAL MEDICINE

## 2019-07-30 PROCEDURE — G8598 ASA/ANTIPLAT THER USED: HCPCS | Performed by: INTERNAL MEDICINE

## 2019-07-30 NOTE — PROGRESS NOTES
Surgical History:   Procedure Laterality Date    BACK SURGERY      CARDIAC SURGERY      cabg    COLONOSCOPY  4/14/15    divertics    CORONARY ANGIOPLASTY WITH STENT PLACEMENT  05/14/2014    X2      As reviewed History reviewed. No pertinent family history. Social History     Tobacco Use    Smoking status: Former Smoker     Packs/day: 3.00     Years: 30.00     Pack years: 90.00     Types: Cigarettes     Last attempt to quit: 2006     Years since quittin.5    Smokeless tobacco: Never Used   Substance Use Topics    Alcohol use: No      Review of Systems:    Constitutional: Negative for diaphoresis and fatigue  Psychological:Negative for anxiety or depression  HENT: Negative for headaches, nasal congestion, sinus pain or vertigo  Eyes: Negative for visual disturbance. Endocrine: Negative for polydipsia/polyuria  Respiratory: Negative for shortness of breath  Cardiovascular: Negative for chest pain, dyspnea on exertion, claudication, edema, irregular heartbeat, murmur, palpitations or shortness of breath  Gastrointestinal: Negative for abdominal pain or heartburn  Genito-Urinary: Negative for urinary frequency/urgency  Musculoskeletal: Negative for muscle pain, muscular weakness, negative for pain in arm and leg or swelling in foot and leg  Neurological: Negative for dizziness, headaches, memory loss, numbness/tingling, visual changes, syncope  Dermatological: Negative for rash    Objective:  /62   Pulse 64   Ht 5' 7\" (1.702 m)   Wt 173 lb 9.6 oz (78.7 kg)   BMI 27.19 kg/m²   Wt Readings from Last 3 Encounters:   19 173 lb 9.6 oz (78.7 kg)   19 172 lb 6.4 oz (78.2 kg)   07/15/19 170 lb (77.1 kg)     Body mass index is 27.19 kg/m². GENERAL - Alert, oriented, pleasant, in no apparent distress. EYES: No jaundice, no conjunctival pallor. SKIN: It is warm & dry. No rashes. No Echhymosis    HEENT - No clinically significant abnormalities seen. Neck - Supple.   No jugular venous distention noted. No carotid bruits. Cardiovascular - Normal S1 and S2 without obvious murmur or gallop. Extremities - No cyanosis, clubbing, or significant edema. Pulmonary - No respiratory distress. No wheezes or rales. Abdomen - No masses, tenderness, or organomegaly. Musculoskeletal - No significant edema. No joint deformities. No muscle wasting. Neurologic - Cranial nerves II through XII are grossly intact. There were no gross focal neurologic abnormalities.     Lab Review   Lab Results   Component Value Date    CKTOTAL 147 04/04/2016    CKTOTAL 145 09/14/2015    TROPONINT <0.010 07/15/2019    TROPONINT <0.010 07/15/2019     BNP:    Lab Results   Component Value Date    BNP 23 09/16/2012     PT/INR:    Lab Results   Component Value Date    INR 1.00 10/02/2017    INR 0.91 05/22/2017     Lab Results   Component Value Date    LABA1C 6.3 05/07/2018    LABA1C 6.2 05/02/2017     Lab Results   Component Value Date    WBC 6.3 07/15/2019    WBC 7.6 06/05/2019    HCT 45.7 07/15/2019    HCT 43.8 06/05/2019    MCV 90.3 07/15/2019    MCV 93.0 06/05/2019     (L) 07/15/2019     (L) 06/05/2019     Lab Results   Component Value Date    CHOL 154 04/05/2019    CHOL 200 (H) 05/07/2018    TRIG 111 04/05/2019    TRIG 115 05/07/2018    HDL 51 04/05/2019    HDL 55 05/07/2018    LDLCALC 87 06/08/2017    LDLCALC 72 06/09/2014    LDLDIRECT 87 04/05/2019    LDLDIRECT 138 (H) 05/07/2018     Lab Results   Component Value Date    ALT 9 (L) 07/15/2019    ALT 12 06/05/2019    AST 14 (L) 07/15/2019    AST 13 (L) 06/05/2019     BMP:    Lab Results   Component Value Date     07/15/2019     06/05/2019    K 4.0 07/15/2019    K 3.8 06/05/2019     07/15/2019     06/05/2019    CO2 22 07/15/2019    CO2 27 06/05/2019    BUN 13 07/15/2019    BUN 12 06/05/2019    CREATININE 0.9 07/15/2019    CREATININE 1.0 06/05/2019     CMP:   Lab Results   Component Value Date     07/15/2019     06/05/2019

## 2019-07-31 PROCEDURE — 93228 REMOTE 30 DAY ECG REV/REPORT: CPT | Performed by: INTERNAL MEDICINE

## 2019-09-27 ENCOUNTER — OFFICE VISIT (OUTPATIENT)
Dept: CARDIOLOGY CLINIC | Age: 79
End: 2019-09-27
Payer: COMMERCIAL

## 2019-09-27 VITALS
HEIGHT: 66 IN | SYSTOLIC BLOOD PRESSURE: 146 MMHG | HEART RATE: 64 BPM | WEIGHT: 174.8 LBS | DIASTOLIC BLOOD PRESSURE: 86 MMHG | BODY MASS INDEX: 28.09 KG/M2 | OXYGEN SATURATION: 96 %

## 2019-09-27 DIAGNOSIS — I49.3 PVC (PREMATURE VENTRICULAR CONTRACTION): Primary | ICD-10-CM

## 2019-09-27 PROCEDURE — 1036F TOBACCO NON-USER: CPT | Performed by: INTERNAL MEDICINE

## 2019-09-27 PROCEDURE — G8419 CALC BMI OUT NRM PARAM NOF/U: HCPCS | Performed by: INTERNAL MEDICINE

## 2019-09-27 PROCEDURE — 4040F PNEUMOC VAC/ADMIN/RCVD: CPT | Performed by: INTERNAL MEDICINE

## 2019-09-27 PROCEDURE — 1123F ACP DISCUSS/DSCN MKR DOCD: CPT | Performed by: INTERNAL MEDICINE

## 2019-09-27 PROCEDURE — 99214 OFFICE O/P EST MOD 30 MIN: CPT | Performed by: INTERNAL MEDICINE

## 2019-09-27 PROCEDURE — 93000 ELECTROCARDIOGRAM COMPLETE: CPT | Performed by: INTERNAL MEDICINE

## 2019-09-27 PROCEDURE — G8427 DOCREV CUR MEDS BY ELIG CLIN: HCPCS | Performed by: INTERNAL MEDICINE

## 2019-09-27 PROCEDURE — G8598 ASA/ANTIPLAT THER USED: HCPCS | Performed by: INTERNAL MEDICINE

## 2019-09-27 ASSESSMENT — ENCOUNTER SYMPTOMS
SINUS PAIN: 0
EYE ITCHING: 0
SINUS PRESSURE: 0
EYE REDNESS: 0
CONSTIPATION: 0
BACK PAIN: 0
VOMITING: 0
SHORTNESS OF BREATH: 1
DIARRHEA: 0
ABDOMINAL PAIN: 0
COLOR CHANGE: 0
ABDOMINAL DISTENTION: 0
CHEST TIGHTNESS: 0
NAUSEA: 0
SORE THROAT: 0
COUGH: 0

## 2019-09-30 ENCOUNTER — TELEPHONE (OUTPATIENT)
Dept: CARDIOLOGY CLINIC | Age: 79
End: 2019-09-30

## 2019-10-01 RX ORDER — ROSUVASTATIN CALCIUM 20 MG/1
20 TABLET, COATED ORAL DAILY
Qty: 30 TABLET | Refills: 5 | Status: SHIPPED | OUTPATIENT
Start: 2019-10-01 | End: 2020-03-03

## 2019-10-20 DIAGNOSIS — I49.3 PVC (PREMATURE VENTRICULAR CONTRACTION): ICD-10-CM

## 2019-10-20 DIAGNOSIS — I10 ESSENTIAL HYPERTENSION: Primary | ICD-10-CM

## 2019-10-21 ENCOUNTER — TELEPHONE (OUTPATIENT)
Dept: CARDIOLOGY CLINIC | Age: 79
End: 2019-10-21

## 2019-10-22 ENCOUNTER — HOSPITAL ENCOUNTER (OUTPATIENT)
Dept: GENERAL RADIOLOGY | Age: 79
Discharge: HOME | End: 2019-10-22
Payer: COMMERCIAL

## 2019-10-22 ENCOUNTER — HOSPITAL ENCOUNTER (OUTPATIENT)
Age: 79
Discharge: HOME | End: 2019-10-22
Payer: COMMERCIAL

## 2019-10-22 DIAGNOSIS — Z01.810 PRE-OPERATIVE CARDIOVASCULAR EXAMINATION: ICD-10-CM

## 2019-10-22 DIAGNOSIS — I49.3 VENTRICULAR PREMATURE BEATS: ICD-10-CM

## 2019-10-22 LAB
ANION GAP SERPL CALCULATED.3IONS-SCNC: 11 MMOL/L (ref 4–16)
APTT: 29.1 SECONDS (ref 25.1–37.1)
BASOPHILS ABSOLUTE: 0 K/CU MM
BASOPHILS RELATIVE PERCENT: 0.5 % (ref 0–1)
BUN BLDV-MCNC: 17 MG/DL (ref 6–23)
CALCIUM SERPL-MCNC: 9.5 MG/DL (ref 8.3–10.6)
CHLORIDE BLD-SCNC: 98 MMOL/L (ref 99–110)
CO2: 29 MMOL/L (ref 21–32)
CREAT SERPL-MCNC: 1.1 MG/DL (ref 0.9–1.3)
DIFFERENTIAL TYPE: ABNORMAL
EOSINOPHILS ABSOLUTE: 0.2 K/CU MM
EOSINOPHILS RELATIVE PERCENT: 2.7 % (ref 0–3)
GFR AFRICAN AMERICAN: >60 ML/MIN/1.73M2
GFR NON-AFRICAN AMERICAN: >60 ML/MIN/1.73M2
GLUCOSE BLD-MCNC: 96 MG/DL (ref 70–99)
HCT VFR BLD CALC: 50.3 % (ref 42–52)
HEMOGLOBIN: 15.4 GM/DL (ref 13.5–18)
IMMATURE NEUTROPHIL %: 0.3 % (ref 0–0.43)
INR BLD: 1.02 INDEX
LYMPHOCYTES ABSOLUTE: 1.2 K/CU MM
LYMPHOCYTES RELATIVE PERCENT: 16.6 % (ref 24–44)
MAGNESIUM: 2 MG/DL (ref 1.8–2.4)
MCH RBC QN AUTO: 28.6 PG (ref 27–31)
MCHC RBC AUTO-ENTMCNC: 30.6 % (ref 32–36)
MCV RBC AUTO: 93.3 FL (ref 78–100)
MONOCYTES ABSOLUTE: 0.7 K/CU MM
MONOCYTES RELATIVE PERCENT: 9.5 % (ref 0–4)
NUCLEATED RBC %: 0 %
PDW BLD-RTO: 15.2 % (ref 11.7–14.9)
PHOSPHORUS: 3.6 MG/DL (ref 2.5–4.9)
PLATELET # BLD: 119 K/CU MM (ref 140–440)
PMV BLD AUTO: 13.7 FL (ref 7.5–11.1)
POTASSIUM SERPL-SCNC: 3.8 MMOL/L (ref 3.5–5.1)
PROTHROMBIN TIME: 11.6 SECONDS (ref 11.7–14.5)
RBC # BLD: 5.39 M/CU MM (ref 4.6–6.2)
SEGMENTED NEUTROPHILS ABSOLUTE COUNT: 5.2 K/CU MM
SEGMENTED NEUTROPHILS RELATIVE PERCENT: 70.4 % (ref 36–66)
SODIUM BLD-SCNC: 138 MMOL/L (ref 135–145)
TOTAL IMMATURE NEUTOROPHIL: 0.02 K/CU MM
TOTAL NUCLEATED RBC: 0 K/CU MM
WBC # BLD: 7.3 K/CU MM (ref 4–10.5)

## 2019-10-22 PROCEDURE — 71046 X-RAY EXAM CHEST 2 VIEWS: CPT

## 2019-10-22 PROCEDURE — 80048 BASIC METABOLIC PNL TOTAL CA: CPT

## 2019-10-22 PROCEDURE — 83735 ASSAY OF MAGNESIUM: CPT

## 2019-10-22 PROCEDURE — 85025 COMPLETE CBC W/AUTO DIFF WBC: CPT

## 2019-10-22 PROCEDURE — 84100 ASSAY OF PHOSPHORUS: CPT

## 2019-10-22 PROCEDURE — 36415 COLL VENOUS BLD VENIPUNCTURE: CPT

## 2019-10-22 PROCEDURE — 85730 THROMBOPLASTIN TIME PARTIAL: CPT

## 2019-10-22 PROCEDURE — 85610 PROTHROMBIN TIME: CPT

## 2019-10-23 RX ORDER — METOPROLOL TARTRATE 50 MG/1
TABLET, FILM COATED ORAL
Qty: 60 TABLET | Refills: 3 | Status: SHIPPED | OUTPATIENT
Start: 2019-10-23

## 2019-10-24 ENCOUNTER — HOSPITAL ENCOUNTER (OUTPATIENT)
Dept: CARDIAC CATH/INVASIVE PROCEDURES | Age: 79
Discharge: HOME | End: 2019-10-24
Attending: INTERNAL MEDICINE | Admitting: INTERNAL MEDICINE
Payer: COMMERCIAL

## 2019-10-24 VITALS
BODY MASS INDEX: 27.97 KG/M2 | HEART RATE: 71 BPM | HEIGHT: 66 IN | TEMPERATURE: 97.8 F | WEIGHT: 174 LBS | OXYGEN SATURATION: 98 % | RESPIRATION RATE: 19 BRPM | DIASTOLIC BLOOD PRESSURE: 81 MMHG | SYSTOLIC BLOOD PRESSURE: 182 MMHG

## 2019-10-24 DIAGNOSIS — I49.3 PVC (PREMATURE VENTRICULAR CONTRACTION): Primary | ICD-10-CM

## 2019-10-24 LAB
ABO/RH: NORMAL
ANTIBODY SCREEN: NEGATIVE

## 2019-10-24 PROCEDURE — 93010 ELECTROCARDIOGRAM REPORT: CPT | Performed by: INTERNAL MEDICINE

## 2019-10-24 PROCEDURE — 86900 BLOOD TYPING SEROLOGIC ABO: CPT

## 2019-10-24 PROCEDURE — 86850 RBC ANTIBODY SCREEN: CPT

## 2019-10-24 PROCEDURE — 93005 ELECTROCARDIOGRAM TRACING: CPT | Performed by: INTERNAL MEDICINE

## 2019-10-24 PROCEDURE — 86901 BLOOD TYPING SEROLOGIC RH(D): CPT

## 2019-10-25 ENCOUNTER — NURSE ONLY (OUTPATIENT)
Dept: CARDIOLOGY CLINIC | Age: 79
End: 2019-10-25

## 2019-10-25 DIAGNOSIS — I49.9 IRREGULAR HEART RATE: Primary | ICD-10-CM

## 2019-10-25 DIAGNOSIS — I49.3 PVC (PREMATURE VENTRICULAR CONTRACTION): ICD-10-CM

## 2019-10-25 LAB
EKG ATRIAL RATE: 71 BPM
EKG DIAGNOSIS: NORMAL
EKG P AXIS: 68 DEGREES
EKG P-R INTERVAL: 160 MS
EKG Q-T INTERVAL: 446 MS
EKG QRS DURATION: 136 MS
EKG QTC CALCULATION (BAZETT): 484 MS
EKG R AXIS: 61 DEGREES
EKG T AXIS: 37 DEGREES
EKG VENTRICULAR RATE: 71 BPM

## 2019-11-07 ENCOUNTER — TELEPHONE (OUTPATIENT)
Dept: CARDIOLOGY CLINIC | Age: 79
End: 2019-11-07

## 2019-12-16 ENCOUNTER — HOSPITAL ENCOUNTER (OUTPATIENT)
Age: 79
Discharge: HOME | End: 2019-12-16
Payer: MEDICARE

## 2019-12-16 LAB — PROSTATE SPECIFIC ANTIGEN: 0.08 NG/ML (ref 0–4)

## 2019-12-16 PROCEDURE — G0103 PSA SCREENING: HCPCS

## 2019-12-16 PROCEDURE — 36415 COLL VENOUS BLD VENIPUNCTURE: CPT

## 2020-02-05 ENCOUNTER — OFFICE VISIT (OUTPATIENT)
Dept: CARDIOLOGY CLINIC | Age: 80
End: 2020-02-05
Payer: MEDICARE

## 2020-02-05 ENCOUNTER — HOSPITAL ENCOUNTER (OUTPATIENT)
Age: 80
Discharge: HOME | End: 2020-02-05
Payer: MEDICARE

## 2020-02-05 VITALS
BODY MASS INDEX: 29.02 KG/M2 | WEIGHT: 180.6 LBS | SYSTOLIC BLOOD PRESSURE: 140 MMHG | HEART RATE: 72 BPM | HEIGHT: 66 IN | DIASTOLIC BLOOD PRESSURE: 80 MMHG

## 2020-02-05 LAB
ALBUMIN SERPL-MCNC: 4.3 GM/DL (ref 3.4–5)
ALP BLD-CCNC: 136 IU/L (ref 40–129)
ALT SERPL-CCNC: 13 U/L (ref 10–40)
AST SERPL-CCNC: 18 IU/L (ref 15–37)
BILIRUB SERPL-MCNC: 0.4 MG/DL (ref 0–1)
BILIRUBIN DIRECT: 0.2 MG/DL (ref 0–0.3)
BILIRUBIN, INDIRECT: 0.2 MG/DL (ref 0–0.7)
CHOLESTEROL, FASTING: 155 MG/DL
HDLC SERPL-MCNC: 52 MG/DL
LDL CHOLESTEROL DIRECT: 91 MG/DL
TOTAL PROTEIN: 7 GM/DL (ref 6.4–8.2)
TRIGLYCERIDE, FASTING: 143 MG/DL

## 2020-02-05 PROCEDURE — G8427 DOCREV CUR MEDS BY ELIG CLIN: HCPCS | Performed by: NURSE PRACTITIONER

## 2020-02-05 PROCEDURE — G8417 CALC BMI ABV UP PARAM F/U: HCPCS | Performed by: NURSE PRACTITIONER

## 2020-02-05 PROCEDURE — 4040F PNEUMOC VAC/ADMIN/RCVD: CPT | Performed by: NURSE PRACTITIONER

## 2020-02-05 PROCEDURE — 80061 LIPID PANEL: CPT

## 2020-02-05 PROCEDURE — 1123F ACP DISCUSS/DSCN MKR DOCD: CPT | Performed by: NURSE PRACTITIONER

## 2020-02-05 PROCEDURE — 99214 OFFICE O/P EST MOD 30 MIN: CPT | Performed by: NURSE PRACTITIONER

## 2020-02-05 PROCEDURE — 36415 COLL VENOUS BLD VENIPUNCTURE: CPT

## 2020-02-05 PROCEDURE — 80076 HEPATIC FUNCTION PANEL: CPT

## 2020-02-05 PROCEDURE — G8484 FLU IMMUNIZE NO ADMIN: HCPCS | Performed by: NURSE PRACTITIONER

## 2020-02-05 PROCEDURE — 1036F TOBACCO NON-USER: CPT | Performed by: NURSE PRACTITIONER

## 2020-02-05 RX ORDER — POTASSIUM CHLORIDE 750 MG/1
10 TABLET, EXTENDED RELEASE ORAL DAILY
Qty: 30 TABLET | Refills: 3 | Status: SHIPPED | OUTPATIENT
Start: 2020-02-05 | End: 2020-10-29 | Stop reason: SDUPTHER

## 2020-02-05 NOTE — PROGRESS NOTES
[address], [address] [facility],[address]  Phone: [phone]    Fax [phone]                  Titi Caal MD, Bhaskar Maddox MD, [address] [facility], MD, MD Gwendolyn Pearl MD Binnie Eric, MD Nevin Offer, APRN      Omie Alert, APRN  Erinn Mendoza, APRN     Kaley Tucker, APRN    CARDIOLOGY  NOTE      2/5/2020    RE: Ras Harrell  ([birth year])                               TO:  Dr. Allison Molina MD  The primary cardiologist is Dr. Oleg Lyles    CC:   1. PVC (premature ventricular contraction)    2. Coronary artery disease involving coronary bypass graft of native heart without angina pectoris    3. Essential hypertension    4. Hyperlipidemia, mixed        Patient denies all of the following:  Chest Pain  Palpitations  Shortness of Breath  Edema  Dizziness  Syncope      HPI: Thank you for involving me in taking care of your patient Ras Harrell, who is a  67y.o. year old male with a history as listed above. Patient presents to the office for follow up on CAD, HTN, PVC, and hyperlipidemia. Patient had CABG x 2 in 2000 then PCTA of RCA in 2019. Patient is  an active male who does walk regularly. Patient is  compliant with he medications. Patient denies any cardiac complaints or needs. Vitals:    02/05/20 1008   BP: (!) 140/80   Pulse:        Current Outpatient Medications   Medication Sig Dispense Refill    potassium chloride (KLOR-CON M) 10 MEQ extended release tablet Take 1 tablet by mouth daily 30 tablet 3    metoprolol tartrate (LOPRESSOR) 50 MG tablet take 1 tablet by mouth twice a day (Patient taking differently: 2 times daily ) 60 tablet 3    rosuvastatin (CRESTOR) 20 MG tablet Take 1 tablet by mouth daily 30 tablet 5    nitroGLYCERIN (NITROSTAT) 0.4 MG SL tablet Place 0.4 mg under the tongue every 5 minutes as needed for Chest pain up to max of 3 total doses. If no relief after 1 dose, call 911.       hydrochlorothiazide (HYDRODIURIL) 12.5 MG tablet Take 12.5 mg by mouth daily      magnesium oxide (MAG-OX) 400 MG tablet Take 400 mg by mouth daily      prasugrel (EFFIENT) 10 MG TABS Take 1 tablet by mouth daily 30 tablet 11    aspirin 81 MG tablet Take 1 tablet by mouth daily 30 tablet 11    omeprazole (PRILOSEC) 20 MG delayed release capsule Take 20 mg by mouth daily      Compression Stockings MISC by Does not apply route Thigh high, 20 to 30 mm of Hg 1 each 3    nystatin (MYCOSTATIN) 859961 UNIT/ML suspension Take 5 mLs by mouth 4 times daily 1 Bottle 1     No current facility-administered medications for this visit. Allergies: Patient has no known allergies. Past Medical History:   Diagnosis Date    CAD (coronary artery disease)     Cancer ([facility] [address]     prostate    H/O echocardiogram 04/29/2019    Patient in atrial fibrillation during exam. Left ventricular function is normal, EF is estimated at 55-60%. Mildly dilated left atrium. Mildly dilated left atrium. Mildli enlarged right ventricle cavity. Trace to mild mitral regurgitation is present. No evidence of pericardial effusion. No evidence of pleural effusion.  History of cardiac catheterization 05/24/2017    Critical Single vessel CAD with chronic occlusion of RCA. No significant disease of the other vessels. SVBG to RCA is patent with mild Proximal disease. LIMA to LAD did not mature. Normal LV systolic function & wall motion. LVEF is 55 %. Patient tolerated the procedure well. No immediate complications.  History of echocardiogram 01/12/2018    Left ventricular systolic function is normal with an ejection fraction of55-60%. Grade I diastolic dysfunction. Mildly dilated left atrium. Moderate mitral regurgitation. Mild to moderate tricuspid regurgitation. No evidence of pericardial effusion.     HTN (hypertension)     Hyperlipidemia      Past Surgical History:   Procedure Laterality Date    BACK SURGERY      CARDIAC SURGERY      cabg    COLONOSCOPY  4/14/15    divertics    CORONARY ANGIOPLASTY WITH STENT PLACEMENT  05/14/2014    X2     History reviewed. No pertinent family history. Social History     Tobacco Use    Smoking status: Former Smoker     Packs/day: 3.00     Years: 30.00     Pack years: 90.00     Types: Cigarettes     Last attempt to quit: 2006     Years since quittin.1    Smokeless tobacco: Never Used   Substance Use Topics    Alcohol use: No     Comment: caffeine 2-3 glasses of tea a day        Review of Systems - History obtained from the patient  General: negative for - fatigue, malaise, night sweats, weight gain  Psychological: negative for - anxiety, depression, sleep disturbances  Ophthalmic: negative for - blurry vision, loss of vision  ENT: negative for - headaches, vertigo, visual changes  Hematological and Lymphatic: negative for - bleeding problems, blood clots, bruising, fatigue or pallor  Endocrine: negative for - malaise/lethargy, palpitations, unexpected weight changes  Respiratory: negative for - cough, orthopnea, shortness of breath or tachypnea  Cardiovascular: negative for - chest pain, dyspnea on exertion, edema or palpitations  Gastrointestinal: no abdominal pain, change in bowel habits, or black or bloody stools  Genito-Urinary: no dysuria, trouble voiding, or hematuria  Musculoskeletal: negative for - gait disturbance, pain, swelling in lower legs   Neurological: negative for - confusion, dizziness, impaired coordination/balance or numbness/tingling    Objective:      Physical Exam:  BP (!) 140/80   Pulse 72   Ht 5' 6\" (1.676 m)   Wt 180 lb 9.6 oz (81.9 kg)   BMI 29.15 kg/m²   Wt Readings from Last 3 Encounters:   20 180 lb 9.6 oz (81.9 kg)   10/24/19 174 lb (78.9 kg)   19 174 lb 12.8 oz (79.3 kg)     Body mass index is 29.15 kg/m². GENERAL - Alert, oriented, pleasant, in no apparent distress.     Head unremarkable  Eyes - pupils equal and reactive to light - bilateral conjunctiva continue current medications (Lopressor, hydrodiuril)   advised low salt diet   Testing ordered:  no   Last testing: Echo 2019    Coronary artery disease involving coronary bypass graft of native heart without angina pectoris  CABG x 2 in 2000 per Dr. Neda Glasgow    Primary and secondary prevention discussed with patient:   -Low sodium cardiac diet   -exercise 30 min a day three days a week    Continue current medications (ASA, Lopressor, Crestor)         PVC (premature ventricular contraction)  Patient was planned for PVC ablation but procedure canceled due to not having any PVC during monitoring pre-op. Magnesium was added to patient's medication treatment prior to ablation which might have decreased PVC burden, according to Dr. Ene Sheehan. Hyperlipidemia, mixed  -At or near goal Yes    -will get Lipid panel and hepatic panel. He is to continue current medications (crestor)    -The nature of cardiac risk has been fully discussed with this patient. I have made him aware of his LDL target goal given his cardiovascular risk analysis. I have discussed the appropriate diet. The need for lifelong compliance in order to reduce risk is stressed. A regular exercise program is recommended to help achieve and maintain normal body weight, fitness and improve lipid balance. A written copy of a low fat, low cholesterol diet has been given to the patient. Patient seen, interviewed and examined. Testing was reviewed. Patient is encouraged to exercise even a brisk walk for 30 minutes at least 3 to 4 times a week. Lifestyle and risk factor modificatons discussed. Various goals are discussed and questions answered. Continue current medications. Appropriate prescriptions are addressed. Questions answered and patient verbalizes understanding. Call for any problems, questions, or concerns. Pt is to follow up in 6  months for Cardiac management    Electronically signed by Bing Meckel.  MIGUEL Juan - CNP on 2/5/2020 at 10:19 AM

## 2020-02-05 NOTE — ASSESSMENT & PLAN NOTE
-At or near goal Yes    -will get Lipid panel and hepatic panel. He is to continue current medications (crestor)    -The nature of cardiac risk has been fully discussed with this patient. I have made him aware of his LDL target goal given his cardiovascular risk analysis. I have discussed the appropriate diet. The need for lifelong compliance in order to reduce risk is stressed. A regular exercise program is recommended to help achieve and maintain normal body weight, fitness and improve lipid balance. A written copy of a low fat, low cholesterol diet has been given to the patient.

## 2020-03-05 RX ORDER — ROSUVASTATIN CALCIUM 20 MG/1
20 TABLET, COATED ORAL DAILY
Qty: 30 TABLET | Refills: 5 | Status: SHIPPED | OUTPATIENT
Start: 2020-03-05 | End: 2020-03-19 | Stop reason: SDUPTHER

## 2020-03-20 RX ORDER — ROSUVASTATIN CALCIUM 20 MG/1
20 TABLET, COATED ORAL DAILY
Qty: 90 TABLET | Refills: 3 | Status: SHIPPED | OUTPATIENT
Start: 2020-03-20 | End: 2020-10-29

## 2020-06-15 ENCOUNTER — HOSPITAL ENCOUNTER (OUTPATIENT)
Age: 80
Discharge: HOME | End: 2020-06-15
Payer: MEDICARE

## 2020-06-15 LAB — PROSTATE SPECIFIC ANTIGEN: 0.1 NG/ML (ref 0–4)

## 2020-06-15 PROCEDURE — G0103 PSA SCREENING: HCPCS

## 2020-06-15 PROCEDURE — 36415 COLL VENOUS BLD VENIPUNCTURE: CPT

## 2020-10-29 ENCOUNTER — OFFICE VISIT (OUTPATIENT)
Dept: CARDIOLOGY CLINIC | Age: 80
End: 2020-10-29
Payer: MEDICARE

## 2020-10-29 VITALS
BODY MASS INDEX: 29.28 KG/M2 | DIASTOLIC BLOOD PRESSURE: 78 MMHG | HEART RATE: 68 BPM | TEMPERATURE: 96.4 F | WEIGHT: 181.4 LBS | SYSTOLIC BLOOD PRESSURE: 134 MMHG

## 2020-10-29 PROCEDURE — G8484 FLU IMMUNIZE NO ADMIN: HCPCS | Performed by: NURSE PRACTITIONER

## 2020-10-29 PROCEDURE — G8427 DOCREV CUR MEDS BY ELIG CLIN: HCPCS | Performed by: NURSE PRACTITIONER

## 2020-10-29 PROCEDURE — G8417 CALC BMI ABV UP PARAM F/U: HCPCS | Performed by: NURSE PRACTITIONER

## 2020-10-29 PROCEDURE — 4040F PNEUMOC VAC/ADMIN/RCVD: CPT | Performed by: NURSE PRACTITIONER

## 2020-10-29 PROCEDURE — 99214 OFFICE O/P EST MOD 30 MIN: CPT | Performed by: NURSE PRACTITIONER

## 2020-10-29 PROCEDURE — 1036F TOBACCO NON-USER: CPT | Performed by: NURSE PRACTITIONER

## 2020-10-29 PROCEDURE — 1123F ACP DISCUSS/DSCN MKR DOCD: CPT | Performed by: NURSE PRACTITIONER

## 2020-10-29 RX ORDER — PRASUGREL 10 MG/1
10 TABLET, FILM COATED ORAL DAILY
Qty: 30 TABLET | Refills: 11 | Status: CANCELLED | OUTPATIENT
Start: 2020-10-29

## 2020-10-29 RX ORDER — POTASSIUM CHLORIDE 750 MG/1
10 TABLET, EXTENDED RELEASE ORAL DAILY
Qty: 30 TABLET | Refills: 3 | Status: SHIPPED | OUTPATIENT
Start: 2020-10-29 | End: 2022-08-31

## 2020-10-29 RX ORDER — ROSUVASTATIN CALCIUM 40 MG/1
40 TABLET, COATED ORAL DAILY
Qty: 30 TABLET | Refills: 3 | Status: SHIPPED | OUTPATIENT
Start: 2020-10-29 | End: 2021-02-23

## 2020-10-29 RX ORDER — OMEPRAZOLE 20 MG/1
20 CAPSULE, DELAYED RELEASE ORAL DAILY
Qty: 90 CAPSULE | Refills: 1 | Status: SHIPPED | OUTPATIENT
Start: 2020-10-29

## 2020-10-29 RX ORDER — MAGNESIUM OXIDE 400 MG/1
400 TABLET ORAL DAILY
Qty: 90 TABLET | Refills: 1 | Status: ON HOLD | OUTPATIENT
Start: 2020-10-29 | End: 2021-09-30

## 2020-10-29 NOTE — PROGRESS NOTES
TARA ([facility], [place] PHYSICAL REHABILITATION [place]  [address]ain[address]  Phone: [phone]    Fax [phone]                  Ponce Flores MD, Marcia Singh MD, [address] Georges MD, MD Mignon Sommers MD Overton Bouillon, MD  Gearluis daniel Favorite, APRN      Mya Ortiz, APRN  Annika Mcihaels, APRN     Angie Falling, APRN    CARDIOLOGY  NOTE      10/29/2020    RE: Remigio Howard  ([id number])                               TO:  Dr. Coral Dailey MD  The primary cardiologist is Dr. Komal Collins    CC:   1. PVD (peripheral vascular disease) ([facility] [address]    2. Coronary artery disease involving coronary bypass graft of native heart without angina pectoris    3. Essential hypertension    4. Hyperlipidemia, mixed        Patient denies all of the following:  Chest Pain  Palpitations  Shortness of Breath  Edema  Dizziness  Syncope      HPI: Thank you for involving me in taking care of your patient Remigio Howard, who is a  79y.o. year old male with a history as listed above. Patient presents to the office for follow up on CAD, HTN, PVC, and hyperlipidemia. Patient had CABG x 2 in 2000 then PCTA of RCA in 2019. Patient is  an active male who does walk regularly. Patient is  compliant with he medications. Patient denies any cardiac complaints or needs.      Vitals:    10/29/20 1018   BP: 134/78   Pulse: 68   Temp: 96.4 °F (35.8 °C)       Current Outpatient Medications   Medication Sig Dispense Refill    magnesium oxide (MAG-OX) 400 MG tablet Take 1 tablet by mouth daily 90 tablet 1    omeprazole (PRILOSEC) 20 MG delayed release capsule Take 1 capsule by mouth daily 90 capsule 1    potassium chloride (KLOR-CON M) 10 MEQ extended release tablet Take 1 tablet by mouth daily 30 tablet 3    rosuvastatin (CRESTOR) 40 MG tablet Take 1 tablet by mouth daily 30 tablet 3    metoprolol tartrate (LOPRESSOR) 50 MG tablet take 1 tablet by mouth twice a day (Patient taking differently: 2 times daily )  COLONOSCOPY  4/14/15    divertics    CORONARY ANGIOPLASTY WITH STENT PLACEMENT  05/14/2014    X2     No family history on file. Social History     Tobacco Use    Smoking status: Former Smoker     Packs/day: 3.00     Years: 30.00     Pack years: 90.00     Types: Cigarettes     Last attempt to quit: 2006     Years since quittin.8    Smokeless tobacco: Never Used   Substance Use Topics    Alcohol use: No     Comment: caffeine 2-3 glasses of tea a day        Review of Systems - History obtained from the patient  General: negative for - fatigue, malaise, night sweats, weight gain  Psychological: negative for - anxiety, depression, sleep disturbances  Ophthalmic: negative for - blurry vision, loss of vision  ENT: negative for - headaches, vertigo, visual changes  Hematological and Lymphatic: negative for - bleeding problems, blood clots, bruising, fatigue or pallor  Endocrine: negative for - malaise/lethargy, palpitations, unexpected weight changes  Respiratory: negative for - cough, orthopnea, shortness of breath or tachypnea  Cardiovascular: negative for - chest pain, dyspnea on exertion, edema or palpitations  Gastrointestinal: no abdominal pain, change in bowel habits, or black or bloody stools  Genito-Urinary: no dysuria, trouble voiding, or hematuria  Musculoskeletal: negative for - gait disturbance, pain, swelling in lower legs   Neurological: negative for - confusion, dizziness, impaired coordination/balance or numbness/tingling    Objective:      Physical Exam:  /78   Pulse 68   Temp 96.4 °F (35.8 °C) (Temporal)   Wt 181 lb 6.4 oz (82.3 kg)   BMI 29.28 kg/m²   Wt Readings from Last 3 Encounters:   10/29/20 181 lb 6.4 oz (82.3 kg)   20 180 lb 9.6 oz (81.9 kg)   10/24/19 174 lb (78.9 kg)     Body mass index is 29.28 kg/m². GENERAL - Alert, oriented, pleasant, in no apparent distress.     Head unremarkable  Eyes - pupils equal and reactive to light - bilateral conjunctiva are pink: sclera are white   ENT - external ears intact, nose is intact:  tongue is midline pink and moist  Neck - Supple. No jugular venous distention noted. No carotid bruits appreciated. Cardiovascular - Normal S1 and S2:  murmur appreciated Yes, No gallop. Regular rate- Yes,  rhythm regular-Yes. Extremities - No cyanosis, clubbing,  edema to lower legs. Pulmonary - No respiratory distress. No wheezes or rales. Chest is clear  Pulses: Bilateral radial and pedal pulses normal  Abdomen -  Soft no tenderness, non distended   Musculoskeletal - Normal movement of all extremities   Neurologic - alert and oriented: There are no gross focal neurologic abnormalities. Skin-  No rash: No echymosis   Affect- normal mood and pleasant       DATA:  Lab Results   Component Value Date    CKTOTAL 147 04/04/2016    TROPONINI 0.363 05/14/2014     BNP:    Lab Results   Component Value Date    BNP 23 09/16/2012     PT/INR:  No results found for: PTINR  Lab Results   Component Value Date    LABA1C 6.3 05/07/2018    LABA1C 6.2 05/02/2017     Lab Results   Component Value Date    CHOL 154 04/05/2019    TRIG 111 04/05/2019    HDL 52 02/05/2020    LDLCALC 87 06/08/2017    LDLDIRECT 91 02/05/2020     Lab Results   Component Value Date    ALT 13 02/05/2020    AST 18 02/05/2020     TSH:  No results found for: TSH    Echo: 4/29/19  Patient in atrial fibrillation during exam   Left ventricular function is normal, EF is estimated at 55-60 %. Mildly dilated left atrium. Mildly enlarged right ventricle cavity. Trace to mild mitral regurgitation is present. No evidence of pericardial effusion. No evidence of pleural effusion. Cath: 4/29/19  POBA OF SVG TO RCA 99% TO 0%  OSTIAL SVG MILD DISESAE       The ASCVD Risk score (Daniel Alfaro, et al., 2013) failed to calculate for the following reasons:     The patient has a prior MI or stroke diagnosis    Assessment/ Plan:     HTN (hypertension)  Controlled, continue lopressor 50 mg BID, hydrodiuril 12.5 mg daily. PVD (peripheral vascular disease) ([address]  Patient is asymptomatic     Coronary artery disease involving coronary bypass graft of native heart without angina pectoris  CABG x 2 in 2000, cath in 2019 POBA of SVG to RCA. Primary and secondary prevention discussed with patient:   -Low sodium cardiac diet   -exercise 30 min a day three days a week      Hyperlipidemia, mixed  -At or near goal No    -We will increase Crestor to 40 mg repeat labs in 6 months prior to next visit    -Hepatic function panel WNL. No abdominal pain. No myalgias.     -The nature of cardiac risk has been fully discussed with this patient. I have made him aware of his LDL target goal given his cardiovascular risk analysis. I have discussed the appropriate diet. The need for lifelong compliance in order to reduce risk is stressed. A regular exercise program is recommended to help achieve and maintain normal body weight, fitness and improve lipid balance. A written copy of a low fat, low cholesterol diet has been given to the patient. Patient seen, interviewed and examined. Testing was reviewed. Patient is encouraged to exercise even a brisk walk for 30 minutes at least 3 to 4 times a week. Lifestyle and risk factor modificatons discussed. Various goals are discussed and questions answered. Continue current medications. Appropriate prescriptions are addressed. Questions answered and patient verbalizes understanding. Call for any problems, questions, or concerns. Pt is to follow up in 6  months for Cardiac management    Electronically signed by MIGUEL Farooq CNP on 10/29/2020 at 11:42 AM

## 2020-10-29 NOTE — ASSESSMENT & PLAN NOTE
-At or near goal No    -We will increase Crestor to 40 mg repeat labs in 6 months prior to next visit    -Hepatic function panel WNL. No abdominal pain. No myalgias.     -The nature of cardiac risk has been fully discussed with this patient. I have made him aware of his LDL target goal given his cardiovascular risk analysis. I have discussed the appropriate diet. The need for lifelong compliance in order to reduce risk is stressed. A regular exercise program is recommended to help achieve and maintain normal body weight, fitness and improve lipid balance. A written copy of a low fat, low cholesterol diet has been given to the patient.

## 2020-10-30 ENCOUNTER — HOSPITAL ENCOUNTER (OUTPATIENT)
Age: 80
Discharge: HOME | End: 2020-10-30
Payer: MEDICARE

## 2020-10-30 LAB
CHOLESTEROL: 153 MG/DL
HDLC SERPL-MCNC: 53 MG/DL
LDL CHOLESTEROL DIRECT: 80 MG/DL
TRIGL SERPL-MCNC: 209 MG/DL

## 2020-10-30 PROCEDURE — 36415 COLL VENOUS BLD VENIPUNCTURE: CPT

## 2020-10-30 PROCEDURE — 80061 LIPID PANEL: CPT

## 2020-10-30 PROCEDURE — 83721 ASSAY OF BLOOD LIPOPROTEIN: CPT

## 2020-12-14 ENCOUNTER — HOSPITAL ENCOUNTER (OUTPATIENT)
Age: 80
Discharge: HOME | End: 2020-12-14
Payer: MEDICARE

## 2020-12-14 PROCEDURE — 36415 COLL VENOUS BLD VENIPUNCTURE: CPT

## 2020-12-14 PROCEDURE — 84154 ASSAY OF PSA FREE: CPT

## 2020-12-14 PROCEDURE — 84153 ASSAY OF PSA TOTAL: CPT

## 2020-12-17 LAB
PROSTATE SPECIFIC ANTIGEN FREE: <0.1 NG/ML
PROSTATE SPECIFIC ANTIGEN PERCENT FREE: <100 %
PROSTATE SPECIFIC ANTIGEN: 0.1 NG/ML (ref 0–4)

## 2021-01-19 ENCOUNTER — TELEPHONE (OUTPATIENT)
Dept: CARDIOLOGY CLINIC | Age: 81
End: 2021-01-19

## 2021-01-19 NOTE — TELEPHONE ENCOUNTER
Patient called to find out what the name of his heart condition is, please call him back at ph# [phone].

## 2021-02-23 RX ORDER — ROSUVASTATIN CALCIUM 40 MG/1
40 TABLET, COATED ORAL DAILY
Qty: 60 TABLET | Refills: 5 | Status: SHIPPED | OUTPATIENT
Start: 2021-02-23

## 2021-03-01 RX ORDER — POTASSIUM CHLORIDE 750 MG/1
TABLET, EXTENDED RELEASE ORAL
Qty: 120 TABLET | OUTPATIENT
Start: 2021-03-01

## 2021-03-18 ENCOUNTER — HOSPITAL ENCOUNTER (OUTPATIENT)
Age: 81
Discharge: HOME | End: 2021-03-18
Payer: MEDICARE

## 2021-03-18 LAB
ALBUMIN SERPL-MCNC: 4.1 GM/DL (ref 3.4–5)
ALP BLD-CCNC: 121 IU/L (ref 40–128)
ALT SERPL-CCNC: 12 U/L (ref 10–40)
ANION GAP SERPL CALCULATED.3IONS-SCNC: 12 MMOL/L (ref 4–16)
AST SERPL-CCNC: 17 IU/L (ref 15–37)
BACTERIA: NEGATIVE /HPF
BASOPHILS ABSOLUTE: 0 K/CU MM
BASOPHILS RELATIVE PERCENT: 0.6 % (ref 0–1)
BILIRUB SERPL-MCNC: 0.4 MG/DL (ref 0–1)
BILIRUBIN URINE: NEGATIVE MG/DL
BLOOD, URINE: NEGATIVE
BUN BLDV-MCNC: 18 MG/DL (ref 6–23)
CALCIUM SERPL-MCNC: 9.1 MG/DL (ref 8.3–10.6)
CHLORIDE BLD-SCNC: 103 MMOL/L (ref 99–110)
CHOLESTEROL: 133 MG/DL
CLARITY: CLEAR
CO2: 27 MMOL/L (ref 21–32)
COLOR: YELLOW
CREAT SERPL-MCNC: 1 MG/DL (ref 0.9–1.3)
DIFFERENTIAL TYPE: ABNORMAL
EOSINOPHILS ABSOLUTE: 0.2 K/CU MM
EOSINOPHILS RELATIVE PERCENT: 3.4 % (ref 0–3)
FOLATE: 6.9 NG/ML (ref 3.1–17.5)
GFR AFRICAN AMERICAN: >60 ML/MIN/1.73M2
GFR NON-AFRICAN AMERICAN: >60 ML/MIN/1.73M2
GLUCOSE BLD-MCNC: 103 MG/DL (ref 70–99)
GLUCOSE, URINE: NEGATIVE MG/DL
HCT VFR BLD CALC: 45.1 % (ref 42–52)
HDLC SERPL-MCNC: 48 MG/DL
HEMOGLOBIN: 14.3 GM/DL (ref 13.5–18)
IMMATURE NEUTROPHIL %: 0.2 % (ref 0–0.43)
KETONES, URINE: NEGATIVE MG/DL
LDL CHOLESTEROL DIRECT: 65 MG/DL
LEUKOCYTE ESTERASE, URINE: NEGATIVE
LYMPHOCYTES ABSOLUTE: 1.6 K/CU MM
LYMPHOCYTES RELATIVE PERCENT: 29.2 % (ref 24–44)
MCH RBC QN AUTO: 29.4 PG (ref 27–31)
MCHC RBC AUTO-ENTMCNC: 31.7 % (ref 32–36)
MCV RBC AUTO: 92.8 FL (ref 78–100)
MONOCYTES ABSOLUTE: 0.4 K/CU MM
MONOCYTES RELATIVE PERCENT: 8.2 % (ref 0–4)
MUCUS: ABNORMAL HPF
NITRITE URINE, QUANTITATIVE: NEGATIVE
NUCLEATED RBC %: 0 %
PDW BLD-RTO: 14.2 % (ref 11.7–14.9)
PH, URINE: 6 (ref 5–8)
PLATELET # BLD: 133 K/CU MM (ref 140–440)
PMV BLD AUTO: 12.6 FL (ref 7.5–11.1)
POTASSIUM SERPL-SCNC: 3.7 MMOL/L (ref 3.5–5.1)
PROSTATE SPECIFIC ANTIGEN: 0.09 NG/ML (ref 0–4)
PROTEIN UA: NEGATIVE MG/DL
RBC # BLD: 4.86 M/CU MM (ref 4.6–6.2)
RBC URINE: <1 /HPF (ref 0–3)
SEGMENTED NEUTROPHILS ABSOLUTE COUNT: 3.1 K/CU MM
SEGMENTED NEUTROPHILS RELATIVE PERCENT: 58.4 % (ref 36–66)
SODIUM BLD-SCNC: 142 MMOL/L (ref 135–145)
SPECIFIC GRAVITY UA: 1.02 (ref 1–1.03)
SQUAMOUS EPITHELIAL: <1 /HPF
TOTAL IMMATURE NEUTOROPHIL: 0.01 K/CU MM
TOTAL NUCLEATED RBC: 0 K/CU MM
TOTAL PROTEIN: 6.5 GM/DL (ref 6.4–8.2)
TRICHOMONAS: ABNORMAL /HPF
TRIGL SERPL-MCNC: 141 MG/DL
TSH HIGH SENSITIVITY: 1.05 UIU/ML (ref 0.27–4.2)
UROBILINOGEN, URINE: NEGATIVE MG/DL (ref 0.2–1)
VITAMIN B-12: 305 PG/ML (ref 211–911)
WBC # BLD: 5.4 K/CU MM (ref 4–10.5)
WBC UA: 3 /HPF (ref 0–2)

## 2021-03-18 PROCEDURE — 84443 ASSAY THYROID STIM HORMONE: CPT

## 2021-03-18 PROCEDURE — 82746 ASSAY OF FOLIC ACID SERUM: CPT

## 2021-03-18 PROCEDURE — 85025 COMPLETE CBC W/AUTO DIFF WBC: CPT

## 2021-03-18 PROCEDURE — 80053 COMPREHEN METABOLIC PANEL: CPT

## 2021-03-18 PROCEDURE — 82607 VITAMIN B-12: CPT

## 2021-03-18 PROCEDURE — G0103 PSA SCREENING: HCPCS

## 2021-03-18 PROCEDURE — 83721 ASSAY OF BLOOD LIPOPROTEIN: CPT

## 2021-03-18 PROCEDURE — 36415 COLL VENOUS BLD VENIPUNCTURE: CPT

## 2021-03-18 PROCEDURE — 81001 URINALYSIS AUTO W/SCOPE: CPT

## 2021-03-18 PROCEDURE — 80061 LIPID PANEL: CPT

## 2021-04-29 ENCOUNTER — OFFICE VISIT (OUTPATIENT)
Dept: CARDIOLOGY CLINIC | Age: 81
End: 2021-04-29
Payer: MEDICARE

## 2021-04-29 VITALS
DIASTOLIC BLOOD PRESSURE: 70 MMHG | HEIGHT: 66 IN | HEART RATE: 51 BPM | SYSTOLIC BLOOD PRESSURE: 122 MMHG | WEIGHT: 181.2 LBS | BODY MASS INDEX: 29.12 KG/M2

## 2021-04-29 DIAGNOSIS — I49.3 PVC (PREMATURE VENTRICULAR CONTRACTION): ICD-10-CM

## 2021-04-29 DIAGNOSIS — I21.11 ST ELEVATION MYOCARDIAL INFARCTION INVOLVING RIGHT CORONARY ARTERY (HCC): ICD-10-CM

## 2021-04-29 DIAGNOSIS — I25.10 ASHD (ARTERIOSCLEROTIC HEART DISEASE): Chronic | ICD-10-CM

## 2021-04-29 DIAGNOSIS — I10 ESSENTIAL HYPERTENSION: Chronic | ICD-10-CM

## 2021-04-29 DIAGNOSIS — I73.9 PVD (PERIPHERAL VASCULAR DISEASE) (HCC): Chronic | ICD-10-CM

## 2021-04-29 DIAGNOSIS — Z95.1 S/P CABG X 2: Chronic | ICD-10-CM

## 2021-04-29 DIAGNOSIS — I25.810 CORONARY ARTERY DISEASE INVOLVING CORONARY BYPASS GRAFT OF NATIVE HEART WITHOUT ANGINA PECTORIS: Primary | ICD-10-CM

## 2021-04-29 DIAGNOSIS — E78.2 HYPERLIPIDEMIA, MIXED: Chronic | ICD-10-CM

## 2021-04-29 PROCEDURE — G8427 DOCREV CUR MEDS BY ELIG CLIN: HCPCS | Performed by: INTERNAL MEDICINE

## 2021-04-29 PROCEDURE — 1123F ACP DISCUSS/DSCN MKR DOCD: CPT | Performed by: INTERNAL MEDICINE

## 2021-04-29 PROCEDURE — 4040F PNEUMOC VAC/ADMIN/RCVD: CPT | Performed by: INTERNAL MEDICINE

## 2021-04-29 PROCEDURE — G8417 CALC BMI ABV UP PARAM F/U: HCPCS | Performed by: INTERNAL MEDICINE

## 2021-04-29 PROCEDURE — 99214 OFFICE O/P EST MOD 30 MIN: CPT | Performed by: INTERNAL MEDICINE

## 2021-04-29 PROCEDURE — 1036F TOBACCO NON-USER: CPT | Performed by: INTERNAL MEDICINE

## 2021-04-29 NOTE — LETTER
[address]  [address] [address]  Phone: [phone]  Fax: [phone]    Dary Rivera MD        April 29, 2021     Kenna Seaman, [address]    Patient: Jigar Quinn  MR Number: [id number]  YOB: 1940  Date of Visit: 4/29/2021    Dear Dr. Kenna Seaman:    Thank you for the request for consultation for Stephanie Baron to me for the evaluation of CAD. Below are the relevant portions of my assessment and plan of care. If you have questions, please do not hesitate to call me. I look forward to following Tracy Tyler along with you.     Sincerely,        Dary Rivera MD

## 2021-04-29 NOTE — PATIENT INSTRUCTIONS
CAD:Yes   clinically stable. Patient is on optimal medical regimen ( see medication list above )  -   Patient is currently  asymptomatic from CAD. - changes in  treatment:   no           - Testing ordered:  no  [facility] classification: 1  [place] RISK SCORE:  DANNY RISK SCORE:  HTN:well controlled on current medical regimen, see list above. - changes in  treatment:   no   CARDIOMYOPATHY: None known   CONGESTIVE HEART FAILURE: NO KNOWN HISTORY.     VHD: No significant VHD noted  DYSLIPIDEMIA: Patient's profile is at / near [employer],                                                           Tolerating current medical regimen wellyes,                                                             See most recent Lab values in Labs section above. PAD:  known. claudication symptoms. Nanette Plasencia no               Up to date on non invasive testing .yes,                Patient on optimum medical regimen . yes,   OTHER RELEVANT DIAGNOSIS:as noted in patient's active problem list:  TESTS ORDERED: None this visit                                              All previously ordered tests reviewed. ARRHYTHMIAS: Excessive PVCs resolved since patient has been on magnesium   MEDICATIONS: CPM.    Office f/u in six months.

## 2021-04-29 NOTE — LETTER
Malinda Hicks  [birth year]  [id number]    Have you had any Chest Pain that is not new? - No          Have you had any Shortness of Breath - No      Have you had any dizziness - No       Have you had any palpitations that are not new? - No      Is the patient on any of the following medications -   If Yes DO EKG - Needs done every 6 months    Do you have any edema - swelling in No        When did you have your last labs drawn   Where did you have them done   What doctor ordered     If we do not have these labs you are retrieve these labs for these providers!     Do you have a surgery or procedure scheduled in the near future - No       Ask patient if they want to sign up for MyChart if they are not already signed up     Check to see if we have an E-MAIL on file for the patient     Check medication list thoroughly!!! AND RECONCILE OUTSIDE MEDICATIONS  If dose has changed change the entire order not just the MG  BE SURE TO ASK PATIENT IF THEY NEED MEDICATION REFILLS     At check out add to every patient's \"wrap up\" the following dot phrase AFTERHOURSEDUCATION and ensure we explain this to our patients

## 2021-04-29 NOTE — PROGRESS NOTES
Gabby Dumont is a 79 y.o. male who has    CHIEF COMPLAINT AS FOLLOWS:   CHEST PAIN: Patient denies any C/O chest pains at this time. SOB: No C/O SOB at this time. LEG EDEMA: minimal leg edema   PALPITATIONS: Denies any C/O Palpitations                                  DIZZINESS: No C/O Dizziness                        SYNCOPE: None   OTHER:                                     HPI: Patient is here for F/U on his CAD, PVCs- Arrhythmia, HTN & Dyslipidemia problems. CAD: Patient has known Hx of  CAD. Had CABG in the past.  Arrhythmia: Patient has known Hx of Supraventricular / Ventricular arrhythmia in the past.  HTN: Patient has known Hx of essential HTN. Has been treated with guideline recommended medical / physical/ diet therapy as stated below. Dyslipidemia: Patient has known Hx of mixed dyslipidemia. Has been treated with guideline recommended medical / physical/ diet therapy as stated below. He does not have any new complaints at this time. Current Outpatient Medications   Medication Sig Dispense Refill    rosuvastatin (CRESTOR) 40 MG tablet Take 1 tablet by mouth daily 60 tablet 5    magnesium oxide (MAG-OX) 400 MG tablet Take 1 tablet by mouth daily 90 tablet 1    omeprazole (PRILOSEC) 20 MG delayed release capsule Take 1 capsule by mouth daily 90 capsule 1    potassium chloride (KLOR-CON M) 10 MEQ extended release tablet Take 1 tablet by mouth daily 30 tablet 3    metoprolol tartrate (LOPRESSOR) 50 MG tablet take 1 tablet by mouth twice a day (Patient taking differently: 2 times daily ) 60 tablet 3    nitroGLYCERIN (NITROSTAT) 0.4 MG SL tablet Place 0.4 mg under the tongue every 5 minutes as needed for Chest pain up to max of 3 total doses. If no relief after 1 dose, call 911.       hydrochlorothiazide (HYDRODIURIL) 12.5 MG tablet Take 12.5 mg by mouth daily      aspirin 81 MG tablet Take 1 tablet by mouth daily 30 tablet 11    Compression Stockings MISC by Does not apply route Thigh high, 20 to 30 mm of Hg 1 each 3    nystatin (MYCOSTATIN) 743963 UNIT/ML suspension Take 5 mLs by mouth 4 times daily 1 Bottle 1     No current facility-administered medications for this visit. Allergies: Patient has no known allergies. Review of Systems:    Constitutional: Negative for diaphoresis and fatigue  Respiratory: Negative for shortness of breath  Cardiovascular: Negative for chest pain, dyspnea on exertion, claudication, edema, irregular heartbeat, murmur, palpitations or shortness of breath  Musculoskeletal: Negative for muscle pain, muscular weakness, negative for pain in arm and leg or swelling in foot and leg    Objective:  /70   Pulse 51   Ht 5' 6\" (1.676 m)   Wt 181 lb 3.2 oz (82.2 kg)   BMI 29.25 kg/m²   Wt Readings from Last 3 Encounters:   04/29/21 181 lb 3.2 oz (82.2 kg)   10/29/20 181 lb 6.4 oz (82.3 kg)   02/05/20 180 lb 9.6 oz (81.9 kg)     Body mass index is 29.25 kg/m². GENERAL - Alert, oriented, pleasant, in no apparent distress. EYES: No jaundice, no conjunctival pallor. Neck - Supple. No jugular venous distention noted. No carotid bruits. Cardiovascular - Normal S1 and S2 without obvious murmur or gallop. Extremities - No cyanosis, clubbing, or significant edema. Pulmonary - No respiratory distress. No wheezes or rales.       Lab Review   Lab Results   Component Value Date    TROPONINT <0.010 07/15/2019    TROPONINT <0.010 07/15/2019     Lab Results   Component Value Date    BNP 23 09/16/2012    PROBNP 751.1 07/15/2019    PROBNP 957.7 04/20/2019     Lab Results   Component Value Date    INR 1.02 10/22/2019    INR 1.00 10/02/2017     Lab Results   Component Value Date    LABA1C 6.3 05/07/2018    LABA1C 6.2 05/02/2017     Lab Results   Component Value Date    WBC 5.4 03/18/2021    WBC 7.3 10/22/2019    HCT 45.1 03/18/2021    HCT 50.3 10/22/2019    MCV 92.8 03/18/2021    MCV 93.3 10/22/2019     (L) 03/18/2021  (L) 10/22/2019     Lab Results   Component Value Date    CHOL 133 03/18/2021    CHOL 153 10/30/2020    TRIG 141 03/18/2021    TRIG 209 (H) 10/30/2020    HDL 48 03/18/2021    HDL 53 10/30/2020    LDLCALC 87 06/08/2017    LDLCALC 72 06/09/2014    LDLDIRECT 65 03/18/2021    LDLDIRECT 80 10/30/2020     Lab Results   Component Value Date    ALT 12 03/18/2021    ALT 13 02/05/2020    AST 17 03/18/2021    AST 18 02/05/2020     BMP:    Lab Results   Component Value Date     03/18/2021     10/22/2019    K 3.7 03/18/2021    K 3.8 10/22/2019     03/18/2021    CL 98 10/22/2019    CO2 27 03/18/2021    CO2 29 10/22/2019    BUN 18 03/18/2021    BUN 17 10/22/2019    CREATININE 1.0 03/18/2021    CREATININE 1.1 10/22/2019     CMP:   Lab Results   Component Value Date     03/18/2021     10/22/2019    K 3.7 03/18/2021    K 3.8 10/22/2019     03/18/2021    CL 98 10/22/2019    CO2 27 03/18/2021    CO2 29 10/22/2019    BUN 18 03/18/2021    BUN 17 10/22/2019    CREATININE 1.0 03/18/2021    CREATININE 1.1 10/22/2019    PROT 6.5 03/18/2021    PROT 7.0 02/05/2020    PROT 6.6 09/16/2012    PROT 6.8 05/15/2012     Lab Results   Component Value Date    TSHHS 1.050 03/18/2021    TSHHS 1.010 07/15/2019     ECHO 4/2019   Patient in atrial fibrillation during exam   Left ventricular function is normal, EF is estimated at 55-60 %. Mildly dilated left atrium. Mildly enlarged right ventricle cavity. Trace to mild mitral regurgitation is present. No evidence of pericardial effusion. No evidence of pleural effusion. CATH 4/2019   POBA OF SVG TO RCA 99% TO 0%   OSTIAL SVG MILD DISESAE    QUALITY MEASURES REVIEWED:  1.CAD:Patient is taking anti platelet agent:Yes  2. DYSLIPIDEMIA: Patient is on cholesterol lowering medication:Yes   3. Beta-Blocker therapy for CAD, if prior Myocardial Infarction:Yes   4. Counselled regarding smoking cessation. No   Patient does not Smoke.   5.Anticoagulation therapy (for A.Fib) No   Does Not have A.Fib.  6.Discussed weight management strategies. Assessment & Plan:    Primary / Secondary prevention is the goal by aggressive risk modification, healthy and therapeutic life style changes for cardiovascular risk reduction. Various goals are discussed and multiple questions answered.     CAD:Yes   clinically stable. Patient is on optimal medical regimen ( see medication list above )  -   Patient is currently  asymptomatic from CAD. - changes in  treatment:   no           - Testing ordered:  no  [facility] classification: 1  [place] RISK SCORE:  DANNY RISK SCORE:  HTN:well controlled on current medical regimen, see list above. - changes in  treatment:   no   CARDIOMYOPATHY: None known   CONGESTIVE HEART FAILURE: NO KNOWN HISTORY.     VHD: No significant VHD noted  DYSLIPIDEMIA: Patient's profile is at / near [employer],                                                           Tolerating current medical regimen wellyes,                                                             See most recent Lab values in Labs section above. PAD:  known. claudication symptoms. Sueeen Payment no               Up to date on non invasive testing .yes,                Patient on optimum medical regimen . yes,   OTHER RELEVANT DIAGNOSIS:as noted in patient's active problem list:  TESTS ORDERED: None this visit                                              All previously ordered tests reviewed. ARRHYTHMIAS: Excessive PVCs resolved since patient has been on magnesium   MEDICATIONS: CPM.    Office f/u in six months.

## 2021-05-01 NOTE — ASSESSMENT & PLAN NOTE
CABG x 2 in 2000 per Dr. Natasha Barnes    Primary and secondary prevention discussed with patient:   -Low sodium cardiac diet   -exercise 30 min a day three days a week    Continue current medications (ASA, Lopressor, Crestor) Covid positive patient sitting in her chair on a zoom call with family. Patient was in the chair with the chair alarm on when a family member called my phone to discuss her eating. I heard what sounded like the ipad fall over, so I immediately hung up with the son-in-law, put on my PPE and went into the room to find the patient laying supine on the floor. There was no apparent injury and patient was still around her baseline for communication. Oxygen was replaced. Vitals were assessed and stable and patient was helped back to bed. Patient was an active participant in standing and going to bed. No sensitivity to touch on her head or body. Physician ordered a CT scan wo contrast for the brain which resulted negative. Physician also ordered an ABG and results suggested we place patient on high flow nasal cannula.

## 2021-06-09 ENCOUNTER — HOSPITAL ENCOUNTER (OUTPATIENT)
Age: 81
Discharge: HOME | End: 2021-06-09
Payer: MEDICARE

## 2021-06-09 LAB — PROSTATE SPECIFIC ANTIGEN: 0.1 NG/ML (ref 0–4)

## 2021-06-09 PROCEDURE — G0103 PSA SCREENING: HCPCS

## 2021-06-09 PROCEDURE — 36415 COLL VENOUS BLD VENIPUNCTURE: CPT

## 2021-06-20 NOTE — PATIENT INSTRUCTIONS
CAD:Yes   clinically stable. Patient is on optimal medical regimen ( see medication list above )  -     CORONARY ARTERY DISEASE: Patient is currently  asymptomatic from CAD. - changes in  treatment:   no           - Testing ordered:  no  [facility] classification: 1  [place] RISK SCORE:  DANNY RISK SCORE:  HTN:well controlled on current medical regimen, see list above. - changes in  treatment:   no   CARDIOMYOPATHY: None known   CONGESTIVE HEART FAILURE: NO KNOWN HISTORY.   VHD: No significant VHD noted  DYSLIPIDEMIA: Patient's profile is at / near [place].no, triglycerides are high. Tolerating current medical regimen wellyes,                                                              See most recent Lab values in Labs section above. PAD:  known. claudication symptoms. .no  OTHER RELEVANT DIAGNOSIS:as noted in patient's active problem list:  TESTS ORDERED:  Echocardiogram.                                      All previously ordered tests reviewed. ARRHYTHMIAS: None known   MEDICATIONS: CPM   Office f/u in six months. Primary/secondary prevention is the goal by aggressive risk modification, healthy and therapeutic life style changes for cardiovascular risk reduction. Various goals are discussed and multiple questions answered. Unable to Assess

## 2021-06-25 ENCOUNTER — HOSPITAL ENCOUNTER (OUTPATIENT)
Dept: GENERAL RADIOLOGY | Age: 81
Discharge: HOME | End: 2021-06-25
Payer: MEDICARE

## 2021-06-25 ENCOUNTER — HOSPITAL ENCOUNTER (OUTPATIENT)
Age: 81
Discharge: HOME | End: 2021-06-25
Payer: MEDICARE

## 2021-06-25 DIAGNOSIS — M79.671 RIGHT FOOT PAIN: ICD-10-CM

## 2021-06-25 LAB
ALBUMIN SERPL-MCNC: 4.4 GM/DL (ref 3.4–5)
ALP BLD-CCNC: 128 IU/L (ref 40–128)
ALT SERPL-CCNC: 11 U/L (ref 10–40)
ANION GAP SERPL CALCULATED.3IONS-SCNC: 11 MMOL/L (ref 4–16)
AST SERPL-CCNC: 16 IU/L (ref 15–37)
BILIRUB SERPL-MCNC: 0.5 MG/DL (ref 0–1)
BUN BLDV-MCNC: 13 MG/DL (ref 6–23)
CALCIUM SERPL-MCNC: 9.7 MG/DL (ref 8.3–10.6)
CHLORIDE BLD-SCNC: 102 MMOL/L (ref 99–110)
CHOLESTEROL: 134 MG/DL
CO2: 27 MMOL/L (ref 21–32)
CREAT SERPL-MCNC: 0.8 MG/DL (ref 0.9–1.3)
GFR AFRICAN AMERICAN: >60 ML/MIN/1.73M2
GFR NON-AFRICAN AMERICAN: >60 ML/MIN/1.73M2
GLUCOSE BLD-MCNC: 89 MG/DL (ref 70–99)
HDLC SERPL-MCNC: 51 MG/DL
LDL CHOLESTEROL DIRECT: 63 MG/DL
POTASSIUM SERPL-SCNC: 3.7 MMOL/L (ref 3.5–5.1)
SODIUM BLD-SCNC: 140 MMOL/L (ref 135–145)
TOTAL PROTEIN: 6.9 GM/DL (ref 6.4–8.2)
TRIGL SERPL-MCNC: 289 MG/DL
URIC ACID: 3.3 MG/DL (ref 3.5–7.2)

## 2021-06-25 PROCEDURE — 36415 COLL VENOUS BLD VENIPUNCTURE: CPT

## 2021-06-25 PROCEDURE — 83721 ASSAY OF BLOOD LIPOPROTEIN: CPT

## 2021-06-25 PROCEDURE — 80053 COMPREHEN METABOLIC PANEL: CPT

## 2021-06-25 PROCEDURE — 84550 ASSAY OF BLOOD/URIC ACID: CPT

## 2021-06-25 PROCEDURE — 73630 X-RAY EXAM OF FOOT: CPT

## 2021-06-25 PROCEDURE — 80061 LIPID PANEL: CPT

## 2021-07-20 ENCOUNTER — HOSPITAL ENCOUNTER (OUTPATIENT)
Age: 81
Discharge: HOME | End: 2021-07-20
Payer: MEDICARE

## 2021-07-20 LAB
ALBUMIN SERPL-MCNC: 4.4 GM/DL (ref 3.4–5)
ALP BLD-CCNC: 133 IU/L (ref 40–128)
ALT SERPL-CCNC: 13 U/L (ref 10–40)
ANION GAP SERPL CALCULATED.3IONS-SCNC: 9 MMOL/L (ref 4–16)
AST SERPL-CCNC: 16 IU/L (ref 15–37)
BILIRUB SERPL-MCNC: 0.5 MG/DL (ref 0–1)
BUN BLDV-MCNC: 12 MG/DL (ref 6–23)
CALCIUM SERPL-MCNC: 9.5 MG/DL (ref 8.3–10.6)
CHLORIDE BLD-SCNC: 101 MMOL/L (ref 99–110)
CHOLESTEROL: 138 MG/DL
CO2: 29 MMOL/L (ref 21–32)
CREAT SERPL-MCNC: 0.9 MG/DL (ref 0.9–1.3)
GFR AFRICAN AMERICAN: >60 ML/MIN/1.73M2
GFR NON-AFRICAN AMERICAN: >60 ML/MIN/1.73M2
GLUCOSE BLD-MCNC: 94 MG/DL (ref 70–99)
HDLC SERPL-MCNC: 47 MG/DL
LDL CHOLESTEROL DIRECT: 66 MG/DL
POTASSIUM SERPL-SCNC: 3.6 MMOL/L (ref 3.5–5.1)
SODIUM BLD-SCNC: 139 MMOL/L (ref 135–145)
TOTAL PROTEIN: 6.8 GM/DL (ref 6.4–8.2)
TRIGL SERPL-MCNC: 167 MG/DL

## 2021-07-20 PROCEDURE — 80053 COMPREHEN METABOLIC PANEL: CPT

## 2021-07-20 PROCEDURE — 80061 LIPID PANEL: CPT

## 2021-07-20 PROCEDURE — 36415 COLL VENOUS BLD VENIPUNCTURE: CPT

## 2021-07-20 PROCEDURE — 83721 ASSAY OF BLOOD LIPOPROTEIN: CPT

## 2021-08-02 ENCOUNTER — HOSPITAL ENCOUNTER (OUTPATIENT)
Age: 81
Discharge: HOME | End: 2021-08-02
Payer: MEDICARE

## 2021-08-02 LAB
ALBUMIN SERPL-MCNC: 4.3 GM/DL (ref 3.4–5)
ALP BLD-CCNC: 136 IU/L (ref 40–128)
ALT SERPL-CCNC: 13 U/L (ref 10–40)
ANION GAP SERPL CALCULATED.3IONS-SCNC: 10 MMOL/L (ref 4–16)
AST SERPL-CCNC: 16 IU/L (ref 15–37)
BILIRUB SERPL-MCNC: 0.5 MG/DL (ref 0–1)
BUN BLDV-MCNC: 14 MG/DL (ref 6–23)
CALCIUM SERPL-MCNC: 9.5 MG/DL (ref 8.3–10.6)
CHLORIDE BLD-SCNC: 104 MMOL/L (ref 99–110)
CHOLESTEROL: 138 MG/DL
CO2: 28 MMOL/L (ref 21–32)
CREAT SERPL-MCNC: 0.9 MG/DL (ref 0.9–1.3)
GFR AFRICAN AMERICAN: >60 ML/MIN/1.73M2
GFR NON-AFRICAN AMERICAN: >60 ML/MIN/1.73M2
GLUCOSE BLD-MCNC: 95 MG/DL (ref 70–99)
HDLC SERPL-MCNC: 48 MG/DL
LDL CHOLESTEROL DIRECT: 64 MG/DL
POTASSIUM SERPL-SCNC: 4.1 MMOL/L (ref 3.5–5.1)
SODIUM BLD-SCNC: 142 MMOL/L (ref 135–145)
TOTAL PROTEIN: 6.8 GM/DL (ref 6.4–8.2)
TRIGL SERPL-MCNC: 196 MG/DL

## 2021-08-02 PROCEDURE — 36415 COLL VENOUS BLD VENIPUNCTURE: CPT

## 2021-08-02 PROCEDURE — 80053 COMPREHEN METABOLIC PANEL: CPT

## 2021-08-02 PROCEDURE — 83721 ASSAY OF BLOOD LIPOPROTEIN: CPT

## 2021-08-02 PROCEDURE — 80061 LIPID PANEL: CPT

## 2021-09-30 ENCOUNTER — APPOINTMENT (OUTPATIENT)
Dept: GENERAL RADIOLOGY | Age: 81
End: 2021-09-30
Payer: MEDICARE

## 2021-09-30 ENCOUNTER — APPOINTMENT (OUTPATIENT)
Dept: CT IMAGING | Age: 81
End: 2021-09-30
Payer: MEDICARE

## 2021-09-30 ENCOUNTER — HOSPITAL ENCOUNTER (OUTPATIENT)
Age: 81
Setting detail: OBSERVATION
Discharge: HOME | End: 2021-10-01
Attending: INTERNAL MEDICINE | Admitting: INTERNAL MEDICINE
Payer: MEDICARE

## 2021-09-30 ENCOUNTER — APPOINTMENT (OUTPATIENT)
Dept: MRI IMAGING | Age: 81
End: 2021-09-30
Payer: MEDICARE

## 2021-09-30 DIAGNOSIS — R42 DIZZINESS: Primary | ICD-10-CM

## 2021-09-30 DIAGNOSIS — I65.02 STENOSIS OF LEFT VERTEBRAL ARTERY: ICD-10-CM

## 2021-09-30 DIAGNOSIS — I72.0 PSEUDOANEURYSM OF CAROTID ARTERY (HCC): ICD-10-CM

## 2021-09-30 DIAGNOSIS — I49.3 PVC (PREMATURE VENTRICULAR CONTRACTION): ICD-10-CM

## 2021-09-30 DIAGNOSIS — E87.6 HYPOKALEMIA: ICD-10-CM

## 2021-09-30 DIAGNOSIS — I10 ESSENTIAL HYPERTENSION: ICD-10-CM

## 2021-09-30 LAB
ALBUMIN SERPL-MCNC: 3.9 GM/DL (ref 3.4–5)
ALP BLD-CCNC: 139 IU/L (ref 40–128)
ALT SERPL-CCNC: 16 U/L (ref 10–40)
ANION GAP SERPL CALCULATED.3IONS-SCNC: 13 MMOL/L (ref 4–16)
APTT: 30.7 SECONDS (ref 25.1–37.1)
AST SERPL-CCNC: 19 IU/L (ref 15–37)
BACTERIA: NEGATIVE /HPF
BASOPHILS ABSOLUTE: 0 K/CU MM
BASOPHILS RELATIVE PERCENT: 0.5 % (ref 0–1)
BILIRUB SERPL-MCNC: 0.3 MG/DL (ref 0–1)
BILIRUBIN URINE: NEGATIVE MG/DL
BLOOD, URINE: NEGATIVE
BUN BLDV-MCNC: 18 MG/DL (ref 6–23)
CALCIUM SERPL-MCNC: 9.3 MG/DL (ref 8.3–10.6)
CHLORIDE BLD-SCNC: 102 MMOL/L (ref 99–110)
CLARITY: CLEAR
CO2: 25 MMOL/L (ref 21–32)
COLOR: ABNORMAL
CREAT SERPL-MCNC: 1 MG/DL (ref 0.9–1.3)
DIFFERENTIAL TYPE: ABNORMAL
EKG ATRIAL RATE: 63 BPM
EKG DIAGNOSIS: NORMAL
EKG P AXIS: 8 DEGREES
EKG P-R INTERVAL: 152 MS
EKG Q-T INTERVAL: 470 MS
EKG QRS DURATION: 152 MS
EKG QTC CALCULATION (BAZETT): 480 MS
EKG R AXIS: 22 DEGREES
EKG T AXIS: 48 DEGREES
EKG VENTRICULAR RATE: 63 BPM
EOSINOPHILS ABSOLUTE: 0.2 K/CU MM
EOSINOPHILS RELATIVE PERCENT: 2.6 % (ref 0–3)
GFR AFRICAN AMERICAN: >60 ML/MIN/1.73M2
GFR NON-AFRICAN AMERICAN: >60 ML/MIN/1.73M2
GLUCOSE BLD-MCNC: 112 MG/DL (ref 70–99)
GLUCOSE, URINE: NEGATIVE MG/DL
HCT VFR BLD CALC: 44.2 % (ref 42–52)
HEMOGLOBIN: 13.9 GM/DL (ref 13.5–18)
IMMATURE NEUTROPHIL %: 0.3 % (ref 0–0.43)
INR BLD: 0.88 INDEX
KETONES, URINE: NEGATIVE MG/DL
LEUKOCYTE ESTERASE, URINE: ABNORMAL
LV EF: 53 %
LVEF MODALITY: NORMAL
LYMPHOCYTES ABSOLUTE: 1.2 K/CU MM
LYMPHOCYTES RELATIVE PERCENT: 18.9 % (ref 24–44)
MAGNESIUM: 2.1 MG/DL (ref 1.8–2.4)
MCH RBC QN AUTO: 29.1 PG (ref 27–31)
MCHC RBC AUTO-ENTMCNC: 31.4 % (ref 32–36)
MCV RBC AUTO: 92.5 FL (ref 78–100)
MONOCYTES ABSOLUTE: 0.5 K/CU MM
MONOCYTES RELATIVE PERCENT: 7.3 % (ref 0–4)
NITRITE URINE, QUANTITATIVE: NEGATIVE
NUCLEATED RBC %: 0 %
PDW BLD-RTO: 13.4 % (ref 11.7–14.9)
PH, URINE: 7 (ref 5–8)
PLATELET # BLD: 129 K/CU MM (ref 140–440)
PMV BLD AUTO: 13.9 FL (ref 7.5–11.1)
POTASSIUM SERPL-SCNC: 3.2 MMOL/L (ref 3.5–5.1)
PRO-BNP: 97.56 PG/ML
PROTEIN UA: NEGATIVE MG/DL
PROTHROMBIN TIME: 11.3 SECONDS (ref 11.7–14.5)
RBC # BLD: 4.78 M/CU MM (ref 4.6–6.2)
RBC URINE: <1 /HPF (ref 0–3)
SEGMENTED NEUTROPHILS ABSOLUTE COUNT: 4.6 K/CU MM
SEGMENTED NEUTROPHILS RELATIVE PERCENT: 70.4 % (ref 36–66)
SODIUM BLD-SCNC: 140 MMOL/L (ref 135–145)
SPECIFIC GRAVITY UA: 1.05 (ref 1–1.03)
TOTAL IMMATURE NEUTOROPHIL: 0.02 K/CU MM
TOTAL NUCLEATED RBC: 0 K/CU MM
TOTAL PROTEIN: 6.8 GM/DL (ref 6.4–8.2)
TRICHOMONAS: ABNORMAL /HPF
TROPONIN T: <0.01 NG/ML
TROPONIN T: <0.01 NG/ML
UROBILINOGEN, URINE: NEGATIVE MG/DL (ref 0.2–1)
WBC # BLD: 6.5 K/CU MM (ref 4–10.5)
WBC UA: 4 /HPF (ref 0–2)

## 2021-09-30 PROCEDURE — G0378 HOSPITAL OBSERVATION PER HR: HCPCS

## 2021-09-30 PROCEDURE — 6360000002 HC RX W HCPCS: Performed by: INTERNAL MEDICINE

## 2021-09-30 PROCEDURE — 6370000000 HC RX 637 (ALT 250 FOR IP): Performed by: INTERNAL MEDICINE

## 2021-09-30 PROCEDURE — 2580000003 HC RX 258: Performed by: INTERNAL MEDICINE

## 2021-09-30 PROCEDURE — 93306 TTE W/DOPPLER COMPLETE: CPT

## 2021-09-30 PROCEDURE — 70551 MRI BRAIN STEM W/O DYE: CPT

## 2021-09-30 PROCEDURE — 93010 ELECTROCARDIOGRAM REPORT: CPT | Performed by: INTERNAL MEDICINE

## 2021-09-30 PROCEDURE — 83735 ASSAY OF MAGNESIUM: CPT

## 2021-09-30 PROCEDURE — 70450 CT HEAD/BRAIN W/O DYE: CPT

## 2021-09-30 PROCEDURE — 85610 PROTHROMBIN TIME: CPT

## 2021-09-30 PROCEDURE — 93005 ELECTROCARDIOGRAM TRACING: CPT | Performed by: PHYSICIAN ASSISTANT

## 2021-09-30 PROCEDURE — 6360000004 HC RX CONTRAST MEDICATION: Performed by: PHYSICIAN ASSISTANT

## 2021-09-30 PROCEDURE — 83880 ASSAY OF NATRIURETIC PEPTIDE: CPT

## 2021-09-30 PROCEDURE — 71045 X-RAY EXAM CHEST 1 VIEW: CPT

## 2021-09-30 PROCEDURE — 6370000000 HC RX 637 (ALT 250 FOR IP): Performed by: PHYSICIAN ASSISTANT

## 2021-09-30 PROCEDURE — 92610 EVALUATE SWALLOWING FUNCTION: CPT

## 2021-09-30 PROCEDURE — 96372 THER/PROPH/DIAG INJ SC/IM: CPT

## 2021-09-30 PROCEDURE — 99205 OFFICE O/P NEW HI 60 MIN: CPT | Performed by: PSYCHIATRY & NEUROLOGY

## 2021-09-30 PROCEDURE — 84484 ASSAY OF TROPONIN QUANT: CPT

## 2021-09-30 PROCEDURE — 99285 EMERGENCY DEPT VISIT HI MDM: CPT

## 2021-09-30 PROCEDURE — 81001 URINALYSIS AUTO W/SCOPE: CPT

## 2021-09-30 PROCEDURE — 80053 COMPREHEN METABOLIC PANEL: CPT

## 2021-09-30 PROCEDURE — 85025 COMPLETE CBC W/AUTO DIFF WBC: CPT

## 2021-09-30 PROCEDURE — 70496 CT ANGIOGRAPHY HEAD: CPT

## 2021-09-30 PROCEDURE — 85730 THROMBOPLASTIN TIME PARTIAL: CPT

## 2021-09-30 RX ORDER — SODIUM CHLORIDE 0.9 % (FLUSH) 0.9 %
5-40 SYRINGE (ML) INJECTION EVERY 12 HOURS SCHEDULED
Status: DISCONTINUED | OUTPATIENT
Start: 2021-09-30 | End: 2021-10-01 | Stop reason: HOSPADM

## 2021-09-30 RX ORDER — CLOPIDOGREL BISULFATE 75 MG/1
75 TABLET ORAL DAILY
Status: DISCONTINUED | OUTPATIENT
Start: 2021-09-30 | End: 2021-10-01 | Stop reason: HOSPADM

## 2021-09-30 RX ORDER — SODIUM CHLORIDE 0.9 % (FLUSH) 0.9 %
5-40 SYRINGE (ML) INJECTION PRN
Status: DISCONTINUED | OUTPATIENT
Start: 2021-09-30 | End: 2021-10-01 | Stop reason: HOSPADM

## 2021-09-30 RX ORDER — ONDANSETRON 2 MG/ML
4 INJECTION INTRAMUSCULAR; INTRAVENOUS EVERY 6 HOURS PRN
Status: DISCONTINUED | OUTPATIENT
Start: 2021-09-30 | End: 2021-10-01 | Stop reason: HOSPADM

## 2021-09-30 RX ORDER — POLYETHYLENE GLYCOL 3350 17 G/17G
17 POWDER, FOR SOLUTION ORAL DAILY PRN
Status: DISCONTINUED | OUTPATIENT
Start: 2021-09-30 | End: 2021-10-01 | Stop reason: HOSPADM

## 2021-09-30 RX ORDER — OMEPRAZOLE 20 MG/1
20 CAPSULE, DELAYED RELEASE ORAL DAILY
Status: CANCELLED | OUTPATIENT
Start: 2021-09-30

## 2021-09-30 RX ORDER — ATORVASTATIN CALCIUM 20 MG/1
80 TABLET, FILM COATED ORAL NIGHTLY
Status: DISCONTINUED | OUTPATIENT
Start: 2021-09-30 | End: 2021-10-01 | Stop reason: HOSPADM

## 2021-09-30 RX ORDER — NITROGLYCERIN 0.4 MG/1
0.4 TABLET SUBLINGUAL EVERY 5 MIN PRN
Status: CANCELLED | OUTPATIENT
Start: 2021-09-30

## 2021-09-30 RX ORDER — POTASSIUM CHLORIDE 750 MG/1
40 TABLET, FILM COATED, EXTENDED RELEASE ORAL ONCE
Status: COMPLETED | OUTPATIENT
Start: 2021-09-30 | End: 2021-09-30

## 2021-09-30 RX ORDER — ASPIRIN 81 MG/1
81 TABLET ORAL DAILY
Status: DISCONTINUED | OUTPATIENT
Start: 2021-09-30 | End: 2021-10-01 | Stop reason: HOSPADM

## 2021-09-30 RX ORDER — MAGNESIUM OXIDE 400 MG/1
400 TABLET ORAL DAILY
Status: CANCELLED | OUTPATIENT
Start: 2021-09-30

## 2021-09-30 RX ORDER — ASPIRIN 300 MG/1
300 SUPPOSITORY RECTAL DAILY
Status: DISCONTINUED | OUTPATIENT
Start: 2021-09-30 | End: 2021-10-01 | Stop reason: HOSPADM

## 2021-09-30 RX ORDER — ONDANSETRON 4 MG/1
4 TABLET, ORALLY DISINTEGRATING ORAL EVERY 8 HOURS PRN
Status: DISCONTINUED | OUTPATIENT
Start: 2021-09-30 | End: 2021-10-01 | Stop reason: HOSPADM

## 2021-09-30 RX ORDER — POTASSIUM CHLORIDE 20 MEQ/1
10 TABLET, EXTENDED RELEASE ORAL DAILY
Status: CANCELLED | OUTPATIENT
Start: 2021-09-30

## 2021-09-30 RX ORDER — ONDANSETRON 2 MG/ML
4 INJECTION INTRAMUSCULAR; INTRAVENOUS ONCE
Status: DISCONTINUED | OUTPATIENT
Start: 2021-09-30 | End: 2021-10-01 | Stop reason: HOSPADM

## 2021-09-30 RX ORDER — SODIUM CHLORIDE 9 MG/ML
25 INJECTION, SOLUTION INTRAVENOUS PRN
Status: DISCONTINUED | OUTPATIENT
Start: 2021-09-30 | End: 2021-10-01 | Stop reason: HOSPADM

## 2021-09-30 RX ADMIN — IOPAMIDOL 75 ML: 755 INJECTION, SOLUTION INTRAVENOUS at 05:37

## 2021-09-30 RX ADMIN — SODIUM CHLORIDE, PRESERVATIVE FREE 10 ML: 5 INJECTION INTRAVENOUS at 20:47

## 2021-09-30 RX ADMIN — POTASSIUM CHLORIDE 40 MEQ: 750 TABLET, FILM COATED, EXTENDED RELEASE ORAL at 06:03

## 2021-09-30 RX ADMIN — SODIUM CHLORIDE, PRESERVATIVE FREE 10 ML: 5 INJECTION INTRAVENOUS at 10:38

## 2021-09-30 RX ADMIN — ENOXAPARIN SODIUM 40 MG: 40 INJECTION SUBCUTANEOUS at 10:37

## 2021-09-30 RX ADMIN — CLOPIDOGREL BISULFATE 75 MG: 75 TABLET ORAL at 16:53

## 2021-09-30 RX ADMIN — ASPIRIN 81 MG: 81 TABLET, COATED ORAL at 10:37

## 2021-09-30 RX ADMIN — ATORVASTATIN CALCIUM 80 MG: 20 TABLET, FILM COATED ORAL at 20:47

## 2021-09-30 ASSESSMENT — ENCOUNTER SYMPTOMS
NAUSEA: 1
EYES NEGATIVE: 1
RESPIRATORY NEGATIVE: 1

## 2021-09-30 NOTE — CARE COORDINATION
Chart reviewed and met w/ pt to initiate to initiate discharge planning. CM introduced self and explained role. Pt is from home alone but states he has a neighbor that checks on him and assists with housekeeping. Pt has PCP and insurance with affordable RX copays. Pt declined any needs from CM at this time. CM available should needs present.

## 2021-09-30 NOTE — ED PROVIDER NOTES
Emergency [address] EMERGENCY DEPARTMENT    Patient: Zulma Ha  MRN: [id number]  : 1940  Date of Evaluation: 2021  ED Provider: Sofia Goodwin PA-C    Chief Complaint       Chief Complaint   Patient presents with    Dizziness     x 1 hour ago        82 Nash Calvillo is a [de-identified] y.o. male who presents to the emergency department for dizziness. Patient states he woke from sleep about an hour ago to go to the bathroom and felt dizzy upon first getting up. He states it was a room spinning sensation. States it lasted a few minutes and has now completely resolved. He does feel nauseated at this time. He denies any headache, visual changes, chest pain, palpitations, sob. Denies any weakness, numbness, tingling. Denies facial drooping or slurred speech. Reports no recurrence of the dizziness. States he had gone to bed around 2130 last night. ROS     CONSTITUTIONAL:  Denies fever. EYES:  Denies visual changes. HEAD:  Denies headache. ENT:  Denies earache, nasal congestion, sore throat. NECK:  Denies neck pain. RESPIRATORY:  Denies any shortness of breath. CARDIOVASCULAR:  Denies chest pain. GI:  + nausea. :  Denies urinary symptoms. MUSCULOSKELETAL:  Denies extremity pain or swelling. BACK:  Denies back pain. INTEGUMENT:  Denies skin changes. LYMPHATIC:  Denies lymphadenopathy. NEUROLOGIC:  Denies any numbness/tingling.  + dizziness. PSYCHIATRIC:  Denies SI/HI. Past History     Past Medical History:   Diagnosis Date    CAD (coronary artery disease)     Cancer ([address]     prostate    H/O echocardiogram 2019    Patient in atrial fibrillation during exam. Left ventricular function is normal, EF is estimated at 55-60%. Mildly dilated left atrium. Mildly dilated left atrium. Mildli enlarged right ventricle cavity. Trace to mild mitral regurgitation is present. No evidence of pericardial effusion.  No evidence of pleural effusion.  History of cardiac catheterization 05/24/2017    Critical Single vessel CAD with chronic occlusion of RCA. No significant disease of the other vessels. SVBG to RCA is patent with mild Proximal disease. LIMA to LAD did not mature. Normal LV systolic function & wall motion. LVEF is 55 %. Patient tolerated the procedure well. No immediate complications.  History of echocardiogram 01/12/2018    Left ventricular systolic function is normal with an ejection fraction of55-60%. Grade I diastolic dysfunction. Mildly dilated left atrium. Moderate mitral regurgitation. Mild to moderate tricuspid regurgitation. No evidence of pericardial effusion.  HTN (hypertension)     Hyperlipidemia      Past Surgical History:   Procedure Laterality Date    BACK SURGERY      CARDIAC SURGERY      cabg    COLONOSCOPY  4/14/15    divertics    CORONARY ANGIOPLASTY WITH STENT PLACEMENT  05/14/2014    X2     Social History     Socioeconomic History    Marital status:      Spouse name: None    Number of children: None    Years of education: None    Highest education level: None   Occupational History    None   Tobacco Use    Smoking status: Former Smoker     Packs/day: 3.00     Years: 30.00     Pack years: 90.00     Types: Cigarettes     Quit date: 1/1/2006     Years since quitting: 15.7    Smokeless tobacco: Never Used   Vaping Use    Vaping Use: Never used   Substance and Sexual Activity    Alcohol use: No     Comment: caffeine 2-3 glasses of tea a day    Drug use: No    Sexual activity: Not Currently   Other Topics Concern    None   Social History Narrative    None     Social Determinants of Health     Financial Resource Strain:     Difficulty of Paying Living Expenses:    Food Insecurity:     Worried About Running Out of Food in the Last Year:     Ran Out of Food in the Last Year:    Transportation Needs:     Lack of Transportation (Medical):      Lack of Transportation (Non-Medical): Physical Activity:     Days of Exercise per Week:     Minutes of Exercise per Session:    Stress:     Feeling of Stress :    Social Connections:     Frequency of Communication with Friends and Family:     Frequency of Social Gatherings with Friends and Family:     Attends Islam Services:     Active Member of Clubs or Organizations:     Attends Club or Organization Meetings:     Marital Status:    Intimate Partner Violence:     Fear of Current or Ex-Partner:     Emotionally Abused:     Physically Abused:     Sexually Abused:        Medications/Allergies     Previous Medications    ASPIRIN 81 MG TABLET    Take 1 tablet by mouth daily    COMPRESSION STOCKINGS MISC    by Does not apply route Thigh high, 20 to 30 mm of Hg    HYDROCHLOROTHIAZIDE (HYDRODIURIL) 12.5 MG TABLET    Take 12.5 mg by mouth daily    MAGNESIUM OXIDE (MAG-OX) 400 MG TABLET    Take 1 tablet by mouth daily    METOPROLOL TARTRATE (LOPRESSOR) 50 MG TABLET    take 1 tablet by mouth twice a day    NITROGLYCERIN (NITROSTAT) 0.4 MG SL TABLET    Place 0.4 mg under the tongue every 5 minutes as needed for Chest pain up to max of 3 total doses. If no relief after 1 dose, call 911. NYSTATIN (MYCOSTATIN) 331378 UNIT/ML SUSPENSION    Take 5 mLs by mouth 4 times daily    OMEPRAZOLE (PRILOSEC) 20 MG DELAYED RELEASE CAPSULE    Take 1 capsule by mouth daily    POTASSIUM CHLORIDE (KLOR-CON M) 10 MEQ EXTENDED RELEASE TABLET    Take 1 tablet by mouth daily    ROSUVASTATIN (CRESTOR) 40 MG TABLET    Take 1 tablet by mouth daily     No Known Allergies     Physical Exam       ED Triage Vitals [09/30/21 0255]   BP Temp Temp Source Pulse Resp SpO2 Height Weight   (!) 177/74 98.4 °F (36.9 °C) Oral 61 17 97 % 5' 7\" (1.702 m) 170 lb (77.1 kg)     GENERAL APPEARANCE:  Well-developed, well-nourished, no acute distress. HEAD:  NC/AT. EYES:  Sclera anicteric. ENT:  Ears, nose, mouth normal.     NECK:  Supple. CARDIO:  RRR. LUNGS:   CTAB.   Respirations unlabored. ABDOMEN:  Soft, non-distended, non-tender. BS active. EXTREMITIES:  No acute deformities. SKIN:  Warm and dry. NEUROLOGICAL:  Alert and oriented. NIH Stroke Scale  NIH Stroke Scale Assessed: Yes  Interval: Reassessment  Level of Consciousness (1a. ): Alert  LOC Questions (1b. ): Answers both correctly  LOC Commands (1c. ): Performs both tasks correctly  Best Gaze (2. ): Normal  Visual (3. ): No visual loss  Facial Palsy (4. ): Normal symmetrical movement  Motor Arm, Left (5a. ): No drift  Motor Arm, Right (5b. ): No drift  Motor Leg, Left (6a. ): No drift  Motor Leg, Right (6b. ): No drift  Limb Ataxia (7. ): Absent  Sensory (8. ): Normal  Best Language (9. ): No aphasia  Dysarthria (10. ): Normal  Extinction and Inattention (11): No abnormality  Total: 0    PSYCHIATRIC:  Very pleasant.     Diagnostics     Labs:  Labs Reviewed   CBC WITH AUTO DIFFERENTIAL - Abnormal; Notable for the following components:       Result Value    MCHC 31.4 (*)     Platelets 148 (*)     MPV 13.9 (*)     Segs Relative 70.4 (*)     Lymphocytes % 18.9 (*)     Monocytes % 7.3 (*)     All other components within normal limits   COMPREHENSIVE METABOLIC PANEL W/ REFLEX TO MG FOR LOW K - Abnormal; Notable for the following components:    Potassium 3.2 (*)     Glucose 112 (*)     Alkaline Phosphatase 139 (*)     All other components within normal limits   PROTIME-INR - Abnormal; Notable for the following components:    Protime 11.3 (*)     All other components within normal limits   URINE RT REFLEX TO CULTURE - Abnormal; Notable for the following components:    Color, UA STRAW (*)     Specific Gravity, UA 1.047 (*)     Leukocyte Esterase, Urine TRACE (*)     WBC, UA 4 (*)     All other components within normal limits   BRAIN NATRIURETIC PEPTIDE   APTT   TROPONIN   MAGNESIUM   TROPONIN   TROPONIN   CBC   HEMOGLOBIN A1C   LIPID PANEL   TSH WITHOUT REFLEX         Radiographs:  Echocardiogram complete 2D with doppler with color    Result Date: 9/30/2021  Transthoracic Echocardiography Report (TTE)  Demographics   Patient Name       Ramesh Jay       Date of Study       09/30/2021   Date of Birth      1940         Gender              Male   Age                [de-identified] year(s)         Race                   Patient Number     [id number]         Room Number         OBS07   Visit Number       [id number]   Corporate ID       [id number]   Accession Number   [id number]         Duarte Allen RVT   Ordering Physician Family Medicine    Interpreting        [facility]                     Physician          Physician           Cecilia LUCERO  Procedure Type of Study   TTE procedure:ECHOCARDIOGRAM COMPLETE 2D W DOPPLER W COLOR. Procedure Date Date: 09/30/2021 Start: 09:14 AM Study Location: Portable Technical Quality: Adequate visualization Indications:Vertigo. Patient Status: Routine Height: 67 inches Weight: 170 pounds BSA: 1.89 m2 BMI: 26.63 kg/m2 HR: 64 bpm BP: 156/73 mmHg  Conclusions   Summary  Left ventricular systolic function is normal.  Ejection fraction is visually estimated at 50-55%. Grade I diastolic dysfunction. No evidence of any pericardial effusion. Signature   ------------------------------------------------------------------  Electronically signed by Belén Cortes MD  (Interpreting physician) on 09/30/2021 at 12:04 PM  ------------------------------------------------------------------   Findings   Left Ventricle  Left ventricular systolic function is normal.  Ejection fraction is visually estimated at 50-55%. Grade I diastolic dysfunction. Left ventricle size is normal.  No regional wall motion abnormalities. Left Atrium  Essentially normal left atrium. Right Atrium  Essentially normal right atrium. Right Ventricle  Essentially normal right ventricle. Aortic Valve  Structurally normal aortic valve. Mitral Valve  Structurally normal mitral valve. Tricuspid Valve  Structurally normal tricuspid valve. Pulmonic Valve  Mild pulmonic regurgitation present. Pericardial Effusion  No evidence of any pericardial effusion. Pleural Effusion  No evidence of pleural effusion. Miscellaneous  Abdominal aorta was not clearly visualized.   M-Mode/2D Measurements & Calculations   LV Diastolic Dimension:  LV Systolic Dimension:  LA Dimension: 3.6 cmAO Root  5.13 cm                  3.72 cm                 Dimension: 3.6 cmLA Area:  LV FS:27.5 %             LV Volume Diastolic: 83 90.6 cm2  LV PW Diastolic: 1.3 cm  ml  LV PW Systolic: 2 cm     LV Volume Systolic: 31  Septum Diastolic: 6.01   ml  cm                       LV EDV/LV EDV Index: 83  Septum Systolic: 2.40 cm MG/33 A2QF ESV/LV ESV   LA/Aorta: 1  CO: 4.74 l/min           Index: 31 ml/16 m2  CI: 2.51 l/m*m2          EF Calculated (A4C):    LA volume/Index: 38 ml                           62.7 %                  /99A0  LV Area Diastolic: 40.0  EF Calculated (2D):  cm2                      53.3 %  LV Area Systolic: 66.0  cm2                      LV Length: 8.34 cm                            LVOT: 2.2 cm  Doppler Measurements & Calculations   MV Peak E-Wave: 44.4    AV Peak Velocity: 81.3 cm/s  LVOT Peak Velocity:  cm/s                    AV Peak Gradient: 2.64 mmHg  85.5 cm/s  MV Peak A-Wave: 71.6    AV Mean Velocity: 52.4 cm/s  LVOT Mean Velocity:  cm/s                    AV Mean Gradient: 1 mmHg     56.7 cm/s  MV E/A Ratio: 0.62      AV VTI: 15.7 cm              LVOT Peak Gradient: 3  MV Peak Gradient: 0.79  AV Area (Continuity):4.72    mmHgLVOT Mean Gradient:  mmHg                    cm2                          1 mmHg   MV P1/2t: 51 msec       LVOT VTI: 19.5 cm  MVA by PHT:4.31 cm2   MV E' Septal Velocity:  5.59 cm/s  MV E' Lateral Velocity:  7.68 cm/s  MV E/E' septal: 7.94  MV E/E' lateral: 5.78      CT Head WO Contrast    Result Date: 9/30/2021  EXAMINATION: CT OF THE HEAD WITHOUT CONTRAST  9/30/2021 5:36 am TECHNIQUE: CT of the head was performed without the administration of intravenous contrast. Dose modulation, iterative reconstruction, and/or weight based adjustment of the mA/kV was utilized to reduce the radiation dose to as low as reasonably achievable. COMPARISON: CT head performed October 2, 2014. HISTORY: ORDERING SYSTEM PROVIDED HISTORY: dizziness TECHNOLOGIST PROVIDED HISTORY: Reason for exam:->dizziness Has a \"code stroke\" or \"stroke alert\" been called? ->No Decision Support Exception - unselect if not a suspected or confirmed emergency medical condition->Emergency Medical Condition (MA) Reason for Exam: dizziness FINDINGS: BRAIN/VENTRICLES: There is no acute intracranial hemorrhage, mass effect or midline shift. No abnormal extra-axial fluid collection. The gray-white differentiation is maintained without evidence of an acute infarct. There is no evidence of hydrocephalus. Diffuse parenchymal volume loss, similar to prior examination. Scattered focal and confluent white matter hypodensities compatible with chronic microvascular disease. ORBITS: The visualized portion of the orbits demonstrate no acute abnormality. SINUSES: The visualized paranasal sinuses and mastoid air cells demonstrate no acute abnormality. SOFT TISSUES/SKULL:  No acute abnormality of the visualized skull or soft tissues. 1. No acute intracranial abnormality. 2. Findings compatible with diffuse parenchymal volume loss and chronic microvascular disease. XR CHEST PORTABLE    Result Date: 9/30/2021  EXAMINATION: ONE XRAY VIEW OF THE CHEST 9/30/2021 3:32 am COMPARISON: 10/22/2019 HISTORY: ORDERING SYSTEM PROVIDED HISTORY: dizziness TECHNOLOGIST PROVIDED HISTORY: Reason for exam:->dizziness Reason for Exam: dizziness Acuity: Acute Type of Exam: Initial Mechanism of Injury: dizziness Relevant Medical/Surgical History: dizziness FINDINGS: Sternotomy wires and mediastinal clips.   No lines or tubes within the chest. There is no consolidation, pleural effusion, or pneumothorax. Cardiac silhouette is not enlarged and there is no pulmonary vascular congestion. Mediastinum and chip are within normal limits. Bony thorax is unremarkable. No acute cardiopulmonary process. CTA HEAD NECK W CONTRAST    Result Date: 9/30/2021  EXAMINATION: CTA OF THE HEAD AND NECK WITH CONTRAST 9/30/2021 5:37 am: TECHNIQUE: CTA of the head and neck was performed with the administration of intravenous contrast. Multiplanar reformatted images are provided for review. MIP images are provided for review. Stenosis of the internal carotid arteries measured using NASCET criteria. Dose modulation, iterative reconstruction, and/or weight based adjustment of the mA/kV was utilized to reduce the radiation dose to as low as reasonably achievable. COMPARISON: CT brain earlier same day HISTORY: ORDERING SYSTEM PROVIDED HISTORY: dizziness TECHNOLOGIST PROVIDED HISTORY: Reason for exam:->dizziness Decision Support Exception - unselect if not a suspected or confirmed emergency medical condition->Emergency Medical Condition (MA) Reason for Exam: dizziness FINDINGS: CTA NECK: AORTIC ARCH/ARCH VESSELS: Atherosclerotic calcifications are present within the aortic arch. The origins of the great vessels are normal.  There is no significant stenosis of the innominate or subclavian arteries. 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 There is no significant stenosis or dissection of the common carotid arteries. There is no significant stenosis of the internal carotid arteries based on NASCET criteria. Atherosclerotic calcifications are present at the origins of the internal carotid arteries. VERTEBRAL ARTERIES: No dissection, arterial injury, or significant stenosis. SOFT TISSUES: The lung apices are clear.   There is no pathologically enlarged lymphadenopathy or soft tissue mass identified. BONES: No lytic or blastic osseous lesions are identified. Multilevel disc space narrowing and osteophyte formation is present within the cervical spine. CTA HEAD: ANTERIOR CIRCULATION: Atherosclerotic calcifications are present within the cavernous segments of the internal carotid arteries. There is no significant stenosis or aneurysm. The anterior and middle cerebral arteries are normal. There is no significant stenosis or aneurysm. POSTERIOR CIRCULATION: There are multifocal severe stenoses of the V4 segment of the left vertebral artery. The right vertebral artery is normal in appearance. The basilar artery is normal.  There is no significant stenosis or aneurysm. The posterior cerebral arteries are normal. OTHER: No dural venous sinus thrombosis on this non-dedicated study. BRAIN: There is no mass effect or midline shift. There is no vascular malformation identified. 1. No large vessel occlusion, significant stenosis or cerebral aneurysm identified within the [place]. 2. 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 3. 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 MRI brain without contrast    Result Date: 9/30/2021  EXAMINATION: MRI OF THE BRAIN WITHOUT CONTRAST  9/30/2021 8:27 am TECHNIQUE: Multiplanar multisequence MRI of the brain was performed without the administration of intravenous contrast. COMPARISON: Head CT 09/30/2021 HISTORY: ORDERING SYSTEM PROVIDED HISTORY: dizziness TECHNOLOGIST PROVIDED HISTORY: Reason for exam:->dizziness Reason for Exam: pt states dizziness and unstable gait. Acuity: Acute Type of Exam: Initial Relevant Medical/Surgical History: none FINDINGS: INTRACRANIAL STRUCTURES/VENTRICLES: No evidence of an acute infarct or other acute parenchymal process. no evidence of acute intracranial hemorrhage.  There is no evidence of an intracranial mass or extraaxial fluid collection. No significant mass effect or midline shift. Patchy and early confluent fociof T2/FLAIR hyperintensity are present within supratentorial white matter which is a nonspecific finding but likely represents moderatemicrovascular ischemia. There is moderate generalized volume loss. Ventricular enlargement concordant with the degree of parenchymal volume loss. The sellar/suprasellar regions appear unremarkable. The normal signal voids within the major intracranial vessels appear maintained. Tiny remote cortical infarct involving the right superior parietal lobule. ORBITS: The visualized portion of the orbits demonstrate no acute abnormality. SINUSES: Small mucous retention cyst versus polyp within the left maxillary sinus. Otherwise, the visualized paranasal sinuses and mastoid air cells are well aerated. BONES/SOFT TISSUES: The bone marrow signal intensity appears normal. The soft tissues demonstrate no acute abnormality. 1. No acute cortical infarct or other acute intracranial process. 2. Senescent changes including chronic small vessel ischemia and parenchymal volume loss. Procedures/EKG:   Please see Dr. Mone Hall note for interpretation. ED Course and MDM   -  Patient seen and evaluated in the emergency department. -  Triage and nursing notes reviewed and incorporated. -  Old chart records reviewed and incorporated. -  Supervising physician was Dr. Flor Henry. Patient was seen independently. -  Work-up included:  See above  -  ED medications:  Zofran  -  Results discussed with patient. Initial work-up is unremarkable. 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 I do recommend admission for further evaluation and monitoring. Patient is agreeable. Consulted hospitalist who will admit.     In light of current events, I did utilize appropriate PPE (including N95 and surgical face mask, safety

## 2021-09-30 NOTE — PROGRESS NOTES
Medication History  [facility]    Patient Name: Troy Funk [birth year]     Medication history has been completed by: Ariella Garcia CPhT    Source(s) of information: patient and insurance claims     Primary Care Physician: Paulina New MD     Pharmacy: [facility]    Allergies as of 09/30/2021    (No Known Allergies)        Prior to Admission medications    Medication Sig Start Date End Date Taking? Authorizing Provider   rosuvastatin (CRESTOR) 40 MG tablet Take 40 mg by mouth nightly  2/23/21  Yes Quang Hernandez MD   omeprazole (PRILOSEC) 20 MG delayed release capsule Take 1 capsule by mouth daily 10/29/20  Yes Jacky Hankins APRN - CNP   metoprolol tartrate (LOPRESSOR) 50 MG tablet take 1 tablet by mouth twice a day 10/23/19  Yes MIGUEL Go - CNP   aspirin 81 MG tablet  Take 162 mg by mouth daily  4/30/19  Yes Christin Polanco MD   potassium chloride (KLOR-CON M) 10 MEQ extended release tablet Take 1 tablet by mouth daily 10/29/20   MIGUEL Silveira - CNP   nitroGLYCERIN (NITROSTAT) 0.4 MG SL tablet Place 0.4 mg under the tongue every 5 minutes as needed for Chest pain up to max of 3 total doses. If no relief after 1 dose, call 911. Historical Provider, MD   hydrochlorothiazide (HYDRODIURIL) 12.5 MG tablet Take 12.5 mg by mouth daily    Historical Provider, MD   Compression Stockings MISC by Does not apply route Thigh high, 20 to 30 mm of Hg 4/1/19   Quang Hernandez MD     Medications removed from list (include reason, ex. noncompliance, medication cost, therapy complete etc.):   Magnesium oxide no longer taking  Hctz flagged for review, last fill date 06/04/21 for 30 day supply  Potassium flagged for review, patient reports he stopped taking this  Nystatin therapy complete    Comments:  Medication list reviewed with patient and insurance claims verified. Patient states he stopped taking potassium d/t it was causing constipation.     To my knowledge the above medication history is accurate as of 9/30/2021 8:11 AM.   Jennifer Echevarria CPhT   9/30/2021 8:11 AM

## 2021-09-30 NOTE — ED NOTES
@[id number] paged Dr Isabel Santos Chief Complaint:    Chief Complaint   Patient presents with   • Cough     cough, fever, nasal congestion, eye drainage, ear ache x 3 days       History of Present Illness:    This is a new problem. Symptoms started 1/3/17. Has had fever up to 103.5 F, ears bother her, eyes are red with drainage, nasal congestion with purulent mucus from nose, discomfort in bilateral frontal, ethmoidal, and maxillary sinus regions, sore throat, and cough (interferes with sleep). Feels symptoms came on suddenly like a truck hit her. Augmentin works/tolerates. Had Codeine cough med rx'd 9/16/16 - felt it was suboptimally effective.      Review of Systems:    Constitutional: See HPI.  Eyes: See HPI.  ENT: See HPI.  Respiratory: See HPI.  Cardiovascular: Negative for chest pain, palpitations, orthopnea, claudication, leg swelling, and PND.   Gastrointestinal: Negative for abdominal pain, nausea, vomiting, diarrhea, constipation, blood in stool, and melena.   Genitourinary: Negative for dysuria, urinary urgency, urinary frequency, hematuria, and flank pain.   Musculoskeletal: Negative for myalgias, joint pain, neck pain, and back pain.   Skin: Negative for rash and itching.   Neurological: Negative for dizziness, tingling, tremors, sensory change, speech change, focal weakness, seizures, loss of consciousness, and headaches.   Endo: Negative for polydipsia.   Heme: Does not bruise/bleed easily.   Psychiatric/Behavioral: No new symptoms.    Past Medical History:    Past Medical History   Diagnosis Date   • Gastritis    • Borderline personality disorder      cutter   • Bladder infection 2012   • Migraine 2014   • Pneumonia 2007       Past Surgical History:    History reviewed. No pertinent past surgical history.    Social History:    Social History     Social History   • Marital Status: Single     Spouse Name: N/A   • Number of Children: N/A   • Years of Education: N/A     Occupational History   • Not on file.     Social History Main Topics    • Smoking status: Never Smoker    • Smokeless tobacco: Never Used   • Alcohol Use: No   • Drug Use: Yes     Special: Inhaled, Marijuana      Comment: medical card   • Sexual Activity:     Partners: Female     Other Topics Concern   • Not on file     Social History Narrative       Family History:    Family History   Problem Relation Age of Onset   • Stroke Neg Hx    • Diabetes Neg Hx    • Hyperlipidemia Mother    • Hypertension Mother    • Alcohol/Drug Father    • Heart Disease Maternal Grandfather    • Cancer Maternal Grandfather      Colon       Medications:    Current Outpatient Prescriptions on File Prior to Visit   Medication Sig Dispense Refill   • medroxyPROGESTERone (PROVERA) 10 MG Tab Take 1 Tab by mouth every day. 10 Tab 5   • lamotrigine (LAMICTAL) 100 MG Tab Take 1 Tab by mouth every day. 30 Tab 3   • benzoyl peroxide-erythromycin (BENZAMYCIN) gel Bid to acne 30 g 5   • clonazepam (KLONOPIN) 0.5 MG TABS Take 0.25 mg by mouth 2 times a day.       No current facility-administered medications on file prior to visit.       Allergies:    No Known Allergies      Vitals:    Filed Vitals:    01/05/17 1551   BP: 128/80   Pulse: 88   Temp: 36.8 °C (98.2 °F)   Resp: 16   Weight: 90.719 kg (200 lb)   SpO2: 96%       Physical Exam:    Constitutional: Vital signs reviewed. Appears well-developed and well-nourished. Occl cough. No acute distress.   Eyes: Bilateral conjunctivae are mildly injected. PERRLA.   ENT: Bilateral nasal congestion. External ears normal. External auditory canals normal without discharge. TMs translucent and non-bulging. Hearing normal. Lips/teeth are normal. Oral mucosa pink and moist. Posterior pharynx: WNL.  Neck: Neck supple.   Cardiovascular: Regular rate and rhythm. No murmur.  Pulmonary/Chest: Respirations non-labored. Clear to auscultation bilaterally.  Lymph: Cervical nodes without tenderness or enlargement.  Musculoskeletal: Normal gait. Normal range of motion. No muscular atrophy or  weakness.  Neurological: Alert and oriented to person, place, and time. Muscle tone normal. Coordination normal.   Skin: No rashes or lesions. Warm, dry, normal turgor.  Psychiatric: Normal mood and affect. Behavior is normal. Judgment and thought content normal.     Diagnostics:     POCT INFLUENZA A/B (Order #[id number]) on 1/5/17       Component Results      Component     Rapid Influenza A-B     Pos A     Internal Control Positive     Positive     Internal Control Negative     Negative         Last Resulted Time     Thu Jan 5, 2017  4:11 PM       Assessment / Plan:    1. Fever, unspecified fever cause  - POCT Influenza A/B    2. Acute pansinusitis, recurrence not specified  - Hydrocod Polst-CPM Polst ER (TUSSIONEX) 10-8 MG/5ML Suspension Extended Release; Take 5 mL by mouth every 12 hours as needed (TAKE IF NEEDED FOR NASAL SYMPTOMS OR COUGH. MAY CAUSE DROWSINESS.).  Dispense: 120 mL; Refill: 0  - amoxicillin-clavulanate (AUGMENTIN) 875-125 MG Tab; 1 TAB TWICE A DAY X 10 DAYS. TAKE WITH FOOD.  Dispense: 20 Tab; Refill: 0    3. Acute upper respiratory infection  - Hydrocod Polst-CPM Polst ER (TUSSIONEX) 10-8 MG/5ML Suspension Extended Release; Take 5 mL by mouth every 12 hours as needed (TAKE IF NEEDED FOR NASAL SYMPTOMS OR COUGH. MAY CAUSE DROWSINESS.).  Dispense: 120 mL; Refill: 0  - amoxicillin-clavulanate (AUGMENTIN) 875-125 MG Tab; 1 TAB TWICE A DAY X 10 DAYS. TAKE WITH FOOD.  Dispense: 20 Tab; Refill: 0    4. Influenza A  - Hydrocod Polst-CPM Polst ER (TUSSIONEX) 10-8 MG/5ML Suspension Extended Release; Take 5 mL by mouth every 12 hours as needed (TAKE IF NEEDED FOR NASAL SYMPTOMS OR COUGH. MAY CAUSE DROWSINESS.).  Dispense: 120 mL; Refill: 0  - oseltamivir (TAMIFLU) 75 MG Cap; 1 CAP TWICE A DAY X 5 DAYS.  Dispense: 10 Cap; Refill: 0      Discussed with her DDX and management options.     Agreeable to medications prescribed.  report checked - last narcotic Rx was Hydrocodone-APAP 7.5-325 mg #25 filled on  6/18/14.    Follow-up with PCP or urgent care if getting worse or not better with above.

## 2021-09-30 NOTE — ED PROVIDER NOTES
This is an EKG note, I did not see or evaluate this patient. Patient was independently seen by midlevel. EKG was independently interpreted, without cardiology present. Normal sinus rhythm, rate 63, , , QTc 480, no acute ST elevation. right bundle branch block seen. Similar to previous EKG.      [id number] [facility],   09/30/21 7991

## 2021-09-30 NOTE — ED NOTES
Warm blanket provided to pt per request. Pt denies other needs at this time.  Call light within reach     Pierce Be RN  09/30/21 4834

## 2021-09-30 NOTE — ED NOTES
Report received from [facility] from nights and given to [address]; all questions answered; pt resting comfortably in bed without complaints     Manpreet Fields RN  09/30/21 2521

## 2021-09-30 NOTE — PROGRESS NOTES
Pt ambulates independently down [address] with this nurse at his side.  Pt walked approximately 100 ft without assistance

## 2021-09-30 NOTE — CONSULTS
Neurology Service Consult Note  [facility]   Patient Name: Dewitt Cowden  : 1940        Subjective:   Reason for consult: Room spinning dizziness started this morning  [de-identified] y.o.  male with a history of prostate cancer ongoing chemotherapy, CAD s/p CABG, arrhythmia (SVT), COPD presenting to [facility] ED early this morning, hours prior to my exam when he got up to go the bathroom he said he felt like room was spinning. On exam, patient laying in bed comfortably he tells me he went to bed at baseline line last night and when he got up early this morning he was trying to walk to the bathroom and felt like the room was spinning and had to sit down. Symptoms improved while laying down and when he sat up in the bed for me he declined any symptoms. He denied ever having similar symptoms, no headache, changes in vision, nausea/tingling/weakness, nausea. He denies being sick but does admit that he has noted some \"clogging\" in his ears secondary to cerumen impaction. He was on daily ASA and statin therapy prior to admission and denies missing any doses. 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 Per chart review, cardiothoracic surgery was consulted in ED and did not feel intervention was warranted. No family at bedside. Past Medical History:   Diagnosis Date    CAD (coronary artery disease)     Cancer ([facility] [address]     prostate    H/O echocardiogram 2019    Patient in atrial fibrillation during exam. Left ventricular function is normal, EF is estimated at 55-60%. Mildly dilated left atrium. Mildly dilated left atrium. Mildli enlarged right ventricle cavity. Trace to mild mitral regurgitation is present. No evidence of pericardial effusion. No evidence of pleural effusion.  History of cardiac catheterization 2017    Critical Single vessel CAD with chronic occlusion of RCA. No significant disease Not on file     Social Determinants of Health     Financial Resource Strain:     Difficulty of Paying Living Expenses:    Food Insecurity:     Worried About Running Out of Food in the Last Year:     [address] in the Last Year:    Transportation Needs:     Lack of Transportation (Medical):  Lack of Transportation (Non-Medical):    Physical Activity:     Days of Exercise per Week:     Minutes of Exercise per Session:    Stress:     Feeling of Stress :    Social Connections:     Frequency of Communication with Friends and Family:     Frequency of Social Gatherings with Friends and Family:     Attends [employer] Services:     Active Member of Clubs or Organizations:     Attends Club or Organization Meetings:     Marital Status:    Intimate Partner Violence:     Fear of Current or Ex-Partner:     Emotionally Abused:     Physically Abused:     Sexually Abused:       History reviewed. No pertinent family history. Review of Symptoms:    10-point system review completed. All of which are negative except as mentioned above. Physical Exam:      Gen: A&O x 4, NAD, cooperative  HEENT: NC/AT, EOMI, PERRL, mmm, neck supple, no meningeal signs  Heart: RRR, S1S2  Lungs: No respiratory distress  Ext: no edema, no calf tenderness b/l  Psych: normal mood and affect  Skin: no rashes or lesions    NEUROLOGIC EXAM:    Mental Status: A&O to self, location, month and year, NAD, speech clear, language fluent, repetition and naming intact, follows commands appropriately    Cranial Nerve Exam:   CN II-XII:  PERRL, VFF, no nystagmus, no gaze paresis, sensation V1-V3 intact b/l, muscles of facial expression symmetric; hearing intact to conversational tone, palate elevates symmetrically, shoulder elevation symmetric and tongue protrudes midline with movement side to side.     Motor Exam:       Strength 5/5 UE's/LE's b/l  Tone and bulk normal   No pronator drift    Deep Tendon Reflexes: 2/4 biceps, triceps, brachioradialis, patellar, and achilles b/l; flexor plantar responses b/l    Sensation: Intact light touch/vibration UE's/LE's b/l    Coordination/Cerebellum:       Tremors--none      Rapidly alternating movements: no dysdiadochokinesia b/l                Finger-to-Nose: no dysmetria b/l    Gait and stance:      Gait: deferred      LABS:     Recent Labs     09/30/21  0341   WBC 6.5      K 3.2*      CO2 25   BUN 18   CREATININE 1.0   GLUCOSE 112*   INR 0.88       IMAGING:      CT Head  1. No acute intracranial abnormality. 2. Findings compatible with diffuse parenchymal volume loss and chronic   microvascular disease. CTA head and neck  1. No large vessel occlusion, significant stenosis or cerebral aneurysm   identified within the [place]. 2. 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 3. 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 MRI head wo  1. No acute cortical infarct or other acute intracranial process. 2. Senescent changes including chronic small vessel ischemia and parenchymal   volume loss. ASSESSMENT/PLAN:   [de-identified] yo male with history of CAD s/p CABG, arrhythmia (SVT), HLD, HTN, prostate cancer presented with room spinning dizziness secondary to peripheral etiology vs. Acute stroke vs. Vertebrobasilar insufficiency. CT Head was non acute. 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 MRI head with no acute stroke. Physical exam is grossly non focal/lateralizing. Plan of care as follows:     1.  Acute onset vertigo secondary to peripheral etiology vs. Acute stroke vs vertebrobasilar insufficiency  · CT head: see above  · CTA head and neck: see above  · MRI head wo: see above  · Lipid panel pending (LDL 08/02/21: 64)  · Continue secondary stroke prevention daily ASA 81 mg and statin therapy  · Will add Plavix 75 mg daily secondary to vertebral stenosis  · PT/OT/ST per their recommendations  · No further inpatient work up, recommend follow up outpatient    Patient care discussed with attending physician, Dr. Eileen Fong and internal medicine [place]. Thank you for allowing us to participate in the care of your patient. If there are any questions regarding evaluation please feel free to contact us. [address] [place], [place], 9/30/2021       ------------------------------------    Attending Note:  I have rounded on this patient with WILL Martines. I have reviewed the chart and we have discussed this case in detail. The patient was seen and examined by myself. Pertinent labs and imaging have been personally reviewed. Our findings and impressions were discussed with the patient. I concur with the Physician Assistant's assessment and plan. MRI of the brain does not reveal any evidence of stroke. Suspect vertigo was a product of a peripheral etiology. He does admit to chronic cerumen impaction and previous vertigo history. It may be worthwhile to follow-up with ENT. His CT angiogram revealed left vertebral artery stenosis. Would recommend he be maintained on dual antiplatelet therapy with aspirin and Plavix. No further recommendations from a neurologic standpoint. We will sign off at this time. Please call with any questions.     Eriberto Blake DO 9/30/2021 10:47 PM

## 2021-09-30 NOTE — ED NOTES
Report given to [employer], care transferred at this time.      Jeanne Brennan RN  09/30/21 9981

## 2021-09-30 NOTE — H&P
HISTORY AND PHYSICAL             Date: 9/30/2021        Patient Name: Kymberly Vera     YOB: 1940      Age:  [de-identified] y.o. Chief Complaint     Chief Complaint   Patient presents with    Dizziness     x 1 hour ago           History Obtained From   patient    History of Present Illness   Shruti Kilgore is an [de-identified] yo male with history CAD status post CABG, Hx SVT, COPD, essential hypertension, hyperlipidemia, PVD, remote history of prostate presents with dizziness. The patient was in his usual state of health; denies any recent infections, fever or chills. Reports symptoms started around 1:00 in a.m. While ambulating to the bathroom he had acute dizziness with gait disturbance and associated nausea. He denies chest pain, shortness of breath or palpitations. He denies headache, ear pain or infections, blurred vision or speech difficulty. He denies unilateral weakness. He reports symptoms improved with lying down. He denies prior history of this type dizziness. He denies new medications. Reports he recently stopped taking his potassium due to constipation. He presented to the emergency department for evaluation. He presented afebrile, pulse 61 respirations 17 blood pressure 177/74 O2 sat 97% on room air. CT of the head nonacute. 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 Per ED provider these findings were discussed with cardiothoracic surgery, no interventions warranted. EKG showed right bundle branch block similar to prior tracings. Troponin negative x1. Chemistry panel showed mild hypokalemia with potassium 3.2, glucose 112 otherwise unremarkable. CBC showed WBC 6.5 hemoglobin 13.9 hematocrit 44.2 platelets 610. UA reviewed. The patient has been admitted for further work-up.     Past Medical History     Past Medical History:   Diagnosis Date    CAD (coronary artery disease)     Cancer ([facility] [address]     prostate    H/O echocardiogram 04/29/2019    Patient in atrial fibrillation during exam. Left ventricular function is normal, EF is estimated at 55-60%. Mildly dilated left atrium. Mildly dilated left atrium. Mildli enlarged right ventricle cavity. Trace to mild mitral regurgitation is present. No evidence of pericardial effusion. No evidence of pleural effusion.  History of cardiac catheterization 05/24/2017    Critical Single vessel CAD with chronic occlusion of RCA. No significant disease of the other vessels. SVBG to RCA is patent with mild Proximal disease. LIMA to LAD did not mature. Normal LV systolic function & wall motion. LVEF is 55 %. Patient tolerated the procedure well. No immediate complications.  History of echocardiogram 01/12/2018    Left ventricular systolic function is normal with an ejection fraction of55-60%. Grade I diastolic dysfunction. Mildly dilated left atrium. Moderate mitral regurgitation. Mild to moderate tricuspid regurgitation. No evidence of pericardial effusion.  HTN (hypertension)     Hyperlipidemia         Past Surgical History     Past Surgical History:   Procedure Laterality Date    BACK SURGERY      CARDIAC SURGERY      cabg    COLONOSCOPY  4/14/15    divertics    CORONARY ANGIOPLASTY WITH STENT PLACEMENT  05/14/2014    X2        Medications Prior to Admission     Prior to Admission medications    Medication Sig Start Date End Date Taking? Authorizing Provider   rosuvastatin (CRESTOR) 40 MG tablet Take 1 tablet by mouth daily 2/23/21   Nathaly Pond MD   magnesium oxide (MAG-OX) 400 MG tablet Take 1 tablet by mouth daily 10/29/20   MIGUEL Silveira - CNP   omeprazole (PRILOSEC) 20 MG delayed release capsule Take 1 capsule by mouth daily 10/29/20   MIGUEL Silveira - CNP   potassium chloride (KLOR-CON M) 10 MEQ extended release tablet Take 1 tablet by mouth daily 10/29/20   MIGUEL Lowe - CNP   metoprolol tartrate (LOPRESSOR) 50 MG tablet take 1 tablet by mouth twice a day  Patient taking differently: 2 times daily  10/23/19   MIGUEL Morales - CNP   nitroGLYCERIN (NITROSTAT) 0.4 MG SL tablet Place 0.4 mg under the tongue every 5 minutes as needed for Chest pain up to max of 3 total doses. If no relief after 1 dose, call 911. Historical Provider, MD   hydrochlorothiazide (HYDRODIURIL) 12.5 MG tablet Take 12.5 mg by mouth daily    Historical Provider, MD   aspirin 81 MG tablet Take 1 tablet by mouth daily 4/30/19   Rosie Cruz MD   Compression Stockings MISC by Does not apply route Thigh high, 20 to 30 mm of Hg 4/1/19   Shad Coon MD   nystatin (MYCOSTATIN) 446036 UNIT/ML suspension Take 5 mLs by mouth 4 times daily 10/15/18   Clary Amezquita MD        Allergies   Patient has no known allergies. Social History     Social History     Tobacco History     Smoking Status  Former Smoker Quit date  1/1/2006 Smoking Frequency  3 packs/day for 30 years (80 pk yrs) Smoking Tobacco Type  Cigarettes    Smokeless Tobacco Use  Never Used          Alcohol History     Alcohol Use Status  No Comment  caffeine 2-3 glasses of tea a day          Drug Use     Drug Use Status  No          Sexual Activity     Sexually Active  Not Currently                Family History   History reviewed. No pertinent family history. Review of Systems   Review of Systems   Constitutional: Negative. HENT: Negative. Eyes: Negative. Respiratory: Negative. Cardiovascular: Negative. Gastrointestinal: Positive for nausea. Genitourinary: Negative. Musculoskeletal: Negative for neck pain and neck stiffness. Skin: Negative. Neurological: Positive for dizziness. Negative for speech difficulty, weakness and headaches.        + Gait disturbance   Psychiatric/Behavioral: Negative.         Physical Exam   BP (!) 177/74   Pulse 55   Temp 98.4 °F (36.9 °C) (Oral)   Resp 19   Ht 5' 7\" (1.702 m)   Wt 170 lb (77.1 kg)   SpO2 94%   BMI 26.63 kg/m²     Physical Exam  Vitals reviewed. Constitutional:       General: He is not in acute distress. Appearance: Normal appearance. HENT:      Head: Normocephalic and atraumatic. Mouth/Throat:      Mouth: Mucous membranes are moist.      Pharynx: Oropharynx is clear. Eyes:      Extraocular Movements: Extraocular movements intact. Pupils: Pupils are equal, round, and reactive to light. Cardiovascular:      Rate and Rhythm: Normal rate and regular rhythm. Pulses: Normal pulses. Heart sounds: Normal heart sounds. Pulmonary:      Effort: Pulmonary effort is normal.      Breath sounds: Normal breath sounds. No wheezing or rales. Abdominal:      General: Bowel sounds are normal. There is no distension. Palpations: Abdomen is soft. Tenderness: There is no abdominal tenderness. Musculoskeletal:         General: Normal range of motion. Cervical back: Normal range of motion and neck supple. Skin:     General: Skin is warm and dry. Capillary Refill: Capillary refill takes less than 2 seconds. Neurological:      General: No focal deficit present. Mental Status: He is alert and oriented to person, place, and time. Sensory: No sensory deficit. Motor: No weakness.       Coordination: Coordination normal.   Psychiatric:         Mood and Affect: Mood normal.         Labs      Recent Results (from the past 24 hour(s))   EKG 12 Lead    Collection Time: 09/30/21  2:57 AM   Result Value Ref Range    Ventricular Rate 63 BPM    Atrial Rate 63 BPM    P-R Interval 152 ms    QRS Duration 152 ms    Q-T Interval 470 ms    QTc Calculation (Bazett) 480 ms    P Axis 8 degrees    R Axis 22 degrees    T Axis 48 degrees    Diagnosis       Normal sinus rhythm  Right bundle branch block  Abnormal ECG  When compared with ECG of 24-OCT-2019 11:15,  No significant change was found     CBC Auto Differential    Collection Time: 09/30/21  3:41 AM   Result Value Ref Range    WBC 6.5 4.0 - 10.5 K/CU MM    RBC 4.78 4.6 - 6.2 M/CU MM    Hemoglobin 13.9 13.5 - 18.0 GM/DL    Hematocrit 44.2 42 - 52 %    MCV 92.5 78 - 100 FL    MCH 29.1 27 - 31 PG    MCHC 31.4 (L) 32.0 - 36.0 %    RDW 13.4 11.7 - 14.9 %    Platelets 413 (L) 060 - 440 K/CU MM    MPV 13.9 (H) 7.5 - 11.1 FL    Differential Type AUTOMATED DIFFERENTIAL     Segs Relative 70.4 (H) 36 - 66 %    Lymphocytes % 18.9 (L) 24 - 44 %    Monocytes % 7.3 (H) 0 - 4 %    Eosinophils % 2.6 0 - 3 %    Basophils % 0.5 0 - 1 %    Segs Absolute 4.6 K/CU MM    Lymphocytes Absolute 1.2 K/CU MM    Monocytes Absolute 0.5 K/CU MM    Eosinophils Absolute 0.2 K/CU MM    Basophils Absolute 0.0 K/CU MM    Nucleated RBC % 0.0 %    Total Nucleated RBC 0.0 K/CU MM    Total Immature Neutrophil 0.02 K/CU MM    Immature Neutrophil % 0.3 0 - 0.43 %   Comprehensive Metabolic Panel w/ Reflex to MG    Collection Time: 09/30/21  3:41 AM   Result Value Ref Range    Sodium 140 135 - 145 MMOL/L    Potassium 3.2 (L) 3.5 - 5.1 MMOL/L    Chloride 102 99 - 110 mMol/L    CO2 25 21 - 32 MMOL/L    BUN 18 6 - 23 MG/DL    CREATININE 1.0 0.9 - 1.3 MG/DL    Glucose 112 (H) 70 - 99 MG/DL    Calcium 9.3 8.3 - 10.6 MG/DL    Albumin 3.9 3.4 - 5.0 GM/DL    Total Protein 6.8 6.4 - 8.2 GM/DL    Total Bilirubin 0.3 0.0 - 1.0 MG/DL    ALT 16 10 - 40 U/L    AST 19 15 - 37 IU/L    Alkaline Phosphatase 139 (H) 40 - 128 IU/L    GFR Non-African American >60 >60 mL/min/1.73m2    GFR African American >60 >60 mL/min/1.73m2    Anion Gap 13 4 - 16   Brain Natriuretic Peptide    Collection Time: 09/30/21  3:41 AM   Result Value Ref Range    Pro-BNP 97.56 <300 PG/ML   Protime-INR    Collection Time: 09/30/21  3:41 AM   Result Value Ref Range    Protime 11.3 (L) 11.7 - 14.5 SECONDS    INR 0.88 INDEX   APTT    Collection Time: 09/30/21  3:41 AM   Result Value Ref Range    aPTT 30.7 25.1 - 37.1 SECONDS   Troponin    Collection Time: 09/30/21  3:41 AM   Result Value Ref Range Troponin T <0.010 <0.01 NG/ML   Magnesium    Collection Time: 09/30/21  3:41 AM   Result Value Ref Range    Magnesium 2.1 1.8 - 2.4 mg/dl   Urinalysis Reflex to Culture    Collection Time: 09/30/21  6:17 AM    Specimen: Urine   Result Value Ref Range    Color, UA STRAW (A) YELLOW    Clarity, UA CLEAR CLEAR    Glucose, Urine NEGATIVE NEGATIVE MG/DL    Bilirubin Urine NEGATIVE NEGATIVE MG/DL    Ketones, Urine NEGATIVE NEGATIVE MG/DL    Specific Gravity, UA 1.047 (H) 1.001 - 1.035    Blood, Urine NEGATIVE NEGATIVE    pH, Urine 7.0 5.0 - 8.0    Protein, UA NEGATIVE NEGATIVE MG/DL    Urobilinogen, Urine NEGATIVE 0.2 - 1.0 MG/DL    Nitrite Urine, Quantitative NEGATIVE NEGATIVE    Leukocyte Esterase, Urine TRACE (A) NEGATIVE    RBC, UA <1 0 - 3 /HPF    WBC, UA 4 (H) 0 - 2 /HPF    Bacteria, UA NEGATIVE NEGATIVE /HPF    Trichomonas, UA NONE SEEN NONE SEEN /HPF        Imaging/Diagnostics Last 24 Hours   CT Head WO Contrast    Result Date: 9/30/2021  EXAMINATION: CT OF THE HEAD WITHOUT CONTRAST  9/30/2021 5:36 am TECHNIQUE: CT of the head was performed without the administration of intravenous contrast. Dose modulation, iterative reconstruction, and/or weight based adjustment of the mA/kV was utilized to reduce the radiation dose to as low as reasonably achievable. COMPARISON: CT head performed October 2, 2014. HISTORY: ORDERING SYSTEM PROVIDED HISTORY: dizziness TECHNOLOGIST PROVIDED HISTORY: Reason for exam:->dizziness Has a \"code stroke\" or \"stroke alert\" been called? ->No Decision Support Exception - unselect if not a suspected or confirmed emergency medical condition->Emergency Medical Condition (MA) Reason for Exam: dizziness FINDINGS: BRAIN/VENTRICLES: There is no acute intracranial hemorrhage, mass effect or midline shift. No abnormal extra-axial fluid collection. The gray-white differentiation is maintained without evidence of an acute infarct. There is no evidence of hydrocephalus.  Diffuse parenchymal volume loss, similar to prior examination. Scattered focal and confluent white matter hypodensities compatible with chronic microvascular disease. ORBITS: The visualized portion of the orbits demonstrate no acute abnormality. SINUSES: The visualized paranasal sinuses and mastoid air cells demonstrate no acute abnormality. SOFT TISSUES/SKULL:  No acute abnormality of the visualized skull or soft tissues. 1. No acute intracranial abnormality. 2. Findings compatible with diffuse parenchymal volume loss and chronic microvascular disease. XR CHEST PORTABLE    Result Date: 9/30/2021  EXAMINATION: ONE XRAY VIEW OF THE CHEST 9/30/2021 3:32 am COMPARISON: 10/22/2019 HISTORY: ORDERING SYSTEM PROVIDED HISTORY: dizziness TECHNOLOGIST PROVIDED HISTORY: Reason for exam:->dizziness Reason for Exam: dizziness Acuity: Acute Type of Exam: Initial Mechanism of Injury: dizziness Relevant Medical/Surgical History: dizziness FINDINGS: Sternotomy wires and mediastinal clips. No lines or tubes within the chest. There is no consolidation, pleural effusion, or pneumothorax. Cardiac silhouette is not enlarged and there is no pulmonary vascular congestion. Mediastinum and chip are within normal limits. Bony thorax is unremarkable. No acute cardiopulmonary process. CTA HEAD NECK W CONTRAST    Result Date: 9/30/2021  EXAMINATION: CTA OF THE HEAD AND NECK WITH CONTRAST 9/30/2021 5:37 am: TECHNIQUE: CTA of the head and neck was performed with the administration of intravenous contrast. Multiplanar reformatted images are provided for review. MIP images are provided for review. Stenosis of the internal carotid arteries measured using NASCET criteria. Dose modulation, iterative reconstruction, and/or weight based adjustment of the mA/kV was utilized to reduce the radiation dose to as low as reasonably achievable.  COMPARISON: CT brain earlier same day HISTORY: ORDERING SYSTEM PROVIDED HISTORY: dizziness TECHNOLOGIST PROVIDED HISTORY: Reason for exam:->dizziness Decision Support Exception - unselect if not a suspected or confirmed emergency medical condition->Emergency Medical Condition (MA) Reason for Exam: dizziness FINDINGS: CTA NECK: AORTIC ARCH/ARCH VESSELS: Atherosclerotic calcifications are present within the aortic arch. The origins of the great vessels are normal.  There is no significant stenosis of the innominate or subclavian arteries. 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 There is no significant stenosis or dissection of the common carotid arteries. There is no significant stenosis of the internal carotid arteries based on NASCET criteria. Atherosclerotic calcifications are present at the origins of the internal carotid arteries. VERTEBRAL ARTERIES: No dissection, arterial injury, or significant stenosis. SOFT TISSUES: The lung apices are clear. There is no pathologically enlarged lymphadenopathy or soft tissue mass identified. BONES: No lytic or blastic osseous lesions are identified. Multilevel disc space narrowing and osteophyte formation is present within the cervical spine. CTA HEAD: ANTERIOR CIRCULATION: Atherosclerotic calcifications are present within the cavernous segments of the internal carotid arteries. There is no significant stenosis or aneurysm. The anterior and middle cerebral arteries are normal. There is no significant stenosis or aneurysm. POSTERIOR CIRCULATION: There are multifocal severe stenoses of the V4 segment of the left vertebral artery. The right vertebral artery is normal in appearance. The basilar artery is normal.  There is no significant stenosis or aneurysm. The posterior cerebral arteries are normal. OTHER: No dural venous sinus thrombosis on this non-dedicated study. BRAIN: There is no mass effect or midline shift. There is no vascular malformation identified.      1. No large vessel occlusion, significant stenosis or cerebral aneurysm identified within the [place]. 2. 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 3. 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 Assessment      Hospital Problems         Last Modified POA    Dizziness 9/30/2021 Yes          Assessment/Plan   1. Dizziness r/o TIA/CVA- CT head non acute. CTA head/neck without large vessel occlusion or significant stenosis. Not deemed TPA candidate per [facility] stroke team. No focal deficits noted on exam. 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 MRI brain, TTE, repeat trops, neuro checks, orthostatic vs, monitor on telemetry for arrhythmias, TSH, lipid profile. Asa, statin. PT/OT, Neurology consultation  2. Hypokalemia -replacement given in ED, magnesium within normal limits. Repeat renal panel in a.m. 3. CAD status post CABG 2000, s/p PCI 2019 -patient denies chest pain. EKG showing right bundle branch block similar to prior tracing 2019. Patient is managed on aspirin statin beta-blocker, will resume. 4. Essential hypertension - will not aggressively treat pending MRI of brain  5. History of SVT-per Cardiology records improved with magnesium replacement, pt on beta-blocker and mg, mg wnl, monitor on tele. 6. Mixed hyperlipidemia-resume statin  7. History of COPD - patient currently not managed inhalers, not in exacerbation. RA sat 97%, prn nebs  8. PVD - on asa/statin  9.  Chronic thrombocytopenia - Monitor    DVT prophyl- Lovenox sq  NPO pending swallow eval then Cardiac diet  CODE Status : FULL CODE    Consultations Ordered:  IP CONSULT TO HOSPITALIST  IP CONSULT TO [address] SURGERY    Electronically signed by Marie Vaca MIGUEL Thao - CNP on 9/30/21 at 7:43 AM EDT

## 2021-09-30 NOTE — ED TRIAGE NOTES
Pt to ED c/o dizziness x 1 hour. Pt states that he woke up to go to the bathroom and that his \"balance was off\" and that he felt dizzy.

## 2021-09-30 NOTE — ED NOTES
Xray at bedside     Augustabuddy Haydenmaria l, [facility]  09/30/21 [id number]

## 2021-09-30 NOTE — PLAN OF CARE
Per the physical rehabilitation triage process, the PT referral will be held at this time. Chart reviewed. Await cardiothoracic input. MRI negative. D/w OT, SLP, and RN ALYSSA. Will defer service today, d/w ZOE Murray, who is supportive. Recommend nursing mobility, as tolerated.   Trell Cain, PT,   9/30/2021, 12:27 PM

## 2021-09-30 NOTE — PROGRESS NOTES
Speech Language Pathology  Facility/Department: [facility] OBSERVATION   CLINICAL BEDSIDE SWALLOW EVALUATION    NAME: Dewitt Cowden  :   MRN: [id number]    ADMISSION DATE: 2021  ADMITTING DIAGNOSIS: has STEMI (ST elevation myocardial infarction) ([address] ASHD (arteriosclerotic heart disease); S/P CABG x 2; HTN (hypertension); Hyperlipidemia, mixed; COPD, moderate ([address] PVD (peripheral vascular disease) ([address] [address] Exertional dyspnea; Diarrhea; Nausea; Coronary artery disease involving coronary bypass graft of native heart without angina pectoris; Unstable angina (HCC); SBO (small bowel obstruction) ([address] Thrush; PVC (premature ventricular contraction); and Dizziness on their problem list.      Impressions: Dewitt Cowden was seen for a bedside swallowing evaluation after being admitted to [facility] with dizziness. Pt was alert and cooperative throughout assessment. Relevant medical hx includes COPD, hypertension, CAD and CABG. Pt denies hx of dysphagia PTA. Pt was positioned upright in bed and presented with a clear vocal quality and strong volitional cough. Oral mechanism examination was [facility]. PO trials of regular solids and thin liquids by cup/straw sips were given. Pt demonstrated prolonged mastication (he is edentulous) and adequate oral clearance with trials of regular solids. Pharyngeal swallow appeared intact characterized by timely swallow initiation and 0 overt s/s of aspiration observed with all PO trials given. Recommend general diet/thin liquids with aspiration precautions. No further acute SLP needs are identified at this time.        ONSET DATE: this admission     Date of Eval: 2021  Evaluating Therapist: HYALEY Wood    Current Diet level:  Current Diet : NPO  Current Liquid Diet : NPO      Primary Complaint  Patient Complaint: weakness    Pain:       Reason for Referral  Dewitt Cowden was referred for a bedside swallow evaluation to assess the efficiency of his swallow function, identify signs and symptoms of aspiration and make recommendations regarding safe dietary consistencies, effective compensatory strategies, and safe eating environment. Impression  Dysphagia Diagnosis: Swallow function appears grossly intact  Dysphagia Outcome Severity Scale: Level 6: Within functional limits/Modified independence     Treatment Plan  Requires SLP Intervention: No  Duration/Frequency of Treatment: n/a  D/C Recommendations: To be determined       Recommended Diet and Intervention  Diet Solids Recommendation: Regular  Liquid Consistency Recommendation: Thin  Recommended Form of Meds: PO          Compensatory Swallowing Strategies  Compensatory Swallowing Strategies: Upright as possible for all oral intake    Treatment/Goals  Short-term Goals  Timeframe for Short-term Goals: n/a    General  Chart Reviewed: Yes  Behavior/Cognition: Alert;Pleasant mood; Cooperative  Respiratory Status: Room air  O2 Device: None (Room air)  Communication Observation: Functional  Follows Directions: Complex  Dentition: Edentulous  Patient Positioning: Upright in bed  Baseline Vocal Quality: Normal  Volitional Cough: Strong  Prior Dysphagia History: Pt denies hx of dysphagia  Consistencies Administered: Reg solid; Thin - cup; Thin - straw           Vision/Hearing  Vision  Vision: Within Functional Limits  Hearing  Hearing: Within functional limits    Oral Motor Deficits  Oral/Motor  Oral Motor: Within functional limits    Oral Phase Dysfunction  Oral Phase  Oral Phase: WFL     Indicators of Pharyngeal Phase Dysfunction   Pharyngeal Phase  Pharyngeal Phase: WFL    Prognosis  Prognosis  Prognosis for safe diet advancement: good  Individuals consulted  Consulted and agree with results and recommendations: Patient;RN    Education  Patient Education: Recommendations/POC  Patient Education Response: Verbalizes understanding  Safety Devices in place: Yes  Type of devices:  All fall risk precautions in place       Therapy Time  SLP Individual Minutes  Time In: 9715  Time Out: [address] 38  Minutes: [id number] [address]in [address], [id number] [facility], 9/30/2021

## 2021-10-01 VITALS
HEIGHT: 67 IN | OXYGEN SATURATION: 93 % | BODY MASS INDEX: 26.68 KG/M2 | DIASTOLIC BLOOD PRESSURE: 87 MMHG | WEIGHT: 170 LBS | TEMPERATURE: 98.1 F | HEART RATE: 68 BPM | SYSTOLIC BLOOD PRESSURE: 169 MMHG | RESPIRATION RATE: 19 BRPM

## 2021-10-01 LAB
ANION GAP SERPL CALCULATED.3IONS-SCNC: 12 MMOL/L (ref 4–16)
BUN BLDV-MCNC: 11 MG/DL (ref 6–23)
CALCIUM SERPL-MCNC: 8.9 MG/DL (ref 8.3–10.6)
CHLORIDE BLD-SCNC: 99 MMOL/L (ref 99–110)
CHOLESTEROL: 138 MG/DL
CO2: 22 MMOL/L (ref 21–32)
CREAT SERPL-MCNC: 0.6 MG/DL (ref 0.9–1.3)
ESTIMATED AVERAGE GLUCOSE: 134 MG/DL
GFR AFRICAN AMERICAN: >60 ML/MIN/1.73M2
GFR NON-AFRICAN AMERICAN: >60 ML/MIN/1.73M2
GLUCOSE BLD-MCNC: 93 MG/DL (ref 70–99)
HBA1C MFR BLD: 6.3 % (ref 4.2–6.3)
HCT VFR BLD CALC: 44.1 % (ref 42–52)
HDLC SERPL-MCNC: 38 MG/DL
HEMOGLOBIN: 13.8 GM/DL (ref 13.5–18)
LDL CHOLESTEROL DIRECT: 72 MG/DL
MAGNESIUM: 2 MG/DL (ref 1.8–2.4)
MCH RBC QN AUTO: 28.6 PG (ref 27–31)
MCHC RBC AUTO-ENTMCNC: 31.3 % (ref 32–36)
MCV RBC AUTO: 91.3 FL (ref 78–100)
PDW BLD-RTO: 13.8 % (ref 11.7–14.9)
PLATELET # BLD: 109 K/CU MM (ref 140–440)
PMV BLD AUTO: 12.4 FL (ref 7.5–11.1)
POTASSIUM SERPL-SCNC: 3.5 MMOL/L (ref 3.5–5.1)
RBC # BLD: 4.83 M/CU MM (ref 4.6–6.2)
SODIUM BLD-SCNC: 133 MMOL/L (ref 135–145)
TRIGL SERPL-MCNC: 140 MG/DL
TROPONIN T: <0.01 NG/ML
TSH HIGH SENSITIVITY: 1.05 UIU/ML (ref 0.27–4.2)
WBC # BLD: 5.8 K/CU MM (ref 4–10.5)

## 2021-10-01 PROCEDURE — 6370000000 HC RX 637 (ALT 250 FOR IP): Performed by: INTERNAL MEDICINE

## 2021-10-01 PROCEDURE — 6370000000 HC RX 637 (ALT 250 FOR IP): Performed by: NURSE PRACTITIONER

## 2021-10-01 PROCEDURE — 83721 ASSAY OF BLOOD LIPOPROTEIN: CPT

## 2021-10-01 PROCEDURE — 94761 N-INVAS EAR/PLS OXIMETRY MLT: CPT

## 2021-10-01 PROCEDURE — 83036 HEMOGLOBIN GLYCOSYLATED A1C: CPT

## 2021-10-01 PROCEDURE — 84443 ASSAY THYROID STIM HORMONE: CPT

## 2021-10-01 PROCEDURE — 83735 ASSAY OF MAGNESIUM: CPT

## 2021-10-01 PROCEDURE — 84484 ASSAY OF TROPONIN QUANT: CPT

## 2021-10-01 PROCEDURE — 85027 COMPLETE CBC AUTOMATED: CPT

## 2021-10-01 PROCEDURE — G0378 HOSPITAL OBSERVATION PER HR: HCPCS

## 2021-10-01 PROCEDURE — 80048 BASIC METABOLIC PNL TOTAL CA: CPT

## 2021-10-01 PROCEDURE — 80061 LIPID PANEL: CPT

## 2021-10-01 PROCEDURE — 96372 THER/PROPH/DIAG INJ SC/IM: CPT

## 2021-10-01 PROCEDURE — 6360000002 HC RX W HCPCS: Performed by: INTERNAL MEDICINE

## 2021-10-01 PROCEDURE — 6370000000 HC RX 637 (ALT 250 FOR IP): Performed by: PHYSICIAN ASSISTANT

## 2021-10-01 RX ORDER — CLOPIDOGREL BISULFATE 75 MG/1
75 TABLET ORAL DAILY
Qty: 30 TABLET | Refills: 3 | Status: SHIPPED | OUTPATIENT
Start: 2021-10-01 | End: 2022-02-03 | Stop reason: SDUPTHER

## 2021-10-01 RX ORDER — METOPROLOL TARTRATE 50 MG/1
50 TABLET, FILM COATED ORAL 2 TIMES DAILY
Status: DISCONTINUED | OUTPATIENT
Start: 2021-10-01 | End: 2021-10-01 | Stop reason: HOSPADM

## 2021-10-01 RX ORDER — HYDROCHLOROTHIAZIDE 12.5 MG/1
12.5 TABLET ORAL DAILY
Status: DISCONTINUED | OUTPATIENT
Start: 2021-10-01 | End: 2021-10-01 | Stop reason: HOSPADM

## 2021-10-01 RX ADMIN — CLOPIDOGREL BISULFATE 75 MG: 75 TABLET ORAL at 09:59

## 2021-10-01 RX ADMIN — ENOXAPARIN SODIUM 40 MG: 40 INJECTION SUBCUTANEOUS at 09:59

## 2021-10-01 RX ADMIN — HYDROCHLOROTHIAZIDE 12.5 MG: 12.5 TABLET ORAL at 09:59

## 2021-10-01 RX ADMIN — METOPROLOL TARTRATE 50 MG: 50 TABLET, FILM COATED ORAL at 09:59

## 2021-10-01 RX ADMIN — ASPIRIN 81 MG: 81 TABLET, COATED ORAL at 09:59

## 2021-10-01 NOTE — DISCHARGE SUMMARY
Discharge Summary    Name:  Kymberly Vera /Age/Sex:   [de-identified] y.o. male)   MRN & CSN:  [id number] & [id number] Admission Date/Time: 2021  3:10 AM   Attending:  Hardy Laughlin MD Discharging Physician: Jasmin Lord, [facility] Course:   Kymberly Vera is a [de-identified] y.o.  male  who presents with dizziness. Problems addressed during this hospitalization:    Dizziness, concern for TIA  Vertebral artery pseudoaneurysm  -CT/CTA head and neck with findings of focal severe stenosis of the V4 segment of left vertebral artery, laterally directed pseudoaneurysm measuring 2 x 3 mm present for the distal right common carotid artery  -MRI brain with chronic small vessel ischemia, no acute stroke  -Echo with EF 55%, grade 1 diastolic dysfunction  -Neurology followed, recommended aspirin, Plavix, statin, outpatient follow-up  -Admitting team discussed with CT surgery regarding pseudoaneurysm who recommended no surgical intervention  -On discharge, the patient symptoms had resolved, and the patient's vital signs were stable    Chronic thrombocytopenia  -Platelets 326 on discharge, relatively stable, no signs or symptoms of bleeding  -Recommended outpatient follow-up    Hypertension  -Resume home meds on discharge    Hypokalemia  -Resolved with replacement    CAD status post CABG   -Continue aspirin, statin      This patient was seen and examined autonomously  A hospitalist attending physician was available for questions/consultation as needed. The patient expressed appropriate understanding of and agreement with the discharge recommendations, medications, and plan.      Consults this admission:  IP CONSULT TO HOSPITALIST  IP CONSULT TO [address] [place] [address] TO NEUROLOGY    Discharge Instruction:   Follow up appointments: PCP, neurology  Primary care physician:  within 2 weeks    Diet:  cardiac diet   Activity: activity as tolerated  Disposition: Discharged to:   [x]Home, []C, []SNF, []Acute Rehab, []Hospice   Condition on discharge: Stable    Discharge Medications:      Aydee Yañez   Home Medication Instructions LNM:[id number]    Printed on:10/01/21 6600   Medication Information                      aspirin 81 MG tablet  Take 1 tablet by mouth daily             Compression Stockings MISC  by Does not apply route Thigh high, 20 to 30 mm of Hg             hydrochlorothiazide (HYDRODIURIL) 12.5 MG tablet  Take 12.5 mg by mouth daily             metoprolol tartrate (LOPRESSOR) 50 MG tablet  take 1 tablet by mouth twice a day             nitroGLYCERIN (NITROSTAT) 0.4 MG SL tablet  Place 0.4 mg under the tongue every 5 minutes as needed for Chest pain up to max of 3 total doses. If no relief after 1 dose, call 911. omeprazole (PRILOSEC) 20 MG delayed release capsule  Take 1 capsule by mouth daily             potassium chloride (KLOR-CON M) 10 MEQ extended release tablet  Take 1 tablet by mouth daily             rosuvastatin (CRESTOR) 40 MG tablet  Take 1 tablet by mouth daily                 Objective Findings at Discharge:   BP (!) 169/87   Pulse 68   Temp 98.1 °F (36.7 °C) (Oral)   Resp 19   Ht 5' 7\" (1.702 m)   Wt 170 lb (77.1 kg)   SpO2 93%   BMI 26.63 kg/m²            PHYSICAL EXAM   GEN Awake male, sitting upright in bed in no apparent distress. Appears given age. EYES Pupils are equally round. No scleral erythema, discharge, or conjunctivitis. HENT Mucous membranes are moist.   NECK Supple, no apparent thyromegaly or masses. RESP Clear to auscultation, no wheezes, rales or rhonchi. Symmetric chest movement while on room air. CARDIO/VASC S1/S2 auscultated. Regular rate without appreciable murmurs, rubs, or gallops. Peripheral pulses equal bilaterally and palpable. No peripheral edema. GI Abdomen is soft without significant tenderness, masses, or guarding. Bowel sounds are normoactive.  No costovertebral angle tenderness. Mahmood catheter is not present.   HEME/LYMPH No petechiae or ecchymoses. MSK No gross joint deformities. SKIN Normal coloration, warm, dry. NEURO Cranial nerves appear grossly intact, normal speech, no lateralizing weakness. Ambulates without difficulty. PSYCH Awake, alert, oriented x 4. Affect appropriate.     BMP/CBC  Recent Labs     09/30/21  0341 10/01/21  0720     --    K 3.2*  --      --    CO2 25  --    BUN 18  --    CREATININE 1.0  --    WBC 6.5 5.8   HCT 44.2 44.1   * 109*       IMAGING:  CT head, CT head and neck, MRI brain    Discharge Time of 35 minutes    Electronically signed by Governor MARIAMA Sun on 10/1/2021 at 8:56 AM

## 2021-10-01 NOTE — PROGRESS NOTES
Mindy Odom Pt IV removed and bleeding controlled. Pt educated on discharge instructions and denies any questions at this time. Pt skin is warm and dry, respirations are even and unlabored.

## 2021-10-06 ENCOUNTER — HOSPITAL ENCOUNTER (OUTPATIENT)
Age: 81
Discharge: HOME | End: 2021-10-06
Payer: MEDICARE

## 2021-10-06 LAB
ALBUMIN SERPL-MCNC: 4.3 GM/DL (ref 3.4–5)
ALP BLD-CCNC: 143 IU/L (ref 40–128)
ALT SERPL-CCNC: 20 U/L (ref 10–40)
ANION GAP SERPL CALCULATED.3IONS-SCNC: 11 MMOL/L (ref 4–16)
AST SERPL-CCNC: 19 IU/L (ref 15–37)
BILIRUB SERPL-MCNC: 0.4 MG/DL (ref 0–1)
BUN BLDV-MCNC: 13 MG/DL (ref 6–23)
CALCIUM SERPL-MCNC: 9.3 MG/DL (ref 8.3–10.6)
CHLORIDE BLD-SCNC: 104 MMOL/L (ref 99–110)
CHOLESTEROL: 142 MG/DL
CO2: 26 MMOL/L (ref 21–32)
CREAT SERPL-MCNC: 0.9 MG/DL (ref 0.9–1.3)
CREATININE URINE: 114.4 MG/DL (ref 39–259)
ESTIMATED AVERAGE GLUCOSE: 131 MG/DL
GFR AFRICAN AMERICAN: >60 ML/MIN/1.73M2
GFR NON-AFRICAN AMERICAN: >60 ML/MIN/1.73M2
GLUCOSE BLD-MCNC: 103 MG/DL (ref 70–99)
HBA1C MFR BLD: 6.2 % (ref 4.2–6.3)
HDLC SERPL-MCNC: 50 MG/DL
LDL CHOLESTEROL DIRECT: 68 MG/DL
MICROALBUMIN/CREAT 24H UR: <1.2 MG/DL
MICROALBUMIN/CREAT UR-RTO: NORMAL MG/G CREAT (ref 0–30)
POTASSIUM SERPL-SCNC: 4.2 MMOL/L (ref 3.5–5.1)
SODIUM BLD-SCNC: 141 MMOL/L (ref 135–145)
TOTAL PROTEIN: 6.9 GM/DL (ref 6.4–8.2)
TRIGL SERPL-MCNC: 155 MG/DL

## 2021-10-06 PROCEDURE — 83036 HEMOGLOBIN GLYCOSYLATED A1C: CPT

## 2021-10-06 PROCEDURE — 83721 ASSAY OF BLOOD LIPOPROTEIN: CPT

## 2021-10-06 PROCEDURE — 36415 COLL VENOUS BLD VENIPUNCTURE: CPT

## 2021-10-06 PROCEDURE — 80061 LIPID PANEL: CPT

## 2021-10-06 PROCEDURE — 82570 ASSAY OF URINE CREATININE: CPT

## 2021-10-06 PROCEDURE — 82043 UR ALBUMIN QUANTITATIVE: CPT

## 2021-10-06 PROCEDURE — 80053 COMPREHEN METABOLIC PANEL: CPT

## 2021-10-13 ENCOUNTER — OFFICE VISIT (OUTPATIENT)
Dept: CARDIOLOGY CLINIC | Age: 81
End: 2021-10-13
Payer: MEDICARE

## 2021-10-13 VITALS
HEART RATE: 69 BPM | HEIGHT: 67 IN | SYSTOLIC BLOOD PRESSURE: 130 MMHG | BODY MASS INDEX: 28.6 KG/M2 | WEIGHT: 182.2 LBS | DIASTOLIC BLOOD PRESSURE: 82 MMHG

## 2021-10-13 DIAGNOSIS — Z95.1 S/P CABG X 2: Chronic | ICD-10-CM

## 2021-10-13 DIAGNOSIS — E78.2 HYPERLIPIDEMIA, MIXED: Chronic | ICD-10-CM

## 2021-10-13 DIAGNOSIS — I25.810 CORONARY ARTERY DISEASE INVOLVING CORONARY BYPASS GRAFT OF NATIVE HEART WITHOUT ANGINA PECTORIS: ICD-10-CM

## 2021-10-13 DIAGNOSIS — I49.3 PVC (PREMATURE VENTRICULAR CONTRACTION): ICD-10-CM

## 2021-10-13 DIAGNOSIS — I73.9 PVD (PERIPHERAL VASCULAR DISEASE) (HCC): Chronic | ICD-10-CM

## 2021-10-13 DIAGNOSIS — I10 PRIMARY HYPERTENSION: Chronic | ICD-10-CM

## 2021-10-13 DIAGNOSIS — I25.10 ASHD (ARTERIOSCLEROTIC HEART DISEASE): Primary | Chronic | ICD-10-CM

## 2021-10-13 DIAGNOSIS — R42 DIZZINESS: ICD-10-CM

## 2021-10-13 DIAGNOSIS — I21.11 ST ELEVATION MYOCARDIAL INFARCTION INVOLVING RIGHT CORONARY ARTERY (HCC): ICD-10-CM

## 2021-10-13 PROCEDURE — 4040F PNEUMOC VAC/ADMIN/RCVD: CPT | Performed by: INTERNAL MEDICINE

## 2021-10-13 PROCEDURE — G8417 CALC BMI ABV UP PARAM F/U: HCPCS | Performed by: INTERNAL MEDICINE

## 2021-10-13 PROCEDURE — 99213 OFFICE O/P EST LOW 20 MIN: CPT | Performed by: INTERNAL MEDICINE

## 2021-10-13 PROCEDURE — G8484 FLU IMMUNIZE NO ADMIN: HCPCS | Performed by: INTERNAL MEDICINE

## 2021-10-13 PROCEDURE — G8427 DOCREV CUR MEDS BY ELIG CLIN: HCPCS | Performed by: INTERNAL MEDICINE

## 2021-10-13 PROCEDURE — 1123F ACP DISCUSS/DSCN MKR DOCD: CPT | Performed by: INTERNAL MEDICINE

## 2021-10-13 PROCEDURE — 1036F TOBACCO NON-USER: CPT | Performed by: INTERNAL MEDICINE

## 2021-10-13 NOTE — LETTER
10/13/2021 @11:15  Patient Name: Itz Josue  : 1940  MRN# [id number]    REASON FOR VISIT:   6 months PCV            Cardiology Problems  ASHD (arteriosclerotic heart disease)  HTN (hypertension)  Hyperlipidemia, mixed  PVD (peripheral vascular disease) ([facility])  Coronary artery disease involving coronary bypass graft of native heart without angina pectoris  Unstable angina (HCC)  STEMI (ST elevation myocardial infarction) ([facility])  PVC (premature ventricular contraction)     Other  S/P CABG x 2  COPD, moderate ([facility])  Exertional dyspnea  Diarrhea  Nausea  SBO (small bowel obstruction) ([facility])  Thrush  Dizziness      Current Outpatient Medications   Medication Sig Dispense Refill    clopidogrel (PLAVIX) 75 MG tablet Take 1 tablet by mouth daily 30 tablet 3    rosuvastatin (CRESTOR) 40 MG tablet Take 1 tablet by mouth daily (Patient taking differently: Take 40 mg by mouth nightly ) 60 tablet 5    omeprazole (PRILOSEC) 20 MG delayed release capsule Take 1 capsule by mouth daily 90 capsule 1    potassium chloride (KLOR-CON M) 10 MEQ extended release tablet Take 1 tablet by mouth daily 30 tablet 3    metoprolol tartrate (LOPRESSOR) 50 MG tablet take 1 tablet by mouth twice a day (Patient taking differently: 2 times daily ) 60 tablet 3    nitroGLYCERIN (NITROSTAT) 0.4 MG SL tablet Place 0.4 mg under the tongue every 5 minutes as needed for Chest pain up to max of 3 total doses. If no relief after 1 dose, call 911.  hydrochlorothiazide (HYDRODIURIL) 12.5 MG tablet Take 12.5 mg by mouth daily      aspirin 81 MG tablet Take 1 tablet by mouth daily (Patient taking differently: Take 162 mg by mouth daily ) 30 tablet 11    Compression Stockings MISC by Does not apply route Thigh high, 20 to 30 mm of Hg 1 each 3     No current facility-administered medications for this visit. Smoke: What:                           How much:    Alcohol:                                      How Much:     Caffeine: Pop:         Tea: Coffee:                Chocolate:    Exercise:    Labs: Lipids:             CBC:       BMP/CMP:          TSH:              A1C:    Last Visit:  Complaints:  Changes:    Last EK2021      STRESS TEST:  2019  Fair exercise tolerance. 5 METs work load. Hypertensive BP response to exercise. EKG non-diagnostic due to RBBB . ECHO: 2021   Left ventricular systolic function is normal.   Ejection fraction is visually estimated at 50-55%. Grade I diastolic dysfunction. No evidence of any pericardial effusion. CAROTID: NONE    MUGA: NONE    LAST PACER CHECK: NONE    CARDIAC CATH: 2019   POBA OF SVG TO RCA 99% TO 0%   OSTIAL SVG MILD DISESAE    Amio Protocol:    CHADS: [id number] Score for Atrial Fibrillation Stroke Risk   Risk   Factors  Component Value   C CHF No 0   H HTN Yes 1   A2 Age >= 76 Yes,  [de-identified] y.o.) 2   D DM No 0   S2 Prior Stroke/TIA No 0   V Vascular Disease Yes 1   A Age 74-69 No,  [de-identified] y.o.) 0   Sc Sex male 0    [id number]  Score  4   Score last updated 10/13/21 4:69 AM EDT    Click here for a link to the UpToDate guideline \"Atrial Fibrillation: Anticoagulation therapy to prevent embolization    Disclaimer: Risk Score calculation is dependent on accuracy of patient problem list and past encounter diagnosis.

## 2021-10-13 NOTE — PROGRESS NOTES
Jason King is a [de-identified] y.o. male who has    CHIEF COMPLAINT AS FOLLOWS:   CHEST PAIN: Patient denies any C/O chest pains at this time.      SOB: No C/O SOB at this time.                 LEG EDEMA: minimal leg edema   PALPITATIONS: Denies any C/O Palpitations                                  DIZZINESS: No C/O Dizziness                        SYNCOPE: None   OTHER:                                     HPI: Patient is here for F/U on his CAD, H/O PVCs- Arrhythmia, HTN & Dyslipidemia problems. CAD: Patient has known Hx of  CAD. Had CABG in the past.  Arrhythmia: Patient has known Hx of PVCs  HTN: Patient has known Hx of essential HTN. Has been treated with guideline recommended medical / physical/ diet therapy as stated below. Dyslipidemia: Patient has known Hx of mixed dyslipidemia. Has been treated with guideline recommended medical / physical/ diet therapy as stated below. He does not have any new complaints at this time. Current Outpatient Medications   Medication Sig Dispense Refill    clopidogrel (PLAVIX) 75 MG tablet Take 1 tablet by mouth daily 30 tablet 3    rosuvastatin (CRESTOR) 40 MG tablet Take 1 tablet by mouth daily (Patient taking differently: Take 40 mg by mouth nightly ) 60 tablet 5    omeprazole (PRILOSEC) 20 MG delayed release capsule Take 1 capsule by mouth daily 90 capsule 1    potassium chloride (KLOR-CON M) 10 MEQ extended release tablet Take 1 tablet by mouth daily 30 tablet 3    metoprolol tartrate (LOPRESSOR) 50 MG tablet take 1 tablet by mouth twice a day (Patient taking differently: 2 times daily ) 60 tablet 3    nitroGLYCERIN (NITROSTAT) 0.4 MG SL tablet Place 0.4 mg under the tongue every 5 minutes as needed for Chest pain up to max of 3 total doses. If no relief after 1 dose, call 911.       hydrochlorothiazide (HYDRODIURIL) 12.5 MG tablet Take 12.5 mg by mouth daily      aspirin 81 MG tablet Take 1 tablet by mouth daily (Patient taking differently: Take 162 mg by mouth daily ) 30 tablet 11    Compression Stockings MISC by Does not apply route Thigh high, 20 to 30 mm of Hg 1 each 3     No current facility-administered medications for this visit. Allergies: Patient has no known allergies. Review of Systems:    Constitutional: Negative for diaphoresis and fatigue  Respiratory: Negative for shortness of breath  Cardiovascular: Negative for chest pain, dyspnea on exertion, claudication, edema, irregular heartbeat, murmur, palpitations or shortness of breath  Musculoskeletal: Negative for muscle pain, muscular weakness, negative for pain in arm and leg or swelling in foot and leg    Objective:  /82   Pulse 69   Ht 5' 7\" (1.702 m)   Wt 182 lb 3.2 oz (82.6 kg)   BMI 28.54 kg/m²   Wt Readings from Last 3 Encounters:   10/13/21 182 lb 3.2 oz (82.6 kg)   09/30/21 170 lb (77.1 kg)   04/29/21 181 lb 3.2 oz (82.2 kg)     Body mass index is 28.54 kg/m². GENERAL - Alert, oriented, pleasant, in no apparent distress. EYES: No jaundice, no conjunctival pallor. Neck - Supple. No jugular venous distention noted. No carotid bruits. Cardiovascular - Normal S1 and S2 without obvious murmur or gallop. Extremities - No cyanosis, clubbing, or significant edema. Pulmonary - No respiratory distress. No wheezes or rales.       Lab Review   Lab Results   Component Value Date    TROPONINT <0.010 10/01/2021    TROPONINT <0.010 09/30/2021     Lab Results   Component Value Date    BNP 23 09/16/2012    PROBNP 97.56 09/30/2021    PROBNP 751.1 07/15/2019     Lab Results   Component Value Date    INR 0.88 09/30/2021    INR 1.02 10/22/2019     Lab Results   Component Value Date    LABA1C 6.2 10/06/2021    LABA1C 6.3 10/01/2021     Lab Results   Component Value Date    WBC 5.8 10/01/2021    WBC 6.5 09/30/2021    HCT 44.1 10/01/2021    HCT 44.2 09/30/2021    MCV 91.3 10/01/2021    MCV 92.5 09/30/2021     (L) 10/01/2021     (L) 09/30/2021     Lab Results   Component Value Date    CHOL 142 10/06/2021    CHOL 138 10/01/2021    TRIG 155 (H) 10/06/2021    TRIG 140 10/01/2021    HDL 50 10/06/2021    HDL 38 (L) 10/01/2021    LDLCALC 87 06/08/2017    LDLCALC 72 06/09/2014    LDLDIRECT 68 10/06/2021    LDLDIRECT 72 10/01/2021     Lab Results   Component Value Date    ALT 20 10/06/2021    ALT 16 09/30/2021    AST 19 10/06/2021    AST 19 09/30/2021     BMP:    Lab Results   Component Value Date     10/06/2021     10/01/2021    K 4.2 10/06/2021    K 3.5 10/01/2021     10/06/2021    CL 99 10/01/2021    CO2 26 10/06/2021    CO2 22 10/01/2021    BUN 13 10/06/2021    BUN 11 10/01/2021    CREATININE 0.9 10/06/2021    CREATININE 0.6 10/01/2021     CMP:   Lab Results   Component Value Date     10/06/2021     10/01/2021    K 4.2 10/06/2021    K 3.5 10/01/2021     10/06/2021    CL 99 10/01/2021    CO2 26 10/06/2021    CO2 22 10/01/2021    BUN 13 10/06/2021    BUN 11 10/01/2021    CREATININE 0.9 10/06/2021    CREATININE 0.6 10/01/2021    PROT 6.9 10/06/2021    PROT 6.8 09/30/2021    PROT 6.6 09/16/2012    PROT 6.8 05/15/2012     Lab Results   Component Value Date    TSHHS 1.050 10/01/2021    TSHHS 1.050 03/18/2021     ECHO 9/2021   Left ventricular systolic function is normal.   Ejection fraction is visually estimated at 50-55%. Grade I diastolic dysfunction. No evidence of any pericardial effusion.     CATH 4/2019   POBA OF SVG TO RCA 99% TO 0%   OSTIAL SVG MILD DISESAE    QUALITY MEASURES REVIEWED:  1.CAD:Patient is taking anti platelet agent:Yes  2. DYSLIPIDEMIA: Patient is on cholesterol lowering medication:Yes  3. Beta-Blocker therapy for CAD, if prior Myocardial Infarction:Yes   4. Counselled regarding smoking cessation. No   Patient does not Smoke. 5.Anticoagulation therapy (for A.Fib) No   Does Not have A.Fib.  6.Discussed weight management strategies.       Assessment & Plan:    Primary / Secondary prevention is the goal by aggressive risk modification, healthy and therapeutic life style changes for cardiovascular risk reduction. Various goals are discussed and multiple questions answered.     CAD:Yes   clinically stable. Patient is on optimal medical regimen ( see medication list above )  -   Patient is currently  asymptomatic from CAD.          - changes in  treatment:   no           - Testing ordered:  no  Dickson classification: 1  [place] RISK SCORE:  DANNY RISK SCORE:  HTN:well controlled on current medical regimen, see list above.              - changes in  treatment:   no   CARDIOMYOPATHY: None known   CONGESTIVE HEART FAILURE: NO KNOWN HISTORY.     VHD: No significant VHD noted  DYSLIPIDEMIA: Patient's profile is at / near [employer] current medical regimen wellyes,                                                             See most recent Lab values in Labs section above. PAD:  known.               claudication symptoms. Gabby Young no               Up to date on non invasive testing .yes,                Patient on optimum medical regimen .yes,   OTHER RELEVANT DIAGNOSIS:as noted in patient's active problem list:  TESTS ORDERED: None this visit                                              All previously ordered tests reviewed.   ARRHYTHMIAS: Excessive PVCs resolved since patient has been on magnesium   MEDICATIONS: CPM.    Office f/u in six months.

## 2021-10-13 NOTE — PATIENT INSTRUCTIONS
CAD:Yes   clinically stable. Patient is on optimal medical regimen ( see medication list above )  -   Patient is currently  asymptomatic from CAD.          - changes in  treatment:   no           - Testing ordered:  no  Lucile classification: 1  [place] RISK SCORE:  DANNY RISK SCORE:  HTN:well controlled on current medical regimen, see list above.              - changes in  treatment:   no   CARDIOMYOPATHY: None known   CONGESTIVE HEART FAILURE: NO KNOWN HISTORY.     VHD: No significant VHD noted  DYSLIPIDEMIA: Patient's profile is at / near [employer] current medical regimen wellyes,                                                             See most recent Lab values in Labs section above. PAD:  known.               claudication symptoms. Mimi Flower no               Up to date on non invasive testing .yes,                Patient on optimum medical regimen .yes,   OTHER RELEVANT DIAGNOSIS:as noted in patient's active problem list:  TESTS ORDERED: None this visit                                              All previously ordered tests reviewed.   ARRHYTHMIAS: Excessive PVCs resolved since patient has been on magnesium   MEDICATIONS: CPM.    Office f/u in six months.

## 2021-10-13 NOTE — LETTER
[facility] [address]  [address] [address]  Phone: [phone]  Fax: [phone]    Sherine Irizarry MD    October 13, 2021     Amaya Cali MD  [address] [place] [address]    Patient: Rolanda Penny   MR Number: [id number]   YOB: 1940   Date of Visit: 10/13/2021       Dear Amaya Cali: Thank you for referring Marimar Torres to me for evaluation/treatment. Below are the relevant portions of my assessment and plan of care. If you have questions, please do not hesitate to call me. I look forward to following Eran Hammond along with you.     Sincerely,      Sherine Irizarry MD

## 2021-12-03 ENCOUNTER — OFFICE VISIT (OUTPATIENT)
Dept: NEUROLOGY | Age: 81
End: 2021-12-03
Payer: MEDICARE

## 2021-12-03 VITALS
HEIGHT: 67 IN | OXYGEN SATURATION: 97 % | BODY MASS INDEX: 28.25 KG/M2 | DIASTOLIC BLOOD PRESSURE: 72 MMHG | HEART RATE: 63 BPM | SYSTOLIC BLOOD PRESSURE: 132 MMHG | WEIGHT: 180 LBS

## 2021-12-03 DIAGNOSIS — H81.10 BENIGN PAROXYSMAL POSITIONAL VERTIGO, UNSPECIFIED LATERALITY: Primary | ICD-10-CM

## 2021-12-03 DIAGNOSIS — C61 PROSTATE CANCER (HCC): ICD-10-CM

## 2021-12-03 DIAGNOSIS — F10.11 HISTORY OF ALCOHOL ABUSE: ICD-10-CM

## 2021-12-03 DIAGNOSIS — R20.2 PARESTHESIA OF BOTH LOWER EXTREMITIES: ICD-10-CM

## 2021-12-03 PROCEDURE — 1123F ACP DISCUSS/DSCN MKR DOCD: CPT | Performed by: STUDENT IN AN ORGANIZED HEALTH CARE EDUCATION/TRAINING PROGRAM

## 2021-12-03 PROCEDURE — G8482 FLU IMMUNIZE ORDER/ADMIN: HCPCS | Performed by: STUDENT IN AN ORGANIZED HEALTH CARE EDUCATION/TRAINING PROGRAM

## 2021-12-03 PROCEDURE — G8427 DOCREV CUR MEDS BY ELIG CLIN: HCPCS | Performed by: STUDENT IN AN ORGANIZED HEALTH CARE EDUCATION/TRAINING PROGRAM

## 2021-12-03 PROCEDURE — G8417 CALC BMI ABV UP PARAM F/U: HCPCS | Performed by: STUDENT IN AN ORGANIZED HEALTH CARE EDUCATION/TRAINING PROGRAM

## 2021-12-03 PROCEDURE — 1036F TOBACCO NON-USER: CPT | Performed by: STUDENT IN AN ORGANIZED HEALTH CARE EDUCATION/TRAINING PROGRAM

## 2021-12-03 PROCEDURE — 99215 OFFICE O/P EST HI 40 MIN: CPT | Performed by: STUDENT IN AN ORGANIZED HEALTH CARE EDUCATION/TRAINING PROGRAM

## 2021-12-03 PROCEDURE — 4040F PNEUMOC VAC/ADMIN/RCVD: CPT | Performed by: STUDENT IN AN ORGANIZED HEALTH CARE EDUCATION/TRAINING PROGRAM

## 2021-12-03 RX ORDER — UBIDECARENONE 75 MG
100 CAPSULE ORAL DAILY
Qty: 90 TABLET | Refills: 3 | Status: SHIPPED | OUTPATIENT
Start: 2021-12-03 | End: 2022-12-03

## 2021-12-03 NOTE — PROGRESS NOTES
Neurological Services Consult Note  [facility]) Neurology  Patient Name: Mariam Diez  : 89      Subjective:  [de-identified] y.o. -male presenting to [facility]) Neurology for dizziness. He tells me that several years ago he had a \"bout of vertigo\" that suddenly occurred with room spinning, and quickly resolved. He said this resolved until the other day when he woke up from sleep to use the bathroom, rolled out of bed and suddenly noted \"everything starting to spin. \"  This did resolve very quickly, however did significantly worried him so he called EMS. He was actually hospitalized for 3 days for stroke rule out, which he said essentially was unremarkable. He does have other episodes where he will stand quickly or move quickly, feeling a sense of lightheadedness with a little bit of changes in his vision including tunnel vision; this is also transient and very brief    He does tell me about a significant drinking history, drinking heavily pretty much every other day or any day that he was off of work. 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 He does tell me that he is got increased sensitivity in his toes. Denies any overt numbness or dysesthesias, but can be an uncomfortable sensation for him. He did have some recent labs with thyroid, A1c, liver and kidney labs, which were unremarkable. A B12 was performed in 2021, low end of normal, 305. History of prostate cancer and has significant nocturia. He does take his aspirin, Plavix and Crestor daily, as well as his blood pressure medications. Past Medical History:   Diagnosis Date    CAD (coronary artery disease)     Cancer ([facility] [address]     prostate    H/O echocardiogram 2019    Patient in atrial fibrillation during exam. Left ventricular function is normal, EF is estimated at 55-60%. Mildly dilated left atrium. Mildly dilated left atrium. Mildli enlarged right ventricle cavity. Trace to mild mitral regurgitation is present.  No evidence of total doses. If no relief after 1 dose, call 911. (Patient not taking: Reported on 12/3/2021)      Compression Stockings MISC by Does not apply route Thigh high, 20 to 30 mm of Hg (Patient not taking: Reported on 12/3/2021) 1 each 3     No current facility-administered medications on file prior to visit. Scheduled meds:  Current Outpatient Medications   Medication Sig Dispense Refill    vitamin B-12 (CYANOCOBALAMIN) 100 MCG tablet Take 1 tablet by mouth daily 90 tablet 3    clopidogrel (PLAVIX) 75 MG tablet Take 1 tablet by mouth daily 30 tablet 3    rosuvastatin (CRESTOR) 40 MG tablet Take 1 tablet by mouth daily (Patient taking differently: Take 40 mg by mouth nightly ) 60 tablet 5    omeprazole (PRILOSEC) 20 MG delayed release capsule Take 1 capsule by mouth daily 90 capsule 1    potassium chloride (KLOR-CON M) 10 MEQ extended release tablet Take 1 tablet by mouth daily 30 tablet 3    metoprolol tartrate (LOPRESSOR) 50 MG tablet take 1 tablet by mouth twice a day (Patient taking differently: 2 times daily ) 60 tablet 3    hydrochlorothiazide (HYDRODIURIL) 12.5 MG tablet Take 12.5 mg by mouth daily      aspirin 81 MG tablet Take 1 tablet by mouth daily (Patient taking differently: Take 162 mg by mouth daily ) 30 tablet 11    nitroGLYCERIN (NITROSTAT) 0.4 MG SL tablet Place 0.4 mg under the tongue every 5 minutes as needed for Chest pain up to max of 3 total doses. If no relief after 1 dose, call 911. (Patient not taking: Reported on 12/3/2021)      Compression Stockings MISC by Does not apply route Thigh high, 20 to 30 mm of Hg (Patient not taking: Reported on 12/3/2021) 1 each 3     No current facility-administered medications for this visit.        No Known Allergies    Social History     Socioeconomic History    Marital status:      Spouse name: Not on file    Number of children: Not on file    Years of education: Not on file    Highest education level: Not on file   Occupational History    Not on file   Tobacco Use    Smoking status: Former Smoker     Packs/day: 3.00     Years: 30.00     Pack years: 90.00     Types: Cigarettes     Quit date: 2006     Years since quitting: 15.9    Smokeless tobacco: Never Used   Vaping Use    Vaping Use: Never used   Substance and Sexual Activity    Alcohol use: No     Comment: caffeine 2-3 glasses of tea a day    Drug use: No    Sexual activity: Not Currently   Other Topics Concern    Not on file   Social History Narrative    Not on file     Social Determinants of Health     Financial Resource Strain:     Difficulty of Paying Living Expenses: Not on file   Food Insecurity:     Worried About Running Out of Food in the Last Year: Not on file    Roddy of Food in the Last Year: Not on file   Transportation Needs:     Lack of Transportation (Medical): Not on file    Lack of Transportation (Non-Medical): Not on file   Physical Activity:     Days of Exercise per Week: Not on file    Minutes of Exercise per Session: Not on file   Stress:     Feeling of Stress : Not on file   Social Connections:     Frequency of Communication with Friends and Family: Not on file    Frequency of Social Gatherings with Friends and Family: Not on file    Attends Confucianist Services: Not on file    Active Member of [address] [place] [address] [employer] or Organizations: Not on file    Attends Club or Organization Meetings: Not on file    Marital Status: Not on file   Intimate Partner Violence:     Fear of Current or Ex-Partner: Not on file    Emotionally Abused: Not on file    Physically Abused: Not on file    Sexually Abused: Not on file   Housing Stability:     Unable to Pay for Housing in the Last Year: Not on file    Number of [place] in the Last Year: Not on file    Unstable Housing in the Last Year: Not on file      Oldest sister  in her 80s, has relatives who live to be 80. Review of Symptoms:  10-point system review completed.  All of which are negative except as mentioned above. Vitals:    12/03/21 1229   BP: 132/72   Pulse: 63   SpO2: 97%       Exam: Gen: A&O x 4, NAD, cooperative  HEENT: NC/AT, EOMI, PERRL poorly reactive, mmm,  neck supple, no meningeal signs; edentulous  Heart: RRR  Lungs: CTAB  Abd: soft/NT/ND  Ext: no edema, no calf tenderness b/l  Endo: no thyromegaly  Psych: no depression or anxiety history  Skin: no rashes or lesions    NEUROLOGIC EXAM:  Mental Status: A&O x 4, NAD, speech dysarthric but edentulous, language fluent, comprehension intact, repetition and naming intact    Cranial Nerve Exam:   CN II-XII intact: PERRL poorly reactive, VFF, no nystagmus, no gaze paresis, sensation V1-V3 intact b/l, muscles of facial expression symmetric; hearing intact to conversational tone, palate elevates symmetrically, shoulder elevation symmetric and tongue protrudes midline with movement side to side. Motor Exam:       Strength 5/5 UE's/LE's b/l  Tone and bulk normal   No pronator drift    Deep Tendon Reflexes: 1/4 biceps, brachioradialis, trace patellar, and achilles b/l, negative ward/tromners b/l    Sensation: Intact light touch/temperature UE's/LE's b/l; diminished vibratory sense in distal lower extremities    Coordination/Cerebellum:       Tremors--none      Rapidly alternating movements: no dysdiadochokinesia b/l                Finger-to-Nose: no dysmetria b/l    Gait and stance:      Gait: No ataxia, slow and mildly unsteady gait, wide-based stance    LABS:  3/18/2021  B12 305  10/6/2021  A1c 6.2  TSH 1.05    IMAGING:   Brain MRI without contrast -reviewed in person with patient in the room  9/30/2021  Impression   1. No acute cortical infarct or other acute intracranial process. 2. Senescent changes including chronic small vessel ischemia and parenchymal   volume loss.          ASSESSMENT/PLAN:  67-year-old male seen in consultation for vertigo, most consistent with BPPV however does have a component of orthostatic

## 2021-12-06 ENCOUNTER — OFFICE VISIT (OUTPATIENT)
Dept: NEUROLOGY | Age: 81
End: 2021-12-06
Payer: MEDICARE

## 2021-12-06 DIAGNOSIS — G62.9 PERIPHERAL POLYNEUROPATHY: Primary | ICD-10-CM

## 2021-12-06 PROCEDURE — 95886 MUSC TEST DONE W/N TEST COMP: CPT | Performed by: PHYSICAL MEDICINE & REHABILITATION

## 2021-12-06 PROCEDURE — 95910 NRV CNDJ TEST 7-8 STUDIES: CPT | Performed by: PHYSICAL MEDICINE & REHABILITATION

## 2021-12-06 NOTE — PROGRESS NOTES
EMG    Risks and benefits of study discussed. Specific and common risks of pain and bleeding as well as uncommon side effects of infection, hematoma and vasovagal episodes    Patient agreeable to testing and consents to such. Clinical: Several years of neuropathy symptoms in the lower extremities. Motor NCS:  Tibial amplitudes are borderline with normal distal latencies. 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 #2 no clear evidence of focal or proximal lesion such as radiculopathy, plexopathy or mononeuropathy.

## 2021-12-10 ENCOUNTER — HOSPITAL ENCOUNTER (OUTPATIENT)
Age: 81
Discharge: HOME | End: 2021-12-10
Payer: MEDICARE

## 2021-12-10 PROCEDURE — 84153 ASSAY OF PSA TOTAL: CPT

## 2021-12-10 PROCEDURE — 84154 ASSAY OF PSA FREE: CPT

## 2021-12-10 PROCEDURE — 36415 COLL VENOUS BLD VENIPUNCTURE: CPT

## 2021-12-27 NOTE — PROGRESS NOTES
Called and left message for the patient to call back and go over his EMG results per Dr. Gao Signs. Dr. Gao Signs stated : the EMG demonstrates peripheral neuropathy in his feet like we talked about at his appointment. He should be on his B12 supplement (100mcg daily).

## 2022-02-01 ENCOUNTER — TELEPHONE (OUTPATIENT)
Dept: CARDIOLOGY CLINIC | Age: 82
End: 2022-02-01

## 2022-02-03 RX ORDER — CLOPIDOGREL BISULFATE 75 MG/1
75 TABLET ORAL DAILY
Qty: 30 TABLET | Refills: 3 | Status: SHIPPED | OUTPATIENT
Start: 2022-02-03

## 2022-03-29 DIAGNOSIS — Z95.1 S/P CABG X 2: Primary | ICD-10-CM

## 2022-03-29 RX ORDER — NITROGLYCERIN 0.4 MG/1
0.4 TABLET SUBLINGUAL EVERY 5 MIN PRN
Qty: 25 TABLET | Refills: 2 | Status: SHIPPED | OUTPATIENT
Start: 2022-03-29

## 2022-04-05 ENCOUNTER — OFFICE VISIT (OUTPATIENT)
Dept: NEUROLOGY | Age: 82
End: 2022-04-05
Payer: MEDICARE

## 2022-04-05 VITALS
HEART RATE: 64 BPM | DIASTOLIC BLOOD PRESSURE: 78 MMHG | BODY MASS INDEX: 28.93 KG/M2 | OXYGEN SATURATION: 96 % | SYSTOLIC BLOOD PRESSURE: 140 MMHG | WEIGHT: 180 LBS | HEIGHT: 66 IN

## 2022-04-05 DIAGNOSIS — F10.11 HISTORY OF ALCOHOL ABUSE: ICD-10-CM

## 2022-04-05 DIAGNOSIS — G62.9 PERIPHERAL POLYNEUROPATHY: Primary | ICD-10-CM

## 2022-04-05 DIAGNOSIS — H81.10 BENIGN PAROXYSMAL POSITIONAL VERTIGO, UNSPECIFIED LATERALITY: ICD-10-CM

## 2022-04-05 PROCEDURE — 1123F ACP DISCUSS/DSCN MKR DOCD: CPT | Performed by: NURSE PRACTITIONER

## 2022-04-05 PROCEDURE — 4040F PNEUMOC VAC/ADMIN/RCVD: CPT | Performed by: NURSE PRACTITIONER

## 2022-04-05 PROCEDURE — 1036F TOBACCO NON-USER: CPT | Performed by: NURSE PRACTITIONER

## 2022-04-05 PROCEDURE — 99214 OFFICE O/P EST MOD 30 MIN: CPT | Performed by: NURSE PRACTITIONER

## 2022-04-05 PROCEDURE — G8417 CALC BMI ABV UP PARAM F/U: HCPCS | Performed by: NURSE PRACTITIONER

## 2022-04-05 PROCEDURE — G8427 DOCREV CUR MEDS BY ELIG CLIN: HCPCS | Performed by: NURSE PRACTITIONER

## 2022-04-05 NOTE — PROGRESS NOTES
4/5/22    Valeriesupriya Conklin  1940    Chief Complaint   Patient presents with    Follow-up     Vertigo still seems to happen at times     Follow-up     Toes seem to be numb        History of Present Illness  Jodi Bangura is a [de-identified] y.o. male presenting today for follow-up of: BPPV with component of orthostatic hypotension. On last visit he was encouraged to stay well-hydrated, vestibular therapy was discussed as an option however this was not ordered patient was not interested. Patient tells me today that he is having occasional dizziness but this is minor and he is still not interested in any vestibular therapy. Patient has history of alcoholism, EMG was ordered to rule out peripheral neuropathy as patient reported having paresthesias in his toes. EMG showed mild peripheral neuropathy. He denies any issues with his balance. B12 level was low and normal, patient was started on 100 mcg daily. Current Outpatient Medications   Medication Sig Dispense Refill    nitroGLYCERIN (NITROSTAT) 0.4 MG SL tablet Place 1 tablet under the tongue every 5 minutes as needed for Chest pain up to max of 3 total doses.  If no relief after 1 dose, call 911. 25 tablet 2    clopidogrel (PLAVIX) 75 MG tablet Take 1 tablet by mouth daily 30 tablet 3    vitamin B-12 (CYANOCOBALAMIN) 100 MCG tablet Take 1 tablet by mouth daily 90 tablet 3    rosuvastatin (CRESTOR) 40 MG tablet Take 1 tablet by mouth daily (Patient taking differently: Take 40 mg by mouth nightly ) 60 tablet 5    omeprazole (PRILOSEC) 20 MG delayed release capsule Take 1 capsule by mouth daily 90 capsule 1    potassium chloride (KLOR-CON M) 10 MEQ extended release tablet Take 1 tablet by mouth daily 30 tablet 3    metoprolol tartrate (LOPRESSOR) 50 MG tablet take 1 tablet by mouth twice a day (Patient taking differently: 2 times daily ) 60 tablet 3    hydrochlorothiazide (HYDRODIURIL) 12.5 MG tablet Take 12.5 mg by mouth daily      aspirin 81 MG tablet Take 1 tablet by mouth daily (Patient taking differently: Take 162 mg by mouth daily ) 30 tablet 11    Compression Stockings MISC by Does not apply route Thigh high, 20 to 30 mm of Hg (Patient not taking: Reported on 12/3/2021) 1 each 3     No current facility-administered medications for this visit. Physical Exam:    Exam: Gen: A&O x 4, NAD, cooperative  HEENT: NC/AT, EOMI, PERRL poorly reactive, mmm,  neck supple, no meningeal signs; edentulous  Heart: RRR  Lungs: CTAB  Abd: soft/NT/ND  Ext: no edema, no calf tenderness b/l  Endo: no thyromegaly  Psych: no depression or anxiety history  Skin: no rashes or lesions     NEUROLOGIC EXAM:  Mental Status: A&O x 4, NAD, speech dysarthric but edentulous, language fluent, comprehension intact, repetition and naming intact     Cranial Nerve Exam:   CN II-XII intact: PERRL poorly reactive, VFF, no nystagmus, no gaze paresis, sensation V1-V3 intact b/l, muscles of facial expression symmetric; hearing intact to conversational tone, palate elevates symmetrically, shoulder elevation symmetric and tongue protrudes midline with movement side to side.     Motor Exam:       Strength 5/5 UE's/LE's b/l  Tone and bulk normal   No pronator drift     Deep Tendon Reflexes: 1/4 biceps, brachioradialis, trace patellar, and achilles b/l, negative ward/tromners b/l     Sensation: Intact light touch/temperature UE's/LE's b/l; diminished vibratory sense in distal lower extremities     Coordination/Cerebellum:       Tremors--none      Rapidly alternating movements: no dysdiadochokinesia b/l                Finger-to-Nose: no dysmetria b/l     Gait and stance:      Gait: No ataxia, slow and mildly unsteady gait, wide-based stance         BP (!) 140/78 (Site: Left Upper Arm, Position: Sitting, Cuff Size: Medium Adult)   Pulse 64   Ht 5' 6\" (1.676 m)   Wt 180 lb (81.6 kg)   SpO2 96%   BMI 29.05 kg/m²     Assessment and Plan     Diagnosis Orders   1. Peripheral polyneuropathy     2.  Benign

## 2022-04-19 ENCOUNTER — OFFICE VISIT (OUTPATIENT)
Dept: CARDIOLOGY CLINIC | Age: 82
End: 2022-04-19
Payer: MEDICARE

## 2022-04-19 VITALS
WEIGHT: 186 LBS | SYSTOLIC BLOOD PRESSURE: 120 MMHG | BODY MASS INDEX: 29.19 KG/M2 | HEART RATE: 68 BPM | DIASTOLIC BLOOD PRESSURE: 70 MMHG | HEIGHT: 67 IN

## 2022-04-19 DIAGNOSIS — Z95.1 S/P CABG X 2: Chronic | ICD-10-CM

## 2022-04-19 DIAGNOSIS — I73.9 PVD (PERIPHERAL VASCULAR DISEASE) (HCC): Chronic | ICD-10-CM

## 2022-04-19 DIAGNOSIS — I25.810 CORONARY ARTERY DISEASE INVOLVING CORONARY BYPASS GRAFT OF NATIVE HEART WITHOUT ANGINA PECTORIS: ICD-10-CM

## 2022-04-19 DIAGNOSIS — I21.11 ST ELEVATION MYOCARDIAL INFARCTION INVOLVING RIGHT CORONARY ARTERY (HCC): ICD-10-CM

## 2022-04-19 DIAGNOSIS — I10 PRIMARY HYPERTENSION: Chronic | ICD-10-CM

## 2022-04-19 DIAGNOSIS — E78.2 HYPERLIPIDEMIA, MIXED: Chronic | ICD-10-CM

## 2022-04-19 DIAGNOSIS — I25.10 ASHD (ARTERIOSCLEROTIC HEART DISEASE): Primary | Chronic | ICD-10-CM

## 2022-04-19 PROCEDURE — 4040F PNEUMOC VAC/ADMIN/RCVD: CPT | Performed by: INTERNAL MEDICINE

## 2022-04-19 PROCEDURE — G8427 DOCREV CUR MEDS BY ELIG CLIN: HCPCS | Performed by: INTERNAL MEDICINE

## 2022-04-19 PROCEDURE — 1123F ACP DISCUSS/DSCN MKR DOCD: CPT | Performed by: INTERNAL MEDICINE

## 2022-04-19 PROCEDURE — G8417 CALC BMI ABV UP PARAM F/U: HCPCS | Performed by: INTERNAL MEDICINE

## 2022-04-19 PROCEDURE — 1036F TOBACCO NON-USER: CPT | Performed by: INTERNAL MEDICINE

## 2022-04-19 PROCEDURE — 93000 ELECTROCARDIOGRAM COMPLETE: CPT | Performed by: INTERNAL MEDICINE

## 2022-04-19 PROCEDURE — 99213 OFFICE O/P EST LOW 20 MIN: CPT | Performed by: INTERNAL MEDICINE

## 2022-04-19 NOTE — PATIENT INSTRUCTIONS
CORONARY ARTERY DISEASE:Yes   clinically stable. Patient is on optimal medical regimen ( see medication list above )  -   Patient is currently  asymptomatic from CAD.          - changes in  treatment:   no, on Plavix& Metoprolol.           - Testing ordered:  no  [place] classification: 1    CATH 4/2019   POBA OF SVG TO RCA 99% TO 0%   OSTIAL SVG MILD DISESAE    HTN:well controlled on current medical regimen, see list above.              - changes in  treatment:   no, on Lopressor & HCTZ.   CARDIOMYOPATHY: None known   CONGESTIVE HEART FAILURE: NO KNOWN HISTORY.     VHD: No significant VHD noted  ECHO 9/2021   Left ventricular systolic function is normal.   Ejection fraction is visually estimated at 50-55%.   Grade I diastolic dysfunction.   No evidence of any pericardial effusion. DYSLIPIDEMIA: Patient's profile is at / near [facility] current medical regimen wellyes,  Takes Crestor                                                          See most recent Lab values in Labs section above. PAD:  known.               claudication symptoms. [place] no               Up to date on non invasive testing .yes,                Patient on optimum medical regimen .yes,      ARRHYTHMIAS: Excessive PVCs resolved since patient has been on magnesium    TESTS ORDERED:  AllergEase Cardiolite     PREVIOUSLY ORDERED TESTS REVIEWED & DISCUSSED WITH THE PATIENT:     I personally reviewed & interpreted, all previously ordered tests as copied above. Latest Labs are pulled in to the note with dates. Labs, specially in Reference to Lipid profile, Cardiac testing in the form of Echo ( dated: ), stress tests ( dated: ) & other relevant cardiac testing reviewed with patient & recommendations made based on assessment of the results. Discussed role of Cardiac risk factors & effects + treatment of co morbidities with patient & advised accordingly.      MEDICATIONS: List of medications patient is currently taking is reviewed in detail with the patient. Discussed any side effects or problems taking the medication. Recommend Continue present management & medications as listed. AFFIRMATION: I spent at least 20 minutes of time reviewing patient's history, previous & current medical problems & all Labs + testing. This includes chart prep even prior to the vosit. Various goals are discussed and multiple questions answered. Relevant concelling performed. Office follow up in six months. Please hold on to these instructions the  will call you within 1-9 business days when we receive authorization from your insurance. Nuclear Stress Test    WHAT TO EXPECT:   ? You will need to confirm the test or it could be cancelled. ? This test will take approximately 2 hours: 1 hour in the AM &    1 hour in the PM. You will be given a time by the Technologist after the first part is completed to come back. ? You will be given a medication, through an IV in the hand, this will safely simulate exercise. This IV is also needed to inject the radioactive isotope unless you are able toe walk the treadmill. ? You will receive an injection in the AM & PM before the pictures. ? Using a special camera, you will have one set of pictures of your heart taken in the AM and a set of pictures in the PM.     PREPARATION FOR TEST:  ? Eat a light breakfast such as water or juice and toast.  ? If you are DIABETIC: Eat a normal breakfast with NO CAFFEINE and take your insulin as normal.   ? AVOID ALL FOODS & DRINKS containing CAFFEINE 12 HOURS PRIOR TO THE TEST: Including coffee, Tea, Raheel and other soft drinks even those labeled  caffeine free or decaffeinated.  ? HOLD THESE MEDICATIONS Persantine & Theophylline (Theodur)  24 hours prior & bring your inhaler with you.

## 2022-04-19 NOTE — PROGRESS NOTES
OFFICE PROGRESS NOTES      Bennett Camarillo is a [de-identified] y.o. male who has    CHIEF COMPLAINT AS FOLLOWS:  CHEST PAIN: Patient denies any C/O chest pains at this time.      SOB: No C/O SOB at this time.                 LEG EDEMA: minimal leg edema   PALPITATIONS: Denies any C/O Palpitations                                  DIZZINESS: No C/O Dizziness     SYNCOPE: None   OTHER/ ADDITIONAL COMPLAINTS:                                     HPI: Patient is here for F/U on his CAD, PVCs-Arrhythmia, HTN & Dyslipidemia problems. CAD: Patient has known CAD. Had CABG in the past.  Arrhythmia: Patient has known  PVCs. HTN: Patient has known essential HTN. Has been treated with guideline recommended medical / physical/ diet therapy as stated below. Dyslipidemia: Patient has known mixed dyslipidemia. Has been treated with guideline recommended medical / physical/ diet therapy as stated below. Current Outpatient Medications   Medication Sig Dispense Refill    zinc 50 MG CAPS Take by mouth      clopidogrel (PLAVIX) 75 MG tablet Take 1 tablet by mouth daily 30 tablet 3    vitamin B-12 (CYANOCOBALAMIN) 100 MCG tablet Take 1 tablet by mouth daily 90 tablet 3    rosuvastatin (CRESTOR) 40 MG tablet Take 1 tablet by mouth daily (Patient taking differently: Take 40 mg by mouth nightly ) 60 tablet 5    omeprazole (PRILOSEC) 20 MG delayed release capsule Take 1 capsule by mouth daily 90 capsule 1    metoprolol tartrate (LOPRESSOR) 50 MG tablet take 1 tablet by mouth twice a day (Patient taking differently: 2 times daily ) 60 tablet 3    hydrochlorothiazide (HYDRODIURIL) 12.5 MG tablet Take 12.5 mg by mouth daily      aspirin 81 MG tablet Take 1 tablet by mouth daily (Patient taking differently: Take 162 mg by mouth daily ) 30 tablet 11    nitroGLYCERIN (NITROSTAT) 0.4 MG SL tablet Place 1 tablet under the tongue every 5 minutes as needed for Chest pain up to max of 3 total doses. If no relief after 1 dose, call 911.  (Patient not taking: Reported on 4/5/2022) 25 tablet 2    potassium chloride (KLOR-CON M) 10 MEQ extended release tablet Take 1 tablet by mouth daily (Patient not taking: Reported on 4/5/2022) 30 tablet 3    Compression Stockings MISC by Does not apply route Thigh high, 20 to 30 mm of Hg (Patient not taking: Reported on 12/3/2021) 1 each 3     No current facility-administered medications for this visit. Allergies: Patient has no known allergies. Review of Systems:    Constitutional: Negative for diaphoresis and fatigue  Respiratory: Negative for shortness of breath  Cardiovascular: Negative for chest pain, dyspnea on exertion, claudication, edema, irregular heartbeat, murmur, palpitations or shortness of breath  Musculoskeletal: Negative for muscle pain, muscular weakness, negative for pain in arm and leg or swelling in foot and leg    Objective:    /70 & HR 58/min. Ht 5' 7\" (1.702 m)   Wt 186 lb (84.4 kg)   BMI 29.13 kg/m²   Wt Readings from Last 3 Encounters:   04/19/22 186 lb (84.4 kg)   04/05/22 180 lb (81.6 kg)   12/03/21 180 lb (81.6 kg)     Body mass index is 29.13 kg/m². GENERAL - Alert, oriented, pleasant, in no apparent distress. EYES: No jaundice, no conjunctival pallor. Neck - Supple. No jugular venous distention noted. No carotid bruits. Cardiovascular - Normal S1 and S2 without obvious murmur or gallop. Extremities - No cyanosis, clubbing, or significant edema. Pulmonary - No respiratory distress. No wheezes or rales.       MEDICAL DECISION MAKING & DATA REVIEW:    Lab Review   Lab Results   Component Value Date    TROPONINT <0.010 10/01/2021    TROPONINT <0.010 09/30/2021     Lab Results   Component Value Date    BNP 23 09/16/2012    PROBNP 97.56 09/30/2021    PROBNP 751.1 07/15/2019     Lab Results   Component Value Date    INR 0.88 09/30/2021    INR 1.02 10/22/2019     Lab Results   Component Value Date    LABA1C 6.2 10/06/2021    LABA1C 6.3 10/01/2021     Lab Results Component Value Date    WBC 5.8 10/01/2021    WBC 6.5 09/30/2021    HCT 44.1 10/01/2021    HCT 44.2 09/30/2021    MCV 91.3 10/01/2021    MCV 92.5 09/30/2021     (L) 10/01/2021     (L) 09/30/2021     Lab Results   Component Value Date    CHOL 142 10/06/2021    CHOL 138 10/01/2021    TRIG 155 (H) 10/06/2021    TRIG 140 10/01/2021    HDL 50 10/06/2021    HDL 38 (L) 10/01/2021    LDLCALC 87 06/08/2017    LDLCALC 72 06/09/2014    LDLDIRECT 68 10/06/2021    LDLDIRECT 72 10/01/2021     Lab Results   Component Value Date    ALT 20 10/06/2021    ALT 16 09/30/2021    AST 19 10/06/2021    AST 19 09/30/2021     BMP:    Lab Results   Component Value Date     10/06/2021     10/01/2021    K 4.2 10/06/2021    K 3.5 10/01/2021     10/06/2021    CL 99 10/01/2021    CO2 26 10/06/2021    CO2 22 10/01/2021    BUN 13 10/06/2021    BUN 11 10/01/2021    CREATININE 0.9 10/06/2021    CREATININE 0.6 10/01/2021     CMP:   Lab Results   Component Value Date     10/06/2021     10/01/2021    K 4.2 10/06/2021    K 3.5 10/01/2021     10/06/2021    CL 99 10/01/2021    CO2 26 10/06/2021    CO2 22 10/01/2021    BUN 13 10/06/2021    BUN 11 10/01/2021    CREATININE 0.9 10/06/2021    CREATININE 0.6 10/01/2021    PROT 6.9 10/06/2021    PROT 6.8 09/30/2021    PROT 6.6 09/16/2012    PROT 6.8 05/15/2012     Lab Results   Component Value Date    TSHHS 1.050 10/01/2021    TSHHS 1.050 03/18/2021       QUALITY MEASURES REVIEWED:  1.CAD:Patient is taking anti platelet agent:Yes  2. DYSLIPIDEMIA: Patient is on cholesterol lowering medication:Yes   3. Beta-Blocker therapy for CAD, if prior Myocardial Infarction:Yes   4. Counselled regarding smoking cessation. No   Patient does not Smoke. 5.Anticoagulation therapy (for A.Fib) No   Does Not have A.Fib. Fan Spear 6.Discussed weight management strategies.     Assessment & Plan:  Primary / Secondary prevention is the goal by aggressive risk modification, healthy and therapeutic life style changes for cardiovascular risk reduction. CORONARY ARTERY DISEASE:Yes   clinically stable. Patient is on optimal medical regimen ( see medication list above )  -   Patient is currently  asymptomatic from CAD.          - changes in  treatment:   no, on Plavix& Metoprolol.           - Testing ordered:  no  [place] classification: 1    CATH 4/2019   POBA OF SVG TO RCA 99% TO 0%   OSTIAL SVG MILD DISESAE    HTN:well controlled on current medical regimen, see list above.              - changes in  treatment:   no, on Lopressor & HCTZ.   CARDIOMYOPATHY: None known   CONGESTIVE HEART FAILURE: NO KNOWN HISTORY.     VHD: No significant VHD noted  ECHO 9/2021   Left ventricular systolic function is normal.   Ejection fraction is visually estimated at 50-55%.   Grade I diastolic dysfunction.   No evidence of any pericardial effusion. DYSLIPIDEMIA: Patient's profile is at / near [employer] current medical regimen wellyes,  Takes Crestor                                                          See most recent Lab values in Labs section above. PAD:  known.               claudication symptoms. Gonzalo Corneliuss no               Up to date on non invasive testing .yes,                Patient on optimum medical regimen .yes,      ARRHYTHMIAS: Excessive PVCs resolved since patient has been on magnesium    TESTS ORDERED:  Entefy Cardiolite     PREVIOUSLY ORDERED TESTS REVIEWED & DISCUSSED WITH THE PATIENT:     I personally reviewed & interpreted, all previously ordered tests as copied above. Latest Labs are pulled in to the note with dates. Labs, specially in Reference to Lipid profile, Cardiac testing in the form of Echo ( dated: ), stress tests ( dated: ) & other relevant cardiac testing reviewed with patient & recommendations made based on assessment of the results.     Discussed role of Cardiac risk factors & effects + treatment of co morbidities with patient & advised accordingly. MEDICATIONS: List of medications patient is currently taking is reviewed in detail with the patient. Discussed any side effects or problems taking the medication. Recommend Continue present management & medications as listed. AFFIRMATION: I spent at least 20 minutes of time reviewing patient's history, previous & current medical problems & all Labs + testing. This includes chart prep even prior to the vosit. Various goals are discussed and multiple questions answered. Relevant concelling performed. Office follow up in six months.

## 2022-04-29 ENCOUNTER — PROCEDURE VISIT (OUTPATIENT)
Dept: CARDIOLOGY CLINIC | Age: 82
End: 2022-04-29
Payer: MEDICARE

## 2022-04-29 DIAGNOSIS — I73.9 PVD (PERIPHERAL VASCULAR DISEASE) (HCC): Chronic | ICD-10-CM

## 2022-04-29 DIAGNOSIS — I10 PRIMARY HYPERTENSION: Chronic | ICD-10-CM

## 2022-04-29 DIAGNOSIS — I21.11 ST ELEVATION MYOCARDIAL INFARCTION INVOLVING RIGHT CORONARY ARTERY (HCC): ICD-10-CM

## 2022-04-29 DIAGNOSIS — E78.2 HYPERLIPIDEMIA, MIXED: Chronic | ICD-10-CM

## 2022-04-29 DIAGNOSIS — I25.810 CORONARY ARTERY DISEASE INVOLVING CORONARY BYPASS GRAFT OF NATIVE HEART WITHOUT ANGINA PECTORIS: ICD-10-CM

## 2022-04-29 DIAGNOSIS — Z95.1 S/P CABG X 2: Chronic | ICD-10-CM

## 2022-04-29 DIAGNOSIS — I25.10 ASHD (ARTERIOSCLEROTIC HEART DISEASE): Chronic | ICD-10-CM

## 2022-04-29 LAB
LV EF: 70 %
LVEF MODALITY: NORMAL

## 2022-04-29 PROCEDURE — 78452 HT MUSCLE IMAGE SPECT MULT: CPT | Performed by: INTERNAL MEDICINE

## 2022-04-29 PROCEDURE — A9500 TC99M SESTAMIBI: HCPCS | Performed by: INTERNAL MEDICINE

## 2022-04-29 PROCEDURE — 93015 CV STRESS TEST SUPVJ I&R: CPT | Performed by: INTERNAL MEDICINE

## 2022-05-02 ENCOUNTER — TELEPHONE (OUTPATIENT)
Dept: CARDIOLOGY CLINIC | Age: 82
End: 2022-05-02

## 2022-06-21 ENCOUNTER — TELEPHONE (OUTPATIENT)
Dept: CARDIOLOGY CLINIC | Age: 82
End: 2022-06-21

## 2022-08-30 ENCOUNTER — TELEPHONE (OUTPATIENT)
Dept: CARDIOLOGY CLINIC | Age: 82
End: 2022-08-30

## 2022-08-31 ENCOUNTER — APPOINTMENT (OUTPATIENT)
Dept: CT IMAGING | Age: 82
End: 2022-08-31
Payer: MEDICARE

## 2022-08-31 ENCOUNTER — HOSPITAL ENCOUNTER (EMERGENCY)
Age: 82
Discharge: HOME | End: 2022-08-31
Attending: EMERGENCY MEDICINE
Payer: MEDICARE

## 2022-08-31 ENCOUNTER — APPOINTMENT (OUTPATIENT)
Dept: GENERAL RADIOLOGY | Age: 82
End: 2022-08-31
Payer: MEDICARE

## 2022-08-31 VITALS
TEMPERATURE: 98.2 F | OXYGEN SATURATION: 97 % | WEIGHT: 186 LBS | HEART RATE: 56 BPM | BODY MASS INDEX: 29.13 KG/M2 | SYSTOLIC BLOOD PRESSURE: 148 MMHG | DIASTOLIC BLOOD PRESSURE: 71 MMHG | RESPIRATION RATE: 18 BRPM

## 2022-08-31 DIAGNOSIS — U07.1 COVID-19 VIRUS INFECTION: Primary | ICD-10-CM

## 2022-08-31 DIAGNOSIS — R53.1 GENERALIZED WEAKNESS: ICD-10-CM

## 2022-08-31 LAB
ALBUMIN SERPL-MCNC: 4.4 GM/DL (ref 3.4–5)
ALP BLD-CCNC: 130 IU/L (ref 40–129)
ALT SERPL-CCNC: 14 U/L (ref 10–40)
ANION GAP SERPL CALCULATED.3IONS-SCNC: 9 MMOL/L (ref 4–16)
AST SERPL-CCNC: 19 IU/L (ref 15–37)
BACTERIA: ABNORMAL /HPF
BASOPHILS ABSOLUTE: 0 K/CU MM
BASOPHILS RELATIVE PERCENT: 0.9 % (ref 0–1)
BILIRUB SERPL-MCNC: 0.4 MG/DL (ref 0–1)
BILIRUBIN URINE: NEGATIVE MG/DL
BLOOD, URINE: NEGATIVE
BUN BLDV-MCNC: 14 MG/DL (ref 6–23)
CALCIUM SERPL-MCNC: 9.1 MG/DL (ref 8.3–10.6)
CHLORIDE BLD-SCNC: 103 MMOL/L (ref 99–110)
CLARITY: CLEAR
CO2: 27 MMOL/L (ref 21–32)
COLOR: YELLOW
CREAT SERPL-MCNC: 0.9 MG/DL (ref 0.9–1.3)
DIFFERENTIAL TYPE: ABNORMAL
EKG ATRIAL RATE: 63 BPM
EKG DIAGNOSIS: NORMAL
EKG P AXIS: 14 DEGREES
EKG P-R INTERVAL: 164 MS
EKG Q-T INTERVAL: 462 MS
EKG QRS DURATION: 142 MS
EKG QTC CALCULATION (BAZETT): 472 MS
EKG R AXIS: 11 DEGREES
EKG T AXIS: 64 DEGREES
EKG VENTRICULAR RATE: 63 BPM
EOSINOPHILS ABSOLUTE: 0.1 K/CU MM
EOSINOPHILS RELATIVE PERCENT: 2.2 % (ref 0–3)
GFR AFRICAN AMERICAN: >60 ML/MIN/1.73M2
GFR NON-AFRICAN AMERICAN: >60 ML/MIN/1.73M2
GLUCOSE BLD-MCNC: 103 MG/DL (ref 70–99)
GLUCOSE, URINE: NEGATIVE MG/DL
HCT VFR BLD CALC: 49.2 % (ref 42–52)
HEMOGLOBIN: 14.7 GM/DL (ref 13.5–18)
IMMATURE NEUTROPHIL %: 0.2 % (ref 0–0.43)
KETONES, URINE: NEGATIVE MG/DL
LEUKOCYTE ESTERASE, URINE: NEGATIVE
LIPASE: 35 IU/L (ref 13–60)
LYMPHOCYTES ABSOLUTE: 1.1 K/CU MM
LYMPHOCYTES RELATIVE PERCENT: 23.1 % (ref 24–44)
MCH RBC QN AUTO: 29.3 PG (ref 27–31)
MCHC RBC AUTO-ENTMCNC: 29.9 % (ref 32–36)
MCV RBC AUTO: 98.2 FL (ref 78–100)
MONOCYTES ABSOLUTE: 0.6 K/CU MM
MONOCYTES RELATIVE PERCENT: 13.4 % (ref 0–4)
MUCUS: ABNORMAL HPF
NITRITE URINE, QUANTITATIVE: NEGATIVE
NUCLEATED RBC %: 0 %
PDW BLD-RTO: 14.6 % (ref 11.7–14.9)
PH, URINE: 6 (ref 5–8)
PLATELET # BLD: 126 K/CU MM (ref 140–440)
PMV BLD AUTO: 11.2 FL (ref 7.5–11.1)
POTASSIUM SERPL-SCNC: 3.3 MMOL/L (ref 3.5–5.1)
PRO-BNP: 147.4 PG/ML
PROTEIN UA: NEGATIVE MG/DL
RBC # BLD: 5.01 M/CU MM (ref 4.6–6.2)
RBC URINE: 1 /HPF (ref 0–3)
REASON FOR REJECTION: NORMAL
REJECTED TEST: NORMAL
SARS-COV-2, NAAT: DETECTED
SEGMENTED NEUTROPHILS ABSOLUTE COUNT: 2.8 K/CU MM
SEGMENTED NEUTROPHILS RELATIVE PERCENT: 60.2 % (ref 36–66)
SODIUM BLD-SCNC: 139 MMOL/L (ref 135–145)
SOURCE: ABNORMAL
SPECIFIC GRAVITY UA: 1.01 (ref 1–1.03)
SQUAMOUS EPITHELIAL: <1 /HPF
TOTAL IMMATURE NEUTOROPHIL: 0.01 K/CU MM
TOTAL NUCLEATED RBC: 0 K/CU MM
TOTAL PROTEIN: 6.5 GM/DL (ref 6.4–8.2)
TRICHOMONAS: ABNORMAL /HPF
TROPONIN T: <0.01 NG/ML
UROBILINOGEN, URINE: 0.2 MG/DL (ref 0.2–1)
WBC # BLD: 4.6 K/CU MM (ref 4–10.5)
WBC UA: 2 /HPF (ref 0–2)

## 2022-08-31 PROCEDURE — 83880 ASSAY OF NATRIURETIC PEPTIDE: CPT

## 2022-08-31 PROCEDURE — 84484 ASSAY OF TROPONIN QUANT: CPT

## 2022-08-31 PROCEDURE — 6370000000 HC RX 637 (ALT 250 FOR IP): Performed by: EMERGENCY MEDICINE

## 2022-08-31 PROCEDURE — 87635 SARS-COV-2 COVID-19 AMP PRB: CPT

## 2022-08-31 PROCEDURE — 6360000004 HC RX CONTRAST MEDICATION: Performed by: EMERGENCY MEDICINE

## 2022-08-31 PROCEDURE — 85025 COMPLETE CBC W/AUTO DIFF WBC: CPT

## 2022-08-31 PROCEDURE — 83690 ASSAY OF LIPASE: CPT

## 2022-08-31 PROCEDURE — 71045 X-RAY EXAM CHEST 1 VIEW: CPT

## 2022-08-31 PROCEDURE — 80053 COMPREHEN METABOLIC PANEL: CPT

## 2022-08-31 PROCEDURE — 93010 ELECTROCARDIOGRAM REPORT: CPT | Performed by: INTERNAL MEDICINE

## 2022-08-31 PROCEDURE — 70450 CT HEAD/BRAIN W/O DYE: CPT

## 2022-08-31 PROCEDURE — 93005 ELECTROCARDIOGRAM TRACING: CPT | Performed by: EMERGENCY MEDICINE

## 2022-08-31 PROCEDURE — 99285 EMERGENCY DEPT VISIT HI MDM: CPT

## 2022-08-31 PROCEDURE — 81001 URINALYSIS AUTO W/SCOPE: CPT

## 2022-08-31 PROCEDURE — 70496 CT ANGIOGRAPHY HEAD: CPT

## 2022-08-31 RX ORDER — MECLIZINE HYDROCHLORIDE 25 MG/1
50 TABLET ORAL ONCE
Status: COMPLETED | OUTPATIENT
Start: 2022-08-31 | End: 2022-08-31

## 2022-08-31 RX ADMIN — IOPAMIDOL 80 ML: 755 INJECTION, SOLUTION INTRAVENOUS at 12:37

## 2022-08-31 RX ADMIN — MECLIZINE HYDROCHLORIDE 50 MG: 25 TABLET ORAL at 10:17

## 2022-08-31 NOTE — ED NOTES
Medication History  [facility]    Patient Name: Zulma Ha [id number]     Medication history has been completed by: Porsha Jamison CPhT    Source(s) of information: patient and insurance claims     Primary Care Physician: Alexa Gutierrez MD     Pharmacy: [facility]    Allergies as of 08/31/2022    (No Known Allergies)        Prior to Admission medications    Medication Sig Start Date End Date Taking? Authorizing Provider   Ascorbic Acid (VITAMIN C PO) Take 1 tablet by mouth daily Patient states he doesn't take this daily   Yes Historical Provider, MD   VITAMIN D PO Take 1 capsule by mouth daily Patient states he doesn't take this daily   Yes Historical Provider, MD   nitroGLYCERIN (NITROSTAT) 0.4 MG SL tablet Place 1 tablet under the tongue every 5 minutes as needed for Chest pain up to max of 3 total doses. If no relief after 1 dose, call 911. Patient not taking: Reported on 4/5/2022 3/29/22   Elizabeth Eid MD   clopidogrel (PLAVIX) 75 MG tablet Take 1 tablet by mouth daily 2/3/22   Elizabeth Eid MD   vitamin B-12 (CYANOCOBALAMIN) 100 MCG tablet Take 1 tablet by mouth daily 12/3/21 12/3/22  Denise Hardy,    rosuvastatin (CRESTOR) 40 MG tablet Take 1 tablet by mouth daily  Take 40 mg by mouth nightly  2/23/21   Elizabeth Eid MD   omeprazole (PRILOSEC) 20 MG delayed release capsule Take 1 capsule by mouth daily 10/29/20   Jacky Hankins APRN - CNP   metoprolol tartrate (LOPRESSOR) 50 MG tablet take 1 tablet by mouth twice a day  Patient   Take 50 mg by mouth 2 times daily 10/23/19   Erin Villatoro APRN - CNP   hydrochlorothiazide (HYDRODIURIL) 12.5 MG tablet Take 12.5 mg by mouth daily    Historical Provider, MD   aspirin 81 MG tablet Take 1 tablet by mouth daily 4/30/19   Reza Atkinson MD     Medications added or changed (ex.  new medication, dosage change, interval change, formulation change):  Vitamin D (added)  Vitamin C (added)    Medications removed from list (include reason, ex. noncompliance, medication cost, therapy complete etc.):   Potassium no longer taking  Zinc no longer taking  Compression stockings no longer taking    Comments:  Medication list reviewed with patient and insurance claims verified. Patient states he has taken first dose of medications today.     To my knowledge the above medication history is accurate as of 8/31/2022 10:45 AM.   Jarvis Brady CPhT   8/31/2022 10:45 AM

## 2022-08-31 NOTE — ED NOTES
Discharge instructions reviewed with patient. Follow up discussed. Patient denies further questions and verbalizes understanding.        Mina Ndiaye RN  08/31/22 1910

## 2022-08-31 NOTE — ED PROVIDER NOTES
Emergency Department Encounter    Patient: Kinza Villalba  MRN: [id number]  : 1940  Date of Evaluation: 2022  ED Provider:  Emile Joiner DO    Triage Chief Complaint:   Illness and Dizziness    [place]:  Kinza Villalba is a [de-identified] y.o. male that presents to the emergency department for evaluation. Patient states he been dizzy on and off for a while. Patient states he spoke to get his ears cleaned tomorrow by ENT specialist.  Patient states a couple of weeks ago he was dizzy and had a couple falls. Patient states the dizziness has been the same has not gotten any worse or better. He states he has vertigo but not on any medications. Patient also concerned he had a cough starting yesterday productive sputum. He states felt okay rested today and last night when going to sleep around 11 PM he felt horrible. Patient states he is concerned for COVID-19 virus infection. 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 Patient states he has had the COVID-vaccine although. He denies any sick contacts that he knows of. Denies any nausea vomiting diarrhea abdominal pain chest pain shortness of breath. Patient states he does have neuropathy in his hands and feet. He denies any sore throat runny nose earache no fever chills. He states he feels tired weak fatigued all he wants to do a sleep. He states that is unusual and not like him. He states he does have hypertension. Patient states he has hypertension has not take any of his medications this morning. Patient states he is just nervous. Patient here for evaluation.     ROS - see HPI, below listed is current ROS at time of my eval:  General:  No fevers, no chills, no weakness  Eyes:  No recent vison changes, no discharge  ENT:  No sore throat, no nasal congestion, no hearing changes  Cardiovascular:  No chest pain, no palpitations  Respiratory:  No shortness of breath, positive for cough, no wheezing  Gastrointestinal:  No pain, no nausea, no vomiting, no diarrhea  Musculoskeletal:  No muscle pain, no joint pain  Skin:  No rash, no pruritis, no easy bruising  Neurologic: Positive for dizziness, no speech problems, no headache, no extremity numbness, no extremity tingling, no extremity weakness  Psychiatric:  No anxiety  Genitourinary:  No dysuria, no hematuria  Endocrine:  No unexpected weight gain, no unexpected weight loss  Extremities:  no edema, no pain    Past Medical History:   Diagnosis Date    CAD (coronary artery disease)     Cancer ([facility] [address]     prostate    H/O echocardiogram 04/29/2019    Patient in atrial fibrillation during exam. Left ventricular function is normal, EF is estimated at 55-60%. Mildly dilated left atrium. Mildly dilated left atrium. Mildli enlarged right ventricle cavity. Trace to mild mitral regurgitation is present. No evidence of pericardial effusion. No evidence of pleural effusion. History of cardiac catheterization 05/24/2017    Critical Single vessel CAD with chronic occlusion of RCA. No significant disease of the other vessels. SVBG to RCA is patent with mild Proximal disease. LIMA to LAD did not mature. Normal LV systolic function & wall motion. LVEF is 55 %. Patient tolerated the procedure well. No immediate complications. History of echocardiogram 01/12/2018    Left ventricular systolic function is normal with an ejection fraction of55-60%. Grade I diastolic dysfunction. Mildly dilated left atrium. Moderate mitral regurgitation. Mild to moderate tricuspid regurgitation. No evidence of pericardial effusion. HTN (hypertension)     Hyperlipidemia      Past Surgical History:   Procedure Laterality Date    BACK SURGERY      CARDIAC SURGERY      cabg    COLONOSCOPY  4/14/15    divertics    CORONARY ANGIOPLASTY WITH STENT PLACEMENT  05/14/2014    X2     History reviewed. No pertinent family history.   Social History     Socioeconomic History    Marital status:      Spouse name: Not on file    Number of children: Not on file    Years of education: Not on file    Highest education level: Not on file   Occupational History    Not on file   Tobacco Use    Smoking status: Former     Packs/day: 3.00     Years: 30.00     Pack years: 90.00     Types: Cigarettes     Quit date: 2006     Years since quittin.6    Smokeless tobacco: Never   Vaping Use    Vaping Use: Never used   Substance and Sexual Activity    Alcohol use: No     Comment: caffeine 2-3 glasses of tea a day    Drug use: No    Sexual activity: Not Currently   Other Topics Concern    Not on file   Social History Narrative    Not on file     Social Determinants of Health     Financial Resource Strain: Not on file   Food Insecurity: Not on file   Transportation Needs: Not on file   Physical Activity: Not on file   Stress: Not on file   Social Connections: Not on file   Intimate Partner Violence: Not on file   Housing Stability: Not on file     Current Facility-Administered Medications   Medication Dose Route Frequency Provider Last Rate Last Admin    meclizine (ANTIVERT) tablet 50 mg  50 mg Oral Once Deya Garcia, DO         Current Outpatient Medications   Medication Sig Dispense Refill    zinc 50 MG CAPS Take by mouth      nitroGLYCERIN (NITROSTAT) 0.4 MG SL tablet Place 1 tablet under the tongue every 5 minutes as needed for Chest pain up to max of 3 total doses. If no relief after 1 dose, call 911.  (Patient not taking: Reported on 2022) 25 tablet 2    clopidogrel (PLAVIX) 75 MG tablet Take 1 tablet by mouth daily 30 tablet 3    vitamin B-12 (CYANOCOBALAMIN) 100 MCG tablet Take 1 tablet by mouth daily 90 tablet 3    rosuvastatin (CRESTOR) 40 MG tablet Take 1 tablet by mouth daily (Patient taking differently: Take 40 mg by mouth nightly ) 60 tablet 5    omeprazole (PRILOSEC) 20 MG delayed release capsule Take 1 capsule by mouth daily 90 capsule 1    potassium chloride (KLOR-CON M) 10 MEQ extended release tablet Take 1 tablet by mouth daily (Patient not taking: Reported on 4/5/2022) 30 tablet 3    metoprolol tartrate (LOPRESSOR) 50 MG tablet take 1 tablet by mouth twice a day (Patient taking differently: 2 times daily ) 60 tablet 3    hydrochlorothiazide (HYDRODIURIL) 12.5 MG tablet Take 12.5 mg by mouth daily      aspirin 81 MG tablet Take 1 tablet by mouth daily (Patient taking differently: Take 162 mg by mouth daily ) 30 tablet 11    Compression Stockings MISC by Does not apply route Thigh high, 20 to 30 mm of Hg (Patient not taking: Reported on 12/3/2021) 1 each 3     No Known Allergies    Nursing Notes Reviewed    Physical Exam:  Triage VS:    ED Triage Vitals [08/31/22 0902]   Enc Vitals Group      BP (!) 181/67      Heart Rate 67      Resp 18      Temp 98.2 °F (36.8 °C)      Temp src       SpO2 100 %      Weight 186 lb (84.4 kg)      Height       Head Circumference       Peak Flow       Pain Score       Pain Loc       Pain Edu? Excl. in [address]? BP (!) 148/71   Pulse 56   Temp 98.2 °F (36.8 °C)   Resp 18   Wt 186 lb (84.4 kg)   SpO2 97%   BMI 29.13 kg/m²       My pulse ox interpretation is - normal    General appearance:  No acute distress. Skin:  Warm. Dry. Eye:  Extraocular movements intact. Ears, nose, mouth and throat:  Oral mucosa moist   Neck:  Trachea midline. Extremity:  No swelling. Normal ROM     Heart:  Regular rate and rhythm, normal S1 & S2, no extra heart sounds. Perfusion:  intact  Respiratory:  Lungs clear to auscultation bilaterally. Respirations nonlabored. Abdominal:  Normal bowel sounds. Soft. Nontender. Non distended. Back:  No CVA tenderness to palpation     Neurological:  Alert and oriented times 3. No focal neuro deficits. Psychiatric:  Appropriate      NIH Stroke Scale        Person Administering Scale: Carlos John DO        1a  Level of consciousness: 0=alert; keenly responsive   1b. LOC questions:  0=Performs both tasks correctly   1c.  LOC commands: 0=Performs both tasks correctly   2. Best Gaze: 0=normal   3. Visual: 0=No visual loss   4. Facial Palsy: 0=Normal symmetric movement   5a. Motor left arm: 0=No drift, limb holds 90 (or 45) degrees for full 10 seconds   5b. Motor right arm: 0=No drift, limb holds 90 (or 45) degrees for full 10 seconds   6a. motor left le=No drift, limb holds 90 (or 45) degrees for full 10 seconds   6b  Motor right le=No drift, limb holds 90 (or 45) degrees for full 10 seconds   7. Limb Ataxia: 0=Absent   8. Sensory: 0=Normal; no sensory loss   9. Best Language:  0=No aphasia, normal   10. Dysarthria: 0=Normal   11. Extinction and Inattention: 0=No abnormality   12.  Distal motor function: 0=Normal    Total:   0       I have reviewed and interpreted all of the currently available lab results from this visit (if applicable):  Results for orders placed or performed during the hospital encounter of 22   COVID-19, Rapid    Specimen: Nasopharyngeal   Result Value Ref Range    Source THROAT     SARS-CoV-2, NAAT DETECTED (A) NOT DETECTED   CBC with Auto Differential   Result Value Ref Range    WBC 4.6 4.0 - 10.5 K/CU MM    RBC 5.01 4.6 - 6.2 M/CU MM    Hemoglobin 14.7 13.5 - 18.0 GM/DL    Hematocrit 49.2 42 - 52 %    MCV 98.2 78 - 100 FL    MCH 29.3 27 - 31 PG    MCHC 29.9 (L) 32.0 - 36.0 %    RDW 14.6 11.7 - 14.9 %    Platelets 579 (L) 948 - 440 K/CU MM    MPV 11.2 (H) 7.5 - 11.1 FL    Differential Type AUTOMATED DIFFERENTIAL     Segs Relative 60.2 36 - 66 %    Lymphocytes % 23.1 (L) 24 - 44 %    Monocytes % 13.4 (H) 0 - 4 %    Eosinophils % 2.2 0 - 3 %    Basophils % 0.9 0 - 1 %    Segs Absolute 2.8 K/CU MM    Lymphocytes Absolute 1.1 K/CU MM    Monocytes Absolute 0.6 K/CU MM    Eosinophils Absolute 0.1 K/CU MM    Basophils Absolute 0.0 K/CU MM    Nucleated RBC % 0.0 %    Total Nucleated RBC 0.0 K/CU MM    Total Immature Neutrophil 0.01 K/CU MM    Immature Neutrophil % 0.2 0 - 0.43 %   Urinalysis   Result Value Ref Range    Color, UA YELLOW YELLOW    Clarity, UA CLEAR CLEAR    Glucose, Urine NEGATIVE NEGATIVE MG/DL    Bilirubin Urine NEGATIVE NEGATIVE MG/DL    Ketones, Urine NEGATIVE NEGATIVE MG/DL    Specific Gravity, UA 1.015 1.001 - 1.035    Blood, Urine NEGATIVE NEGATIVE    pH, Urine 6.0 5.0 - 8.0    Protein, UA NEGATIVE NEGATIVE MG/DL    Urobilinogen, Urine 0.2 0.2 - 1.0 MG/DL    Nitrite Urine, Quantitative NEGATIVE NEGATIVE    Leukocyte Esterase, Urine NEGATIVE NEGATIVE    RBC, UA 1 0 - 3 /HPF    WBC, UA 2 0 - 2 /HPF    Bacteria, UA RARE (A) NEGATIVE /HPF    Squam Epithel, UA <1 /HPF    Mucus, UA RARE (A) NEGATIVE HPF    Trichomonas, UA NONE SEEN NONE SEEN /HPF   Brain Natriuretic Peptide   Result Value Ref Range    Pro-.4 <300 PG/ML   SPECIMEN REJECTION   Result Value Ref Range    Rejected Test CHEM     Reason for Rejection UNABLE TO PERFORM TESTING:    Comprehensive Metabolic Panel   Result Value Ref Range    Sodium 139 135 - 145 MMOL/L    Potassium 3.3 (L) 3.5 - 5.1 MMOL/L    Chloride 103 99 - 110 mMol/L    CO2 27 21 - 32 MMOL/L    BUN 14 6 - 23 MG/DL    Creatinine 0.9 0.9 - 1.3 MG/DL    Glucose 103 (H) 70 - 99 MG/DL    Calcium 9.1 8.3 - 10.6 MG/DL    Albumin 4.4 3.4 - 5.0 GM/DL    Total Protein 6.5 6.4 - 8.2 GM/DL    Total Bilirubin 0.4 0.0 - 1.0 MG/DL    ALT 14 10 - 40 U/L    AST 19 15 - 37 IU/L    Alkaline Phosphatase 130 (H) 40 - 129 IU/L    GFR Non-African American >60 >60 mL/min/1.73m2    GFR African American >60 >60 mL/min/1.73m2    Anion Gap 9 4 - 16   Lipase   Result Value Ref Range    Lipase 35 13 - 60 IU/L   Troponin   Result Value Ref Range    Troponin T <0.010 <0.01 NG/ML   EKG 12 Lead   Result Value Ref Range    Ventricular Rate 63 BPM    Atrial Rate 63 BPM    P-R Interval 164 ms    QRS Duration 142 ms    Q-T Interval 462 ms    QTc Calculation (Bazett) 472 ms    P Axis 14 degrees    R Axis 11 degrees    T Axis 64 degrees    Diagnosis       Normal sinus rhythm  Right bundle branch block  Abnormal ECG  When compared with ECG of 30-SEP-2021 02:57,  No significant change was found        Radiographs (if obtained):  Radiologist's Report Reviewed:  CTA HEAD NECK W CONTRAST   Final Result   Unremarkable CTA of the head and neck. XR CHEST PORTABLE   Final Result   No acute abnormality. CT Head W/O Contrast   Preliminary Result   No evidence of acute intracranial hemorrhage. Nonspecific white matter disease, likely due to chronic small vessel ischemia. Atrophy. EKG (if obtained): (All EKG's are interpreted by myself in the absence of a cardiologist)      MDM:  Patient presents to the emergency department for some dizziness been going on for a while. States not gotten worse but he has been getting dizzy. States he was told about vertigo before but not on any medication. Patient neurologically intact no focal weakness and deficit on exam.  Patient also concern for COVID he has had a cough since yesterday feeling weak tired fatigue and just feeling horrible he has had COVID vaccines. Patient ordered laboratory studies including CT scan of the head and negative COVID orthostatics chest x-ray. EKG was performed which shows normal sinus rhythm right bundle branch block rate of 63 no ST elevations, similar to previous EKG. laboratory studies unremarked for any acute process. Patient slightly low potassium 3.3. Patient normal liver enzymes lipase negative troponin. Urinalysis negative urinary tract infection. Chest x-ray negative for any acute process. CT scan of the head no evidence of intracranial hemorrhage with nonspecific white matter likely chronic small vessel ischemia and atrophy. Patient orthostatics are negative he did not have any dizziness lightheadedness. Patient been ambulatory back and forth in his room in the emergency department. Patient offered admission but refused. He did test positive for COVID which explains his weakness fatigue and tired increased sleep lately. Patient states he is feeling fine he does not want to be admitted and wants to be discharged home. Did speak with patient's granddaughter she was updated on patient diagnosis. She states she will check on patient. Patient will be discharged in stable condition. Patient was offered admission 1 more time states he feels fine he rather follow-up outpatient. Discussed with patient isolate quarantine increase fluids return if any worsening symptoms. Patient was agreeable. Clinical Impression:  1. COVID-19 virus infection    2. Generalized weakness      Disposition referral (if applicable):  Erwin Kline MD  [address] [place] [address]  [address] [facility]   [phone]    Schedule an appointment as soon as possible for a visit in 2 days  If symptoms worsen and for reevaluation. Call for an appointment    [facility] Emergency Department  [place] [id number] [phone]  Go in 1 day  If symptoms worsen  Disposition medications (if applicable):  New Prescriptions    No medications on file     ED Provider Disposition Time  DISPOSITION  2:09 PM        Comment: Please note this report has been produced using speech recognition software and may contain errors related to that system including errors in grammar, punctuation, and spelling, as well as words and phrases that may be inappropriate. Efforts were made to edit the dictations.         [employer], DO  08/31/22 4150

## 2022-09-01 ENCOUNTER — CARE COORDINATION (OUTPATIENT)
Dept: CARE COORDINATION | Age: 82
End: 2022-09-01

## 2022-09-01 NOTE — CARE COORDINATION
Patient contacted regarding COVID-19 diagnosis. Discussed COVID-19 related testing which was available at this time. Test results were positive. Patient informed of results, if available? Yes. Ambulatory Care Manager contacted the patient by telephone to perform post discharge assessment. Call within 2 business days of discharge: Yes. Verified name and  with patient as identifiers. Provided introduction to self, and explanation of the CTN/ACM role, and reason for call due to risk factors for infection and/or exposure to COVID-19. Symptoms reviewed with patient who verbalized the following symptoms: fatigue  cough  no new symptoms  no worsening symptoms. Due to no new or worsening symptoms encounter was not routed to provider for escalation. Discussed follow-up appointments. If no appointment was previously scheduled, appointment scheduling offered: Yes. [facility] follow up appointment(s):   Future Appointments   Date Time Provider Jarvis Avalos   10/12/2022  1:15 PM Avelino Argueta, APRN - CNP [address] [facility] [place] NEURO [facility]   10/19/2022  9:30 AM Suly Suero MD [facility] Heart [facility]     Non-[facility] follow up appointment(s): N/A    Non-face-to-face services provided:  Obtained and reviewed discharge summary and/or continuity of care documents     Advance Care Planning:   Does patient have an Advance Directive:  not on file. Educated patient about risk for severe COVID-19 due to risk factors according to CDC guidelines. ACM reviewed discharge instructions, medical action plan and red flag symptoms with the patient who verbalized understanding. Discussed COVID vaccination status: Yes. Education provided on COVID-19 vaccination as appropriate. Discussed exposure protocols and quarantine with CDC Guidelines. Patient was given an opportunity to verbalize any questions and concerns and agrees to contact ACM or health care provider for questions related to their healthcare.     Reviewed and educated patient on any new and changed medications related to discharge diagnosis     Was patient discharged with a pulse oximeter? no      ACM provided contact information. Plan for follow-up call in 5-7 days based on severity of symptoms and risk factors.

## 2022-09-09 ENCOUNTER — CARE COORDINATION (OUTPATIENT)
Dept: CARE COORDINATION | Age: 82
End: 2022-09-09

## 2022-09-09 NOTE — CARE COORDINATION
ACM call to pt for Covid / ED follow up. No answer. Left HIPAA compliant message on voicemail requesting return call to ACM.

## 2022-10-04 ENCOUNTER — OFFICE VISIT (OUTPATIENT)
Dept: CARDIOLOGY CLINIC | Age: 82
End: 2022-10-04
Payer: MEDICARE

## 2022-10-04 VITALS
BODY MASS INDEX: 28.45 KG/M2 | DIASTOLIC BLOOD PRESSURE: 60 MMHG | WEIGHT: 177 LBS | HEIGHT: 66 IN | HEART RATE: 80 BPM | SYSTOLIC BLOOD PRESSURE: 140 MMHG

## 2022-10-04 DIAGNOSIS — I25.810 CORONARY ARTERY DISEASE INVOLVING CORONARY BYPASS GRAFT OF NATIVE HEART WITHOUT ANGINA PECTORIS: ICD-10-CM

## 2022-10-04 DIAGNOSIS — I25.10 ASHD (ARTERIOSCLEROTIC HEART DISEASE): Primary | Chronic | ICD-10-CM

## 2022-10-04 DIAGNOSIS — I10 PRIMARY HYPERTENSION: Chronic | ICD-10-CM

## 2022-10-04 DIAGNOSIS — I73.9 PVD (PERIPHERAL VASCULAR DISEASE) (HCC): Chronic | ICD-10-CM

## 2022-10-04 DIAGNOSIS — Z95.1 S/P CABG X 2: Chronic | ICD-10-CM

## 2022-10-04 DIAGNOSIS — I21.11 ST ELEVATION MYOCARDIAL INFARCTION INVOLVING RIGHT CORONARY ARTERY (HCC): ICD-10-CM

## 2022-10-04 DIAGNOSIS — E78.2 HYPERLIPIDEMIA, MIXED: Chronic | ICD-10-CM

## 2022-10-04 PROCEDURE — 1123F ACP DISCUSS/DSCN MKR DOCD: CPT | Performed by: INTERNAL MEDICINE

## 2022-10-04 PROCEDURE — G8484 FLU IMMUNIZE NO ADMIN: HCPCS | Performed by: INTERNAL MEDICINE

## 2022-10-04 PROCEDURE — G8417 CALC BMI ABV UP PARAM F/U: HCPCS | Performed by: INTERNAL MEDICINE

## 2022-10-04 PROCEDURE — 99214 OFFICE O/P EST MOD 30 MIN: CPT | Performed by: INTERNAL MEDICINE

## 2022-10-04 PROCEDURE — G8427 DOCREV CUR MEDS BY ELIG CLIN: HCPCS | Performed by: INTERNAL MEDICINE

## 2022-10-04 PROCEDURE — 1036F TOBACCO NON-USER: CPT | Performed by: INTERNAL MEDICINE

## 2022-10-04 RX ORDER — LISINOPRIL 5 MG/1
5 TABLET ORAL DAILY
Qty: 30 TABLET | Refills: 5 | Status: SHIPPED | OUTPATIENT
Start: 2022-10-04

## 2022-10-04 NOTE — LETTER
10/4/2022 10:30 AM    Patient Name: Georgina Osorio  :   MRN# [id number]    REASON FOR VISIT: 6 month    Patient Active Problem List    Diagnosis Date Noted    Unstable angina ([facility] 2017    Exertional dyspnea     Diarrhea     Nausea     Coronary artery disease involving coronary bypass graft of native heart without angina pectoris     Dizziness 2021    PVC (premature ventricular contraction)     Thrush 10/15/2018    SBO (small bowel obstruction) ([facility] 10/04/2018    STEMI (ST elevation myocardial infarction) ([facility] 2014    ASHD (arteriosclerotic heart disease) 2014    S/P CABG x 2 2014    HTN (hypertension) 2014    Hyperlipidemia, mixed 2014    COPD, moderate ([facility] 2014    PVD (peripheral vascular disease) ([place] [id number] 2014       Allergies: Patient has no known allergies. Current Outpatient Medications   Medication Sig Dispense Refill    Ascorbic Acid (VITAMIN C PO) Take 1 tablet by mouth daily Patient states he doesn't take this daily      VITAMIN D PO Take 1 capsule by mouth daily Patient states he doesn't take this daily      nitroGLYCERIN (NITROSTAT) 0.4 MG SL tablet Place 1 tablet under the tongue every 5 minutes as needed for Chest pain up to max of 3 total doses. If no relief after 1 dose, call 911.  (Patient not taking: Reported on 2022) 25 tablet 2    clopidogrel (PLAVIX) 75 MG tablet Take 1 tablet by mouth daily 30 tablet 3    vitamin B-12 (CYANOCOBALAMIN) 100 MCG tablet Take 1 tablet by mouth daily 90 tablet 3    rosuvastatin (CRESTOR) 40 MG tablet Take 1 tablet by mouth daily (Patient taking differently: Take 40 mg by mouth nightly ) 60 tablet 5    omeprazole (PRILOSEC) 20 MG delayed release capsule Take 1 capsule by mouth daily 90 capsule 1    metoprolol tartrate (LOPRESSOR) 50 MG tablet take 1 tablet by mouth twice a day (Patient taking differently: Take 50 mg by mouth 2 times daily) 60 tablet 3    hydrochlorothiazide (HYDRODIURIL) 12.5 MG tablet Take 12.5 mg by mouth daily      aspirin 81 MG tablet Take 1 tablet by mouth daily 30 tablet 11     No current facility-administered medications for this visit.          Lab Review   Lab Results   Component Value Date/Time    CKTOTAL 147 04/04/2016 01:17 PM    CKTOTAL 145 09/14/2015 07:29 AM    TROPONINT <0.010 08/31/2022 10:49 AM    TROPONINT <0.010 10/01/2021 07:20 AM     BNP:    Lab Results   Component Value Date/Time    BNP 23 09/16/2012 03:20 AM    PROBNP 147.4 08/31/2022 09:41 AM    PROBNP 97.56 09/30/2021 03:41 AM     PT/INR:    Lab Results   Component Value Date    INR 0.88 09/30/2021    INR 1.02 10/22/2019     Lab Results   Component Value Date    LABA1C 6.2 10/06/2021    LABA1C 6.3 10/01/2021     Lab Results   Component Value Date    WBC 4.6 08/31/2022    WBC 5.8 10/01/2021    HCT 49.2 08/31/2022    HCT 44.1 10/01/2021    MCV 98.2 08/31/2022    MCV 91.3 10/01/2021     (L) 08/31/2022     (L) 10/01/2021     Lab Results   Component Value Date    CHOL 142 10/06/2021    CHOL 138 10/01/2021    TRIG 155 (H) 10/06/2021    TRIG 140 10/01/2021    HDL 50 10/06/2021    HDL 38 (L) 10/01/2021    LDLCALC 87 06/08/2017    LDLCALC 72 06/09/2014    LDLDIRECT 68 10/06/2021    LDLDIRECT 72 10/01/2021     Lab Results   Component Value Date    ALT 14 08/31/2022    ALT 20 10/06/2021    AST 19 08/31/2022    AST 19 10/06/2021     BMP:    Lab Results   Component Value Date/Time     08/31/2022 10:49 AM     10/06/2021 07:29 AM    K 3.3 08/31/2022 10:49 AM    K 4.2 10/06/2021 07:29 AM     08/31/2022 10:49 AM     10/06/2021 07:29 AM    CO2 27 08/31/2022 10:49 AM    CO2 26 10/06/2021 07:29 AM    BUN 14 08/31/2022 10:49 AM    BUN 13 10/06/2021 07:29 AM    CREATININE 0.9 08/31/2022 10:49 AM    CREATININE 0.9 10/06/2021 07:29 AM     CMP:   Lab Results   Component Value Date/Time     08/31/2022 10:49 AM     10/06/2021 07:29 AM    K 3.3 2022 10:49 AM    K 4.2 10/06/2021 07:29 AM     2022 10:49 AM     10/06/2021 07:29 AM    CO2 27 2022 10:49 AM    CO2 26 10/06/2021 07:29 AM    BUN 14 2022 10:49 AM    BUN 13 10/06/2021 07:29 AM    CREATININE 0.9 2022 10:49 AM    CREATININE 0.9 10/06/2021 07:29 AM    PROT 6.5 2022 10:49 AM    PROT 6.9 10/06/2021 07:29 AM    PROT 6.6 2012 03:30 AM    PROT 6.8 05/15/2012 06:53 AM     TSH:    Lab Results   Component Value Date/Time    TSHHS 1.050 10/01/2021 07:20 AM    TSHHS 1.050 2021 07:40 AM       Smoke: What:                           How much:    Alcohol: How Much:     Caffeine: Pop:         Tea:            Coffee:                Chocolate:    Exercise:    Last Visit:  Complaints:  Changes:    LAST EK2022    VENOUS DOPPLER: NONE    HOLTER/ EVENT MONITOR: 10/2019    STRESS TEST:  2022  Normal study.    Normal perfusion study with normal distribution in all coronal, short, and    horizontal axis.    The observed defect is consistent with diaphragmatic attenuation.    Normal LV function. LVEF is > 70 %.    Supervising physician Dr. Elizabeth Alonzo        ECHO: 2021  Left ventricular systolic function is normal.   Ejection fraction is visually estimated at 50-55%. Grade I diastolic dysfunction.    No evidence of any pericardial effusion    CAROTID: NONE    MUGA: NONE    LAST PACER CHECK: NONE    CARDIAC CATH: 2019  Right Coronary Angiography, Left Coronary   Angiography, Saphenous Vein Graft Angiography      PCI procedure:Balloon Angioplasty, SVG RCA: Balloon Angioplasty   Initl Vsl      Conclusions      Procedure Summary   POBA OF SVG TO RCA 99% TO 0%   OSTIAL SVG MILD DISESAE       Amio Protocol:    CHADS: [id number] Score for Atrial Fibrillation Stroke Risk   Risk   Factors  Component Value   C CHF No 0   H HTN Yes 1   A2 Age >= 76 Yes,  (81 y.o.) 2   D DM No 0   S2 Prior Stroke/TIA No 0   V Vascular Disease Yes 1 A Age 74-69 No,  (81 y.o.) 0   Sc Sex male 0    [id number]  Score  4   Score last updated 10/4/22 7:06 AM EDT    Click here for a link to the UpToDate guideline \"Atrial Fibrillation: Anticoagulation therapy to prevent embolization    Disclaimer: Risk Score calculation is dependent on accuracy of patient problem list and past encounter diagnosis.

## 2022-10-04 NOTE — PATIENT INSTRUCTIONS
Please be informed that if you contact our office outside of normal business hours the physician on call cannot help with any scheduling or rescheduling issues, procedure instruction questions or any type of medication issue. We advise you for any urgent/emergency that you go to the nearest emergency room! PLEASE CALL OUR OFFICE DURING NORMAL BUSINESS HOURS    Monday - Friday   8 am to 5 pm    [place]: [phone]    [place]:  [phone]  **It is YOUR responsibilty to bring medication bottles and/or updated medication list to [address] [place] [address]. This will allow us to better serve you and all your healthcare needs**  [facility] Locations - No appointment necessary. Sites open Monday to Friday. Call your preferred location for test preparation, business hours and other information you need. [employer] [facility] [employer]. [facility]   [facility] [address]. [facility], [address] [facility]  Phone: [phone] [facility] [address] [facility]  [address]. Darcie marcelo, [address] [facility]  Phone: [phone]     CORONARY ARTERY DISEASE:Yes   clinically stable. Patient is on optimal medical regimen ( see medication list above )  -   Patient is currently  asymptomatic from CAD. - changes in  treatment:   no, on Plavix& Metoprolol.           - Testing ordered:  no  [facility] classification: 1     CATH 4/2019   POBA OF SVG TO RCA 99% TO 0%   OSTIAL SVG MILD DISESAE    4/29/2022   Normal study. Normal perfusion study with normal distribution in all coronal, short, and    horizontal axis. The observed defect is consistent with diaphragmatic attenuation. Normal LV function. LVEF is > 70 %. HTN: Not well controlled on current medical regimen, see list above. - changes in  treatment:   no, on Lopressor & HCTZ.    CARDIOMYOPATHY: None known   CONGESTIVE HEART FAILURE: NO KNOWN HISTORY.     VHD: No significant VHD noted  ECHO 9/2021   Left ventricular systolic function is normal.   Ejection fraction is visually estimated at 50-55%. Grade I diastolic dysfunction. No evidence of any pericardial effusion. DYSLIPIDEMIA: Patient's profile is at / near [employer],                                                           Tolerating current medical regimen wellyes,  Takes Crestor                                                          See most recent Lab values in Labs section above. PAD:  known. claudication symptoms. Kolby Ugartemarcelo no               Up to date on non invasive testing .yes,                Patient on optimum medical regimen . yes,       ARRHYTHMIAS: Excessive PVCs resolved since patient has been on magnesium    TESTS ORDERED:[facility]     PREVIOUSLY ORDERED TESTS REVIEWED & DISCUSSED WITH THE PATIENT:     I personally reviewed & interpreted, all previously ordered tests as copied above. Latest Labs are pulled in to the note with dates. Labs, specially in Reference to Lipid profile, Cardiac testing in the form of Echo ( dated: ), stress tests ( dated: ) & other relevant cardiac testing reviewed with patient & recommendations made based on assessment of the results. Discussed role of Cardiac risk factors & effects + treatment of co morbidities with patient & advised accordingly. MEDICATIONS: List of medications patient is currently taking is reviewed in detail with the patient. Discussed any side effects or problems taking the medication. Recommend Continue present management & medications as listed. Add lisinopril 5 mg daily. AFFIRMATION: I  reviewed patient's history, previous & current medical problems & all Labs + testing. This includes chart prep even prior to the vosit. Various goals are discussed and multiple questions answered. Relevant concelling performed. Office follow up in six months.  BP check with nurse in 2 weeks

## 2022-10-04 NOTE — PROGRESS NOTES
Vein \"LEG PROBLEM Questionnaire\"  Do you have prominent leg veins? No   Do you have any skin discoloration? No  Do you have any healed or active sores? No  Do you have swelling of the legs? No  Do you have a family history of varicose veins? 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 Have you been fighting overweight problems? No  8. Do you have restless legs? No  9. Do you have any night time cramps? No  10. Do you have any of the following in your legs:        Tiredness I  11. If Yes - Have they worn compression stockings No  12.  If they have worn compression stockings

## 2022-10-04 NOTE — PROGRESS NOTES
Ene Casanova is a 81 y.o. male who has    CHIEF COMPLAINT AS FOLLOWS:  CHEST PAIN:  Patient denies any C/O chest pains at this time. SOB:  C/O SOB at this time since after COVID a month. LEG EDEMA: minimal leg edema   PALPITATIONS: Denies any C/O Palpitations                                  DIZZINESS: No C/O Dizziness     SYNCOPE: None   OTHER/ ADDITIONAL COMPLAINTS: Patient had COVID a month ago. His sense of smell is not back yet. He is still somewhat SOB. HPI: Patient is here for F/U on his CAD, PVCs-Arrhythmia, HTN & Dyslipidemia problems. CAD: Patient has known CAD. Had CABG in the past.  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 HTN: Patient has known essential HTN. Has been treated with guideline recommended medical / physical/ diet therapy as stated below. Dyslipidemia: Patient has known mixed dyslipidemia. Has been treated with guideline recommended medical / physical/ diet therapy as stated below.                 Current Outpatient Medications   Medication Sig Dispense Refill    Ascorbic Acid (VITAMIN C PO) Take 1 tablet by mouth daily Patient states he doesn't take this daily      VITAMIN D PO Take 1 capsule by mouth daily Patient states he doesn't take this daily      clopidogrel (PLAVIX) 75 MG tablet Take 1 tablet by mouth daily 30 tablet 3    vitamin B-12 (CYANOCOBALAMIN) 100 MCG tablet Take 1 tablet by mouth daily 90 tablet 3    rosuvastatin (CRESTOR) 40 MG tablet Take 1 tablet by mouth daily (Patient taking differently: Take 40 mg by mouth nightly) 60 tablet 5    omeprazole (PRILOSEC) 20 MG delayed release capsule Take 1 capsule by mouth daily 90 capsule 1    metoprolol tartrate (LOPRESSOR) 50 MG tablet take 1 tablet by mouth twice a day (Patient taking differently: Take 50 mg by mouth 2 times daily) 60 tablet 3    hydrochlorothiazide (HYDRODIURIL) 12.5 MG tablet Take 12.5 mg by mouth daily      aspirin 81 MG tablet Take 1 tablet by mouth daily 30 tablet 11    nitroGLYCERIN (NITROSTAT) 0.4 MG SL tablet Place 1 tablet under the tongue every 5 minutes as needed for Chest pain up to max of 3 total doses. If no relief after 1 dose, call 911. (Patient not taking: No sig reported) 25 tablet 2     No current facility-administered medications for this visit. Allergies: Patient has no known allergies. Review of Systems:    Constitutional: Negative for diaphoresis and fatigue  Respiratory: Negative for shortness of breath  Cardiovascular: Negative for chest pain, dyspnea on exertion, claudication, edema, irregular heartbeat, murmur, palpitations or shortness of breath  Musculoskeletal: Negative for muscle pain, muscular weakness, negative for pain in arm and leg or swelling in foot and leg    Objective:  BP (!) 154/60 (Site: Left Upper Arm, Position: Sitting, Cuff Size: Medium Adult)   Pulse 80   Ht 5' 6\" (1.676 m)   Wt 177 lb (80.3 kg)   BMI 28.57 kg/m²   Wt Readings from Last 3 Encounters:   10/04/22 177 lb (80.3 kg)   08/31/22 186 lb (84.4 kg)   04/19/22 186 lb (84.4 kg)     Body mass index is 28.57 kg/m². GENERAL - Alert, oriented, pleasant, in no apparent distress. EYES: No jaundice, no conjunctival pallor. Neck - Supple. No jugular venous distention noted. No carotid bruits. Cardiovascular - Normal S1 and S2 without obvious murmur or gallop. Extremities - No cyanosis, clubbing, or significant edema. Pulmonary - No respiratory distress. No wheezes or rales.       MEDICAL DECISION MAKING & DATA REVIEW:    Lab Review   Lab Results   Component Value Date/Time    TROPONINT <0.010 08/31/2022 10:49 AM    TROPONINT <0.010 10/01/2021 07:20 AM     Lab Results   Component Value Date/Time    BNP 23 09/16/2012 03:20 AM    PROBNP 147.4 08/31/2022 09:41 AM    PROBNP 97.56 09/30/2021 03:41 AM     Lab Results   Component Value Date    INR 0.88 09/30/2021    INR 1.02 10/22/2019     Lab Results   Component Value Date    LABA1C 6.2 10/06/2021    LABA1C 6.3 10/01/2021     Lab Results   Component Value Date    WBC 4.6 08/31/2022    WBC 5.8 10/01/2021    HCT 49.2 08/31/2022    HCT 44.1 10/01/2021    MCV 98.2 08/31/2022    MCV 91.3 10/01/2021     (L) 08/31/2022     (L) 10/01/2021     Lab Results   Component Value Date    CHOL 142 10/06/2021    CHOL 138 10/01/2021    TRIG 155 (H) 10/06/2021    TRIG 140 10/01/2021    HDL 50 10/06/2021    HDL 38 (L) 10/01/2021    LDLCALC 87 06/08/2017    LDLCALC 72 06/09/2014    LDLDIRECT 68 10/06/2021    LDLDIRECT 72 10/01/2021     Lab Results   Component Value Date    ALT 14 08/31/2022    ALT 20 10/06/2021    AST 19 08/31/2022    AST 19 10/06/2021     BMP:    Lab Results   Component Value Date/Time     08/31/2022 10:49 AM     10/06/2021 07:29 AM    K 3.3 08/31/2022 10:49 AM    K 4.2 10/06/2021 07:29 AM     08/31/2022 10:49 AM     10/06/2021 07:29 AM    CO2 27 08/31/2022 10:49 AM    CO2 26 10/06/2021 07:29 AM    BUN 14 08/31/2022 10:49 AM    BUN 13 10/06/2021 07:29 AM    CREATININE 0.9 08/31/2022 10:49 AM    CREATININE 0.9 10/06/2021 07:29 AM     CMP:   Lab Results   Component Value Date/Time     08/31/2022 10:49 AM     10/06/2021 07:29 AM    K 3.3 08/31/2022 10:49 AM    K 4.2 10/06/2021 07:29 AM     08/31/2022 10:49 AM     10/06/2021 07:29 AM    CO2 27 08/31/2022 10:49 AM    CO2 26 10/06/2021 07:29 AM    BUN 14 08/31/2022 10:49 AM    BUN 13 10/06/2021 07:29 AM    CREATININE 0.9 08/31/2022 10:49 AM    CREATININE 0.9 10/06/2021 07:29 AM    PROT 6.5 08/31/2022 10:49 AM    PROT 6.9 10/06/2021 07:29 AM    PROT 6.6 09/16/2012 03:30 AM    PROT 6.8 05/15/2012 06:53 AM     Lab Results   Component Value Date/Time    [facility] 1.050 10/01/2021 07:20 AM    TSH 1.050 03/18/2021 07:40 AM       QUALITY MEASURES REVIEWED:  1.CAD:Patient is taking anti platelet agent:Yes  2. DYSLIPIDEMIA: Patient is on cholesterol lowering medication:Yes   3. Beta-Blocker therapy for CAD, if prior Myocardial Infarction:Yes   4. Counselled regarding smoking cessation. No   Patient does not Smoke. 5.Anticoagulation therapy (for A.Fib) No   Does Not have A.Fib.  6.Discussed weight management strategies. Assessment & Plan:  Primary / Secondary prevention is the goal by aggressive risk modification, healthy and therapeutic life style changes for cardiovascular risk reduction. CORONARY ARTERY DISEASE:Yes   clinically stable. Patient is on optimal medical regimen ( see medication list above )  -   Patient is currently  asymptomatic from CAD. - changes in  treatment:   no, on Plavix& Metoprolol.           - Testing ordered:  no  [facility] classification: 1     CATH 4/2019   POBA OF SVG TO RCA 99% TO 0%   OSTIAL SVG MILD DISESAE    4/29/2022   Normal study. Normal perfusion study with normal distribution in all coronal, short, and    horizontal axis. The observed defect is consistent with diaphragmatic attenuation. Normal LV function. LVEF is > 70 %. HTN: Not well controlled on current medical regimen, see list above. - changes in  treatment:   no, on Lopressor & HCTZ. CARDIOMYOPATHY: None known   CONGESTIVE HEART FAILURE: NO KNOWN HISTORY.     VHD: No significant VHD noted  ECHO 9/2021   Left ventricular systolic function is normal.   Ejection fraction is visually estimated at 50-55%. Grade I diastolic dysfunction. No evidence of any pericardial effusion. DYSLIPIDEMIA: Patient's profile is at / near [employer],                                                           Tolerating current medical regimen wellyes,  Takes Crestor                                                          See most recent Lab values in Labs section above. PAD:  known. claudication symptoms. Jacob Miller no               Up to date on non invasive testing .yes,                Patient on optimum medical regimen . yes,       ARRHYTHMIAS: Excessive PVCs resolved since patient has been on magnesium    TESTS ORDERED:[facility]     PREVIOUSLY ORDERED TESTS REVIEWED & DISCUSSED WITH THE PATIENT:     I personally reviewed & interpreted, all previously ordered tests as copied above. Latest Labs are pulled in to the note with dates. Labs, specially in Reference to Lipid profile, Cardiac testing in the form of Echo ( dated: ), stress tests ( dated: ) & other relevant cardiac testing reviewed with patient & recommendations made based on assessment of the results. Discussed role of Cardiac risk factors & effects + treatment of co morbidities with patient & advised accordingly. MEDICATIONS: List of medications patient is currently taking is reviewed in detail with the patient. Discussed any side effects or problems taking the medication. Recommend Continue present management & medications as listed. Add lisinopril 5 mg daily. AFFIRMATION: I  reviewed patient's history, previous & current medical problems & all Labs + testing. This includes chart prep even prior to the vosit. Various goals are discussed and multiple questions answered. Relevant concelling performed. Office follow up in six months. BP check with nurse in 2 weeks.

## 2022-10-04 NOTE — LETTER
[address]  [address] [address]  Phone: [phone]  Fax: [phone]    Pablo Booker MD    October 4, 2022     Jose Hyatt MD  [address] [address]    Patient: Bia Priest   MR Number: [id number]   YOB: 1940   Date of Visit: 10/4/2022       Dear Jose Hyatt: Thank you for referring Mohamud Hewitt to me for evaluation/treatment. Below are the relevant portions of my assessment and plan of care. If you have questions, please do not hesitate to call me. I look forward to following Harjit Retana along with you.     Sincerely,      Pablo Booker MD

## 2022-10-18 ENCOUNTER — NURSE ONLY (OUTPATIENT)
Dept: CARDIOLOGY CLINIC | Age: 82
End: 2022-10-18
Payer: MEDICARE

## 2022-10-18 VITALS — SYSTOLIC BLOOD PRESSURE: 130 MMHG | OXYGEN SATURATION: 97 % | HEART RATE: 60 BPM | DIASTOLIC BLOOD PRESSURE: 62 MMHG

## 2022-10-18 DIAGNOSIS — E78.2 HYPERLIPIDEMIA, MIXED: Primary | ICD-10-CM

## 2022-10-18 DIAGNOSIS — I25.810 CORONARY ARTERY DISEASE INVOLVING CORONARY BYPASS GRAFT OF NATIVE HEART WITHOUT ANGINA PECTORIS: Primary | ICD-10-CM

## 2022-10-18 LAB
ANION GAP SERPL CALCULATED.3IONS-SCNC: 13 MMOL/L (ref 3–16)
BUN BLDV-MCNC: 14 MG/DL (ref 7–20)
CALCIUM SERPL-MCNC: 9.5 MG/DL (ref 8.3–10.6)
CHLORIDE BLD-SCNC: 104 MMOL/L (ref 99–110)
CO2: 26 MMOL/L (ref 21–32)
CREAT SERPL-MCNC: 1 MG/DL (ref 0.8–1.3)
GFR SERPL CREATININE-BSD FRML MDRD: >60 ML/MIN/{1.73_M2}
GLUCOSE BLD-MCNC: 89 MG/DL (ref 70–99)
POTASSIUM SERPL-SCNC: 4.1 MMOL/L (ref 3.5–5.1)
SODIUM BLD-SCNC: 143 MMOL/L (ref 136–145)

## 2022-10-18 PROCEDURE — 36415 COLL VENOUS BLD VENIPUNCTURE: CPT | Performed by: NURSE PRACTITIONER

## 2022-10-18 NOTE — PROGRESS NOTES
[facility] ([facility], [address] [facility],[address]  Phone: [phone]    Fax [phone]                  Javid Segovia MD, Javy Correia MD, [address] [facility], MD, MD Luis A Granado MD,[facility]    General Deysi MD, Erick Rendon MD, [address], MIGUEL Espino, MIGUEL Trinhr, MIGUEL Hopkins      Blood pressure check     Pt is here for a 2 week BP check. Patient started on lisinopril 5 mg daily. Vitals:    10/18/22 0858   BP: 130/62   Site: Left Upper Arm   Position: Sitting   Cuff Size: Medium Adult   Pulse: 60   SpO2: 97%        Wt Readings from Last 3 Encounters:   10/04/22 177 lb (80.3 kg)   08/31/22 186 lb (84.4 kg)   04/19/22 186 lb (84.4 kg)      -Patient to get follow-up labs. B/P well controlled. Plan for pt is to continue with lisinopril 5 mg daily, hydrochlorothiazide 12.5 mg daily, and Lopressor 50 mg twice daily    Follow up in 6 months with Dr. Nikki Gardiner. Pt is to report any dizziness or syncope to the office. Electronically signed by MIGUEL Enriquez - CNP on 10/18/2022 at 9:02 AM

## 2022-10-21 ENCOUNTER — TELEPHONE (OUTPATIENT)
Dept: CARDIOLOGY CLINIC | Age: 82
End: 2022-10-21

## 2022-12-07 ENCOUNTER — HOSPITAL ENCOUNTER (OUTPATIENT)
Age: 82
Discharge: HOME | End: 2022-12-07
Payer: MEDICARE

## 2022-12-07 PROCEDURE — 36415 COLL VENOUS BLD VENIPUNCTURE: CPT

## 2022-12-07 PROCEDURE — 84153 ASSAY OF PSA TOTAL: CPT

## 2022-12-07 PROCEDURE — 84154 ASSAY OF PSA FREE: CPT

## 2022-12-09 LAB
PROSTATE SPECIFIC ANTIGEN FREE: <0.1 NG/ML
PROSTATE SPECIFIC ANTIGEN PERCENT FREE: <50 %
PROSTATE SPECIFIC ANTIGEN: 0.2 NG/ML (ref 0–4)

## 2023-03-27 RX ORDER — LISINOPRIL 5 MG/1
5 TABLET ORAL DAILY
Qty: 30 TABLET | Refills: 5 | Status: SHIPPED | OUTPATIENT
Start: 2023-03-27

## 2023-04-16 ENCOUNTER — APPOINTMENT (OUTPATIENT)
Dept: GENERAL RADIOLOGY | Age: 83
DRG: 202 | End: 2023-04-16
Payer: MEDICARE

## 2023-04-16 ENCOUNTER — APPOINTMENT (OUTPATIENT)
Dept: CT IMAGING | Age: 83
DRG: 202 | End: 2023-04-16
Payer: MEDICARE

## 2023-04-16 ENCOUNTER — HOSPITAL ENCOUNTER (OUTPATIENT)
Age: 83
Setting detail: OBSERVATION
Discharge: HOME | DRG: 202 | End: 2023-04-16
Attending: GENERAL PRACTICE | Admitting: GENERAL PRACTICE
Payer: MEDICARE

## 2023-04-16 ENCOUNTER — HOSPITAL ENCOUNTER (INPATIENT)
Age: 83
LOS: 1 days | Discharge: HOME | DRG: 202 | End: 2023-04-17
Attending: EMERGENCY MEDICINE | Admitting: GENERAL PRACTICE
Payer: MEDICARE

## 2023-04-16 VITALS
DIASTOLIC BLOOD PRESSURE: 67 MMHG | TEMPERATURE: 98 F | HEIGHT: 66 IN | OXYGEN SATURATION: 94 % | BODY MASS INDEX: 28.45 KG/M2 | SYSTOLIC BLOOD PRESSURE: 136 MMHG | HEART RATE: 63 BPM | WEIGHT: 177 LBS | RESPIRATION RATE: 15 BRPM

## 2023-04-16 DIAGNOSIS — R94.31 QT PROLONGATION: ICD-10-CM

## 2023-04-16 DIAGNOSIS — R42 DIZZINESS: Primary | ICD-10-CM

## 2023-04-16 DIAGNOSIS — R05.1 ACUTE COUGH: ICD-10-CM

## 2023-04-16 DIAGNOSIS — R05.9 COUGH, UNSPECIFIED TYPE: ICD-10-CM

## 2023-04-16 DIAGNOSIS — R53.1 GENERALIZED WEAKNESS: ICD-10-CM

## 2023-04-16 DIAGNOSIS — R11.0 NAUSEA: ICD-10-CM

## 2023-04-16 DIAGNOSIS — R53.1 GENERAL WEAKNESS: ICD-10-CM

## 2023-04-16 DIAGNOSIS — R42 VERTIGO: Primary | ICD-10-CM

## 2023-04-16 DIAGNOSIS — R27.0 ATAXIA: ICD-10-CM

## 2023-04-16 LAB
ALBUMIN SERPL-MCNC: 3.6 GM/DL (ref 3.4–5)
ALP BLD-CCNC: 99 IU/L (ref 40–129)
ALT SERPL-CCNC: 19 U/L (ref 10–40)
ANION GAP SERPL CALCULATED.3IONS-SCNC: 12 MMOL/L (ref 4–16)
AST SERPL-CCNC: 13 IU/L (ref 15–37)
B PARAP IS1001 DNA NPH QL NAA+NON-PROBE: NOT DETECTED
B PERT.PT PRMT NPH QL NAA+NON-PROBE: NOT DETECTED
BASOPHILS ABSOLUTE: 0 K/CU MM
BASOPHILS RELATIVE PERCENT: 0.2 % (ref 0–1)
BILIRUB SERPL-MCNC: 0.6 MG/DL (ref 0–1)
BILIRUBIN URINE: NEGATIVE MG/DL
BLOOD, URINE: NEGATIVE
BUN SERPL-MCNC: 19 MG/DL (ref 6–23)
C PNEUM DNA NPH QL NAA+NON-PROBE: NOT DETECTED
CALCIUM SERPL-MCNC: 8.8 MG/DL (ref 8.3–10.6)
CHLORIDE BLD-SCNC: 105 MMOL/L (ref 99–110)
CLARITY: CLEAR
CO2: 26 MMOL/L (ref 21–32)
COLOR: YELLOW
COMMENT UA: NORMAL
CREAT SERPL-MCNC: 1 MG/DL (ref 0.9–1.3)
DIFFERENTIAL TYPE: ABNORMAL
EKG ATRIAL RATE: 69 BPM
EKG DIAGNOSIS: NORMAL
EKG P AXIS: 72 DEGREES
EKG P-R INTERVAL: 136 MS
EKG Q-T INTERVAL: 448 MS
EKG QRS DURATION: 138 MS
EKG QTC CALCULATION (BAZETT): 500 MS
EKG R AXIS: 31 DEGREES
EKG T AXIS: 53 DEGREES
EKG VENTRICULAR RATE: 75 BPM
EOSINOPHILS ABSOLUTE: 0.2 K/CU MM
EOSINOPHILS RELATIVE PERCENT: 1.8 % (ref 0–3)
FLUAV H1 2009 PAN RNA NPH NAA+NON-PROBE: NOT DETECTED
FLUAV H1 RNA NPH QL NAA+NON-PROBE: NOT DETECTED
FLUAV H3 RNA NPH QL NAA+NON-PROBE: NOT DETECTED
FLUAV RNA NPH QL NAA+NON-PROBE: NOT DETECTED
FLUBV RNA NPH QL NAA+NON-PROBE: NOT DETECTED
GFR SERPL CREATININE-BSD FRML MDRD: >60 ML/MIN/1.73M2
GLUCOSE SERPL-MCNC: 94 MG/DL (ref 70–99)
GLUCOSE, URINE: NEGATIVE MG/DL
HADV DNA NPH QL NAA+NON-PROBE: NOT DETECTED
HCOV 229E RNA NPH QL NAA+NON-PROBE: NOT DETECTED
HCOV HKU1 RNA NPH QL NAA+NON-PROBE: NOT DETECTED
HCOV NL63 RNA NPH QL NAA+NON-PROBE: NOT DETECTED
HCOV OC43 RNA NPH QL NAA+NON-PROBE: NOT DETECTED
HCT VFR BLD CALC: 42.8 % (ref 42–52)
HEMOGLOBIN: 13.6 GM/DL (ref 13.5–18)
HMPV RNA NPH QL NAA+NON-PROBE: NOT DETECTED
HPIV1 RNA NPH QL NAA+NON-PROBE: NOT DETECTED
HPIV2 RNA NPH QL NAA+NON-PROBE: NOT DETECTED
HPIV3 RNA NPH QL NAA+NON-PROBE: NOT DETECTED
HPIV4 RNA NPH QL NAA+NON-PROBE: NOT DETECTED
IMMATURE NEUTROPHIL %: 0.5 % (ref 0–0.43)
KETONES, URINE: NEGATIVE MG/DL
LEUKOCYTE ESTERASE, URINE: NEGATIVE
LYMPHOCYTES ABSOLUTE: 2 K/CU MM
LYMPHOCYTES RELATIVE PERCENT: 19.5 % (ref 24–44)
M PNEUMO DNA NPH QL NAA+NON-PROBE: NOT DETECTED
MAGNESIUM: 1.9 MG/DL (ref 1.8–2.4)
MCH RBC QN AUTO: 29.3 PG (ref 27–31)
MCHC RBC AUTO-ENTMCNC: 31.8 % (ref 32–36)
MCV RBC AUTO: 92.2 FL (ref 78–100)
MONOCYTES ABSOLUTE: 0.8 K/CU MM
MONOCYTES RELATIVE PERCENT: 8.2 % (ref 0–4)
NITRITE URINE, QUANTITATIVE: NEGATIVE
NUCLEATED RBC %: 0 %
PDW BLD-RTO: 14.2 % (ref 11.7–14.9)
PH, URINE: 5.5 (ref 5–8)
PLATELET # BLD: 121 K/CU MM (ref 140–440)
PMV BLD AUTO: 12.4 FL (ref 7.5–11.1)
POTASSIUM SERPL-SCNC: 3.4 MMOL/L (ref 3.5–5.1)
PRO-BNP: 142.6 PG/ML
PROTEIN UA: NEGATIVE MG/DL
RBC # BLD: 4.64 M/CU MM (ref 4.6–6.2)
RSV RNA NPH QL NAA+NON-PROBE: NOT DETECTED
RV+EV RNA NPH QL NAA+NON-PROBE: NOT DETECTED
SARS-COV-2 RNA NPH QL NAA+NON-PROBE: NOT DETECTED
SEGMENTED NEUTROPHILS ABSOLUTE COUNT: 7 K/CU MM
SEGMENTED NEUTROPHILS RELATIVE PERCENT: 69.8 % (ref 36–66)
SODIUM BLD-SCNC: 143 MMOL/L (ref 135–145)
SPECIFIC GRAVITY UA: 1.01 (ref 1–1.03)
TOTAL IMMATURE NEUTOROPHIL: 0.05 K/CU MM
TOTAL NUCLEATED RBC: 0 K/CU MM
TOTAL PROTEIN: 5.9 GM/DL (ref 6.4–8.2)
TROPONIN T: <0.01 NG/ML
TSH SERPL DL<=0.005 MIU/L-ACNC: 0.82 UIU/ML (ref 0.27–4.2)
UROBILINOGEN, URINE: 0.2 MG/DL (ref 0.2–1)
WBC # BLD: 10.1 K/CU MM (ref 4–10.5)

## 2023-04-16 PROCEDURE — 2580000003 HC RX 258: Performed by: PHYSICIAN ASSISTANT

## 2023-04-16 PROCEDURE — 85025 COMPLETE CBC W/AUTO DIFF WBC: CPT

## 2023-04-16 PROCEDURE — 96361 HYDRATE IV INFUSION ADD-ON: CPT

## 2023-04-16 PROCEDURE — 81003 URINALYSIS AUTO W/O SCOPE: CPT

## 2023-04-16 PROCEDURE — 93010 ELECTROCARDIOGRAM REPORT: CPT | Performed by: INTERNAL MEDICINE

## 2023-04-16 PROCEDURE — 70450 CT HEAD/BRAIN W/O DYE: CPT

## 2023-04-16 PROCEDURE — 1200000000 HC SEMI PRIVATE

## 2023-04-16 PROCEDURE — 93005 ELECTROCARDIOGRAM TRACING: CPT | Performed by: PHYSICIAN ASSISTANT

## 2023-04-16 PROCEDURE — 80053 COMPREHEN METABOLIC PANEL: CPT

## 2023-04-16 PROCEDURE — 71045 X-RAY EXAM CHEST 1 VIEW: CPT

## 2023-04-16 PROCEDURE — 2580000003 HC RX 258: Performed by: EMERGENCY MEDICINE

## 2023-04-16 PROCEDURE — 6360000004 HC RX CONTRAST MEDICATION: Performed by: PHYSICIAN ASSISTANT

## 2023-04-16 PROCEDURE — 84443 ASSAY THYROID STIM HORMONE: CPT

## 2023-04-16 PROCEDURE — 83880 ASSAY OF NATRIURETIC PEPTIDE: CPT

## 2023-04-16 PROCEDURE — 84484 ASSAY OF TROPONIN QUANT: CPT

## 2023-04-16 PROCEDURE — 99285 EMERGENCY DEPT VISIT HI MDM: CPT

## 2023-04-16 PROCEDURE — 0202U NFCT DS 22 TRGT SARS-COV-2: CPT

## 2023-04-16 PROCEDURE — 70498 CT ANGIOGRAPHY NECK: CPT

## 2023-04-16 PROCEDURE — 6370000000 HC RX 637 (ALT 250 FOR IP): Performed by: GENERAL PRACTICE

## 2023-04-16 PROCEDURE — G0378 HOSPITAL OBSERVATION PER HR: HCPCS

## 2023-04-16 PROCEDURE — 83735 ASSAY OF MAGNESIUM: CPT

## 2023-04-16 RX ORDER — POLYETHYLENE GLYCOL 3350 17 G/17G
17 POWDER, FOR SOLUTION ORAL DAILY PRN
Status: CANCELLED | OUTPATIENT
Start: 2023-04-16

## 2023-04-16 RX ORDER — METOPROLOL TARTRATE 50 MG/1
50 TABLET, FILM COATED ORAL 2 TIMES DAILY
Status: CANCELLED | OUTPATIENT
Start: 2023-04-16

## 2023-04-16 RX ORDER — METOPROLOL TARTRATE 50 MG/1
50 TABLET, FILM COATED ORAL 2 TIMES DAILY
Status: DISCONTINUED | OUTPATIENT
Start: 2023-04-16 | End: 2023-04-17 | Stop reason: HOSPADM

## 2023-04-16 RX ORDER — ACETAMINOPHEN 325 MG/1
650 TABLET ORAL EVERY 6 HOURS PRN
Status: CANCELLED | OUTPATIENT
Start: 2023-04-16

## 2023-04-16 RX ORDER — SODIUM CHLORIDE 0.9 % (FLUSH) 0.9 %
5-40 SYRINGE (ML) INJECTION PRN
Status: CANCELLED | OUTPATIENT
Start: 2023-04-16

## 2023-04-16 RX ORDER — AZITHROMYCIN 250 MG/1
250 TABLET, FILM COATED ORAL DAILY
Status: CANCELLED | OUTPATIENT
Start: 2023-04-16

## 2023-04-16 RX ORDER — SODIUM CHLORIDE 0.9 % (FLUSH) 0.9 %
5-40 SYRINGE (ML) INJECTION EVERY 12 HOURS SCHEDULED
Status: CANCELLED | OUTPATIENT
Start: 2023-04-16

## 2023-04-16 RX ORDER — ACETAMINOPHEN 650 MG/1
650 SUPPOSITORY RECTAL EVERY 6 HOURS PRN
Status: CANCELLED | OUTPATIENT
Start: 2023-04-16

## 2023-04-16 RX ORDER — SODIUM CHLORIDE 9 MG/ML
INJECTION, SOLUTION INTRAVENOUS CONTINUOUS
Status: CANCELLED | OUTPATIENT
Start: 2023-04-16

## 2023-04-16 RX ORDER — UBIDECARENONE 75 MG
100 CAPSULE ORAL DAILY
Status: CANCELLED | OUTPATIENT
Start: 2023-04-16

## 2023-04-16 RX ORDER — CLOPIDOGREL BISULFATE 75 MG/1
75 TABLET ORAL DAILY
Status: DISCONTINUED | OUTPATIENT
Start: 2023-04-16 | End: 2023-04-17 | Stop reason: HOSPADM

## 2023-04-16 RX ORDER — 0.9 % SODIUM CHLORIDE 0.9 %
1000 INTRAVENOUS SOLUTION INTRAVENOUS ONCE
Status: COMPLETED | OUTPATIENT
Start: 2023-04-16 | End: 2023-04-16

## 2023-04-16 RX ORDER — SODIUM CHLORIDE 9 MG/ML
INJECTION, SOLUTION INTRAVENOUS PRN
Status: CANCELLED | OUTPATIENT
Start: 2023-04-16

## 2023-04-16 RX ORDER — SODIUM CHLORIDE 9 MG/ML
INJECTION, SOLUTION INTRAVENOUS CONTINUOUS
Status: DISCONTINUED | OUTPATIENT
Start: 2023-04-16 | End: 2023-04-17 | Stop reason: HOSPADM

## 2023-04-16 RX ORDER — LISINOPRIL 5 MG/1
5 TABLET ORAL DAILY
Status: CANCELLED | OUTPATIENT
Start: 2023-04-16

## 2023-04-16 RX ORDER — ROSUVASTATIN CALCIUM 20 MG/1
40 TABLET, COATED ORAL DAILY
Status: CANCELLED | OUTPATIENT
Start: 2023-04-16

## 2023-04-16 RX ORDER — ROSUVASTATIN CALCIUM 20 MG/1
40 TABLET, COATED ORAL DAILY
Status: DISCONTINUED | OUTPATIENT
Start: 2023-04-16 | End: 2023-04-17 | Stop reason: HOSPADM

## 2023-04-16 RX ORDER — LISINOPRIL 5 MG/1
5 TABLET ORAL DAILY
Status: DISCONTINUED | OUTPATIENT
Start: 2023-04-16 | End: 2023-04-17 | Stop reason: HOSPADM

## 2023-04-16 RX ORDER — ONDANSETRON 2 MG/ML
4 INJECTION INTRAMUSCULAR; INTRAVENOUS EVERY 6 HOURS PRN
Status: CANCELLED | OUTPATIENT
Start: 2023-04-16

## 2023-04-16 RX ORDER — ONDANSETRON 4 MG/1
4 TABLET, ORALLY DISINTEGRATING ORAL EVERY 8 HOURS PRN
Status: DISCONTINUED | OUTPATIENT
Start: 2023-04-16 | End: 2023-04-17 | Stop reason: HOSPADM

## 2023-04-16 RX ORDER — ONDANSETRON 4 MG/1
4 TABLET, ORALLY DISINTEGRATING ORAL EVERY 8 HOURS PRN
Status: CANCELLED | OUTPATIENT
Start: 2023-04-16

## 2023-04-16 RX ORDER — HYDROCHLOROTHIAZIDE 25 MG/1
12.5 TABLET ORAL DAILY
Status: CANCELLED | OUTPATIENT
Start: 2023-04-16

## 2023-04-16 RX ORDER — ENOXAPARIN SODIUM 100 MG/ML
40 INJECTION SUBCUTANEOUS DAILY
Status: CANCELLED | OUTPATIENT
Start: 2023-04-16

## 2023-04-16 RX ORDER — MECLIZINE HCL 12.5 MG/1
12.5 TABLET ORAL 3 TIMES DAILY PRN
Status: DISCONTINUED | OUTPATIENT
Start: 2023-04-16 | End: 2023-04-17 | Stop reason: HOSPADM

## 2023-04-16 RX ORDER — PANTOPRAZOLE SODIUM 40 MG/1
40 TABLET, DELAYED RELEASE ORAL
Status: CANCELLED | OUTPATIENT
Start: 2023-04-17

## 2023-04-16 RX ORDER — AZITHROMYCIN 250 MG/1
250 TABLET, FILM COATED ORAL DAILY
Status: DISCONTINUED | OUTPATIENT
Start: 2023-04-17 | End: 2023-04-17 | Stop reason: HOSPADM

## 2023-04-16 RX ORDER — AZITHROMYCIN 250 MG/1
250 TABLET, FILM COATED ORAL SEE ADMIN INSTRUCTIONS
Qty: 6 TABLET | Refills: 0 | Status: ON HOLD | OUTPATIENT
Start: 2023-04-16 | End: 2023-04-17 | Stop reason: HOSPADM

## 2023-04-16 RX ORDER — VITAMIN B COMPLEX
1000 TABLET ORAL DAILY
Status: DISCONTINUED | OUTPATIENT
Start: 2023-04-16 | End: 2023-04-17 | Stop reason: HOSPADM

## 2023-04-16 RX ORDER — ONDANSETRON 2 MG/ML
4 INJECTION INTRAMUSCULAR; INTRAVENOUS EVERY 6 HOURS PRN
Status: DISCONTINUED | OUTPATIENT
Start: 2023-04-16 | End: 2023-04-17 | Stop reason: HOSPADM

## 2023-04-16 RX ORDER — PANTOPRAZOLE SODIUM 40 MG/1
40 TABLET, DELAYED RELEASE ORAL
Status: DISCONTINUED | OUTPATIENT
Start: 2023-04-17 | End: 2023-04-17 | Stop reason: HOSPADM

## 2023-04-16 RX ORDER — ASPIRIN 81 MG/1
81 TABLET, CHEWABLE ORAL DAILY
Status: DISCONTINUED | OUTPATIENT
Start: 2023-04-16 | End: 2023-04-17 | Stop reason: HOSPADM

## 2023-04-16 RX ORDER — CLOPIDOGREL BISULFATE 75 MG/1
75 TABLET ORAL DAILY
Status: CANCELLED | OUTPATIENT
Start: 2023-04-16

## 2023-04-16 RX ADMIN — ASPIRIN 81 MG 81 MG: 81 TABLET ORAL at 20:38

## 2023-04-16 RX ADMIN — METOPROLOL TARTRATE 50 MG: 50 TABLET, FILM COATED ORAL at 20:34

## 2023-04-16 RX ADMIN — SODIUM CHLORIDE 1000 ML: 9 INJECTION, SOLUTION INTRAVENOUS at 08:54

## 2023-04-16 RX ADMIN — IOPAMIDOL 75 ML: 755 INJECTION, SOLUTION INTRAVENOUS at 10:08

## 2023-04-16 RX ADMIN — SODIUM CHLORIDE 75 ML/HR: 9 INJECTION, SOLUTION INTRAVENOUS at 18:24

## 2023-04-16 RX ADMIN — ROSUVASTATIN CALCIUM 40 MG: 20 TABLET, COATED ORAL at 20:34

## 2023-04-16 RX ADMIN — CLOPIDOGREL BISULFATE 75 MG: 75 TABLET ORAL at 20:34

## 2023-04-16 RX ADMIN — Medication 1000 UNITS: at 20:35

## 2023-04-16 RX ADMIN — LISINOPRIL 5 MG: 5 TABLET ORAL at 20:34

## 2023-04-16 ASSESSMENT — PAIN - FUNCTIONAL ASSESSMENT: PAIN_FUNCTIONAL_ASSESSMENT: NONE - DENIES PAIN

## 2023-04-17 VITALS
HEART RATE: 68 BPM | OXYGEN SATURATION: 96 % | RESPIRATION RATE: 17 BRPM | DIASTOLIC BLOOD PRESSURE: 63 MMHG | TEMPERATURE: 98.1 F | WEIGHT: 169.53 LBS | SYSTOLIC BLOOD PRESSURE: 131 MMHG | HEIGHT: 66 IN | BODY MASS INDEX: 27.25 KG/M2

## 2023-04-17 LAB
ALBUMIN SERPL-MCNC: 3.2 GM/DL (ref 3.4–5)
ALP BLD-CCNC: 91 IU/L (ref 40–128)
ALT SERPL-CCNC: 15 U/L (ref 10–40)
ANION GAP SERPL CALCULATED.3IONS-SCNC: 8 MMOL/L (ref 4–16)
AST SERPL-CCNC: 12 IU/L (ref 15–37)
BILIRUB SERPL-MCNC: 0.6 MG/DL (ref 0–1)
BUN SERPL-MCNC: 16 MG/DL (ref 6–23)
CALCIUM SERPL-MCNC: 8.5 MG/DL (ref 8.3–10.6)
CHLORIDE BLD-SCNC: 108 MMOL/L (ref 99–110)
CO2: 25 MMOL/L (ref 21–32)
CREAT SERPL-MCNC: 0.8 MG/DL (ref 0.9–1.3)
ERYTHROCYTE SEDIMENTATION RATE: 9 MM/HR (ref 0–20)
GFR SERPL CREATININE-BSD FRML MDRD: >60 ML/MIN/1.73M2
GLUCOSE SERPL-MCNC: 81 MG/DL (ref 70–99)
MAGNESIUM: 2 MG/DL (ref 1.8–2.4)
PHOSPHORUS: 3.1 MG/DL (ref 2.5–4.9)
POTASSIUM SERPL-SCNC: 3.8 MMOL/L (ref 3.5–5.1)
SODIUM BLD-SCNC: 141 MMOL/L (ref 135–145)
T4 FREE SERPL-MCNC: 1.54 NG/DL (ref 0.9–1.8)
TOTAL PROTEIN: 5 GM/DL (ref 6.4–8.2)
TSH SERPL DL<=0.005 MIU/L-ACNC: 1.1 UIU/ML (ref 0.27–4.2)

## 2023-04-17 PROCEDURE — 84443 ASSAY THYROID STIM HORMONE: CPT

## 2023-04-17 PROCEDURE — 94761 N-INVAS EAR/PLS OXIMETRY MLT: CPT

## 2023-04-17 PROCEDURE — 36415 COLL VENOUS BLD VENIPUNCTURE: CPT

## 2023-04-17 PROCEDURE — 96361 HYDRATE IV INFUSION ADD-ON: CPT

## 2023-04-17 PROCEDURE — 6360000002 HC RX W HCPCS: Performed by: EMERGENCY MEDICINE

## 2023-04-17 PROCEDURE — 84100 ASSAY OF PHOSPHORUS: CPT

## 2023-04-17 PROCEDURE — 6370000000 HC RX 637 (ALT 250 FOR IP): Performed by: GENERAL PRACTICE

## 2023-04-17 PROCEDURE — 85652 RBC SED RATE AUTOMATED: CPT

## 2023-04-17 PROCEDURE — 80053 COMPREHEN METABOLIC PANEL: CPT

## 2023-04-17 PROCEDURE — 84439 ASSAY OF FREE THYROXINE: CPT

## 2023-04-17 PROCEDURE — 96374 THER/PROPH/DIAG INJ IV PUSH: CPT

## 2023-04-17 PROCEDURE — 83735 ASSAY OF MAGNESIUM: CPT

## 2023-04-17 RX ORDER — AZITHROMYCIN 250 MG/1
250 TABLET, FILM COATED ORAL DAILY
Qty: 4 TABLET | Refills: 0 | Status: SHIPPED | OUTPATIENT
Start: 2023-04-17 | End: 2023-04-21

## 2023-04-17 RX ORDER — METHYLPREDNISOLONE 4 MG/1
TABLET ORAL
Qty: 1 KIT | Refills: 0 | Status: SHIPPED | OUTPATIENT
Start: 2023-04-17 | End: 2023-04-23

## 2023-04-17 RX ADMIN — MECLIZINE 12.5 MG: 12.5 TABLET ORAL at 09:36

## 2023-04-17 RX ADMIN — PANTOPRAZOLE SODIUM 40 MG: 40 TABLET, DELAYED RELEASE ORAL at 05:39

## 2023-04-17 RX ADMIN — LISINOPRIL 5 MG: 5 TABLET ORAL at 09:37

## 2023-04-17 RX ADMIN — ROSUVASTATIN CALCIUM 40 MG: 20 TABLET, COATED ORAL at 09:36

## 2023-04-17 RX ADMIN — ONDANSETRON 4 MG: 2 INJECTION INTRAMUSCULAR; INTRAVENOUS at 05:39

## 2023-04-17 RX ADMIN — ASPIRIN 81 MG 81 MG: 81 TABLET ORAL at 09:36

## 2023-04-17 RX ADMIN — Medication 1000 UNITS: at 09:36

## 2023-04-17 RX ADMIN — AZITHROMYCIN MONOHYDRATE 250 MG: 250 TABLET ORAL at 09:36

## 2023-04-17 RX ADMIN — METOPROLOL TARTRATE 50 MG: 50 TABLET, FILM COATED ORAL at 09:36

## 2023-04-17 RX ADMIN — CLOPIDOGREL BISULFATE 75 MG: 75 TABLET ORAL at 09:36

## 2023-04-17 NOTE — H&P
systolic function is normal with an ejection fraction of55-60%. Grade I diastolic dysfunction. Mildly dilated left atrium. Moderate mitral regurgitation. Mild to moderate tricuspid regurgitation. No evidence of pericardial effusion. HTN (hypertension)     Hyperlipidemia        Past Surgical History:        Procedure Laterality Date    BACK SURGERY      CARDIAC SURGERY      cabg    COLONOSCOPY  4/14/15    divertics    CORONARY ANGIOPLASTY WITH STENT PLACEMENT  05/14/2014    X2       Medications Prior to Admission:    Medications Prior to Admission: azithromycin (ZITHROMAX) 250 MG tablet, Take 1 tablet by mouth See Admin Instructions for 5 days 500mg on day 1 followed by 250mg on days 2 - 5  lisinopril (PRINIVIL;ZESTRIL) 5 MG tablet, Take 1 tablet by mouth daily  Ascorbic Acid (VITAMIN C PO), Take 1 tablet by mouth daily Patient states he doesn't take this daily  VITAMIN D PO, Take 1 capsule by mouth daily Patient states he doesn't take this daily  clopidogrel (PLAVIX) 75 MG tablet, Take 1 tablet by mouth daily  vitamin B-12 (CYANOCOBALAMIN) 100 MCG tablet, Take 1 tablet by mouth daily  rosuvastatin (CRESTOR) 40 MG tablet, Take 1 tablet by mouth daily (Patient taking differently: Take 40 mg by mouth nightly)  omeprazole (PRILOSEC) 20 MG delayed release capsule, Take 1 capsule by mouth daily  metoprolol tartrate (LOPRESSOR) 50 MG tablet, take 1 tablet by mouth twice a day (Patient taking differently: Take 50 mg by mouth 2 times daily)  hydrochlorothiazide (HYDRODIURIL) 12.5 MG tablet, Take 12.5 mg by mouth daily  aspirin 81 MG tablet, Take 1 tablet by mouth daily    Allergies:  Patient has no known allergies. Social History:   TOBACCO:   reports that he quit smoking about 17 years ago. His smoking use included cigarettes. He has a 90.00 pack-year smoking history. He has never used smokeless tobacco.  ETOH:   reports no history of alcohol use. Patient currently lives alone    Family History:   History reviewed.  No

## 2023-04-17 NOTE — PROGRESS NOTES
Physician Progress Note      PATIENT:               Nicanor Haddad  CSN #:                  [id number]  :                       1940  ADMIT DATE:       2023 3:51 PM  [address] [place] DATE:        2023 12:12 PM  RESPONDING  PROVIDER #:        Pepper Griffith MD          QUERY TEXT:    Internal Medicine,    Patient admitted with acute bronchitis and noted to have COPD documented. If   possible, please document in progress notes and discharge summary if you are   evaluating and /or treating any of the following: The medical record reflects the following:  Risk Factors: COPD  Clinical Indicators: documentation of acute bronchitis and COPD, cough  Treatment: labs, imaging, po Zithromax, steroids    Thank you,  Kathi Gutierrez RN CDS  [phone]  Options provided:  -- Unspecified COPD exacerbation  -- Acute bronchitis on chronic obstructive bronchitis  -- Other - I will add my own diagnosis  -- Disagree - Not applicable / Not valid  -- Disagree - Clinically unable to determine / Unknown  -- Refer to Clinical Documentation Reviewer    PROVIDER RESPONSE TEXT:    Provider disagreed with this query. did not have copd exacerbation.     Query created by: Katie Tejeda on 2023 10:17 AM      Electronically signed by:  Pepper Griffith MD 2023 5:08 PM

## 2023-04-17 NOTE — DISCHARGE SUMMARY
about going home. Patient was readmitted. He was continued on Medrol dose pack and Z-Mathew. He was also started on IV fluids. Patient's cough and dizziness did improve. The following morning he is able to ambulate without dizziness. Overall, the patient's condition has improved. He is to follow-up with PCP as outpatient. Relevant Investigations    CTA head/neck, 4/16/2023  1. No acute intracranial process identified. 2. No large vessel occlusion, significant stenosis or cerebral aneurysm identified within the brain. 3. 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 4. 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 Disposition: home    Patient Instructions:      Medication List        START taking these medications      methylPREDNISolone 4 MG tablet  Commonly known as: MEDROL DOSEPACK  Take as directed by mouth.             CHANGE how you take these medications      azithromycin 250 MG tablet  Commonly known as: ZITHROMAX  Take 1 tablet by mouth daily for 4 doses  What changed:   when to take this  additional instructions     rosuvastatin 40 MG tablet  Commonly known as: CRESTOR  Take 1 tablet by mouth daily  What changed: when to take this            CONTINUE taking these medications      aspirin 81 MG tablet  Take 1 tablet by mouth daily     clopidogrel 75 MG tablet  Commonly known as: PLAVIX  Take 1 tablet by mouth daily     hydroCHLOROthiazide 12.5 MG tablet  Commonly known as: HYDRODIURIL     lisinopril 5 MG tablet  Commonly known as: PRINIVIL;ZESTRIL  Take 1 tablet by mouth daily     metoprolol tartrate 50 MG tablet  Commonly known as: LOPRESSOR  take 1 tablet by mouth twice a day     omeprazole 20 MG delayed release capsule  Commonly known as: PRILOSEC  Take 1 capsule by mouth daily     vitamin B-12 100 MCG tablet  Commonly known as: CYANOCOBALAMIN  Take 1 tablet by mouth daily     VITAMIN C PO     VITAMIN D PO

## 2023-04-19 ENCOUNTER — OFFICE VISIT (OUTPATIENT)
Dept: CARDIOLOGY CLINIC | Age: 83
End: 2023-04-19
Payer: MEDICARE

## 2023-04-19 VITALS
SYSTOLIC BLOOD PRESSURE: 130 MMHG | WEIGHT: 171 LBS | DIASTOLIC BLOOD PRESSURE: 62 MMHG | HEART RATE: 86 BPM | BODY MASS INDEX: 27.48 KG/M2 | HEIGHT: 66 IN

## 2023-04-19 DIAGNOSIS — E78.2 HYPERLIPIDEMIA, MIXED: Chronic | ICD-10-CM

## 2023-04-19 DIAGNOSIS — I73.9 PVD (PERIPHERAL VASCULAR DISEASE) (HCC): Chronic | ICD-10-CM

## 2023-04-19 DIAGNOSIS — I25.810 CORONARY ARTERY DISEASE INVOLVING CORONARY BYPASS GRAFT OF NATIVE HEART WITHOUT ANGINA PECTORIS: ICD-10-CM

## 2023-04-19 DIAGNOSIS — Z95.1 S/P CABG X 2: Chronic | ICD-10-CM

## 2023-04-19 DIAGNOSIS — I21.11 ST ELEVATION MYOCARDIAL INFARCTION INVOLVING RIGHT CORONARY ARTERY (HCC): ICD-10-CM

## 2023-04-19 DIAGNOSIS — R42 DIZZINESS: ICD-10-CM

## 2023-04-19 DIAGNOSIS — I10 PRIMARY HYPERTENSION: Chronic | ICD-10-CM

## 2023-04-19 DIAGNOSIS — I25.10 ASHD (ARTERIOSCLEROTIC HEART DISEASE): Primary | Chronic | ICD-10-CM

## 2023-04-19 PROCEDURE — 99213 OFFICE O/P EST LOW 20 MIN: CPT | Performed by: INTERNAL MEDICINE

## 2023-04-19 PROCEDURE — 3075F SYST BP GE 130 - 139MM HG: CPT | Performed by: INTERNAL MEDICINE

## 2023-04-19 PROCEDURE — G8427 DOCREV CUR MEDS BY ELIG CLIN: HCPCS | Performed by: INTERNAL MEDICINE

## 2023-04-19 PROCEDURE — 1111F DSCHRG MED/CURRENT MED MERGE: CPT | Performed by: INTERNAL MEDICINE

## 2023-04-19 PROCEDURE — 3078F DIAST BP <80 MM HG: CPT | Performed by: INTERNAL MEDICINE

## 2023-04-19 PROCEDURE — 1123F ACP DISCUSS/DSCN MKR DOCD: CPT | Performed by: INTERNAL MEDICINE

## 2023-04-19 PROCEDURE — 1036F TOBACCO NON-USER: CPT | Performed by: INTERNAL MEDICINE

## 2023-04-19 PROCEDURE — G8417 CALC BMI ABV UP PARAM F/U: HCPCS | Performed by: INTERNAL MEDICINE

## 2023-04-19 NOTE — PROGRESS NOTES
Madeleine Doe is a 81 y.o. male who has    CHIEF COMPLAINT AS FOLLOWS:  CHEST PAIN: Patient denies any C/O chest pains at this time. SOB: No C/O SOB at this time. LEG EDEMA: No leg edema                             PALPITATIONS: Denies any C/O Palpitations   DIZZINESS: C/O positional Dizziness but no black out spells. Has balance issues. SYNCOPE: None   OTHER/ ADDITIONAL COMPLAINTS: His sense of smell is not back yet completely since after his COVID. HPI: Patient is here for F/U on his CAD, PVCs-Arrhythmia, HTN & Dyslipidemia problems. CAD: Patient has known CAD. Had CABG in the past.  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 HTN: Patient has known essential HTN. Has been treated with guideline recommended medical / physical/ diet therapy as stated below. Dyslipidemia: Patient has known mixed dyslipidemia. Has been treated with guideline recommended medical / physical/ diet therapy as stated below.                 Current Outpatient Medications   Medication Sig Dispense Refill    azithromycin (ZITHROMAX) 250 MG tablet Take 1 tablet by mouth daily for 4 doses 4 tablet 0    lisinopril (PRINIVIL;ZESTRIL) 5 MG tablet Take 1 tablet by mouth daily 30 tablet 5    Ascorbic Acid (VITAMIN C PO) Take 1 tablet by mouth daily Patient states he doesn't take this daily      VITAMIN D PO Take 1 capsule by mouth daily Patient states he doesn't take this daily      clopidogrel (PLAVIX) 75 MG tablet Take 1 tablet by mouth daily 30 tablet 3    vitamin B-12 (CYANOCOBALAMIN) 100 MCG tablet Take 1 tablet by mouth daily 90 tablet 3    rosuvastatin (CRESTOR) 40 MG tablet Take 1 tablet by mouth daily (Patient taking differently: Take 1 tablet by mouth nightly) 60 tablet 5    omeprazole (PRILOSEC) 20 MG delayed release capsule Take 1 capsule by mouth daily 90 capsule 1    metoprolol tartrate (LOPRESSOR) 50 MG tablet take 1 tablet by mouth twice a

## 2023-04-19 NOTE — PATIENT INSTRUCTIONS
CORONARY ARTERY DISEASE:Yes   clinically stable. Patient is on optimal medical regimen ( see medication list above )  -   Patient is currently  asymptomatic from CAD. - changes in  treatment:   no, on Plavix& Metoprolol.           - Testing ordered:  no  [facility] classification: 1     CATH 4/2019   POBA OF SVG TO RCA 99% TO 0%   OSTIAL SVG MILD DISESAE     4/29/2022   Normal study. Normal perfusion study with normal distribution in all coronal, short, and    horizontal axis. The observed defect is consistent with diaphragmatic attenuation. Normal LV function. LVEF is > 70 %. HTN: Not well controlled on current medical regimen, see list above. - changes in  treatment:   no, on Lopressor & HCTZ. CARDIOMYOPATHY: None known   CONGESTIVE HEART FAILURE: NO KNOWN HISTORY.     VHD: No significant VHD noted  ECHO 9/2021   Left ventricular systolic function is normal.   Ejection fraction is visually estimated at 50-55%. Grade I diastolic dysfunction. No evidence of any pericardial effusion. DYSLIPIDEMIA: Patient's profile is at / near [employer],                                                           Tolerating current medical regimen wellyes,  Takes Crestor                                                          See most recent Lab values in Labs section above. PAD:  known. claudication symptoms. Blanquita Joyce no               Up to date on non invasive testing .yes,                Patient on optimum medical regimen . yes,       ARRHYTHMIAS: Excessive PVCs resolved since patient has been on magnesium     TESTS ORDERED:NONE THIS VISIT     PREVIOUSLY ORDERED TESTS REVIEWED & DISCUSSED WITH THE PATIENT:     I personally reviewed & interpreted, all previously ordered tests as copied above. Latest Labs are pulled in to the note with dates.    Labs, specially in Reference to Lipid profile, Cardiac testing in the form of Echo ( dated: ), stress tests ( dated: ) & other relevant cardiac

## 2023-04-29 ENCOUNTER — APPOINTMENT (OUTPATIENT)
Dept: GENERAL RADIOLOGY | Age: 83
DRG: 190 | End: 2023-04-29
Payer: MEDICAID

## 2023-04-29 ENCOUNTER — HOSPITAL ENCOUNTER (EMERGENCY)
Age: 83
Discharge: HOME | End: 2023-04-29
Attending: EMERGENCY MEDICINE
Payer: MEDICARE

## 2023-04-29 ENCOUNTER — HOSPITAL ENCOUNTER (INPATIENT)
Age: 83
LOS: 1 days | Discharge: HOME | DRG: 190 | End: 2023-05-02
Attending: EMERGENCY MEDICINE | Admitting: GENERAL PRACTICE
Payer: MEDICAID

## 2023-04-29 VITALS
BODY MASS INDEX: 24.11 KG/M2 | HEART RATE: 84 BPM | DIASTOLIC BLOOD PRESSURE: 62 MMHG | TEMPERATURE: 97.5 F | HEIGHT: 66 IN | OXYGEN SATURATION: 95 % | SYSTOLIC BLOOD PRESSURE: 142 MMHG | WEIGHT: 150 LBS | RESPIRATION RATE: 16 BRPM

## 2023-04-29 DIAGNOSIS — R33.9 URINARY RETENTION: ICD-10-CM

## 2023-04-29 DIAGNOSIS — R33.9 URINARY RETENTION: Primary | ICD-10-CM

## 2023-04-29 DIAGNOSIS — T83.011A MALFUNCTION OF FOLEY CATHETER, INITIAL ENCOUNTER (HCC): ICD-10-CM

## 2023-04-29 DIAGNOSIS — I21.3 ST ELEVATION MYOCARDIAL INFARCTION (STEMI), UNSPECIFIED ARTERY (HCC): Primary | ICD-10-CM

## 2023-04-29 PROBLEM — R07.9 CHEST PAIN: Status: ACTIVE | Noted: 2023-04-29

## 2023-04-29 PROBLEM — I21.02 ACUTE ST ELEVATION MYOCARDIAL INFARCTION (STEMI) DUE TO OCCLUSION OF DISTAL PORTION OF LEFT ANTERIOR DESCENDING (LAD) CORONARY ARTERY (HCC): Status: ACTIVE | Noted: 2023-04-29

## 2023-04-29 LAB
ALBUMIN SERPL-MCNC: 4.1 GM/DL (ref 3.4–5)
ALBUMIN SERPL-MCNC: 4.2 GM/DL (ref 3.4–5)
ALP BLD-CCNC: 110 IU/L (ref 40–129)
ALP BLD-CCNC: 119 IU/L (ref 40–129)
ALT SERPL-CCNC: 14 U/L (ref 10–40)
ALT SERPL-CCNC: 15 U/L (ref 10–40)
ANION GAP SERPL CALCULATED.3IONS-SCNC: 14 MMOL/L (ref 4–16)
ANION GAP SERPL CALCULATED.3IONS-SCNC: 8 MMOL/L (ref 4–16)
AST SERPL-CCNC: 16 IU/L (ref 15–37)
AST SERPL-CCNC: 19 IU/L (ref 15–37)
BASOPHILS ABSOLUTE: 0 K/CU MM
BASOPHILS ABSOLUTE: 0 K/CU MM
BASOPHILS RELATIVE PERCENT: 0.3 % (ref 0–1)
BASOPHILS RELATIVE PERCENT: 0.3 % (ref 0–1)
BILIRUB SERPL-MCNC: 0.5 MG/DL (ref 0–1)
BILIRUB SERPL-MCNC: 0.6 MG/DL (ref 0–1)
BILIRUBIN URINE: NEGATIVE MG/DL
BLOOD, URINE: NEGATIVE
BUN SERPL-MCNC: 16 MG/DL (ref 6–23)
BUN SERPL-MCNC: 17 MG/DL (ref 6–23)
CALCIUM SERPL-MCNC: 8.9 MG/DL (ref 8.3–10.6)
CALCIUM SERPL-MCNC: 9.3 MG/DL (ref 8.3–10.6)
CHLORIDE BLD-SCNC: 101 MMOL/L (ref 99–110)
CHLORIDE BLD-SCNC: 97 MMOL/L (ref 99–110)
CLARITY: CLEAR
CO2: 24 MMOL/L (ref 21–32)
CO2: 29 MMOL/L (ref 21–32)
COLOR: YELLOW
COMMENT UA: NORMAL
CREAT SERPL-MCNC: 0.9 MG/DL (ref 0.9–1.3)
CREAT SERPL-MCNC: 0.9 MG/DL (ref 0.9–1.3)
DIFFERENTIAL TYPE: ABNORMAL
DIFFERENTIAL TYPE: ABNORMAL
EOSINOPHILS ABSOLUTE: 0.1 K/CU MM
EOSINOPHILS ABSOLUTE: 0.1 K/CU MM
EOSINOPHILS RELATIVE PERCENT: 0.8 % (ref 0–3)
EOSINOPHILS RELATIVE PERCENT: 1.8 % (ref 0–3)
GFR SERPL CREATININE-BSD FRML MDRD: >60 ML/MIN/1.73M2
GFR SERPL CREATININE-BSD FRML MDRD: >60 ML/MIN/1.73M2
GLUCOSE SERPL-MCNC: 105 MG/DL (ref 70–99)
GLUCOSE SERPL-MCNC: 142 MG/DL (ref 70–99)
GLUCOSE, URINE: NEGATIVE MG/DL
HCT VFR BLD CALC: 42.3 % (ref 42–52)
HCT VFR BLD CALC: 42.6 % (ref 42–52)
HEMOGLOBIN: 13.7 GM/DL (ref 13.5–18)
HEMOGLOBIN: 13.9 GM/DL (ref 13.5–18)
IMMATURE NEUTROPHIL %: 0.1 % (ref 0–0.43)
IMMATURE NEUTROPHIL %: 0.3 % (ref 0–0.43)
INR BLD: 0.98 INDEX
KETONES, URINE: NEGATIVE MG/DL
LEUKOCYTE ESTERASE, URINE: NEGATIVE
LYMPHOCYTES ABSOLUTE: 1 K/CU MM
LYMPHOCYTES ABSOLUTE: 1.2 K/CU MM
LYMPHOCYTES RELATIVE PERCENT: 11.1 % (ref 24–44)
LYMPHOCYTES RELATIVE PERCENT: 15.1 % (ref 24–44)
MCH RBC QN AUTO: 29.7 PG (ref 27–31)
MCH RBC QN AUTO: 29.7 PG (ref 27–31)
MCHC RBC AUTO-ENTMCNC: 32.2 % (ref 32–36)
MCHC RBC AUTO-ENTMCNC: 32.9 % (ref 32–36)
MCV RBC AUTO: 90.4 FL (ref 78–100)
MCV RBC AUTO: 92.4 FL (ref 78–100)
MONOCYTES ABSOLUTE: 0.6 K/CU MM
MONOCYTES ABSOLUTE: 0.8 K/CU MM
MONOCYTES RELATIVE PERCENT: 7.1 % (ref 0–4)
MONOCYTES RELATIVE PERCENT: 8.4 % (ref 0–4)
NITRITE URINE, QUANTITATIVE: NEGATIVE
NUCLEATED RBC %: 0 %
NUCLEATED RBC %: 0 %
PDW BLD-RTO: 13.6 % (ref 11.7–14.9)
PDW BLD-RTO: 13.6 % (ref 11.7–14.9)
PH, URINE: 5.5 (ref 5–8)
PLATELET # BLD: 119 K/CU MM (ref 140–440)
PLATELET # BLD: 143 K/CU MM (ref 140–440)
PMV BLD AUTO: 11.5 FL (ref 7.5–11.1)
PMV BLD AUTO: 12.1 FL (ref 7.5–11.1)
POTASSIUM SERPL-SCNC: 3.3 MMOL/L (ref 3.5–5.1)
POTASSIUM SERPL-SCNC: 3.7 MMOL/L (ref 3.5–5.1)
PRO-BNP: 90.1 PG/ML
PROTEIN UA: NEGATIVE MG/DL
PROTHROMBIN TIME: 12.4 SECONDS (ref 11.7–14.5)
RBC # BLD: 4.61 M/CU MM (ref 4.6–6.2)
RBC # BLD: 4.68 M/CU MM (ref 4.6–6.2)
SEGMENTED NEUTROPHILS ABSOLUTE COUNT: 5 K/CU MM
SEGMENTED NEUTROPHILS ABSOLUTE COUNT: 9 K/CU MM
SEGMENTED NEUTROPHILS RELATIVE PERCENT: 74.3 % (ref 36–66)
SEGMENTED NEUTROPHILS RELATIVE PERCENT: 80.4 % (ref 36–66)
SODIUM BLD-SCNC: 135 MMOL/L (ref 135–145)
SODIUM BLD-SCNC: 138 MMOL/L (ref 135–145)
SPECIFIC GRAVITY UA: 1.02 (ref 1–1.03)
TOTAL IMMATURE NEUTOROPHIL: 0.01 K/CU MM
TOTAL IMMATURE NEUTOROPHIL: 0.03 K/CU MM
TOTAL NUCLEATED RBC: 0 K/CU MM
TOTAL NUCLEATED RBC: 0 K/CU MM
TOTAL PROTEIN: 6.3 GM/DL (ref 6.4–8.2)
TOTAL PROTEIN: 7.3 GM/DL (ref 6.4–8.2)
TROPONIN T: 0.02 NG/ML
UROBILINOGEN, URINE: 0.2 MG/DL (ref 0.2–1)
WBC # BLD: 11.1 K/CU MM (ref 4–10.5)
WBC # BLD: 6.8 K/CU MM (ref 4–10.5)

## 2023-04-29 PROCEDURE — 85025 COMPLETE CBC W/AUTO DIFF WBC: CPT

## 2023-04-29 PROCEDURE — G0378 HOSPITAL OBSERVATION PER HR: HCPCS

## 2023-04-29 PROCEDURE — 93455 CORONARY ART/GRFT ANGIO S&I: CPT

## 2023-04-29 PROCEDURE — 2500000003 HC RX 250 WO HCPCS: Performed by: INTERNAL MEDICINE

## 2023-04-29 PROCEDURE — 84484 ASSAY OF TROPONIN QUANT: CPT

## 2023-04-29 PROCEDURE — 99285 EMERGENCY DEPT VISIT HI MDM: CPT

## 2023-04-29 PROCEDURE — 6370000000 HC RX 637 (ALT 250 FOR IP)

## 2023-04-29 PROCEDURE — 81003 URINALYSIS AUTO W/O SCOPE: CPT

## 2023-04-29 PROCEDURE — B2111ZZ FLUOROSCOPY OF MULTIPLE CORONARY ARTERIES USING LOW OSMOLAR CONTRAST: ICD-10-PCS | Performed by: INTERNAL MEDICINE

## 2023-04-29 PROCEDURE — 93005 ELECTROCARDIOGRAM TRACING: CPT | Performed by: PHYSICIAN ASSISTANT

## 2023-04-29 PROCEDURE — 2709999900 HC NON-CHARGEABLE SUPPLY

## 2023-04-29 PROCEDURE — 6370000000 HC RX 637 (ALT 250 FOR IP): Performed by: PHYSICIAN ASSISTANT

## 2023-04-29 PROCEDURE — 4A023N7 MEASUREMENT OF CARDIAC SAMPLING AND PRESSURE, LEFT HEART, PERCUTANEOUS APPROACH: ICD-10-PCS | Performed by: INTERNAL MEDICINE

## 2023-04-29 PROCEDURE — 6360000002 HC RX W HCPCS

## 2023-04-29 PROCEDURE — 6370000000 HC RX 637 (ALT 250 FOR IP): Performed by: INTERNAL MEDICINE

## 2023-04-29 PROCEDURE — 2580000003 HC RX 258

## 2023-04-29 PROCEDURE — 99223 1ST HOSP IP/OBS HIGH 75: CPT | Performed by: INTERNAL MEDICINE

## 2023-04-29 PROCEDURE — 2500000003 HC RX 250 WO HCPCS

## 2023-04-29 PROCEDURE — C1769 GUIDE WIRE: HCPCS

## 2023-04-29 PROCEDURE — 83880 ASSAY OF NATRIURETIC PEPTIDE: CPT

## 2023-04-29 PROCEDURE — C1894 INTRO/SHEATH, NON-LASER: HCPCS

## 2023-04-29 PROCEDURE — 80053 COMPREHEN METABOLIC PANEL: CPT

## 2023-04-29 PROCEDURE — 71045 X-RAY EXAM CHEST 1 VIEW: CPT

## 2023-04-29 PROCEDURE — 93455 CORONARY ART/GRFT ANGIO S&I: CPT | Performed by: INTERNAL MEDICINE

## 2023-04-29 PROCEDURE — 85610 PROTHROMBIN TIME: CPT

## 2023-04-29 PROCEDURE — 6360000004 HC RX CONTRAST MEDICATION

## 2023-04-29 PROCEDURE — 99283 EMERGENCY DEPT VISIT LOW MDM: CPT

## 2023-04-29 PROCEDURE — 2580000003 HC RX 258: Performed by: INTERNAL MEDICINE

## 2023-04-29 PROCEDURE — 6370000000 HC RX 637 (ALT 250 FOR IP): Performed by: EMERGENCY MEDICINE

## 2023-04-29 RX ORDER — ACETAMINOPHEN 325 MG/1
650 TABLET ORAL EVERY 4 HOURS PRN
Status: DISCONTINUED | OUTPATIENT
Start: 2023-04-29 | End: 2023-05-02 | Stop reason: HOSPADM

## 2023-04-29 RX ORDER — SODIUM CHLORIDE 9 MG/ML
INJECTION, SOLUTION INTRAVENOUS CONTINUOUS
Status: DISCONTINUED | OUTPATIENT
Start: 2023-04-29 | End: 2023-04-30

## 2023-04-29 RX ORDER — ASPIRIN 81 MG/1
324 TABLET, CHEWABLE ORAL ONCE
Status: COMPLETED | OUTPATIENT
Start: 2023-04-29 | End: 2023-04-29

## 2023-04-29 RX ORDER — SODIUM CHLORIDE 9 MG/ML
INJECTION, SOLUTION INTRAVENOUS PRN
Status: DISCONTINUED | OUTPATIENT
Start: 2023-04-29 | End: 2023-05-02 | Stop reason: HOSPADM

## 2023-04-29 RX ORDER — NITROGLYCERIN 0.4 MG/1
0.4 TABLET SUBLINGUAL ONCE
Status: DISCONTINUED | OUTPATIENT
Start: 2023-04-29 | End: 2023-05-02 | Stop reason: HOSPADM

## 2023-04-29 RX ORDER — CLOPIDOGREL BISULFATE 75 MG/1
75 TABLET ORAL DAILY
Status: DISCONTINUED | OUTPATIENT
Start: 2023-04-30 | End: 2023-05-02 | Stop reason: HOSPADM

## 2023-04-29 RX ORDER — NITROGLYCERIN 0.4 MG/1
TABLET SUBLINGUAL DAILY PRN
Status: COMPLETED | OUTPATIENT
Start: 2023-04-29 | End: 2023-04-29

## 2023-04-29 RX ORDER — SODIUM CHLORIDE 0.9 % (FLUSH) 0.9 %
5-40 SYRINGE (ML) INJECTION EVERY 12 HOURS SCHEDULED
Status: DISCONTINUED | OUTPATIENT
Start: 2023-04-29 | End: 2023-05-02 | Stop reason: HOSPADM

## 2023-04-29 RX ORDER — NITROGLYCERIN 20 MG/100ML
5-200 INJECTION INTRAVENOUS CONTINUOUS
Status: DISCONTINUED | OUTPATIENT
Start: 2023-04-29 | End: 2023-04-30

## 2023-04-29 RX ORDER — ASPIRIN 81 MG/1
81 TABLET, CHEWABLE ORAL DAILY
Status: DISCONTINUED | OUTPATIENT
Start: 2023-04-30 | End: 2023-05-02 | Stop reason: HOSPADM

## 2023-04-29 RX ORDER — SODIUM CHLORIDE 0.9 % (FLUSH) 0.9 %
5-40 SYRINGE (ML) INJECTION PRN
Status: DISCONTINUED | OUTPATIENT
Start: 2023-04-29 | End: 2023-05-02 | Stop reason: HOSPADM

## 2023-04-29 RX ORDER — ROSUVASTATIN CALCIUM 20 MG/1
40 TABLET, COATED ORAL NIGHTLY
Status: DISCONTINUED | OUTPATIENT
Start: 2023-04-29 | End: 2023-05-02 | Stop reason: HOSPADM

## 2023-04-29 RX ADMIN — NITROGLYCERIN 10 MCG/MIN: 20 INJECTION INTRAVENOUS at 19:56

## 2023-04-29 RX ADMIN — METOPROLOL TARTRATE 25 MG: 25 TABLET, FILM COATED ORAL at 22:51

## 2023-04-29 RX ADMIN — SODIUM CHLORIDE, PRESERVATIVE FREE 10 ML: 5 INJECTION INTRAVENOUS at 22:56

## 2023-04-29 RX ADMIN — ASPIRIN 81 MG CHEWABLE TABLET 324 MG: 81 TABLET CHEWABLE at 18:16

## 2023-04-29 RX ADMIN — NITROGLYCERIN 0.4 MG: 0.4 TABLET, ORALLY DISINTEGRATING SUBLINGUAL at 18:23

## 2023-04-29 RX ADMIN — NITROGLYCERIN 0.4 MG: 0.4 TABLET, ORALLY DISINTEGRATING SUBLINGUAL at 18:26

## 2023-04-29 RX ADMIN — NITROGLYCERIN 0.4 MG: 0.4 TABLET, ORALLY DISINTEGRATING SUBLINGUAL at 18:20

## 2023-04-29 RX ADMIN — ROSUVASTATIN CALCIUM 40 MG: 20 TABLET, FILM COATED ORAL at 22:51

## 2023-04-29 ASSESSMENT — PAIN DESCRIPTION - PAIN TYPE: TYPE: ACUTE PAIN

## 2023-04-29 ASSESSMENT — PAIN - FUNCTIONAL ASSESSMENT: PAIN_FUNCTIONAL_ASSESSMENT: 0-10

## 2023-04-29 ASSESSMENT — PAIN DESCRIPTION - ORIENTATION: ORIENTATION: LOWER

## 2023-04-29 ASSESSMENT — PAIN DESCRIPTION - LOCATION: LOCATION: ABDOMEN

## 2023-04-29 ASSESSMENT — PAIN DESCRIPTION - DESCRIPTORS: DESCRIPTORS: PRESSURE

## 2023-04-29 ASSESSMENT — PAIN SCALES - GENERAL: PAINLEVEL_OUTOF10: 10

## 2023-04-29 NOTE — ED TRIAGE NOTES
Patient presents to the ED with complaints of urinary frequency. Patient states that he feels like he has to pee and then he doesn't pee.

## 2023-04-29 NOTE — ED PROVIDER NOTES
Emergency Department Encounter    Patient: Eugenio Adamson  MRN: [id number]  : 1940  Date of Evaluation: 2023  ED Provider:  Fer Munoz MD    Triage Chief Complaint:   Urinary Frequency    [place]:  Eugenio Adamson is a 81 y.o. male that presents with complaint of decrease in urinary frequency. He has history of prostate cancer, usually has get up to the bathroom twice in the night and last night did not get up at all. He went downstairs this morning and felt like needed to urinate so went to go upstairs and did not feel like he needed to pee. He is concerned due to that. No abdominal pain. No nausea or vomiting. No flank pain. He did have recent hospitalization, he did get started on a new medication from Dr. Frandy Aldana he tells me but cannot tell me what it was. He is not have any shortness of breath or congestion. No peripheral edema. No chest pain. ROS - see HPI, below listed is current ROS at time of my eval:  10 systems reviewed and negative except as above. Past Medical History:   Diagnosis Date    CAD (coronary artery disease)     Cancer ([facility] [address]     prostate    H/O echocardiogram 2019    Patient in atrial fibrillation during exam. Left ventricular function is normal, EF is estimated at 55-60%. Mildly dilated left atrium. Mildly dilated left atrium. Mildli enlarged right ventricle cavity. Trace to mild mitral regurgitation is present. No evidence of pericardial effusion. No evidence of pleural effusion. History of cardiac catheterization 2017    Critical Single vessel CAD with chronic occlusion of RCA. No significant disease of the other vessels. SVBG to RCA is patent with mild Proximal disease. LIMA to LAD did not mature. Normal LV systolic function & wall motion. LVEF is 55 %. Patient tolerated the procedure well. No immediate complications. History of echocardiogram 2018    Left ventricular systolic function is normal with an ejection fraction of55-60%.  Grade I

## 2023-04-29 NOTE — ED NOTES
Mahmood inserted at this time, pt tolerated well, 100 ml of UA noted.      Jef Holt, RN  04/29/23 1640

## 2023-04-29 NOTE — ED NOTES
Report and care assumed from [facility], [facility]  04/29/23 2082

## 2023-04-30 ENCOUNTER — APPOINTMENT (OUTPATIENT)
Dept: CT IMAGING | Age: 83
DRG: 190 | End: 2023-04-30
Payer: MEDICAID

## 2023-04-30 LAB
ANION GAP SERPL CALCULATED.3IONS-SCNC: 12 MMOL/L (ref 4–16)
BUN SERPL-MCNC: 16 MG/DL (ref 6–23)
CALCIUM SERPL-MCNC: 8.3 MG/DL (ref 8.3–10.6)
CHLORIDE BLD-SCNC: 100 MMOL/L (ref 99–110)
CO2: 22 MMOL/L (ref 21–32)
CREAT SERPL-MCNC: 0.8 MG/DL (ref 0.9–1.3)
GFR SERPL CREATININE-BSD FRML MDRD: >60 ML/MIN/1.73M2
GLUCOSE SERPL-MCNC: 119 MG/DL (ref 70–99)
HCT VFR BLD CALC: 38.8 % (ref 42–52)
HEMOGLOBIN: 12.5 GM/DL (ref 13.5–18)
MCH RBC QN AUTO: 29.6 PG (ref 27–31)
MCHC RBC AUTO-ENTMCNC: 32.2 % (ref 32–36)
MCV RBC AUTO: 91.7 FL (ref 78–100)
PDW BLD-RTO: 13.5 % (ref 11.7–14.9)
PLATELET # BLD: 114 K/CU MM (ref 140–440)
PMV BLD AUTO: 12.3 FL (ref 7.5–11.1)
POTASSIUM SERPL-SCNC: 3.8 MMOL/L (ref 3.5–5.1)
RBC # BLD: 4.23 M/CU MM (ref 4.6–6.2)
SODIUM BLD-SCNC: 134 MMOL/L (ref 135–145)
TROPONIN T: 0.54 NG/ML
WBC # BLD: 9.5 K/CU MM (ref 4–10.5)

## 2023-04-30 PROCEDURE — 6360000004 HC RX CONTRAST MEDICATION: Performed by: INTERNAL MEDICINE

## 2023-04-30 PROCEDURE — 6370000000 HC RX 637 (ALT 250 FOR IP): Performed by: NURSE PRACTITIONER

## 2023-04-30 PROCEDURE — 71275 CT ANGIOGRAPHY CHEST: CPT

## 2023-04-30 PROCEDURE — 6370000000 HC RX 637 (ALT 250 FOR IP): Performed by: INTERNAL MEDICINE

## 2023-04-30 PROCEDURE — APPSS60 APP SPLIT SHARED TIME 46-60 MINUTES: Performed by: NURSE PRACTITIONER

## 2023-04-30 PROCEDURE — G0378 HOSPITAL OBSERVATION PER HR: HCPCS

## 2023-04-30 PROCEDURE — 6360000002 HC RX W HCPCS: Performed by: NURSE PRACTITIONER

## 2023-04-30 PROCEDURE — 85027 COMPLETE CBC AUTOMATED: CPT

## 2023-04-30 PROCEDURE — 80048 BASIC METABOLIC PNL TOTAL CA: CPT

## 2023-04-30 PROCEDURE — 99232 SBSQ HOSP IP/OBS MODERATE 35: CPT | Performed by: INTERNAL MEDICINE

## 2023-04-30 PROCEDURE — 6370000000 HC RX 637 (ALT 250 FOR IP): Performed by: UROLOGY

## 2023-04-30 PROCEDURE — 84484 ASSAY OF TROPONIN QUANT: CPT

## 2023-04-30 PROCEDURE — 36415 COLL VENOUS BLD VENIPUNCTURE: CPT

## 2023-04-30 PROCEDURE — 2700000000 HC OXYGEN THERAPY PER DAY

## 2023-04-30 PROCEDURE — 2580000003 HC RX 258: Performed by: INTERNAL MEDICINE

## 2023-04-30 PROCEDURE — 94761 N-INVAS EAR/PLS OXIMETRY MLT: CPT

## 2023-04-30 PROCEDURE — 93005 ELECTROCARDIOGRAM TRACING: CPT | Performed by: INTERNAL MEDICINE

## 2023-04-30 RX ORDER — ONDANSETRON 2 MG/ML
4 INJECTION INTRAMUSCULAR; INTRAVENOUS EVERY 6 HOURS PRN
Status: DISCONTINUED | OUTPATIENT
Start: 2023-04-30 | End: 2023-05-02 | Stop reason: HOSPADM

## 2023-04-30 RX ORDER — ISOSORBIDE MONONITRATE 30 MG/1
30 TABLET, EXTENDED RELEASE ORAL DAILY
Status: DISCONTINUED | OUTPATIENT
Start: 2023-04-30 | End: 2023-05-02 | Stop reason: HOSPADM

## 2023-04-30 RX ORDER — LANOLIN ALCOHOL/MO/W.PET/CERES
3 CREAM (GRAM) TOPICAL NIGHTLY PRN
Status: DISCONTINUED | OUTPATIENT
Start: 2023-04-30 | End: 2023-05-02 | Stop reason: HOSPADM

## 2023-04-30 RX ORDER — TAMSULOSIN HYDROCHLORIDE 0.4 MG/1
0.4 CAPSULE ORAL NIGHTLY
Status: DISCONTINUED | OUTPATIENT
Start: 2023-04-30 | End: 2023-05-02 | Stop reason: HOSPADM

## 2023-04-30 RX ORDER — PANTOPRAZOLE SODIUM 40 MG/1
40 TABLET, DELAYED RELEASE ORAL
Status: DISCONTINUED | OUTPATIENT
Start: 2023-05-01 | End: 2023-05-02 | Stop reason: HOSPADM

## 2023-04-30 RX ORDER — ONDANSETRON 2 MG/ML
2 INJECTION INTRAMUSCULAR; INTRAVENOUS ONCE
Status: COMPLETED | OUTPATIENT
Start: 2023-04-30 | End: 2023-04-30

## 2023-04-30 RX ADMIN — ASPIRIN 81 MG: 81 TABLET, CHEWABLE ORAL at 10:09

## 2023-04-30 RX ADMIN — METOPROLOL TARTRATE 25 MG: 25 TABLET, FILM COATED ORAL at 20:42

## 2023-04-30 RX ADMIN — Medication 3 MG: at 00:58

## 2023-04-30 RX ADMIN — ISOSORBIDE MONONITRATE 30 MG: 30 TABLET, EXTENDED RELEASE ORAL at 11:39

## 2023-04-30 RX ADMIN — SODIUM CHLORIDE, PRESERVATIVE FREE 10 ML: 5 INJECTION INTRAVENOUS at 20:39

## 2023-04-30 RX ADMIN — METOPROLOL TARTRATE 25 MG: 25 TABLET, FILM COATED ORAL at 10:09

## 2023-04-30 RX ADMIN — TAMSULOSIN HYDROCHLORIDE 0.4 MG: 0.4 CAPSULE ORAL at 20:42

## 2023-04-30 RX ADMIN — SODIUM CHLORIDE: 9 INJECTION, SOLUTION INTRAVENOUS at 10:08

## 2023-04-30 RX ADMIN — CLOPIDOGREL BISULFATE 75 MG: 75 TABLET ORAL at 10:09

## 2023-04-30 RX ADMIN — ONDANSETRON 2 MG: 2 INJECTION INTRAMUSCULAR; INTRAVENOUS at 00:59

## 2023-04-30 RX ADMIN — IOPAMIDOL 75 ML: 755 INJECTION, SOLUTION INTRAVENOUS at 17:23

## 2023-04-30 RX ADMIN — ROSUVASTATIN CALCIUM 40 MG: 20 TABLET, FILM COATED ORAL at 20:42

## 2023-04-30 RX ADMIN — ONDANSETRON 4 MG: 2 INJECTION INTRAMUSCULAR; INTRAVENOUS at 11:38

## 2023-04-30 ASSESSMENT — PAIN SCALES - GENERAL
PAINLEVEL_OUTOF10: 0

## 2023-04-30 ASSESSMENT — ENCOUNTER SYMPTOMS: SHORTNESS OF BREATH: 0

## 2023-05-01 LAB
ANION GAP SERPL CALCULATED.3IONS-SCNC: 10 MMOL/L (ref 4–16)
BUN SERPL-MCNC: 15 MG/DL (ref 6–23)
CALCIUM SERPL-MCNC: 8.6 MG/DL (ref 8.3–10.6)
CHLORIDE BLD-SCNC: 97 MMOL/L (ref 99–110)
CO2: 22 MMOL/L (ref 21–32)
CREAT SERPL-MCNC: 0.9 MG/DL (ref 0.9–1.3)
GFR SERPL CREATININE-BSD FRML MDRD: >60 ML/MIN/1.73M2
GLUCOSE SERPL-MCNC: 111 MG/DL (ref 70–99)
MAGNESIUM: 2 MG/DL (ref 1.8–2.4)
POTASSIUM SERPL-SCNC: 3.5 MMOL/L (ref 3.5–5.1)
SODIUM BLD-SCNC: 129 MMOL/L (ref 135–145)
TSH SERPL DL<=0.005 MIU/L-ACNC: 1.01 UIU/ML (ref 0.27–4.2)

## 2023-05-01 PROCEDURE — 6370000000 HC RX 637 (ALT 250 FOR IP): Performed by: NURSE PRACTITIONER

## 2023-05-01 PROCEDURE — 83735 ASSAY OF MAGNESIUM: CPT

## 2023-05-01 PROCEDURE — APPSS60 APP SPLIT SHARED TIME 46-60 MINUTES: Performed by: NURSE PRACTITIONER

## 2023-05-01 PROCEDURE — 80048 BASIC METABOLIC PNL TOTAL CA: CPT

## 2023-05-01 PROCEDURE — 99232 SBSQ HOSP IP/OBS MODERATE 35: CPT | Performed by: INTERNAL MEDICINE

## 2023-05-01 PROCEDURE — 2580000003 HC RX 258: Performed by: INTERNAL MEDICINE

## 2023-05-01 PROCEDURE — G0378 HOSPITAL OBSERVATION PER HR: HCPCS

## 2023-05-01 PROCEDURE — 6370000000 HC RX 637 (ALT 250 FOR IP): Performed by: INTERNAL MEDICINE

## 2023-05-01 PROCEDURE — 84443 ASSAY THYROID STIM HORMONE: CPT

## 2023-05-01 PROCEDURE — 36415 COLL VENOUS BLD VENIPUNCTURE: CPT

## 2023-05-01 PROCEDURE — 94761 N-INVAS EAR/PLS OXIMETRY MLT: CPT

## 2023-05-01 PROCEDURE — 6370000000 HC RX 637 (ALT 250 FOR IP): Performed by: UROLOGY

## 2023-05-01 RX ADMIN — ROSUVASTATIN CALCIUM 40 MG: 20 TABLET, FILM COATED ORAL at 21:48

## 2023-05-01 RX ADMIN — SODIUM CHLORIDE, PRESERVATIVE FREE 10 ML: 5 INJECTION INTRAVENOUS at 21:49

## 2023-05-01 RX ADMIN — ASPIRIN 81 MG: 81 TABLET, CHEWABLE ORAL at 08:50

## 2023-05-01 RX ADMIN — METOPROLOL TARTRATE 25 MG: 25 TABLET, FILM COATED ORAL at 21:49

## 2023-05-01 RX ADMIN — SODIUM CHLORIDE, PRESERVATIVE FREE 10 ML: 5 INJECTION INTRAVENOUS at 08:50

## 2023-05-01 RX ADMIN — CLOPIDOGREL BISULFATE 75 MG: 75 TABLET ORAL at 08:50

## 2023-05-01 RX ADMIN — METOPROLOL TARTRATE 25 MG: 25 TABLET, FILM COATED ORAL at 08:50

## 2023-05-01 RX ADMIN — Medication 3 MG: at 21:49

## 2023-05-01 RX ADMIN — ISOSORBIDE MONONITRATE 30 MG: 30 TABLET, EXTENDED RELEASE ORAL at 08:50

## 2023-05-01 RX ADMIN — PANTOPRAZOLE SODIUM 40 MG: 40 TABLET, DELAYED RELEASE ORAL at 06:25

## 2023-05-01 RX ADMIN — TAMSULOSIN HYDROCHLORIDE 0.4 MG: 0.4 CAPSULE ORAL at 21:48

## 2023-05-01 ASSESSMENT — ENCOUNTER SYMPTOMS: SHORTNESS OF BREATH: 0

## 2023-05-01 ASSESSMENT — PAIN SCALES - GENERAL: PAINLEVEL_OUTOF10: 0

## 2023-05-01 NOTE — CARE COORDINATION
.Case Management Assessment  Initial Evaluation    Date/Time of Evaluation: 5/1/2023 3:12 PM  Assessment Completed by: Liz Nicole RN    If patient is discharged prior to next notation, then this note serves as note for discharge by case management. Patient Name: Chucho Bonner                   YOB: 1940  Diagnosis: Urinary retention [R33.9]  Chest pain [R07.9]  Malfunction of Mahmood catheter, [facility] [T83.011A]  ST elevation myocardial infarction (STEMI), unspecified artery ([facility] [address] [I21.3]                   Date / Time: 4/29/2023  5:15 PM    Patient Admission Status: Observation   Readmission Risk (Low < 19, Mod (19-27), High > 27): Readmission Risk Score: 15.4    Current PCP: Pradip Acuna MD  PCP verified by CM? Yes    Chart Reviewed: Yes      History Provided by: Patient  Patient Orientation: Alert and Oriented    Patient Cognition: Alert    Hospitalization in the last 30 days (Readmission):  Yes    If yes, Readmission Assessment in CM Navigator will be completed. Advance Directives:      Code Status: Full Code   Patient's Primary Decision Maker is: Legal Next of Kin    Primary Decision MakerShaunna Kulkarni Brother/Sister - [phone]    Primary Decision Maker: Shonda Quintanilla - Brother/Sister - [phone]    Discharge Planning:    Patient lives with: Alone Type of Home: Apartment  Primary Care Giver: Self  Patient Support Systems include: Family Members   Current Financial resources: Medicare, Medicaid  Current community resources: None  Current services prior to admission: None            Current DME:              Type of Home Care services:  None    ADLS  Prior functional level: Independent in ADLs/IADLs  Current functional level: Independent in ADLs/IADLs    PT AM-PAC:   /24  OT AM-PAC:   /24    Family can provide assistance at DC:  Yes  Would you like Case Management to discuss the discharge plan with any other family members/significant others, and if so, who?

## 2023-05-01 NOTE — PROGRESS NOTES
Comprehensive Nutrition Assessment    Type and Reason for Visit:  Initial, Positive Nutrition Screen (wt loss, poor intake)    Nutrition Recommendations/Plan:   Continue Regular Diet  Begin standard high lauren oral nutrition supplement bid     Malnutrition Assessment:  Malnutrition Status: At risk for malnutrition (05/01/23 1303)    Context:  Acute Illness       Nutrition Assessment:    Pt admitted with CP, s/p cardiac cath, acute urinary retention. H/O hypertension, hyperlipidemia, coronary artery  disease/CABG, prostate cancer. He reports poor po intake and loss of 20 lb PTA. Eating chicken noodle soup during my visit. Mild wt loss over yrs noted per Epic history. Willing to trial oral supplement. Will continue to follow as moderate nutrition risk. Nutrition Related Findings:    Na 129, Glu 111   Wound Type: None       Current Nutrition Intake & Therapies:    Average Meal Intake: 51-75%  Average Supplements Intake: None Ordered  ADULT DIET; Regular    Anthropometric Measures:  Height: 5' 6\" (167.6 cm)  Ideal Body Weight (IBW): 142 lbs (65 kg)    Admission Body Weight: 150 lb 1.6 oz (68.1 kg)  Current Body Weight: 165 lb 8 oz (75.1 kg), 116.5 % IBW. Weight Source: Bed Scale  Current BMI (kg/m2): 26.7  Usual Body Weight: 181 lb 3 oz (82.2 kg) (4/29/21)  % Weight Change (Calculated): -8.7                    BMI Categories: Overweight (BMI 25.0-29. 9)    Estimated Daily Nutrient Needs:  Energy Requirements Based On: Formula  Weight Used for Energy Requirements: Current  Energy (kcal/day): 1679 (MSJ)  Weight Used for Protein Requirements: Ideal  Protein (g/day): 64-77 (1-1.2 g/kg IBW)  Method Used for Fluid Requirements: 1 ml/kcal  Fluid (ml/day): 1700    Nutrition Diagnosis:   Predicted inadequate energy intake related to acute injury/trauma as evidenced by poor intake prior to admission, intake 51-75%, weight loss    Nutrition Interventions:   Food and/or Nutrient Delivery: Continue Current Diet, Start Oral

## 2023-05-01 NOTE — PROGRESS NOTES
Cardiology Progress Note     Today's Plan: Okay for D/C from cardiac standpoint    Admit Date:  4/29/2023    Consult reason/ Seen today for: chest pain     Subjective and  Overnight Events:  Right groin site soft, no hematoma, no bleeding, pulses palpable, dressing dry and intact. No signs of infection and wound healing well. Assessment / Plan / Recommendation:     Chest pain: CTA R/O PE. Reports chest pain resolved. Continue with Imdur. Frequent PVC's: normal TSH and mag. Hyponatremia noted. Hx of PVC's patient asymptomatic. Continue with BB.   CAD: S/P CABG x 2 in 2019,LHC showed patent LAD despite graft monitoring. SVG to RCA patent. LCx is free of any disease. Small bump in troponin we will start Imdur. Aspirin, Plavix, Imdur, Lopressor, Crestor  HTN:controlled, continue Lopressor 25 mg twice daily, can titrate accordingly   Dyslipidemia: continue statins  DVT prophylaxis if not contraindicated. History of Presenting Illness:    Chief complain on admission : 81 y. o.year old who is admitted for  Chief Complaint   Patient presents with    Urinary Catheter     Pt reports being here earlier and cath was placed but not having any output. Past medical history:    has a past medical history of CAD (coronary artery disease), Cancer ([facility] [address] H/O echocardiogram, History of cardiac catheterization, History of echocardiogram, HTN (hypertension), and Hyperlipidemia. Past surgical history:   has a past surgical history that includes back surgery; Cardiac surgery; Coronary angioplasty with stent (05/14/2014); and Colonoscopy (4/14/15). Social History:   reports that he quit smoking about 17 years ago. His smoking use included cigarettes. He has a 90.00 pack-year smoking history. He has never used smokeless tobacco. He reports that he does not drink alcohol and does not use drugs. Family history:  family history is not on file.     No Known

## 2023-05-01 NOTE — CARE COORDINATION
CM met with pt for d/c planning. Introduced self and updated white board. Pt lives alone in an apt and is independent with ADL's. Pt drives, has a PCP, has insurance, and is able to afford his medication. Pt uses a cane prn. [facility] offered and pt declined. He states that he has someone that cleans his apt and family that can help him if needed. Pt denies any d/c needs at this time. D/c plan is home alone, no needs. Notify CM if any d/c needs arise.   TE

## 2023-05-01 NOTE — PLAN OF CARE
Problem: Discharge Planning  Goal: Discharge to home or other facility with appropriate resources  Outcome: Progressing     Problem: Pain  Goal: Verbalizes/displays adequate comfort level or baseline comfort level  Outcome: Progressing  Flowsheets (Taken 4/30/2023 1400 by Hernandez Sinha RN)  Verbalizes/displays adequate comfort level or baseline comfort level: Encourage patient to monitor pain and request assistance     Problem: Safety - Adult  Goal: Free from fall injury  Outcome: Progressing

## 2023-05-01 NOTE — PROGRESS NOTES
[facility] [address]   Progress Note  [facility] 0 1 2      Date: 2023   Patient: Ad Crew   :    DOA: 2023   MRN: [id number]   ROOM#: [id number]     Admit Date: 2023     Collaborating Urologist on Call at time of admission: Dr. Vinnie Nj  CC: Urinary retention and chest pain  Reason for Consult: Acute urinary retention    Subjective:     Pain: mild, no nausea and no vomiting,   Bowel Movement/Flatus:   Yes  Voiding:  Indwelling catheter with clear yellow     Pt resting in bed, reports feeling better today.     Objective:    Vitals:    BP 99/62   Pulse 85   Temp 97.9 °F (36.6 °C) (Oral)   Resp 26   Ht 5' 6\" (1.676 m)   Wt 165 lb 8 oz (75.1 kg)   SpO2 96%   BMI 26.71 kg/m²    Temp  Av °F (36.7 °C)  Min: 97.9 °F (36.6 °C)  Max: 98.1 °F (36.7 °C)     Intake/Output Summary (Last 24 hours) at 2023 0847  Last data filed at 2023 0734  Gross per 24 hour   Intake --   Output 1150 ml   Net -1150 ml       Physical Exam:   General appearance: alert, appears stated age, cooperative, no distress, and mildly obese  Head: Normocephalic, without obvious abnormality, atraumatic  Back:  No CVA tenderness  Abdomen:  Soft, non-tender, non-distended  : Indwelling catheter with clear yellow    Labs:   WBC:    Lab Results   Component Value Date/Time    WBC 9.5 2023 03:36 AM      Hemoglobin/Hematocrit:    Lab Results   Component Value Date/Time    HGB 12.5 2023 03:36 AM    HCT 38.8 2023 03:36 AM      BMP:   Lab Results   Component Value Date/Time     2023 03:36 AM    K 3.8 2023 03:36 AM     2023 03:36 AM    CO2 22 2023 03:36 AM    BUN 16 2023 03:36 AM    LABALBU 4.2 2023 06:15 PM    CREATININE 0.8 2023 03:36 AM    CALCIUM 8.3 2023 03:36 AM    GFRAA >60 2022 10:49 AM    LABGLOM >60 2023 03:36 AM       Imaging:  CT HEAD WO CONTRAST    Result Date: 2023  EXAMINATION: CTA OF THE HEAD

## 2023-05-01 NOTE — H&P
[address] [place] [address], [address] [place] [address]                              HISTORY AND PHYSICAL    PATIENT NAME: Dayna Polanco                       :        1940  MED REC NO:   [id number]                          ROOM:       [id number]  ACCOUNT NO:   [de-identified]                           ADMIT DATE: 2023  PROVIDER:     Alem Lujan MD    CHIEF COMPLAINT:  Urinary retention and chest pain. HISTORY OF PRESENT ILLNESS:  The patient is an 81-year-old   gentleman who came to the emergency room on the morning of 2023,  with urinary retention. He was not able to empty the bladder. He had a  Mahmood catheter placement. He has a history of prostate cancer. He  follows with urologist routinely. The patient was discharged home from  the emergency room after Mahmood catheter placement and he was advised to  follow with his primary care physician as well as the urologist.  The  patient came back to the emergency room after a few hours stating that  his Mahmood catheter was not draining. He also had chest pain and  abdominal pain. His electrocardiogram revealed ST elevation. Cardiology was consulted and the patient was taken to the cath lab. The  patient underwent cardiac catheterization by Dr. Silvia Menendez. His  left main artery is normal caliber. Left anterior descending artery is  free of significant disease. Left circumflex artery is normal caliber,  has no significant disease. He has a dominant right coronary artery. It is occluded at its proximal segment. SVG to RCA is widely patent. The patient does have a history of coronary bypass graft surgery x2  vessels. His graft to LAD never matured. PAST MEDICAL HISTORY:  Hypertension, hyperlipidemia, coronary artery  disease, prostate cancer.     PAST SURGICAL HISTORY:  Surgeries include coronary artery bypass graft  surgery, angioplasty and stent placement, back

## 2023-05-01 NOTE — PROGRESS NOTES
INTERNAL MEDICINE PROGRESS NOTE        [facility]   1/81/7403   Primary Care Physician:  Sina Bruner MD  Admit Date: 4/29/2023     Subjective:   Patient awake, lying in bed, in no acute distress. Reports he woke up confused thinking he was at home. Now he knows he is in the hospital and he came in for urinary retention. He has Mahmood. He denies chest pain, shortness of breath. Objective:   BP 99/62   Pulse 85   Temp 97.9 °F (36.6 °C) (Oral)   Resp 26   Ht 5' 6\" (1.676 m)   Wt 165 lb 8 oz (75.1 kg)   SpO2 96%   BMI 26.71 kg/m²    General appearance: alert, appears stated age, and cooperative  Head: Normocephalic, without obvious abnormality, atraumatic  Neck: no adenopathy and supple, symmetrical  Lungs: Coarse breath sounds bilaterally  Heart: S1, S2 normal  Abdomen: soft, non-tender; bowel sounds normal  Extremities: no clubbing, cyanosis with trace edema  Neurologic: No new focal deficits    Data Review    Coronary angiogram findings, 4/29/2023:  Dominance: Right dominant system  Left main artery: Normal caliber vessel, free of significant disease  Left anterior descending artery: Type IV LAD, free of significant disease  Left circumflex artery: Normal caliber vessel, free of significant disease  Right coronary artery: Dominant right coronary artery, it is currently occluded in its proximal segment. SVG to RCA is widely patent    CTA chest, 4/30/2023  No evidence of pulmonary embolism or acute pulmonary abnormality. Asymmetric DJD of the right acromioclavicular joint. Recommend aspirating the joint to rule out infection if clinically warranted. Also, asymmetric cortical thickening of the right clavicle which could reflect Paget's disease.      Lab Results   Component Value Date     (L) 04/30/2023    K 3.8 04/30/2023     04/30/2023    CO2 22 04/30/2023    CREATININE 0.8 (L) 04/30/2023    BUN 16 04/30/2023    CALCIUM 8.3 04/30/2023     Lab Results   Component Value Date

## 2023-05-02 VITALS
SYSTOLIC BLOOD PRESSURE: 124 MMHG | DIASTOLIC BLOOD PRESSURE: 70 MMHG | OXYGEN SATURATION: 100 % | TEMPERATURE: 97.7 F | BODY MASS INDEX: 26.28 KG/M2 | RESPIRATION RATE: 19 BRPM | HEIGHT: 66 IN | WEIGHT: 163.5 LBS | HEART RATE: 82 BPM

## 2023-05-02 PROBLEM — I21.4 ACUTE NON Q WAVE MI (MYOCARDIAL INFARCTION), INITIAL EPISODE OF CARE (HCC): Status: ACTIVE | Noted: 2023-05-02

## 2023-05-02 LAB
ALBUMIN SERPL-MCNC: 3.5 GM/DL (ref 3.4–5)
ALP BLD-CCNC: 94 IU/L (ref 40–128)
ALT SERPL-CCNC: 11 U/L (ref 10–40)
ANION GAP SERPL CALCULATED.3IONS-SCNC: 12 MMOL/L (ref 4–16)
AST SERPL-CCNC: 27 IU/L (ref 15–37)
BASOPHILS ABSOLUTE: 0 K/CU MM
BASOPHILS RELATIVE PERCENT: 0.5 % (ref 0–1)
BILIRUB SERPL-MCNC: 0.5 MG/DL (ref 0–1)
BUN SERPL-MCNC: 10 MG/DL (ref 6–23)
CALCIUM SERPL-MCNC: 8.7 MG/DL (ref 8.3–10.6)
CHLORIDE BLD-SCNC: 102 MMOL/L (ref 99–110)
CO2: 24 MMOL/L (ref 21–32)
CREAT SERPL-MCNC: 0.9 MG/DL (ref 0.9–1.3)
DIFFERENTIAL TYPE: ABNORMAL
EOSINOPHILS ABSOLUTE: 0.2 K/CU MM
EOSINOPHILS RELATIVE PERCENT: 2.5 % (ref 0–3)
GFR SERPL CREATININE-BSD FRML MDRD: >60 ML/MIN/1.73M2
GLUCOSE SERPL-MCNC: 91 MG/DL (ref 70–99)
HCT VFR BLD CALC: 41.4 % (ref 42–52)
HEMOGLOBIN: 12.9 GM/DL (ref 13.5–18)
IMMATURE NEUTROPHIL %: 0.2 % (ref 0–0.43)
LYMPHOCYTES ABSOLUTE: 1.1 K/CU MM
LYMPHOCYTES RELATIVE PERCENT: 16.8 % (ref 24–44)
MCH RBC QN AUTO: 29.9 PG (ref 27–31)
MCHC RBC AUTO-ENTMCNC: 31.2 % (ref 32–36)
MCV RBC AUTO: 95.8 FL (ref 78–100)
MONOCYTES ABSOLUTE: 0.6 K/CU MM
MONOCYTES RELATIVE PERCENT: 9 % (ref 0–4)
NUCLEATED RBC %: 0 %
PDW BLD-RTO: 13.6 % (ref 11.7–14.9)
PLATELET # BLD: 124 K/CU MM (ref 140–440)
PMV BLD AUTO: 11.6 FL (ref 7.5–11.1)
POTASSIUM SERPL-SCNC: 4.1 MMOL/L (ref 3.5–5.1)
PROCALCITONIN SERPL-MCNC: 0.09 NG/ML
RBC # BLD: 4.32 M/CU MM (ref 4.6–6.2)
SEGMENTED NEUTROPHILS ABSOLUTE COUNT: 4.6 K/CU MM
SEGMENTED NEUTROPHILS RELATIVE PERCENT: 71 % (ref 36–66)
SODIUM BLD-SCNC: 138 MMOL/L (ref 135–145)
TOTAL IMMATURE NEUTOROPHIL: 0.01 K/CU MM
TOTAL NUCLEATED RBC: 0 K/CU MM
TOTAL PROTEIN: 5.4 GM/DL (ref 6.4–8.2)
WBC # BLD: 6.4 K/CU MM (ref 4–10.5)

## 2023-05-02 PROCEDURE — 36415 COLL VENOUS BLD VENIPUNCTURE: CPT

## 2023-05-02 PROCEDURE — 1200000000 HC SEMI PRIVATE

## 2023-05-02 PROCEDURE — 94761 N-INVAS EAR/PLS OXIMETRY MLT: CPT

## 2023-05-02 PROCEDURE — 51798 US URINE CAPACITY MEASURE: CPT

## 2023-05-02 PROCEDURE — 80053 COMPREHEN METABOLIC PANEL: CPT

## 2023-05-02 PROCEDURE — 2700000000 HC OXYGEN THERAPY PER DAY

## 2023-05-02 PROCEDURE — 2580000003 HC RX 258: Performed by: INTERNAL MEDICINE

## 2023-05-02 PROCEDURE — 6370000000 HC RX 637 (ALT 250 FOR IP): Performed by: NURSE PRACTITIONER

## 2023-05-02 PROCEDURE — 85025 COMPLETE CBC W/AUTO DIFF WBC: CPT

## 2023-05-02 PROCEDURE — 6370000000 HC RX 637 (ALT 250 FOR IP): Performed by: INTERNAL MEDICINE

## 2023-05-02 PROCEDURE — G0378 HOSPITAL OBSERVATION PER HR: HCPCS

## 2023-05-02 PROCEDURE — 84145 PROCALCITONIN (PCT): CPT

## 2023-05-02 RX ORDER — TAMSULOSIN HYDROCHLORIDE 0.4 MG/1
0.4 CAPSULE ORAL NIGHTLY
Qty: 30 CAPSULE | Refills: 3 | Status: SHIPPED | OUTPATIENT
Start: 2023-05-02

## 2023-05-02 RX ORDER — ISOSORBIDE MONONITRATE 30 MG/1
30 TABLET, EXTENDED RELEASE ORAL DAILY
Qty: 30 TABLET | Refills: 3 | Status: SHIPPED | OUTPATIENT
Start: 2023-05-02

## 2023-05-02 RX ORDER — NITROGLYCERIN 0.4 MG/1
0.4 TABLET SUBLINGUAL ONCE
Qty: 25 TABLET | Refills: 3 | Status: SHIPPED | OUTPATIENT
Start: 2023-05-02 | End: 2023-05-02

## 2023-05-02 RX ADMIN — ASPIRIN 81 MG: 81 TABLET, CHEWABLE ORAL at 10:08

## 2023-05-02 RX ADMIN — ISOSORBIDE MONONITRATE 30 MG: 30 TABLET, EXTENDED RELEASE ORAL at 10:08

## 2023-05-02 RX ADMIN — PANTOPRAZOLE SODIUM 40 MG: 40 TABLET, DELAYED RELEASE ORAL at 06:37

## 2023-05-02 RX ADMIN — CLOPIDOGREL BISULFATE 75 MG: 75 TABLET ORAL at 10:08

## 2023-05-02 RX ADMIN — SODIUM CHLORIDE, PRESERVATIVE FREE 10 ML: 5 INJECTION INTRAVENOUS at 10:08

## 2023-05-02 RX ADMIN — METOPROLOL TARTRATE 25 MG: 25 TABLET, FILM COATED ORAL at 10:08

## 2023-05-02 ASSESSMENT — PAIN SCALES - GENERAL: PAINLEVEL_OUTOF10: 0

## 2023-05-02 NOTE — PROGRESS NOTES
Discharge complete. This nurse went over discharge instructions with patient. Patient voiced understanding. Meds to bed at bedside with patient. All belongings with patient. Patient wheeled to front entrance for pickup.  No complaints

## 2023-05-02 NOTE — DISCHARGE SUMMARY
Micki Drake MD, Internal Medicine    [address] [facility]   [phone]   Patient ID  Artemio Bernheim   [birth year]  [id number]          Admit date: 4/29/2023   Discharge date: 5/2/2023      Admitting Physician: Sammy Ortega MD   Discharge Physician: Nahun Palmer PA-C    Discharge Diagnoses:   Chest pain: CTA chest negative for PE. S/p cardiac catheterization. STEMI s/p C 4/29/2023: Medical management per cardiology. No chest pain. Acute urinary retention w/ BPH: With Mahmood. Voiding trial before discharge. Follow-up urology  HTN/HLD/CAD s/p CABG x2: On ASA, Plavix, Imdur, Lopressor, nitro glycerin, Crestor. Cardiology following. COPD: CPM  Asymmetric DJD of the right acromioclavicular joint/asymmetric cortical thickening of the right clavicle: noted on CT, WBC and Pro-Shady normal.    Patient Active Problem List   Diagnosis    STEMI (ST elevation myocardial infarction) ([facility] [address]    ASHD (arteriosclerotic heart disease)    S/P CABG x 2    HTN (hypertension)    Hyperlipidemia, mixed    COPD, moderate (HCC)    PVD (peripheral vascular disease) ([facility])    Exertional dyspnea    Diarrhea    Nausea    Coronary artery disease involving coronary bypass graft of native heart without angina pectoris    Unstable angina (HCC)    SBO (small bowel obstruction) ([facility])    Thrush    PVC (premature ventricular contraction)    Dizziness    Ataxia    Chest pain    Acute ST elevation myocardial infarction (STEMI) due to occlusion of distal portion of left anterior descending (LAD) coronary artery [facility])       Discharged Condition: good    Hospital Course: The patient is an 50-year-old male who presented to the ER with urinary retention on 4/29/2023. He had a Mahmood catheter placement.   He was discharged home from the ER with Mahmood catheter and advised to follow-up with PCP and urologist.  Patient returned to the ER a few hours later reporting his Mahmood was not draining and also complained of chest pain

## 2023-05-02 NOTE — PROGRESS NOTES
Outpatient Pharmacy Progress Note for Meds-to-Beds    Total number of Prescriptions Filled: 3  The following medications were dispensed to the patient during the discharge process:  Isosorbide mononitrate  NTG  Tamsulosin     Additional Documentation:  Medication(s) were delivered to the patient's room prior to discharge      Thank you for letting us serve your patients.   [address] [facility]""    [address], [address] [facility]    Phone: [phone]    Fax: [phone]

## 2023-05-02 NOTE — PROGRESS NOTES
Pts arechiga catheter has been removed at 6:15 am. Pt is set for bladder trial and will hand off to AM Nurse.

## 2023-05-03 ENCOUNTER — TELEPHONE (OUTPATIENT)
Dept: CARDIOLOGY CLINIC | Age: 83
End: 2023-05-03

## 2023-05-03 LAB
EKG ATRIAL RATE: 105 BPM
EKG ATRIAL RATE: 106 BPM
EKG ATRIAL RATE: 110 BPM
EKG ATRIAL RATE: 89 BPM
EKG DIAGNOSIS: NORMAL
EKG P AXIS: 69 DEGREES
EKG P AXIS: 71 DEGREES
EKG P AXIS: 73 DEGREES
EKG P AXIS: 76 DEGREES
EKG P-R INTERVAL: 170 MS
EKG P-R INTERVAL: 180 MS
EKG P-R INTERVAL: 200 MS
EKG P-R INTERVAL: 208 MS
EKG Q-T INTERVAL: 362 MS
EKG Q-T INTERVAL: 372 MS
EKG Q-T INTERVAL: 394 MS
EKG Q-T INTERVAL: 420 MS
EKG QRS DURATION: 126 MS
EKG QRS DURATION: 130 MS
EKG QRS DURATION: 138 MS
EKG QRS DURATION: 140 MS
EKG QTC CALCULATION (BAZETT): 440 MS
EKG QTC CALCULATION (BAZETT): 503 MS
EKG QTC CALCULATION (BAZETT): 520 MS
EKG QTC CALCULATION (BAZETT): 557 MS
EKG R AXIS: 118 DEGREES
EKG R AXIS: 121 DEGREES
EKG R AXIS: 176 DEGREES
EKG R AXIS: 91 DEGREES
EKG T AXIS: 60 DEGREES
EKG T AXIS: 60 DEGREES
EKG T AXIS: 66 DEGREES
EKG T AXIS: 71 DEGREES
EKG VENTRICULAR RATE: 105 BPM
EKG VENTRICULAR RATE: 106 BPM
EKG VENTRICULAR RATE: 110 BPM
EKG VENTRICULAR RATE: 89 BPM

## 2023-05-03 NOTE — TELEPHONE ENCOUNTER
Pt just released from ED, has questions regarding medications lisinopril, tamsulosin and imdur.  Please advise

## 2023-05-07 ENCOUNTER — HOSPITAL ENCOUNTER (EMERGENCY)
Age: 83
Discharge: HOME | End: 2023-05-07
Payer: MEDICARE

## 2023-05-07 VITALS
RESPIRATION RATE: 14 BRPM | TEMPERATURE: 98.3 F | HEART RATE: 75 BPM | DIASTOLIC BLOOD PRESSURE: 69 MMHG | OXYGEN SATURATION: 96 % | SYSTOLIC BLOOD PRESSURE: 149 MMHG

## 2023-05-07 DIAGNOSIS — T50.901A ACCIDENTAL MEDICATION ERROR, INITIAL ENCOUNTER: Primary | ICD-10-CM

## 2023-05-07 PROCEDURE — 99283 EMERGENCY DEPT VISIT LOW MDM: CPT

## 2023-05-07 RX ORDER — ONDANSETRON 4 MG/1
4 TABLET, FILM COATED ORAL EVERY 8 HOURS PRN
Qty: 10 TABLET | Refills: 0 | Status: SHIPPED | OUTPATIENT
Start: 2023-05-07

## 2023-05-07 ASSESSMENT — LIFESTYLE VARIABLES
HOW OFTEN DO YOU HAVE A DRINK CONTAINING ALCOHOL: NEVER
HOW MANY STANDARD DRINKS CONTAINING ALCOHOL DO YOU HAVE ON A TYPICAL DAY: PATIENT DOES NOT DRINK

## 2023-05-07 ASSESSMENT — PAIN - FUNCTIONAL ASSESSMENT: PAIN_FUNCTIONAL_ASSESSMENT: NONE - DENIES PAIN

## 2023-05-12 DIAGNOSIS — E78.2 HYPERLIPIDEMIA, MIXED: Primary | Chronic | ICD-10-CM

## 2023-05-15 ENCOUNTER — OFFICE VISIT (OUTPATIENT)
Dept: CARDIOLOGY CLINIC | Age: 83
End: 2023-05-15
Payer: MEDICARE

## 2023-05-15 VITALS
HEIGHT: 66 IN | WEIGHT: 166 LBS | HEART RATE: 68 BPM | SYSTOLIC BLOOD PRESSURE: 100 MMHG | BODY MASS INDEX: 26.68 KG/M2 | DIASTOLIC BLOOD PRESSURE: 60 MMHG

## 2023-05-15 DIAGNOSIS — I73.9 PVD (PERIPHERAL VASCULAR DISEASE) (HCC): Chronic | ICD-10-CM

## 2023-05-15 DIAGNOSIS — I21.02 ACUTE ST ELEVATION MYOCARDIAL INFARCTION (STEMI) DUE TO OCCLUSION OF DISTAL PORTION OF LEFT ANTERIOR DESCENDING (LAD) CORONARY ARTERY (HCC): ICD-10-CM

## 2023-05-15 DIAGNOSIS — E78.2 HYPERLIPIDEMIA, MIXED: Chronic | ICD-10-CM

## 2023-05-15 DIAGNOSIS — I49.3 PVC (PREMATURE VENTRICULAR CONTRACTION): ICD-10-CM

## 2023-05-15 DIAGNOSIS — I10 PRIMARY HYPERTENSION: Chronic | ICD-10-CM

## 2023-05-15 DIAGNOSIS — I25.10 ASHD (ARTERIOSCLEROTIC HEART DISEASE): Primary | Chronic | ICD-10-CM

## 2023-05-15 DIAGNOSIS — Z95.1 S/P CABG X 2: Chronic | ICD-10-CM

## 2023-05-15 DIAGNOSIS — I10 ESSENTIAL HYPERTENSION: ICD-10-CM

## 2023-05-15 PROCEDURE — 3074F SYST BP LT 130 MM HG: CPT | Performed by: INTERNAL MEDICINE

## 2023-05-15 PROCEDURE — 1123F ACP DISCUSS/DSCN MKR DOCD: CPT | Performed by: INTERNAL MEDICINE

## 2023-05-15 PROCEDURE — 3078F DIAST BP <80 MM HG: CPT | Performed by: INTERNAL MEDICINE

## 2023-05-15 PROCEDURE — 1036F TOBACCO NON-USER: CPT | Performed by: INTERNAL MEDICINE

## 2023-05-15 PROCEDURE — G8427 DOCREV CUR MEDS BY ELIG CLIN: HCPCS | Performed by: INTERNAL MEDICINE

## 2023-05-15 PROCEDURE — G8417 CALC BMI ABV UP PARAM F/U: HCPCS | Performed by: INTERNAL MEDICINE

## 2023-05-15 PROCEDURE — 1111F DSCHRG MED/CURRENT MED MERGE: CPT | Performed by: INTERNAL MEDICINE

## 2023-05-15 PROCEDURE — 99213 OFFICE O/P EST LOW 20 MIN: CPT | Performed by: INTERNAL MEDICINE

## 2023-05-15 RX ORDER — MECLIZINE HYDROCHLORIDE 25 MG/1
25 TABLET ORAL 3 TIMES DAILY PRN
COMMUNITY
Start: 2023-05-09

## 2023-05-15 NOTE — PROGRESS NOTES
Mitchell Mariano is a 81 y.o. male who has    CHIEF COMPLAINT AS FOLLOWS:  CHEST PAIN:  Patient denies any C/O chest pains at this time. SOB:  C/O SOB at this time since after COVID a month. LEG EDEMA: minimal leg edema   PALPITATIONS: Denies any C/O Palpitations                                  DIZZINESS: No C/O Dizziness     SYNCOPE: None   OTHER/ ADDITIONAL COMPLAINTS:                                     HPI: Patient is here for F/U on his CAD,  Arrhythmia, HTN & Dyslipidemia problems. CAD: Patient has known CAD. Had CABG in the past.  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 HTN: Patient has known essential HTN. Has been treated with guideline recommended medical / physical/ diet therapy as stated below. Dyslipidemia: Patient has known mixed dyslipidemia. Has been treated with guideline recommended medical / physical/ diet therapy as stated below.                 Current Outpatient Medications   Medication Sig Dispense Refill    meclizine (ANTIVERT) 25 MG tablet Take 1 tablet by mouth 3 times daily as needed      isosorbide mononitrate (IMDUR) 30 MG extended release tablet Take 1 tablet by mouth daily 30 tablet 3    tamsulosin (FLOMAX) 0.4 MG capsule Take 1 capsule by mouth at bedtime 30 capsule 3    lisinopril (PRINIVIL;ZESTRIL) 5 MG tablet Take 1 tablet by mouth daily 30 tablet 5    Ascorbic Acid (VITAMIN C PO) Take 1 tablet by mouth daily Patient states he doesn't take this daily      VITAMIN D PO Take 1 capsule by mouth daily Patient states he doesn't take this daily      clopidogrel (PLAVIX) 75 MG tablet Take 1 tablet by mouth daily 30 tablet 3    vitamin B-12 (CYANOCOBALAMIN) 100 MCG tablet Take 1 tablet by mouth daily 90 tablet 3    rosuvastatin (CRESTOR) 40 MG tablet Take 1 tablet by mouth daily (Patient taking differently: Take 1 tablet by mouth nightly) 60 tablet 5    omeprazole (PRILOSEC) 20 MG delayed release capsule Take 1 capsule by mouth daily 90 capsule 1

## 2023-05-15 NOTE — PATIENT INSTRUCTIONS
CORONARY ARTERY DISEASE:Yes   clinically stable. Patient is on optimal medical regimen ( see medication list above )  -   Patient is currently  asymptomatic from CAD. - changes in  treatment:   no, on Plavix& Metoprolol.           - Testing ordered:  no  [facility] classification: 1     CATH 4/2019   POBA OF SVG TO RCA 99% TO 0%   OSTIAL SVG MILD DISESAE     4/29/2022   Normal study. Normal perfusion study with normal distribution in all coronal, short, and    horizontal axis. The observed defect is consistent with diaphragmatic attenuation. Normal LV function. LVEF is > 70 %. CATH  Coronary angiogram findings, 4/29/2023:  Dominance: Right dominant system  Left main artery: Normal caliber vessel, free of significant disease  Left anterior descending artery: Type IV LAD, free of significant disease  Left circumflex artery: Normal caliber vessel, free of significant disease  Right coronary artery: Dominant right coronary artery, it is currently occluded in its proximal segment. SVG to RCA is widely patent    HTN: Not well controlled on current medical regimen, see list above. - changes in  treatment:   no, on Lopressor & HCTZ. CARDIOMYOPATHY: None known   CONGESTIVE HEART FAILURE: NO KNOWN HISTORY.     VHD: No significant VHD noted  ECHO 9/2021   Left ventricular systolic function is normal.   Ejection fraction is visually estimated at 50-55%. Grade I diastolic dysfunction. No evidence of any pericardial effusion. DYSLIPIDEMIA: Patient's profile is at / near [employer],                                                           Tolerating current medical regimen wellyes,  Takes Crestor                                                          See most recent Lab values in Labs section above. PAD:  known. claudication symptoms. Jacob Patella no               Up to date on non invasive testing .yes,                Patient on optimum medical regimen . yes,       ARRHYTHMIAS:

## 2023-05-17 ENCOUNTER — HOSPITAL ENCOUNTER (OUTPATIENT)
Age: 83
Discharge: HOME | End: 2023-05-17
Payer: MEDICARE

## 2023-05-17 LAB
CHOLEST SERPL-MCNC: 142 MG/DL
HDLC SERPL-MCNC: 51 MG/DL
LDLC SERPL CALC-MCNC: 57 MG/DL
TRIGL SERPL-MCNC: 168 MG/DL

## 2023-05-17 PROCEDURE — 80061 LIPID PANEL: CPT

## 2023-05-17 PROCEDURE — 36415 COLL VENOUS BLD VENIPUNCTURE: CPT

## 2023-08-07 ENCOUNTER — HOSPITAL ENCOUNTER (OUTPATIENT)
Dept: CT IMAGING | Age: 83
Discharge: HOME | End: 2023-08-07
Payer: MEDICARE

## 2023-08-07 DIAGNOSIS — R41.3 OTHER AMNESIA: ICD-10-CM

## 2023-08-07 PROCEDURE — 70450 CT HEAD/BRAIN W/O DYE: CPT

## 2023-09-11 RX ORDER — ISOSORBIDE MONONITRATE 30 MG/1
30 TABLET, EXTENDED RELEASE ORAL DAILY
Qty: 30 TABLET | Refills: 5 | Status: SHIPPED | OUTPATIENT
Start: 2023-09-11

## 2023-10-12 ENCOUNTER — OFFICE VISIT (OUTPATIENT)
Dept: CARDIOLOGY CLINIC | Age: 83
End: 2023-10-12
Payer: MEDICARE

## 2023-10-12 VITALS
WEIGHT: 171 LBS | HEIGHT: 67 IN | BODY MASS INDEX: 26.84 KG/M2 | HEART RATE: 58 BPM | SYSTOLIC BLOOD PRESSURE: 124 MMHG | DIASTOLIC BLOOD PRESSURE: 60 MMHG

## 2023-10-12 DIAGNOSIS — I25.10 ASHD (ARTERIOSCLEROTIC HEART DISEASE): Primary | Chronic | ICD-10-CM

## 2023-10-12 DIAGNOSIS — I49.3 PVC (PREMATURE VENTRICULAR CONTRACTION): ICD-10-CM

## 2023-10-12 DIAGNOSIS — I73.9 PVD (PERIPHERAL VASCULAR DISEASE) (HCC): Chronic | ICD-10-CM

## 2023-10-12 DIAGNOSIS — I10 PRIMARY HYPERTENSION: Chronic | ICD-10-CM

## 2023-10-12 DIAGNOSIS — Z95.1 S/P CABG X 2: Chronic | ICD-10-CM

## 2023-10-12 DIAGNOSIS — I25.810 CORONARY ARTERY DISEASE INVOLVING CORONARY BYPASS GRAFT OF NATIVE HEART WITHOUT ANGINA PECTORIS: ICD-10-CM

## 2023-10-12 DIAGNOSIS — I21.11 ST ELEVATION MYOCARDIAL INFARCTION INVOLVING RIGHT CORONARY ARTERY (HCC): ICD-10-CM

## 2023-10-12 DIAGNOSIS — E78.2 HYPERLIPIDEMIA, MIXED: Chronic | ICD-10-CM

## 2023-10-12 PROCEDURE — 99213 OFFICE O/P EST LOW 20 MIN: CPT | Performed by: INTERNAL MEDICINE

## 2023-10-12 PROCEDURE — 1036F TOBACCO NON-USER: CPT | Performed by: INTERNAL MEDICINE

## 2023-10-12 PROCEDURE — G8417 CALC BMI ABV UP PARAM F/U: HCPCS | Performed by: INTERNAL MEDICINE

## 2023-10-12 PROCEDURE — G8427 DOCREV CUR MEDS BY ELIG CLIN: HCPCS | Performed by: INTERNAL MEDICINE

## 2023-10-12 PROCEDURE — 3078F DIAST BP <80 MM HG: CPT | Performed by: INTERNAL MEDICINE

## 2023-10-12 PROCEDURE — 1123F ACP DISCUSS/DSCN MKR DOCD: CPT | Performed by: INTERNAL MEDICINE

## 2023-10-12 PROCEDURE — 3074F SYST BP LT 130 MM HG: CPT | Performed by: INTERNAL MEDICINE

## 2023-10-12 PROCEDURE — G8484 FLU IMMUNIZE NO ADMIN: HCPCS | Performed by: INTERNAL MEDICINE

## 2023-10-12 RX ORDER — DONEPEZIL HYDROCHLORIDE 5 MG/1
5 TABLET, FILM COATED ORAL NIGHTLY
COMMUNITY

## 2023-10-12 NOTE — PROGRESS NOTES
Fermin Mayo is a 81 y.o. male who has    CHIEF COMPLAINT AS FOLLOWS:  CHEST PAIN: Patient denies any C/O chest pains at this time. SOB:  C/O SOB at this time since after COVID a month. LEG EDEMA: minimal leg edema   PALPITATIONS: Denies any C/O Palpitations                                  DIZZINESS: No C/O Dizziness     SYNCOPE: None   OTHER/ ADDITIONAL COMPLAINTS:                                     HPI: Patient is here for F/U on his CAD, Arrhythmia, HTN & Dyslipidemia problems. CAD: Patient has known CAD. Had CABG in the past.  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 HTN: Patient has known essential HTN. Has been treated with guideline recommended medical / physical/ diet therapy as stated below. Dyslipidemia: Patient has known mixed dyslipidemia. Has been treated with guideline recommended medical / physical/ diet therapy as stated below. Current Outpatient Medications   Medication Sig Dispense Refill    donepezil (ARICEPT) 5 MG tablet Take 1 tablet by mouth nightly      isosorbide mononitrate (IMDUR) 30 MG extended release tablet Take 1 tablet by mouth daily 30 tablet 5    meclizine (ANTIVERT) 25 MG tablet Take 1 tablet by mouth 3 times daily as needed      metoprolol tartrate (LOPRESSOR) 25 MG tablet Take 1 tablet by mouth 2 times daily 60 tablet 5    ondansetron (ZOFRAN) 4 MG tablet Take 1 tablet by mouth every 8 hours as needed for Nausea 10 tablet 0    nitroGLYCERIN (NITROSTAT) 0.4 MG SL tablet Place 1 tablet under the tongue once for 1 dose up to max of 3 total doses.  If no relief after 1 dose, call 911. 25 tablet 3    tamsulosin (FLOMAX) 0.4 MG capsule Take 1 capsule by mouth at bedtime 30 capsule 3    lisinopril (PRINIVIL;ZESTRIL) 5 MG tablet Take 1 tablet by mouth daily 30 tablet 5    Ascorbic Acid (VITAMIN C PO) Take 1 tablet by mouth daily Patient states he doesn't take this daily      VITAMIN D PO Take 1 capsule by mouth daily Patient states

## 2023-11-06 RX ORDER — LISINOPRIL 5 MG/1
5 TABLET ORAL DAILY
Qty: 30 TABLET | Refills: 5 | Status: SHIPPED | OUTPATIENT
Start: 2023-11-06

## 2023-11-06 NOTE — TELEPHONE ENCOUNTER
Pt   needs refill for lisinipril  90 day supply to [facility]  [address]

## 2023-11-30 ENCOUNTER — HOSPITAL ENCOUNTER (EMERGENCY)
Age: 83
Discharge: HOME | End: 2023-11-30
Payer: MEDICARE

## 2023-11-30 VITALS
WEIGHT: 160 LBS | BODY MASS INDEX: 25.11 KG/M2 | SYSTOLIC BLOOD PRESSURE: 177 MMHG | HEART RATE: 66 BPM | TEMPERATURE: 98.6 F | HEIGHT: 67 IN | DIASTOLIC BLOOD PRESSURE: 87 MMHG | OXYGEN SATURATION: 96 % | RESPIRATION RATE: 12 BRPM

## 2023-11-30 DIAGNOSIS — U07.1 COVID-19: Primary | ICD-10-CM

## 2023-11-30 LAB
INFLUENZA A ANTIGEN: NOT DETECTED
INFLUENZA B ANTIGEN: NOT DETECTED
SARS-COV-2 RDRP RESP QL NAA+PROBE: DETECTED
SOURCE: ABNORMAL

## 2023-11-30 PROCEDURE — 87502 INFLUENZA DNA AMP PROBE: CPT

## 2023-11-30 PROCEDURE — 99283 EMERGENCY DEPT VISIT LOW MDM: CPT

## 2023-11-30 PROCEDURE — 87635 SARS-COV-2 COVID-19 AMP PRB: CPT

## 2023-11-30 RX ORDER — GUAIFENESIN AND DEXTROMETHORPHAN HYDROBROMIDE 600; 30 MG/1; MG/1
1 TABLET, EXTENDED RELEASE ORAL 2 TIMES DAILY
Qty: 28 TABLET | Refills: 0 | Status: SHIPPED | OUTPATIENT
Start: 2023-11-30

## 2023-11-30 NOTE — DISCHARGE INSTRUCTIONS
Your COVID test today was positive. We will treat you with an antiviral medication which may help reduce the severity and duration of your symptoms. Do not take your rosuvastatin while taking this medication.

## 2023-11-30 NOTE — ED PROVIDER NOTES
[facility] ENCOUNTER        Pt Name: Carmen Cruz  MRN: [id number]  [address] [facility] [birth year]  Date of evaluation: 11/30/2023  Provider: MIGUEL Sage - CNP  PCP: Nikolay Nagy MD    ELVA. I have evaluated this patient. Triage CHIEF COMPLAINT     No chief complaint on file. HISTORY OF PRESENT ILLNESS      Chief Complaint: cough, congestion, weakness    Carmen Cruz is a 81 y.o. male who presents for evaluation of cough, congestion, fatigue and feeling weak for the last couple of days. Patient denies any fevers, nausea, vomiting, shortness of breath, abdominal pain, body aches or headaches. He is concerned about possible exposure to COVID. Nursing Notes were all reviewed and agreed with or any disagreements were addressed in the HPI. REVIEW OF SYSTEMS     Pertinent ROS as noted in HPI. PAST MEDICAL HISTORY     Past Medical History:   Diagnosis Date    CAD (coronary artery disease)     Cancer ([address])     prostate    H/O echocardiogram 04/29/2019    Patient in atrial fibrillation during exam. Left ventricular function is normal, EF is estimated at 55-60%. Mildly dilated left atrium. Mildly dilated left atrium. Mildli enlarged right ventricle cavity. Trace to mild mitral regurgitation is present. No evidence of pericardial effusion. No evidence of pleural effusion. History of cardiac catheterization 05/24/2017    Critical Single vessel CAD with chronic occlusion of RCA. No significant disease of the other vessels. SVBG to RCA is patent with mild Proximal disease. LIMA to LAD did not mature. Normal LV systolic function & wall motion. LVEF is 55 %. Patient tolerated the procedure well. No immediate complications. History of echocardiogram 01/12/2018    Left ventricular systolic function is normal with an ejection fraction of55-60%. Grade I diastolic dysfunction. Mildly dilated left atrium. Moderate

## 2023-12-13 ENCOUNTER — ANCILLARY PROCEDURE (OUTPATIENT)
Dept: EMERGENCY DEPT | Age: 83
End: 2023-12-13
Attending: EMERGENCY MEDICINE
Payer: MEDICARE

## 2023-12-13 ENCOUNTER — HOSPITAL ENCOUNTER (EMERGENCY)
Age: 83
Discharge: HOME | End: 2023-12-13
Attending: EMERGENCY MEDICINE
Payer: MEDICARE

## 2023-12-13 VITALS
WEIGHT: 160 LBS | DIASTOLIC BLOOD PRESSURE: 68 MMHG | HEIGHT: 67 IN | RESPIRATION RATE: 18 BRPM | SYSTOLIC BLOOD PRESSURE: 154 MMHG | TEMPERATURE: 98.3 F | HEART RATE: 77 BPM | OXYGEN SATURATION: 98 % | BODY MASS INDEX: 25.11 KG/M2

## 2023-12-13 DIAGNOSIS — R33.8 ACUTE URINARY RETENTION: Primary | ICD-10-CM

## 2023-12-13 LAB
ANION GAP SERPL CALCULATED.3IONS-SCNC: 10 MMOL/L (ref 4–16)
BACTERIA: ABNORMAL /HPF
BASOPHILS ABSOLUTE: 0 K/CU MM
BASOPHILS RELATIVE PERCENT: 0.6 % (ref 0–1)
BILIRUBIN URINE: NEGATIVE MG/DL
BLOOD, URINE: ABNORMAL
BUN SERPL-MCNC: 17 MG/DL (ref 6–23)
CALCIUM SERPL-MCNC: 9.1 MG/DL (ref 8.3–10.6)
CHLORIDE BLD-SCNC: 106 MMOL/L (ref 99–110)
CLARITY: CLEAR
CO2: 25 MMOL/L (ref 21–32)
COLOR: YELLOW
CREAT SERPL-MCNC: 1 MG/DL (ref 0.9–1.3)
DIFFERENTIAL TYPE: ABNORMAL
EOSINOPHILS ABSOLUTE: 0.2 K/CU MM
EOSINOPHILS RELATIVE PERCENT: 3.2 % (ref 0–3)
GFR SERPL CREATININE-BSD FRML MDRD: >60 ML/MIN/1.73M2
GLUCOSE SERPL-MCNC: 117 MG/DL (ref 70–99)
GLUCOSE, URINE: NEGATIVE MG/DL
HCT VFR BLD CALC: 40.9 % (ref 42–52)
HEMOGLOBIN: 12.9 GM/DL (ref 13.5–18)
IMMATURE NEUTROPHIL %: 0.2 % (ref 0–0.43)
KETONES, URINE: NEGATIVE MG/DL
LEUKOCYTE ESTERASE, URINE: NEGATIVE
LYMPHOCYTES ABSOLUTE: 1.3 K/CU MM
LYMPHOCYTES RELATIVE PERCENT: 23.6 % (ref 24–44)
MCH RBC QN AUTO: 29.5 PG (ref 27–31)
MCHC RBC AUTO-ENTMCNC: 31.5 % (ref 32–36)
MCV RBC AUTO: 93.6 FL (ref 78–100)
MONOCYTES ABSOLUTE: 0.5 K/CU MM
MONOCYTES RELATIVE PERCENT: 9 % (ref 0–4)
MUCUS: ABNORMAL HPF
NITRITE URINE, QUANTITATIVE: NEGATIVE
NUCLEATED RBC %: 0 %
PDW BLD-RTO: 14.4 % (ref 11.7–14.9)
PH, URINE: 6 (ref 5–8)
PLATELET # BLD: 119 K/CU MM (ref 140–440)
PMV BLD AUTO: 12 FL (ref 7.5–11.1)
POTASSIUM SERPL-SCNC: 4 MMOL/L (ref 3.5–5.1)
PROTEIN UA: NEGATIVE MG/DL
RBC # BLD: 4.37 M/CU MM (ref 4.6–6.2)
RBC URINE: 126 /HPF (ref 0–3)
SEGMENTED NEUTROPHILS ABSOLUTE COUNT: 3.4 K/CU MM
SEGMENTED NEUTROPHILS RELATIVE PERCENT: 63.4 % (ref 36–66)
SODIUM BLD-SCNC: 141 MMOL/L (ref 135–145)
SPECIFIC GRAVITY UA: 1.01 (ref 1–1.03)
SQUAMOUS EPITHELIAL: <1 /HPF
TOTAL IMMATURE NEUTOROPHIL: 0.01 K/CU MM
TOTAL NUCLEATED RBC: 0 K/CU MM
TRICHOMONAS: ABNORMAL /HPF
UROBILINOGEN, URINE: 0.2 MG/DL (ref 0.2–1)
WBC # BLD: 5.4 K/CU MM (ref 4–10.5)
WBC UA: 2 /HPF (ref 0–2)

## 2023-12-13 PROCEDURE — 99283 EMERGENCY DEPT VISIT LOW MDM: CPT

## 2023-12-13 PROCEDURE — 80048 BASIC METABOLIC PNL TOTAL CA: CPT

## 2023-12-13 PROCEDURE — 51798 US URINE CAPACITY MEASURE: CPT

## 2023-12-13 PROCEDURE — 51702 INSERT TEMP BLADDER CATH: CPT

## 2023-12-13 PROCEDURE — 85025 COMPLETE CBC W/AUTO DIFF WBC: CPT

## 2023-12-13 PROCEDURE — 81001 URINALYSIS AUTO W/SCOPE: CPT

## 2023-12-13 NOTE — ED TRIAGE NOTES
Patient to emergency department with report of not being able to urinate x2 weeks. Dr Cam Viera speaking with patient and patient tells him that he has no control over his urination and puts towels under him. Patient reports history of prostate cancer. Patient reports pain to groin area.

## 2023-12-13 NOTE — ED PROVIDER NOTES
NEGATIVE /HPF    Squam Epithel, UA <1 /HPF    Mucus, UA RARE (A) NEGATIVE HPF    Trichomonas NONE SEEN NONE SEEN /HPF      Radiographs (if obtained):  [] The following radiograph was interpreted by myself in the absence of a radiologist:  [] Radiologist's Report Reviewed:    Medical Decision Making and ED Course:    CC/HPI Summary, DDx, ED Course, and Reassessment: Patient presents as above. Patient is in no acute distress on arrival.  He is hypertensive. He has some suprapubic tenderness but no peritonitis on exam.  Presenting with symptoms consistent with urinary retention with urge incontinence. Low suspicion for infectious etiology given overall presentation, lack of fevers or other systemic symptoms. Bedside ultrasound does show distended bladder. Mahmood catheter placed for relief of urinary retention. Renal function is normal here. No evidence of UTI. Patient tolerated procedure well and will be discharged with urology follow-up. Bedside Ultrasound Bladder Exam:    Indication: urinary retention    Bladder: bladder is distended. Images obtained and read by Rhianna Lea. Images saved to ultrasound machine. Discharged in stable condition. History from : Patient    Limitations to history : None    Patient was given the following medications:  Medications - No data to display    Independent Imaging Interpretation by me:     EKG (if obtained): (All EKG's are interpreted by myself in the absence of a cardiologist)     Chronic conditions affecting care: BPH    Discussion with Other Profesionals : None    Social Determinants : None    Disposition Considerations (tests considered but not done, Shared Decision Making, Pt Expectation of Test or Tx.):     Appropriate for outpatient management      I am the Primary Clinician of Record. Clinical Impression:  1.  Acute urinary retention      (Please note that portions of this note may have been completed with a voice

## 2023-12-16 ENCOUNTER — HOSPITAL ENCOUNTER (EMERGENCY)
Age: 83
Discharge: HOME | End: 2023-12-16
Attending: STUDENT IN AN ORGANIZED HEALTH CARE EDUCATION/TRAINING PROGRAM
Payer: MEDICARE

## 2023-12-16 ENCOUNTER — APPOINTMENT (OUTPATIENT)
Dept: GENERAL RADIOLOGY | Age: 83
End: 2023-12-16
Payer: MEDICARE

## 2023-12-16 VITALS
SYSTOLIC BLOOD PRESSURE: 135 MMHG | BODY MASS INDEX: 25.11 KG/M2 | TEMPERATURE: 97.9 F | WEIGHT: 160 LBS | HEART RATE: 66 BPM | RESPIRATION RATE: 16 BRPM | HEIGHT: 67 IN | DIASTOLIC BLOOD PRESSURE: 58 MMHG | OXYGEN SATURATION: 96 %

## 2023-12-16 DIAGNOSIS — B34.9 VIRAL ILLNESS: ICD-10-CM

## 2023-12-16 DIAGNOSIS — J06.9 UPPER RESPIRATORY TRACT INFECTION, UNSPECIFIED TYPE: Primary | ICD-10-CM

## 2023-12-16 LAB
ANION GAP SERPL CALCULATED.3IONS-SCNC: 11 MMOL/L (ref 4–16)
BACTERIA: NEGATIVE /HPF
BASOPHILS ABSOLUTE: 0 K/CU MM
BASOPHILS RELATIVE PERCENT: 0.6 % (ref 0–1)
BILIRUBIN URINE: NEGATIVE MG/DL
BLOOD, URINE: ABNORMAL
BUN SERPL-MCNC: 16 MG/DL (ref 6–23)
CALCIUM OXALATE CRYSTALS: ABNORMAL /HPF
CALCIUM SERPL-MCNC: 8.8 MG/DL (ref 8.3–10.6)
CHLORIDE BLD-SCNC: 103 MMOL/L (ref 99–110)
CLARITY: CLEAR
CO2: 24 MMOL/L (ref 21–32)
COLOR: YELLOW
CREAT SERPL-MCNC: 1.1 MG/DL (ref 0.9–1.3)
DIFFERENTIAL TYPE: ABNORMAL
EOSINOPHILS ABSOLUTE: 0.2 K/CU MM
EOSINOPHILS RELATIVE PERCENT: 3.6 % (ref 0–3)
GFR SERPL CREATININE-BSD FRML MDRD: >60 ML/MIN/1.73M2
GLUCOSE SERPL-MCNC: 91 MG/DL (ref 70–99)
GLUCOSE, URINE: NEGATIVE MG/DL
HCT VFR BLD CALC: 42.1 % (ref 42–52)
HEMOGLOBIN: 13.3 GM/DL (ref 13.5–18)
HYALINE CASTS: 0 /LPF
IMMATURE NEUTROPHIL %: 0.2 % (ref 0–0.43)
INFLUENZA A ANTIGEN: NOT DETECTED
INFLUENZA B ANTIGEN: NOT DETECTED
KETONES, URINE: NEGATIVE MG/DL
LEUKOCYTE ESTERASE, URINE: NEGATIVE
LYMPHOCYTES ABSOLUTE: 0.9 K/CU MM
LYMPHOCYTES RELATIVE PERCENT: 18.8 % (ref 24–44)
MCH RBC QN AUTO: 29.6 PG (ref 27–31)
MCHC RBC AUTO-ENTMCNC: 31.6 % (ref 32–36)
MCV RBC AUTO: 93.8 FL (ref 78–100)
MONOCYTES ABSOLUTE: 0.4 K/CU MM
MONOCYTES RELATIVE PERCENT: 7.5 % (ref 0–4)
MUCUS: ABNORMAL HPF
NITRITE URINE, QUANTITATIVE: NEGATIVE
NUCLEATED RBC %: 0 %
PDW BLD-RTO: 14.1 % (ref 11.7–14.9)
PH, URINE: 6 (ref 5–8)
PLATELET # BLD: 128 K/CU MM (ref 140–440)
PMV BLD AUTO: 12.5 FL (ref 7.5–11.1)
POTASSIUM SERPL-SCNC: 3.7 MMOL/L (ref 3.5–5.1)
PRO-BNP: 58.96 PG/ML
PROCALCITONIN SERPL-MCNC: 0.03 NG/ML
PROTEIN UA: 30 MG/DL
RBC # BLD: 4.49 M/CU MM (ref 4.6–6.2)
RBC URINE: 87 /HPF (ref 0–3)
SARS-COV-2 RDRP RESP QL NAA+PROBE: NOT DETECTED
SEGMENTED NEUTROPHILS ABSOLUTE COUNT: 3.2 K/CU MM
SEGMENTED NEUTROPHILS RELATIVE PERCENT: 69.3 % (ref 36–66)
SODIUM BLD-SCNC: 138 MMOL/L (ref 135–145)
SOURCE: NORMAL
SPECIFIC GRAVITY UA: 1.02 (ref 1–1.03)
SQUAMOUS EPITHELIAL: <1 /HPF
TOTAL IMMATURE NEUTOROPHIL: 0.01 K/CU MM
TOTAL NUCLEATED RBC: 0 K/CU MM
TRICHOMONAS: ABNORMAL /HPF
TROPONIN, HIGH SENSITIVITY: 15 NG/L (ref 0–22)
UROBILINOGEN, URINE: 0.2 MG/DL (ref 0.2–1)
WBC # BLD: 4.7 K/CU MM (ref 4–10.5)
WBC UA: 7 /HPF (ref 0–2)

## 2023-12-16 PROCEDURE — 71046 X-RAY EXAM CHEST 2 VIEWS: CPT

## 2023-12-16 PROCEDURE — 80048 BASIC METABOLIC PNL TOTAL CA: CPT

## 2023-12-16 PROCEDURE — 87502 INFLUENZA DNA AMP PROBE: CPT

## 2023-12-16 PROCEDURE — 84145 PROCALCITONIN (PCT): CPT

## 2023-12-16 PROCEDURE — 85025 COMPLETE CBC W/AUTO DIFF WBC: CPT

## 2023-12-16 PROCEDURE — 84484 ASSAY OF TROPONIN QUANT: CPT

## 2023-12-16 PROCEDURE — 81001 URINALYSIS AUTO W/SCOPE: CPT

## 2023-12-16 PROCEDURE — 6370000000 HC RX 637 (ALT 250 FOR IP): Performed by: STUDENT IN AN ORGANIZED HEALTH CARE EDUCATION/TRAINING PROGRAM

## 2023-12-16 PROCEDURE — 87635 SARS-COV-2 COVID-19 AMP PRB: CPT

## 2023-12-16 PROCEDURE — 83880 ASSAY OF NATRIURETIC PEPTIDE: CPT

## 2023-12-16 RX ORDER — ACETAMINOPHEN 500 MG
1000 TABLET ORAL ONCE
Status: COMPLETED | OUTPATIENT
Start: 2023-12-16 | End: 2023-12-16

## 2023-12-16 RX ADMIN — ACETAMINOPHEN 1000 MG: 500 TABLET ORAL at 08:26

## 2023-12-16 ASSESSMENT — PAIN DESCRIPTION - LOCATION: LOCATION: GENERALIZED

## 2023-12-16 ASSESSMENT — PAIN SCALES - GENERAL: PAINLEVEL_OUTOF10: 5

## 2023-12-16 ASSESSMENT — LIFESTYLE VARIABLES
HOW MANY STANDARD DRINKS CONTAINING ALCOHOL DO YOU HAVE ON A TYPICAL DAY: PATIENT DOES NOT DRINK
HOW OFTEN DO YOU HAVE A DRINK CONTAINING ALCOHOL: NEVER

## 2023-12-16 NOTE — ED PROVIDER NOTES
Emergency Department Encounter        Pt Name: Winter Morgan  MRN: [id number]  [address] [facility] [place] 1940  Date of evaluation: 12/16/2023  ED Physician: Debora Cruz MD    CHIEF COMPLAINT     Triage Chief Complaint:   Illness (Pt states he was dx with covid 2-3 weeks ago and just hasn't felt right since.)      HISTORY OF PRESENT ILLNESS & REVIEW OF SYSTEMS     History obtained from the patient and staff. Winter Morgan is a 81 y.o. male who presents to the emergency department for evaluation of feeling unwell. Says that he was diagnosed with COVID about 3 weeks ago. Says that he completed 5-day course of medications but he is still feeling unwell. Says he still is having productive cough. Mensing muscle aches and pain. Denies any fevers. Denies any abdominal pain chest pain shortness of breath. Denies any hemoptysis. Says that he was having trouble peeing and he has a catheter and he still has urologist a few days ago who kept the catheter in and wants to follow-up with him again. Denies any abdominal pain. Denies any leg swelling. Denies any previous CVA or blood clot. Denies any recent long distance travel or recent surgery or procedure. Says that he had prostate cancer about 25 years ago but is not currently receiving treatment. Patient denies any previous PE or DVT. Patient denies any recent procedure or surgery. Patient denies any recent long distance travel. Patient denies any hemoptysis. Patient denies any hormone or birth control use. Patient denies any new Headache, Fever, Chills, Chest pain, Shortness of breath, Abdominal pain, Nausea, Vomiting, Diarrhea, Constipation, and Leg swelling. The patient has no other acute complaints at this time. Review of systems as above.           PAST MED/SURG/SOCIAL/FAM HISTORY & ALLERGY & MEDICATIONS     Past Medical History:   Diagnosis Date    CAD (coronary artery disease)     Cancer ([address])     prostate    H/O echocardiogram 04/29/2019 emergency department for URI symptoms. Says he tested positive for COVID few weeks ago and was still having some continuation of his symptoms. Otherwise well-appearing. EKG was reassuring. We obtained labs and imaging given the continuation of symptoms. Labs unremarkable. Imaging unremarkable. UA shows some blood but no significant signs of infection at this point. Low suspicion for PE, Wells criteria is low. Likely symptoms are secondary to long COVID/continuation of his COVID symptoms versus acute viral illness. Otherwise well-appearing. [place] safe with plan for discharge and following up with PCP. Chronic conditions affecting care: CAD, COPD, hypertension, hyperlipidemia  Previous records reviewed:  Patient was seen 12/13/2023 for emergency medicine visit regarding acute urinary retention  Records Reviewed : External ED Note    Disposition Considerations (tests considered but not done, Shared Decision Making, Pt Expectation of Test or Tx.):     History from: see HPI & ED course      The patient was seen and examined unless otherwise specified. Appropriate diagnostic testing was performed and results reviewed with the patient. My independent review and interpretation of data, imaging, labs and diagnostic evaluations are as above/below and in the ED Course. Previous patient encounter documents & history available on EMR were reviewed  Case discussed with consulting clinician as above/below or per ED Course. Shared Decision-Making was performed and disposition discussed with the Patient/Family and questions answered.    Social determinants of health impacting treatment or disposition: Older age and Access to healthcare services     PROCEDURES: (None if blank)  Procedures:     CRITICAL CARE: (None if blank)      MDM  Number of Diagnoses or Management Options  Upper respiratory tract infection, unspecified type: new, needed workup  Viral illness: new, needed workup     Amount and/or Complexity

## 2023-12-16 NOTE — DISCHARGE INSTRUCTIONS
You can use Tylenol as needed for pain  Make sure to eat and drink plenty of fluids to stay well-hydrated.   Call and follow-up with your family doctor in the next 3-5 days  Return to the ED if your symptoms worsen or you feel you need to be reevaluated

## 2023-12-16 NOTE — ED TRIAGE NOTES
Pt states he called the squad today because he is scared because he has not been getting over his covid symptoms and he does not like having his catheter in at home. Pt is being seen by an aide at his home.

## 2023-12-16 NOTE — ED NOTES
Pt family called picking pt up, d/c reviewed with pt and all questions answered, pt d/c home stable.      Alice Valdivia RN  12/16/23 [id number]

## 2023-12-17 LAB
EKG ATRIAL RATE: 62 BPM
EKG DIAGNOSIS: NORMAL
EKG P AXIS: 74 DEGREES
EKG P-R INTERVAL: 166 MS
EKG Q-T INTERVAL: 462 MS
EKG QRS DURATION: 146 MS
EKG QTC CALCULATION (BAZETT): 468 MS
EKG R AXIS: 60 DEGREES
EKG T AXIS: 53 DEGREES
EKG VENTRICULAR RATE: 62 BPM

## 2023-12-26 ENCOUNTER — APPOINTMENT (OUTPATIENT)
Dept: CT IMAGING | Age: 83
End: 2023-12-26
Payer: MEDICARE

## 2023-12-26 ENCOUNTER — APPOINTMENT (OUTPATIENT)
Dept: GENERAL RADIOLOGY | Age: 83
End: 2023-12-26
Attending: EMERGENCY MEDICINE
Payer: MEDICARE

## 2023-12-26 ENCOUNTER — HOSPITAL ENCOUNTER (EMERGENCY)
Age: 83
Discharge: HOME | End: 2023-12-27
Attending: EMERGENCY MEDICINE
Payer: MEDICARE

## 2023-12-26 DIAGNOSIS — I26.99 ACUTE PULMONARY EMBOLISM WITHOUT ACUTE COR PULMONALE, UNSPECIFIED PULMONARY EMBOLISM TYPE (HCC): Primary | ICD-10-CM

## 2023-12-26 LAB
ALBUMIN SERPL-MCNC: 4.4 GM/DL (ref 3.4–5)
ALP BLD-CCNC: 130 IU/L (ref 40–128)
ALT SERPL-CCNC: 8 U/L (ref 10–40)
ANION GAP SERPL CALCULATED.3IONS-SCNC: 12 MMOL/L (ref 7–16)
AST SERPL-CCNC: 12 IU/L (ref 15–37)
BASOPHILS ABSOLUTE: 0 K/CU MM
BASOPHILS RELATIVE PERCENT: 0.2 % (ref 0–1)
BILIRUB SERPL-MCNC: 0.3 MG/DL (ref 0–1)
BUN SERPL-MCNC: 13 MG/DL (ref 6–23)
CALCIUM SERPL-MCNC: 9 MG/DL (ref 8.3–10.6)
CHLORIDE BLD-SCNC: 101 MMOL/L (ref 99–110)
CO2: 25 MMOL/L (ref 21–32)
CREAT SERPL-MCNC: 1 MG/DL (ref 0.9–1.3)
DIFFERENTIAL TYPE: ABNORMAL
EOSINOPHILS ABSOLUTE: 0.1 K/CU MM
EOSINOPHILS RELATIVE PERCENT: 1.7 % (ref 0–3)
GFR SERPL CREATININE-BSD FRML MDRD: >60 ML/MIN/1.73M2
GLUCOSE SERPL-MCNC: 115 MG/DL (ref 70–99)
HCT VFR BLD CALC: 43.4 % (ref 42–52)
HEMOGLOBIN: 13.6 GM/DL (ref 13.5–18)
IMMATURE NEUTROPHIL %: 0.2 % (ref 0–0.43)
LYMPHOCYTES ABSOLUTE: 1.1 K/CU MM
LYMPHOCYTES RELATIVE PERCENT: 12.9 % (ref 24–44)
MCH RBC QN AUTO: 29.4 PG (ref 27–31)
MCHC RBC AUTO-ENTMCNC: 31.3 % (ref 32–36)
MCV RBC AUTO: 93.9 FL (ref 78–100)
MONOCYTES ABSOLUTE: 0.8 K/CU MM
MONOCYTES RELATIVE PERCENT: 10 % (ref 0–4)
NUCLEATED RBC %: 0 %
PDW BLD-RTO: 13.7 % (ref 11.7–14.9)
PLATELET # BLD: 129 K/CU MM (ref 140–440)
PMV BLD AUTO: 12.7 FL (ref 7.5–11.1)
POTASSIUM SERPL-SCNC: 3.8 MMOL/L (ref 3.5–5.1)
RBC # BLD: 4.62 M/CU MM (ref 4.6–6.2)
SEGMENTED NEUTROPHILS ABSOLUTE COUNT: 6.3 K/CU MM
SEGMENTED NEUTROPHILS RELATIVE PERCENT: 75 % (ref 36–66)
SODIUM BLD-SCNC: 138 MMOL/L (ref 135–145)
TOTAL IMMATURE NEUTOROPHIL: 0.02 K/CU MM
TOTAL NUCLEATED RBC: 0 K/CU MM
TOTAL PROTEIN: 7 GM/DL (ref 6.4–8.2)
WBC # BLD: 8.4 K/CU MM (ref 4–10.5)

## 2023-12-26 PROCEDURE — 96374 THER/PROPH/DIAG INJ IV PUSH: CPT

## 2023-12-26 PROCEDURE — 6360000002 HC RX W HCPCS: Performed by: EMERGENCY MEDICINE

## 2023-12-26 PROCEDURE — 71045 X-RAY EXAM CHEST 1 VIEW: CPT

## 2023-12-26 PROCEDURE — 80053 COMPREHEN METABOLIC PANEL: CPT

## 2023-12-26 PROCEDURE — 84484 ASSAY OF TROPONIN QUANT: CPT

## 2023-12-26 PROCEDURE — 93005 ELECTROCARDIOGRAM TRACING: CPT | Performed by: EMERGENCY MEDICINE

## 2023-12-26 PROCEDURE — 99285 EMERGENCY DEPT VISIT HI MDM: CPT

## 2023-12-26 PROCEDURE — 85025 COMPLETE CBC W/AUTO DIFF WBC: CPT

## 2023-12-26 PROCEDURE — 96376 TX/PRO/DX INJ SAME DRUG ADON: CPT

## 2023-12-26 PROCEDURE — 71275 CT ANGIOGRAPHY CHEST: CPT

## 2023-12-26 PROCEDURE — 6360000004 HC RX CONTRAST MEDICATION: Performed by: EMERGENCY MEDICINE

## 2023-12-26 RX ORDER — MORPHINE SULFATE 4 MG/ML
4 INJECTION, SOLUTION INTRAMUSCULAR; INTRAVENOUS EVERY 30 MIN PRN
Status: DISCONTINUED | OUTPATIENT
Start: 2023-12-26 | End: 2023-12-27 | Stop reason: HOSPADM

## 2023-12-26 RX ADMIN — MORPHINE SULFATE 4 MG: 4 INJECTION, SOLUTION INTRAMUSCULAR; INTRAVENOUS at 21:27

## 2023-12-26 RX ADMIN — IOPAMIDOL 75 ML: 755 INJECTION, SOLUTION INTRAVENOUS at 23:38

## 2023-12-27 VITALS
WEIGHT: 160 LBS | RESPIRATION RATE: 20 BRPM | OXYGEN SATURATION: 93 % | SYSTOLIC BLOOD PRESSURE: 137 MMHG | DIASTOLIC BLOOD PRESSURE: 61 MMHG | HEIGHT: 67 IN | TEMPERATURE: 98.4 F | HEART RATE: 90 BPM | BODY MASS INDEX: 25.11 KG/M2

## 2023-12-27 VITALS
SYSTOLIC BLOOD PRESSURE: 138 MMHG | HEIGHT: 67 IN | OXYGEN SATURATION: 95 % | RESPIRATION RATE: 16 BRPM | HEART RATE: 84 BPM | DIASTOLIC BLOOD PRESSURE: 49 MMHG | TEMPERATURE: 98.8 F | BODY MASS INDEX: 25.11 KG/M2 | WEIGHT: 160 LBS

## 2023-12-27 DIAGNOSIS — I26.99 BILATERAL PULMONARY EMBOLISM (HCC): Primary | ICD-10-CM

## 2023-12-27 LAB
EKG ATRIAL RATE: 81 BPM
EKG DIAGNOSIS: NORMAL
EKG P AXIS: 81 DEGREES
EKG P-R INTERVAL: 172 MS
EKG Q-T INTERVAL: 414 MS
EKG QRS DURATION: 142 MS
EKG QTC CALCULATION (BAZETT): 480 MS
EKG R AXIS: 67 DEGREES
EKG T AXIS: 69 DEGREES
EKG VENTRICULAR RATE: 81 BPM
TROPONIN, HIGH SENSITIVITY: 11 NG/L (ref 0–22)

## 2023-12-27 PROCEDURE — 93010 ELECTROCARDIOGRAM REPORT: CPT | Performed by: INTERNAL MEDICINE

## 2023-12-27 PROCEDURE — 6370000000 HC RX 637 (ALT 250 FOR IP): Performed by: EMERGENCY MEDICINE

## 2023-12-27 PROCEDURE — 99283 EMERGENCY DEPT VISIT LOW MDM: CPT

## 2023-12-27 PROCEDURE — 6360000002 HC RX W HCPCS: Performed by: EMERGENCY MEDICINE

## 2023-12-27 RX ORDER — HYDROCODONE BITARTRATE AND ACETAMINOPHEN 5; 325 MG/1; MG/1
1 TABLET ORAL EVERY 6 HOURS PRN
Qty: 12 TABLET | Refills: 0 | Status: SHIPPED | OUTPATIENT
Start: 2023-12-27 | End: 2023-12-30

## 2023-12-27 RX ADMIN — APIXABAN 10 MG: 5 TABLET, FILM COATED ORAL at 01:16

## 2023-12-27 RX ADMIN — MORPHINE SULFATE 4 MG: 4 INJECTION, SOLUTION INTRAMUSCULAR; INTRAVENOUS at 01:17

## 2023-12-27 NOTE — DISCHARGE INSTRUCTIONS
Hold the prescription, Eliquis and take according to the instructions given starting today  If having worsening pain or shortness of breath return to ER

## 2023-12-27 NOTE — ED PROVIDER NOTES
cough; covid 2 weeks ago TECHNOLOGIST PROVIDED HISTORY: Reason for exam:->cough; covid 2 weeks ago Reason for Exam: cough; covid 2 weeks ago FINDINGS: The lungs appear clear. The heart appears unremarkable. Patient is status post sternotomy and CABG. Bony structures unremarkable. There is no change from prior examination. No active cardiopulmonary disease. MDM:     Discussion with Other Professionals : None    Social Determinants: None    Records Reviewed : Outpatient Notes shows the patient has a history of CAD but no history of pulmonary embolism    Chronic conditions affecting care: CAD    Labs ordered and results as above (interpreted by myself), CBC showed no significant concerning anemia, chemistry showed no significant concerning electrolyte derangements or acute renal failure, hepatic function panel showed no transaminitis to suggest hepatic dysfunction/inflammation, and troponin was either normal or close to baseline so lower suspicion for acute MI, myocarditis, pericarditis  Imaging interpreted and reviewed by myself: CTA PE showed segmental PEs without evidence of any heart strain  EKG interpreted by myself as above, not acutely concerning    Patient was given the following medications:  Medications   morphine sulfate (PF) injection 4 mg (4 mg IntraVENous Given 12/26/23 2127)   apixaban (ELIQUIS) tablet 10 mg (has no administration in time range)   iopamidol (ISOVUE-370) 76 % injection 75 mL (75 mLs IntraVENous Given 12/26/23 1202)       Disposition Discussion:  Patient is well-appearing here without any respiratory distress hypoxia or tachycardia. Troponin is negative as well. Patient's symptoms are well-controlled. Has blood score is slightly elevated and discussed bleeding risks associated with this however given that these are pulmonary embolisms we discussed that the benefits may outweigh the risks.   The patient would like to try the anticoagulation which again is reasonable as

## 2023-12-27 NOTE — ED TRIAGE NOTES
Pt was released from hospital at 2 AM and has not filled his prescriptions yet. Pt woke up still in pain.

## 2023-12-27 NOTE — ED PROVIDER NOTES
Emergency Department Encounter    Patient: Dallin Huitron  MRN: [id number]  : 1940  Date of Evaluation: 2023  ED Provider:  Brian Almendarez MD    Triage Chief Complaint:   No chief complaint on file. [place]:  Dallin Huitron is a 81 y.o. male diagnosed with multiple bilateral in this emergency department yesterday charged home with prescription for Eliquis that presents stating he is not getting a ride to fill his prescription. Patient is very poor historian and my suspicion is he probably has significant underlying cognitive impairment. He said he is having pain when he takes a deep breath but does not admit to shortness of breath at this time. Lightheadedness or dizziness. ROS - see HPI, below listed is current ROS at time of my eval:  General:  No fevers, no chills, no weakness  Eyes:  No recent vison changes, no discharge  ENT:  No sore throat, no nasal congestion, no hearing changes  Cardiovascular: Pleuritic chest pain  Respiratory:  No shortness of breath, no cough, no wheezing  Gastrointestinal:  No pain, no nausea, no vomiting, no diarrhea  Musculoskeletal:  No muscle pain, no joint pain  Skin:  No rash, no pruritis, no easy bruising  Neurologic:  No speech problems, no headache, no extremity numbness, no extremity tingling, no extremity weakness  Psychiatric:  No anxiety  Genitourinary:  No dysuria, no hematuria  Endocrine:  No unexpected weight gain, no unexpected weight loss  Extremities:  no edema, no pain    Past Medical History:   Diagnosis Date    CAD (coronary artery disease)     Cancer ([address])     prostate    H/O echocardiogram 2019    Patient in atrial fibrillation during exam. Left ventricular function is normal, EF is estimated at 55-60%. Mildly dilated left atrium. Mildly dilated left atrium. Mildli enlarged right ventricle cavity. Trace to mild mitral regurgitation is present. No evidence of pericardial effusion. No evidence of pleural effusion.     History of cardiac

## 2023-12-29 ENCOUNTER — TELEPHONE (OUTPATIENT)
Dept: CARDIOLOGY CLINIC | Age: 83
End: 2023-12-29

## 2023-12-29 NOTE — TELEPHONE ENCOUNTER
Pts phone .   Friend called back with med list to review  Pt has phone on the  now and will be able to talk    Omeprazole 20 mg  Low dose aspirin  Linsinipril 5mg  Metoprolol 25 mg  Rosuvastatin 40 mg  Isosorbide 30 mg  Eliquis 7 days 2 tablets x2    *pt taking for blood clot in lungs (not prescribed by HH)   Next 7 days  1 tablet x2    *pt confirmed instructions on box  Tanulosin hcl  0.04 mg  Hydrocodone       Mentioned to pt that some of these are prescribed by PCP , not our office.  Please call pt back and confirm med list  [phone] (Home

## 2023-12-29 NOTE — TELEPHONE ENCOUNTER
Patients phone  while I was talking to them. I printed off a med list for them. Will be stopping by the office to pick it up.

## 2023-12-29 NOTE — TELEPHONE ENCOUNTER
Need someone to call the patient to review medications with him.  Patient is confused about what meds to take when and how many.

## 2024-01-09 ENCOUNTER — HOSPITAL ENCOUNTER (EMERGENCY)
Age: 84
Discharge: HOME | DRG: 699 | End: 2024-01-10
Attending: EMERGENCY MEDICINE
Payer: MEDICARE

## 2024-01-09 VITALS
DIASTOLIC BLOOD PRESSURE: 73 MMHG | HEART RATE: 86 BPM | RESPIRATION RATE: 17 BRPM | SYSTOLIC BLOOD PRESSURE: 142 MMHG | TEMPERATURE: 97.9 F | OXYGEN SATURATION: 95 %

## 2024-01-09 DIAGNOSIS — R33.9 URINARY RETENTION: Primary | ICD-10-CM

## 2024-01-09 DIAGNOSIS — T83.511A URINARY TRACT INFECTION ASSOCIATED WITH INDWELLING URETHRAL CATHETER, INITIAL ENCOUNTER (HCC): ICD-10-CM

## 2024-01-09 DIAGNOSIS — N39.0 URINARY TRACT INFECTION ASSOCIATED WITH INDWELLING URETHRAL CATHETER, INITIAL ENCOUNTER (HCC): ICD-10-CM

## 2024-01-09 LAB
BACTERIA: ABNORMAL /HPF
BILIRUBIN URINE: ABNORMAL MG/DL
BLOOD, URINE: ABNORMAL
CLARITY: CLEAR
COLOR: YELLOW
GLUCOSE, URINE: 100 MG/DL
KETONES, URINE: ABNORMAL MG/DL
LEUKOCYTE ESTERASE, URINE: ABNORMAL
NITRITE URINE, QUANTITATIVE: POSITIVE
PH, URINE: 5.5 (ref 5–8)
PROTEIN UA: 100 MG/DL
RBC URINE: 1362 /HPF (ref 0–3)
SPECIFIC GRAVITY UA: 1.02 (ref 1–1.03)
TRICHOMONAS: ABNORMAL /HPF
UROBILINOGEN, URINE: 1 MG/DL (ref 0.2–1)
WBC UA: 1167 /HPF (ref 0–2)

## 2024-01-09 PROCEDURE — 81001 URINALYSIS AUTO W/SCOPE: CPT

## 2024-01-09 PROCEDURE — 87086 URINE CULTURE/COLONY COUNT: CPT

## 2024-01-09 PROCEDURE — 99283 EMERGENCY DEPT VISIT LOW MDM: CPT

## 2024-01-09 PROCEDURE — 6370000000 HC RX 637 (ALT 250 FOR IP): Performed by: EMERGENCY MEDICINE

## 2024-01-09 PROCEDURE — 87186 SC STD MICRODIL/AGAR DIL: CPT

## 2024-01-09 PROCEDURE — 87077 CULTURE AEROBIC IDENTIFY: CPT

## 2024-01-09 RX ORDER — HYDROCODONE BITARTRATE AND ACETAMINOPHEN 5; 325 MG/1; MG/1
1 TABLET ORAL ONCE
Status: COMPLETED | OUTPATIENT
Start: 2024-01-09 | End: 2024-01-09

## 2024-01-09 RX ORDER — CEPHALEXIN 500 MG/1
500 CAPSULE ORAL 2 TIMES DAILY
Qty: 14 CAPSULE | Refills: 0 | Status: ON HOLD | OUTPATIENT
Start: 2024-01-09 | End: 2024-01-17 | Stop reason: HOSPADM

## 2024-01-09 RX ORDER — CEPHALEXIN 250 MG/1
500 CAPSULE ORAL ONCE
Status: COMPLETED | OUTPATIENT
Start: 2024-01-09 | End: 2024-01-09

## 2024-01-09 RX ADMIN — HYDROCODONE BITARTRATE AND ACETAMINOPHEN 1 TABLET: 5; 325 TABLET ORAL at 23:53

## 2024-01-09 RX ADMIN — CEPHALEXIN 500 MG: 250 CAPSULE ORAL at 23:53

## 2024-01-10 NOTE — ED PROVIDER NOTES
Triage Chief Complaint:   Groin Pain and Constipation    [place]:  Brett Lozada is a 83 y.o. male that presents with burning to the tip of his penis that started earlier today following removal of Mahmood catheter that had been in place for over a month for urinary retention.  Follows with Dr. Fonseca of urology.  States that he is unable to urinate today, occasionally dribbles.  States that he occasionally has severe burning to the tip of his penis.  No abdominal pain, nausea, vomiting or fevers.    ROS:  At least 6 systems reviewed and otherwise acutely negative except as in the [place].    Past Medical History:   Diagnosis Date    CAD (coronary artery disease)     Cancer (HCC)     prostate    H/O echocardiogram 04/29/2019    Patient in atrial fibrillation during exam. Left ventricular function is normal, EF is estimated at 55-60%. Mildly dilated left atrium. Mildly dilated left atrium. Mildli enlarged right ventricle cavity. Trace to mild mitral regurgitation is present. No evidence of pericardial effusion. No evidence of pleural effusion.    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 LVEF is 55 %.Patient tolerated the procedure well.No immediate complications.    History of echocardiogram 01/12/2018    Left ventricular systolic function is normal with an ejection fraction of55-60%. Grade I diastolic dysfunction. Mildly dilated left atrium.Moderate mitral regurgitation.Mild to moderate tricuspid regurgitation.No evidence of pericardial effusion.    HTN (hypertension)     Hyperlipidemia      Past Surgical History:   Procedure Laterality Date    BACK SURGERY      CARDIAC SURGERY      cabg    COLONOSCOPY  4/14/15    divertics    CORONARY ANGIOPLASTY WITH STENT PLACEMENT  05/14/2014    X2     History  reviewed. No pertinent family history.  Social History     Socioeconomic History    Marital status:      Spouse name: Not on file    Number of children: Not on file    Years of education: Not on file    Highest education level: Not on file   Occupational History    Not on file   Tobacco Use    Smoking status: Former     Current packs/day: 0.00     Average packs/day: 3.0 packs/day for 30.0 years (90.0 ttl pk-yrs)     Types: Cigarettes     Start date: 1976     Quit date: 2006     Years since quittin.0    Smokeless tobacco: Never   Vaping Use    Vaping Use: Never used   Substance and Sexual Activity    Alcohol use: Not Currently     Comment: Caffeine: 2-3 glasses of tea a day    Drug use: No    Sexual activity: Not Currently   Other Topics Concern    Not on file   Social History Narrative    Not on file     Social Determinants of Health     Financial Resource Strain: Not on file   Food Insecurity: Not on file   Transportation Needs: Not on file   Physical Activity: Not on file   Stress: Not on file   Social Connections: Not on file   Intimate Partner Violence: Not on file   Housing Stability: Not on file     Current Facility-Administered Medications   Medication Dose Route Frequency Provider Last Rate Last Admin    cephALEXin (KEFLEX) capsule 500 mg  500 mg Oral Once Mark Lam MD        HYDROcodone-acetaminophen (NORCO) 5-325 MG per tablet 1 tablet  1 tablet Oral Once Mark Lam MD         Current Outpatient Medications   Medication Sig Dispense Refill    cephALEXin (KEFLEX) 500 MG capsule Take 1 capsule by mouth 2 times daily for 7 days 14 capsule 0    apixaban starter pack (ELIQUIS) 5 MG TBPK tablet Take 1 tablet by mouth See Admin Instructions 74 tablet 0    Dextromethorphan-guaiFENesin (MUCINEX DM)  MG TB12 Take 1 tablet by mouth in the morning and at bedtime 28 tablet 0    lisinopril (PRINIVIL;ZESTRIL) 5 MG tablet Take 1 tablet by mouth daily 30 tablet 5    donepezil (ARICEPT) 5 MG

## 2024-01-10 NOTE — ED TRIAGE NOTES
Patient presents to the ED with complaint of groin pain and constipation. Patient states that earlier today he had his catheter removed and that he is now experiencing penile pain. Patient states he did an enema prior to coming in and that he had a small bowel movement.

## 2024-01-12 ENCOUNTER — HOSPITAL ENCOUNTER (INPATIENT)
Age: 84
LOS: 5 days | Discharge: HOME | DRG: 699 | End: 2024-01-17
Attending: EMERGENCY MEDICINE
Payer: MEDICARE

## 2024-01-12 ENCOUNTER — APPOINTMENT (OUTPATIENT)
Dept: CT IMAGING | Age: 84
DRG: 699 | End: 2024-01-12
Payer: MEDICARE

## 2024-01-12 DIAGNOSIS — N39.0 ACUTE UTI: Primary | ICD-10-CM

## 2024-01-12 DIAGNOSIS — N17.9 AKI (ACUTE KIDNEY INJURY) (HCC): ICD-10-CM

## 2024-01-12 LAB
ALBUMIN SERPL-MCNC: 3.8 GM/DL (ref 3.4–5)
ALP BLD-CCNC: 113 IU/L (ref 40–128)
ALT SERPL-CCNC: 14 U/L (ref 10–40)
ANION GAP SERPL CALCULATED.3IONS-SCNC: 12 MMOL/L (ref 7–16)
AST SERPL-CCNC: 18 IU/L (ref 15–37)
BACTERIA: NEGATIVE /HPF
BASOPHILS ABSOLUTE: 0 K/CU MM
BASOPHILS RELATIVE PERCENT: 0.4 % (ref 0–1)
BILIRUB SERPL-MCNC: 0.3 MG/DL (ref 0–1)
BILIRUBIN URINE: ABNORMAL MG/DL
BLOOD, URINE: ABNORMAL
BUN SERPL-MCNC: 17 MG/DL (ref 6–23)
CALCIUM SERPL-MCNC: 9 MG/DL (ref 8.3–10.6)
CHLORIDE BLD-SCNC: 101 MMOL/L (ref 99–110)
CLARITY: CLEAR
CO2: 24 MMOL/L (ref 21–32)
COLOR: ABNORMAL
CREAT SERPL-MCNC: 1.9 MG/DL (ref 0.9–1.3)
CULTURE: ABNORMAL
CULTURE: ABNORMAL
DIFFERENTIAL TYPE: ABNORMAL
EOSINOPHILS ABSOLUTE: 0.2 K/CU MM
EOSINOPHILS RELATIVE PERCENT: 2.5 % (ref 0–3)
GFR SERPL CREATININE-BSD FRML MDRD: 35 ML/MIN/1.73M2
GLUCOSE SERPL-MCNC: 119 MG/DL (ref 70–99)
GLUCOSE, URINE: 100 MG/DL
HCT VFR BLD CALC: 39.9 % (ref 42–52)
HEMOGLOBIN: 12.5 GM/DL (ref 13.5–18)
IMMATURE NEUTROPHIL %: 0.3 % (ref 0–0.43)
KETONES, URINE: ABNORMAL MG/DL
LACTIC ACID, SEPSIS: 1.8 MMOL/L (ref 0.4–2)
LEUKOCYTE ESTERASE, URINE: ABNORMAL
LYMPHOCYTES ABSOLUTE: 0.9 K/CU MM
LYMPHOCYTES RELATIVE PERCENT: 12.6 % (ref 24–44)
Lab: ABNORMAL
MAGNESIUM: 2.3 MG/DL (ref 1.8–2.4)
MCH RBC QN AUTO: 29.2 PG (ref 27–31)
MCHC RBC AUTO-ENTMCNC: 31.3 % (ref 32–36)
MCV RBC AUTO: 93.2 FL (ref 78–100)
MONOCYTES ABSOLUTE: 0.5 K/CU MM
MONOCYTES RELATIVE PERCENT: 7.1 % (ref 0–4)
MUCUS: ABNORMAL HPF
NITRITE URINE, QUANTITATIVE: POSITIVE
NON SQUAM EPI CELLS: <1 /HPF
NUCLEATED RBC %: 0 %
PDW BLD-RTO: 13.7 % (ref 11.7–14.9)
PH, URINE: 5 (ref 5–8)
PLATELET # BLD: 253 K/CU MM (ref 140–440)
PMV BLD AUTO: 11.4 FL (ref 7.5–11.1)
POTASSIUM SERPL-SCNC: 4.2 MMOL/L (ref 3.5–5.1)
PROTEIN UA: >300 MG/DL
RBC # BLD: 4.28 M/CU MM (ref 4.6–6.2)
RBC URINE: 363 /HPF (ref 0–3)
SEGMENTED NEUTROPHILS ABSOLUTE COUNT: 5.5 K/CU MM
SEGMENTED NEUTROPHILS RELATIVE PERCENT: 77.1 % (ref 36–66)
SODIUM BLD-SCNC: 137 MMOL/L (ref 135–145)
SPECIFIC GRAVITY UA: 1.01 (ref 1–1.03)
SPECIMEN: ABNORMAL
TOTAL IMMATURE NEUTOROPHIL: 0.02 K/CU MM
TOTAL NUCLEATED RBC: 0 K/CU MM
TOTAL PROTEIN: 6.2 GM/DL (ref 6.4–8.2)
UROBILINOGEN, URINE: 4 MG/DL (ref 0.2–1)
WBC # BLD: 7.2 K/CU MM (ref 4–10.5)
WBC CLUMP: ABNORMAL /HPF
WBC UA: 9 /HPF (ref 0–2)

## 2024-01-12 PROCEDURE — 2580000003 HC RX 258: Performed by: EMERGENCY MEDICINE

## 2024-01-12 PROCEDURE — 81001 URINALYSIS AUTO W/SCOPE: CPT

## 2024-01-12 PROCEDURE — 6360000002 HC RX W HCPCS: Performed by: EMERGENCY MEDICINE

## 2024-01-12 PROCEDURE — 1200000000 HC SEMI PRIVATE

## 2024-01-12 PROCEDURE — 87040 BLOOD CULTURE FOR BACTERIA: CPT

## 2024-01-12 PROCEDURE — 83735 ASSAY OF MAGNESIUM: CPT

## 2024-01-12 PROCEDURE — 6370000000 HC RX 637 (ALT 250 FOR IP): Performed by: EMERGENCY MEDICINE

## 2024-01-12 PROCEDURE — 96365 THER/PROPH/DIAG IV INF INIT: CPT

## 2024-01-12 PROCEDURE — 74176 CT ABD & PELVIS W/O CONTRAST: CPT

## 2024-01-12 PROCEDURE — 6370000000 HC RX 637 (ALT 250 FOR IP): Performed by: STUDENT IN AN ORGANIZED HEALTH CARE EDUCATION/TRAINING PROGRAM

## 2024-01-12 PROCEDURE — 80053 COMPREHEN METABOLIC PANEL: CPT

## 2024-01-12 PROCEDURE — 85025 COMPLETE CBC W/AUTO DIFF WBC: CPT

## 2024-01-12 PROCEDURE — 83605 ASSAY OF LACTIC ACID: CPT

## 2024-01-12 PROCEDURE — 99285 EMERGENCY DEPT VISIT HI MDM: CPT

## 2024-01-12 PROCEDURE — 6370000000 HC RX 637 (ALT 250 FOR IP): Performed by: FAMILY MEDICINE

## 2024-01-12 RX ORDER — SODIUM CHLORIDE 0.9 % (FLUSH) 0.9 %
5-40 SYRINGE (ML) INJECTION EVERY 12 HOURS SCHEDULED
Status: DISCONTINUED | OUTPATIENT
Start: 2024-01-12 | End: 2024-01-17 | Stop reason: HOSPADM

## 2024-01-12 RX ORDER — LANOLIN ALCOHOL/MO/W.PET/CERES
3 CREAM (GRAM) TOPICAL NIGHTLY PRN
Status: DISCONTINUED | OUTPATIENT
Start: 2024-01-12 | End: 2024-01-17 | Stop reason: HOSPADM

## 2024-01-12 RX ORDER — ONDANSETRON 4 MG/1
4 TABLET, ORALLY DISINTEGRATING ORAL EVERY 8 HOURS PRN
Status: DISCONTINUED | OUTPATIENT
Start: 2024-01-12 | End: 2024-01-17 | Stop reason: HOSPADM

## 2024-01-12 RX ORDER — CLOPIDOGREL BISULFATE 75 MG/1
75 TABLET ORAL DAILY
Status: DISCONTINUED | OUTPATIENT
Start: 2024-01-13 | End: 2024-01-17 | Stop reason: HOSPADM

## 2024-01-12 RX ORDER — ROSUVASTATIN CALCIUM 5 MG/1
10 TABLET, COATED ORAL NIGHTLY
Status: DISCONTINUED | OUTPATIENT
Start: 2024-01-12 | End: 2024-01-17 | Stop reason: HOSPADM

## 2024-01-12 RX ORDER — TAMSULOSIN HYDROCHLORIDE 0.4 MG/1
0.4 CAPSULE ORAL NIGHTLY
Status: DISCONTINUED | OUTPATIENT
Start: 2024-01-12 | End: 2024-01-17 | Stop reason: HOSPADM

## 2024-01-12 RX ORDER — HYDROCODONE BITARTRATE AND ACETAMINOPHEN 5; 325 MG/1; MG/1
1 TABLET ORAL ONCE
Status: COMPLETED | OUTPATIENT
Start: 2024-01-12 | End: 2024-01-12

## 2024-01-12 RX ORDER — ACETAMINOPHEN 650 MG/1
650 SUPPOSITORY RECTAL EVERY 6 HOURS PRN
Status: DISCONTINUED | OUTPATIENT
Start: 2024-01-12 | End: 2024-01-17 | Stop reason: HOSPADM

## 2024-01-12 RX ORDER — 0.9 % SODIUM CHLORIDE 0.9 %
1000 INTRAVENOUS SOLUTION INTRAVENOUS ONCE
Status: COMPLETED | OUTPATIENT
Start: 2024-01-12 | End: 2024-01-12

## 2024-01-12 RX ORDER — PANTOPRAZOLE SODIUM 40 MG/1
40 TABLET, DELAYED RELEASE ORAL
Status: DISCONTINUED | OUTPATIENT
Start: 2024-01-13 | End: 2024-01-17 | Stop reason: HOSPADM

## 2024-01-12 RX ORDER — LISINOPRIL 5 MG/1
5 TABLET ORAL DAILY
Status: DISCONTINUED | OUTPATIENT
Start: 2024-01-13 | End: 2024-01-15

## 2024-01-12 RX ORDER — ONDANSETRON 2 MG/ML
4 INJECTION INTRAMUSCULAR; INTRAVENOUS EVERY 6 HOURS PRN
Status: DISCONTINUED | OUTPATIENT
Start: 2024-01-12 | End: 2024-01-17 | Stop reason: HOSPADM

## 2024-01-12 RX ORDER — MECLIZINE HYDROCHLORIDE 25 MG/1
25 TABLET ORAL 3 TIMES DAILY PRN
Status: DISCONTINUED | OUTPATIENT
Start: 2024-01-12 | End: 2024-01-17 | Stop reason: HOSPADM

## 2024-01-12 RX ORDER — POLYETHYLENE GLYCOL 3350 17 G/17G
17 POWDER, FOR SOLUTION ORAL DAILY PRN
Status: DISCONTINUED | OUTPATIENT
Start: 2024-01-12 | End: 2024-01-17 | Stop reason: HOSPADM

## 2024-01-12 RX ORDER — ISOSORBIDE MONONITRATE 30 MG/1
30 TABLET, EXTENDED RELEASE ORAL DAILY
Status: DISCONTINUED | OUTPATIENT
Start: 2024-01-13 | End: 2024-01-17 | Stop reason: HOSPADM

## 2024-01-12 RX ORDER — SODIUM CHLORIDE 9 MG/ML
INJECTION, SOLUTION INTRAVENOUS PRN
Status: DISCONTINUED | OUTPATIENT
Start: 2024-01-12 | End: 2024-01-17 | Stop reason: HOSPADM

## 2024-01-12 RX ORDER — DONEPEZIL HYDROCHLORIDE 5 MG/1
5 TABLET, FILM COATED ORAL NIGHTLY
Status: DISCONTINUED | OUTPATIENT
Start: 2024-01-12 | End: 2024-01-17 | Stop reason: HOSPADM

## 2024-01-12 RX ORDER — SODIUM CHLORIDE 9 MG/ML
INJECTION, SOLUTION INTRAVENOUS CONTINUOUS
Status: DISCONTINUED | OUTPATIENT
Start: 2024-01-12 | End: 2024-01-13

## 2024-01-12 RX ORDER — SODIUM CHLORIDE 0.9 % (FLUSH) 0.9 %
5-40 SYRINGE (ML) INJECTION PRN
Status: DISCONTINUED | OUTPATIENT
Start: 2024-01-12 | End: 2024-01-17 | Stop reason: HOSPADM

## 2024-01-12 RX ORDER — ATROPA BELLADONNA AND OPIUM 16.2; 6 MG/1; MG/1
60 SUPPOSITORY RECTAL EVERY 8 HOURS PRN
Status: DISCONTINUED | OUTPATIENT
Start: 2024-01-12 | End: 2024-01-12

## 2024-01-12 RX ORDER — ACETAMINOPHEN 325 MG/1
650 TABLET ORAL EVERY 6 HOURS PRN
Status: DISCONTINUED | OUTPATIENT
Start: 2024-01-12 | End: 2024-01-17 | Stop reason: HOSPADM

## 2024-01-12 RX ADMIN — ROSUVASTATIN CALCIUM 10 MG: 5 TABLET, FILM COATED ORAL at 23:08

## 2024-01-12 RX ADMIN — TAMSULOSIN HYDROCHLORIDE 0.4 MG: 0.4 CAPSULE ORAL at 23:08

## 2024-01-12 RX ADMIN — METOPROLOL TARTRATE 25 MG: 25 TABLET, FILM COATED ORAL at 23:08

## 2024-01-12 RX ADMIN — SODIUM CHLORIDE 1000 ML: 9 INJECTION, SOLUTION INTRAVENOUS at 17:37

## 2024-01-12 RX ADMIN — CEFTRIAXONE 1000 MG: 1 INJECTION, POWDER, FOR SOLUTION INTRAMUSCULAR; INTRAVENOUS at 15:44

## 2024-01-12 RX ADMIN — DONEPEZIL HYDROCHLORIDE 5 MG: 5 TABLET, FILM COATED ORAL at 23:08

## 2024-01-12 RX ADMIN — HYDROCODONE BITARTRATE AND ACETAMINOPHEN 1 TABLET: 5; 325 TABLET ORAL at 15:44

## 2024-01-12 RX ADMIN — SODIUM CHLORIDE: 9 INJECTION, SOLUTION INTRAVENOUS at 19:59

## 2024-01-12 RX ADMIN — Medication 3 MG: at 23:08

## 2024-01-12 RX ADMIN — APIXABAN 5 MG: 5 TABLET, FILM COATED ORAL at 23:08

## 2024-01-12 NOTE — ED PROVIDER NOTES
Left ventricular systolic function is normal with an ejection fraction of55-60%. Grade I diastolic dysfunction. Mildly dilated left atrium.Moderate mitral regurgitation.Mild to moderate tricuspid regurgitation.No evidence of pericardial effusion.    HTN (hypertension)     Hyperlipidemia      Past Surgical History:   Procedure Laterality Date    BACK SURGERY      CARDIAC SURGERY      cabg    COLONOSCOPY  4/14/15    divertics    CORONARY ANGIOPLASTY WITH STENT PLACEMENT  05/14/2014    X2     No family history on file.  Social History     Socioeconomic History    Marital status:      Spouse name: Not on file    Number of children: Not on file    Years of education: Not on file    Highest education level: Not on file   Occupational History    Not on file   Tobacco Use    Smoking status: Former     Current packs/day: 0.00     Average packs/day: 3.0 packs/day for 30.0 years (90.0 ttl pk-yrs)     Types: Cigarettes     Start date: 1976     Quit date: 2006     Years since quittin.0    Smokeless tobacco: Never   Vaping Use    Vaping Use: Never used   Substance and Sexual Activity    Alcohol use: Not Currently     Comment: Caffeine: 2-3 glasses of tea a day    Drug use: No    Sexual activity: Not Currently   Other Topics Concern    Not on file   Social History Narrative    Not on file     Social Determinants of Health     Financial Resource Strain: Not on file   Food Insecurity: Patient Declined (2024)    Hunger Vital Sign     Worried About Running Out of Food in the Last Year: Patient declined     Ran Out of Food in the Last Year: Patient declined   Transportation Needs: Patient Declined (2024)    PRAPARE - Transportation     Lack of Transportation (Medical): Patient declined     Lack of Transportation (Non-Medical): Patient declined   Physical Activity: Not on file   Stress: Not on file   Social Connections: Not on file   Intimate Partner Violence: Not on file   Housing Stability: Patient

## 2024-01-12 NOTE — ED NOTES
Patient urinary cath replaced at this time. Patient tolerated well. Pain meds administered patient resting comfortably

## 2024-01-13 LAB
ANION GAP SERPL CALCULATED.3IONS-SCNC: 9 MMOL/L (ref 7–16)
BASOPHILS ABSOLUTE: 0 K/CU MM
BASOPHILS ABSOLUTE: 0 K/CU MM
BASOPHILS RELATIVE PERCENT: 0.3 % (ref 0–1)
BASOPHILS RELATIVE PERCENT: 0.6 % (ref 0–1)
BUN SERPL-MCNC: 14 MG/DL (ref 6–23)
CALCIUM SERPL-MCNC: 8.5 MG/DL (ref 8.3–10.6)
CHLORIDE BLD-SCNC: 109 MMOL/L (ref 99–110)
CO2: 23 MMOL/L (ref 21–32)
CREAT SERPL-MCNC: 1.7 MG/DL (ref 0.9–1.3)
CREATININE URINE: 64.8 MG/DL (ref 39–259)
DIFFERENTIAL TYPE: ABNORMAL
DIFFERENTIAL TYPE: ABNORMAL
EOSINOPHILS ABSOLUTE: 0.2 K/CU MM
EOSINOPHILS ABSOLUTE: 0.2 K/CU MM
EOSINOPHILS RELATIVE PERCENT: 2.7 % (ref 0–3)
EOSINOPHILS RELATIVE PERCENT: 2.9 % (ref 0–3)
GFR SERPL CREATININE-BSD FRML MDRD: 40 ML/MIN/1.73M2
GLUCOSE SERPL-MCNC: 94 MG/DL (ref 70–99)
HCT VFR BLD CALC: 35.7 % (ref 42–52)
HCT VFR BLD CALC: 38.9 % (ref 42–52)
HEMOGLOBIN: 11.2 GM/DL (ref 13.5–18)
HEMOGLOBIN: 12.1 GM/DL (ref 13.5–18)
IMMATURE NEUTROPHIL %: 0.3 % (ref 0–0.43)
IMMATURE NEUTROPHIL %: 0.3 % (ref 0–0.43)
LYMPHOCYTES ABSOLUTE: 0.9 K/CU MM
LYMPHOCYTES ABSOLUTE: 1.2 K/CU MM
LYMPHOCYTES RELATIVE PERCENT: 11.5 % (ref 24–44)
LYMPHOCYTES RELATIVE PERCENT: 17.5 % (ref 24–44)
MCH RBC QN AUTO: 28.9 PG (ref 27–31)
MCH RBC QN AUTO: 29.1 PG (ref 27–31)
MCHC RBC AUTO-ENTMCNC: 31.1 % (ref 32–36)
MCHC RBC AUTO-ENTMCNC: 31.4 % (ref 32–36)
MCV RBC AUTO: 92.7 FL (ref 78–100)
MCV RBC AUTO: 93.1 FL (ref 78–100)
MONOCYTES ABSOLUTE: 0.4 K/CU MM
MONOCYTES ABSOLUTE: 0.6 K/CU MM
MONOCYTES RELATIVE PERCENT: 5.7 % (ref 0–4)
MONOCYTES RELATIVE PERCENT: 8.1 % (ref 0–4)
NUCLEATED RBC %: 0 %
NUCLEATED RBC %: 0 %
PDW BLD-RTO: 13.5 % (ref 11.7–14.9)
PDW BLD-RTO: 13.6 % (ref 11.7–14.9)
PLATELET # BLD: 212 K/CU MM (ref 140–440)
PLATELET # BLD: 217 K/CU MM (ref 140–440)
PMV BLD AUTO: 11.1 FL (ref 7.5–11.1)
PMV BLD AUTO: 13.2 FL (ref 7.5–11.1)
POTASSIUM SERPL-SCNC: 3.9 MMOL/L (ref 3.5–5.1)
PROT/CREAT RATIO, UR: 0.2
RBC # BLD: 3.85 M/CU MM (ref 4.6–6.2)
RBC # BLD: 4.18 M/CU MM (ref 4.6–6.2)
SEGMENTED NEUTROPHILS ABSOLUTE COUNT: 4.8 K/CU MM
SEGMENTED NEUTROPHILS ABSOLUTE COUNT: 5.9 K/CU MM
SEGMENTED NEUTROPHILS RELATIVE PERCENT: 70.6 % (ref 36–66)
SEGMENTED NEUTROPHILS RELATIVE PERCENT: 79.5 % (ref 36–66)
SODIUM BLD-SCNC: 141 MMOL/L (ref 135–145)
TOTAL IMMATURE NEUTOROPHIL: 0.02 K/CU MM
TOTAL IMMATURE NEUTOROPHIL: 0.02 K/CU MM
TOTAL NUCLEATED RBC: 0 K/CU MM
TOTAL NUCLEATED RBC: 0 K/CU MM
URINE TOTAL PROTEIN: 15 MG/DL
WBC # BLD: 6.8 K/CU MM (ref 4–10.5)
WBC # BLD: 7.4 K/CU MM (ref 4–10.5)

## 2024-01-13 PROCEDURE — 85025 COMPLETE CBC W/AUTO DIFF WBC: CPT

## 2024-01-13 PROCEDURE — 36415 COLL VENOUS BLD VENIPUNCTURE: CPT

## 2024-01-13 PROCEDURE — 82570 ASSAY OF URINE CREATININE: CPT

## 2024-01-13 PROCEDURE — 80048 BASIC METABOLIC PNL TOTAL CA: CPT

## 2024-01-13 PROCEDURE — 84156 ASSAY OF PROTEIN URINE: CPT

## 2024-01-13 PROCEDURE — 6360000002 HC RX W HCPCS: Performed by: STUDENT IN AN ORGANIZED HEALTH CARE EDUCATION/TRAINING PROGRAM

## 2024-01-13 PROCEDURE — 6370000000 HC RX 637 (ALT 250 FOR IP): Performed by: FAMILY MEDICINE

## 2024-01-13 PROCEDURE — 1200000000 HC SEMI PRIVATE

## 2024-01-13 PROCEDURE — 2580000003 HC RX 258: Performed by: STUDENT IN AN ORGANIZED HEALTH CARE EDUCATION/TRAINING PROGRAM

## 2024-01-13 PROCEDURE — 6370000000 HC RX 637 (ALT 250 FOR IP): Performed by: STUDENT IN AN ORGANIZED HEALTH CARE EDUCATION/TRAINING PROGRAM

## 2024-01-13 PROCEDURE — 94761 N-INVAS EAR/PLS OXIMETRY MLT: CPT

## 2024-01-13 RX ORDER — PHENAZOPYRIDINE HYDROCHLORIDE 100 MG/1
200 TABLET, FILM COATED ORAL
Status: DISCONTINUED | OUTPATIENT
Start: 2024-01-13 | End: 2024-01-13

## 2024-01-13 RX ORDER — HYDROCODONE BITARTRATE AND ACETAMINOPHEN 5; 325 MG/1; MG/1
1 TABLET ORAL ONCE
Status: COMPLETED | OUTPATIENT
Start: 2024-01-13 | End: 2024-01-13

## 2024-01-13 RX ORDER — HYDROCODONE BITARTRATE AND ACETAMINOPHEN 5; 325 MG/1; MG/1
1 TABLET ORAL EVERY 6 HOURS PRN
Status: DISCONTINUED | OUTPATIENT
Start: 2024-01-13 | End: 2024-01-17 | Stop reason: HOSPADM

## 2024-01-13 RX ORDER — HYDROXYZINE PAMOATE 25 MG/1
25 CAPSULE ORAL 3 TIMES DAILY PRN
Status: DISCONTINUED | OUTPATIENT
Start: 2024-01-13 | End: 2024-01-17 | Stop reason: HOSPADM

## 2024-01-13 RX ADMIN — SODIUM CHLORIDE, PRESERVATIVE FREE 5 ML: 5 INJECTION INTRAVENOUS at 21:27

## 2024-01-13 RX ADMIN — METOPROLOL TARTRATE 25 MG: 25 TABLET, FILM COATED ORAL at 21:28

## 2024-01-13 RX ADMIN — Medication 3 MG: at 21:28

## 2024-01-13 RX ADMIN — APIXABAN 5 MG: 5 TABLET, FILM COATED ORAL at 08:06

## 2024-01-13 RX ADMIN — METOPROLOL TARTRATE 25 MG: 25 TABLET, FILM COATED ORAL at 08:06

## 2024-01-13 RX ADMIN — CEFTRIAXONE 1000 MG: 1 INJECTION, POWDER, FOR SOLUTION INTRAMUSCULAR; INTRAVENOUS at 18:20

## 2024-01-13 RX ADMIN — PANTOPRAZOLE SODIUM 40 MG: 40 TABLET, DELAYED RELEASE ORAL at 05:33

## 2024-01-13 RX ADMIN — APIXABAN 5 MG: 5 TABLET, FILM COATED ORAL at 21:28

## 2024-01-13 RX ADMIN — SODIUM CHLORIDE, PRESERVATIVE FREE 10 ML: 5 INJECTION INTRAVENOUS at 08:06

## 2024-01-13 RX ADMIN — ISOSORBIDE MONONITRATE 30 MG: 30 TABLET, EXTENDED RELEASE ORAL at 08:06

## 2024-01-13 RX ADMIN — HYDROXYZINE PAMOATE 25 MG: 25 CAPSULE ORAL at 21:28

## 2024-01-13 RX ADMIN — HYDROXYZINE PAMOATE 25 MG: 25 CAPSULE ORAL at 13:07

## 2024-01-13 RX ADMIN — CLOPIDOGREL BISULFATE 75 MG: 75 TABLET ORAL at 08:06

## 2024-01-13 RX ADMIN — DONEPEZIL HYDROCHLORIDE 5 MG: 5 TABLET, FILM COATED ORAL at 21:28

## 2024-01-13 RX ADMIN — HYDROCODONE BITARTRATE AND ACETAMINOPHEN 1 TABLET: 5; 325 TABLET ORAL at 01:05

## 2024-01-13 RX ADMIN — HYDROCODONE BITARTRATE AND ACETAMINOPHEN 1 TABLET: 5; 325 TABLET ORAL at 11:28

## 2024-01-13 RX ADMIN — PHENAZOPYRIDINE 200 MG: 100 TABLET ORAL at 11:28

## 2024-01-13 RX ADMIN — SODIUM CHLORIDE 25 ML: 9 INJECTION, SOLUTION INTRAVENOUS at 18:19

## 2024-01-13 RX ADMIN — ROSUVASTATIN CALCIUM 10 MG: 5 TABLET, FILM COATED ORAL at 21:28

## 2024-01-13 RX ADMIN — TAMSULOSIN HYDROCHLORIDE 0.4 MG: 0.4 CAPSULE ORAL at 21:28

## 2024-01-13 ASSESSMENT — PAIN SCALES - GENERAL
PAINLEVEL_OUTOF10: 8
PAINLEVEL_OUTOF10: 7
PAINLEVEL_OUTOF10: 8

## 2024-01-13 ASSESSMENT — PAIN DESCRIPTION - DESCRIPTORS
DESCRIPTORS: BURNING;ACHING;DISCOMFORT
DESCRIPTORS: PRESSURE

## 2024-01-13 ASSESSMENT — PAIN DESCRIPTION - LOCATION
LOCATION: PERINEUM
LOCATION: PENIS;GROIN

## 2024-01-13 ASSESSMENT — PAIN - FUNCTIONAL ASSESSMENT
PAIN_FUNCTIONAL_ASSESSMENT: ACTIVITIES ARE NOT PREVENTED
PAIN_FUNCTIONAL_ASSESSMENT: ACTIVITIES ARE NOT PREVENTED

## 2024-01-13 NOTE — H&P
mg, TID PRN  melatonin, 3 mg, Nightly PRN        Labs      CBC:   Recent Labs     01/12/24  1527   WBC 7.2   HGB 12.5*        BMP:    Recent Labs     01/12/24  1527      K 4.2      CO2 24   BUN 17   CREATININE 1.9*   GLUCOSE 119*     Hepatic:   Recent Labs     01/12/24  1527   AST 18   ALT 14   BILITOT 0.3   ALKPHOS 113     UA:  Lab Results   Component Value Date/Time    NITRU POSITIVE 01/12/2024 05:46 PM    NITRU NEGATIVE 09/16/2012 03:45 AM    COLORU ORANGE 01/12/2024 05:46 PM    WBCUA 9 01/12/2024 05:46 PM    RBCUA 363 01/12/2024 05:46 PM    MUCUS RARE 01/12/2024 05:46 PM    TRICHOMONAS NONE SEEN 01/09/2024 10:52 PM    BACTERIA NEGATIVE 01/12/2024 05:46 PM    CLARITYU CLEAR 01/12/2024 05:46 PM    SPECGRAV 1.015 01/12/2024 05:46 PM    LEUKOCYTESUR MODERATE NUMBER OR AMOUNT OBSERVED 01/12/2024 05:46 PM    UROBILINOGEN 4.0 01/12/2024 05:46 PM    BILIRUBINUR MODERATE NUMBER OR AMOUNT OBSERVED 01/12/2024 05:46 PM    BLOODU LARGE NUMBER OR AMOUNT OBSERVED 01/12/2024 05:46 PM    GLUCOSEU negative 09/16/2012 03:57 AM    KETUA TRACE 01/12/2024 05:46 PM     Imaging/Diagnostics Last 24 Hours   CT ABDOMEN PELVIS WO CONTRAST Additional Contrast? None    Result Date: 1/12/2024  EXAMINATION: CT OF THE ABDOMEN AND PELVIS WITHOUT CONTRAST 1/12/2024 5:16 pm TECHNIQUE: CT of the abdomen and pelvis was performed without the administration of intravenous contrast. Multiplanar reformatted images are provided for review. Automated exposure control, iterative reconstruction, and/or weight based adjustment of the mA/kV was utilized to reduce the radiation dose to as low as reasonably achievable. COMPARISON: 10/04/2018 HISTORY: ORDERING SYSTEM PROVIDED HISTORY: lower abd pain TECHNOLOGIST PROVIDED HISTORY: Reason for exam:->lower abd pain Additional Contrast?->None Decision Support Exception - unselect if not a suspected or confirmed emergency medical condition->Emergency Medical Condition (MA) Reason for Exam: lower

## 2024-01-13 NOTE — FLOWSHEET NOTE
Consult sent to Dr. Carrillo: New consult:  Failed voiding trails. Now having severe pain with arechiga catheter

## 2024-01-13 NOTE — ED NOTES
ED TO INPATIENT SBAR HANDOFF    Patient Name: Brett Lozada   :  1940  83 y.o.   Preferred Name Brett  Family/Caregiver Present yes   Restraints no   C-SSRS: Risk of Suicide: No Risk  Sitter no   Sepsis Risk Score Sepsis Risk Score: 1.89      Situation  No chief complaint on file.    Brief Description of Patient's Condition: Patient came in with urinary catheter issues.  Catheter replaced in ED.  Mental Status: oriented, alert, coherent, logical, thought processes intact, and able to concentrate and follow conversation  Arrived from: home    Imaging:   CT ABDOMEN PELVIS WO CONTRAST Additional Contrast? None   Final Result   1. No acute intra-abdominal or pelvic process.   2. Right lower lobe infiltrate versus rounded atelectasis in the lateral   costophrenic angle.  Recommend follow-up imaging to confirm resolution   3. Left-sided nephrolithiasis without evidence for hydronephrosis or   obstructive uropathy.   4. Diverticulosis           Abnormal labs:   Abnormal Labs Reviewed   URINALYSIS - Abnormal; Notable for the following components:       Result Value    Color, UA ORANGE (*)     Glucose, Urine 100 (*)     Bilirubin Urine MODERATE NUMBER OR AMOUNT OBSERVED (*)     Ketones, Urine TRACE (*)     Blood, Urine LARGE NUMBER OR AMOUNT OBSERVED (*)     Protein, UA >300 (*)     Urobilinogen, Urine 4.0 (*)     Nitrite Urine, Quantitative POSITIVE (*)     Leukocyte Esterase, Urine MODERATE NUMBER OR AMOUNT OBSERVED (*)     All other components within normal limits   CBC WITH AUTO DIFFERENTIAL - Abnormal; Notable for the following components:    RBC 4.28 (*)     Hemoglobin 12.5 (*)     Hematocrit 39.9 (*)     MCHC 31.3 (*)     MPV 11.4 (*)     Segs Relative 77.1 (*)     Lymphocytes % 12.6 (*)     Monocytes % 7.1 (*)     All other components within normal limits   COMPREHENSIVE METABOLIC PANEL - Abnormal; Notable for the following components:    Glucose 119 (*)     Creatinine 1.9 (*)     Est, Glom Filt Rate 35 (*)

## 2024-01-14 LAB
ALBUMIN SERPL-MCNC: 3.2 GM/DL (ref 3.4–5)
ALP BLD-CCNC: 91 IU/L (ref 40–129)
ALT SERPL-CCNC: 10 U/L (ref 10–40)
ANION GAP SERPL CALCULATED.3IONS-SCNC: 10 MMOL/L (ref 7–16)
AST SERPL-CCNC: 15 IU/L (ref 15–37)
BILIRUB SERPL-MCNC: 0.3 MG/DL (ref 0–1)
BUN SERPL-MCNC: 13 MG/DL (ref 6–23)
CALCIUM SERPL-MCNC: 8.7 MG/DL (ref 8.3–10.6)
CHLORIDE BLD-SCNC: 109 MMOL/L (ref 99–110)
CO2: 21 MMOL/L (ref 21–32)
CREAT SERPL-MCNC: 1.5 MG/DL (ref 0.9–1.3)
GFR SERPL CREATININE-BSD FRML MDRD: 46 ML/MIN/1.73M2
GLUCOSE SERPL-MCNC: 96 MG/DL (ref 70–99)
POTASSIUM SERPL-SCNC: 3.9 MMOL/L (ref 3.5–5.1)
SODIUM BLD-SCNC: 140 MMOL/L (ref 135–145)
TOTAL PROTEIN: 5.3 GM/DL (ref 6.4–8.2)

## 2024-01-14 PROCEDURE — 94761 N-INVAS EAR/PLS OXIMETRY MLT: CPT

## 2024-01-14 PROCEDURE — 1200000000 HC SEMI PRIVATE

## 2024-01-14 PROCEDURE — 6360000002 HC RX W HCPCS: Performed by: STUDENT IN AN ORGANIZED HEALTH CARE EDUCATION/TRAINING PROGRAM

## 2024-01-14 PROCEDURE — 87086 URINE CULTURE/COLONY COUNT: CPT

## 2024-01-14 PROCEDURE — 80053 COMPREHEN METABOLIC PANEL: CPT

## 2024-01-14 PROCEDURE — 6370000000 HC RX 637 (ALT 250 FOR IP): Performed by: STUDENT IN AN ORGANIZED HEALTH CARE EDUCATION/TRAINING PROGRAM

## 2024-01-14 PROCEDURE — 36415 COLL VENOUS BLD VENIPUNCTURE: CPT

## 2024-01-14 PROCEDURE — 2580000003 HC RX 258: Performed by: STUDENT IN AN ORGANIZED HEALTH CARE EDUCATION/TRAINING PROGRAM

## 2024-01-14 PROCEDURE — 6370000000 HC RX 637 (ALT 250 FOR IP): Performed by: FAMILY MEDICINE

## 2024-01-14 RX ADMIN — SODIUM CHLORIDE, PRESERVATIVE FREE 5 ML: 5 INJECTION INTRAVENOUS at 20:24

## 2024-01-14 RX ADMIN — ROSUVASTATIN CALCIUM 10 MG: 5 TABLET, FILM COATED ORAL at 20:24

## 2024-01-14 RX ADMIN — ISOSORBIDE MONONITRATE 30 MG: 30 TABLET, EXTENDED RELEASE ORAL at 10:27

## 2024-01-14 RX ADMIN — METOPROLOL TARTRATE 25 MG: 25 TABLET, FILM COATED ORAL at 20:24

## 2024-01-14 RX ADMIN — APIXABAN 5 MG: 5 TABLET, FILM COATED ORAL at 20:24

## 2024-01-14 RX ADMIN — TAMSULOSIN HYDROCHLORIDE 0.4 MG: 0.4 CAPSULE ORAL at 20:24

## 2024-01-14 RX ADMIN — SODIUM CHLORIDE, PRESERVATIVE FREE 5 ML: 5 INJECTION INTRAVENOUS at 10:28

## 2024-01-14 RX ADMIN — METOPROLOL TARTRATE 25 MG: 25 TABLET, FILM COATED ORAL at 10:27

## 2024-01-14 RX ADMIN — PANTOPRAZOLE SODIUM 40 MG: 40 TABLET, DELAYED RELEASE ORAL at 06:45

## 2024-01-14 RX ADMIN — HYDROXYZINE PAMOATE 25 MG: 25 CAPSULE ORAL at 20:24

## 2024-01-14 RX ADMIN — DONEPEZIL HYDROCHLORIDE 5 MG: 5 TABLET, FILM COATED ORAL at 20:24

## 2024-01-14 RX ADMIN — CLOPIDOGREL BISULFATE 75 MG: 75 TABLET ORAL at 10:27

## 2024-01-14 RX ADMIN — APIXABAN 5 MG: 5 TABLET, FILM COATED ORAL at 10:27

## 2024-01-14 RX ADMIN — CEFTRIAXONE 1000 MG: 1 INJECTION, POWDER, FOR SOLUTION INTRAMUSCULAR; INTRAVENOUS at 18:39

## 2024-01-14 RX ADMIN — Medication 3 MG: at 20:24

## 2024-01-14 RX ADMIN — HYDROXYZINE PAMOATE 25 MG: 25 CAPSULE ORAL at 06:45

## 2024-01-15 LAB
ALBUMIN SERPL-MCNC: 3.6 GM/DL (ref 3.4–5)
ALP BLD-CCNC: 100 IU/L (ref 40–129)
ALT SERPL-CCNC: 11 U/L (ref 10–40)
ANION GAP SERPL CALCULATED.3IONS-SCNC: 10 MMOL/L (ref 7–16)
AST SERPL-CCNC: 16 IU/L (ref 15–37)
BILIRUB SERPL-MCNC: 0.5 MG/DL (ref 0–1)
BUN SERPL-MCNC: 11 MG/DL (ref 6–23)
CALCIUM SERPL-MCNC: 9.2 MG/DL (ref 8.3–10.6)
CHLORIDE BLD-SCNC: 107 MMOL/L (ref 99–110)
CO2: 25 MMOL/L (ref 21–32)
CREAT SERPL-MCNC: 1.3 MG/DL (ref 0.9–1.3)
CULTURE: NORMAL
GFR SERPL CREATININE-BSD FRML MDRD: 55 ML/MIN/1.73M2
GLUCOSE SERPL-MCNC: 90 MG/DL (ref 70–99)
Lab: NORMAL
POTASSIUM SERPL-SCNC: 3.8 MMOL/L (ref 3.5–5.1)
SODIUM BLD-SCNC: 142 MMOL/L (ref 135–145)
SPECIMEN: NORMAL
TOTAL PROTEIN: 6 GM/DL (ref 6.4–8.2)

## 2024-01-15 PROCEDURE — 36415 COLL VENOUS BLD VENIPUNCTURE: CPT

## 2024-01-15 PROCEDURE — 97166 OT EVAL MOD COMPLEX 45 MIN: CPT

## 2024-01-15 PROCEDURE — 6370000000 HC RX 637 (ALT 250 FOR IP): Performed by: STUDENT IN AN ORGANIZED HEALTH CARE EDUCATION/TRAINING PROGRAM

## 2024-01-15 PROCEDURE — 2580000003 HC RX 258: Performed by: STUDENT IN AN ORGANIZED HEALTH CARE EDUCATION/TRAINING PROGRAM

## 2024-01-15 PROCEDURE — 80053 COMPREHEN METABOLIC PANEL: CPT

## 2024-01-15 PROCEDURE — 97116 GAIT TRAINING THERAPY: CPT

## 2024-01-15 PROCEDURE — 6360000002 HC RX W HCPCS: Performed by: STUDENT IN AN ORGANIZED HEALTH CARE EDUCATION/TRAINING PROGRAM

## 2024-01-15 PROCEDURE — 1200000000 HC SEMI PRIVATE

## 2024-01-15 PROCEDURE — 97530 THERAPEUTIC ACTIVITIES: CPT

## 2024-01-15 PROCEDURE — 6370000000 HC RX 637 (ALT 250 FOR IP): Performed by: FAMILY MEDICINE

## 2024-01-15 PROCEDURE — 97161 PT EVAL LOW COMPLEX 20 MIN: CPT

## 2024-01-15 RX ORDER — LISINOPRIL 5 MG/1
5 TABLET ORAL DAILY
Status: DISCONTINUED | OUTPATIENT
Start: 2024-01-15 | End: 2024-01-17 | Stop reason: HOSPADM

## 2024-01-15 RX ADMIN — TAMSULOSIN HYDROCHLORIDE 0.4 MG: 0.4 CAPSULE ORAL at 21:04

## 2024-01-15 RX ADMIN — METOPROLOL TARTRATE 25 MG: 25 TABLET, FILM COATED ORAL at 21:03

## 2024-01-15 RX ADMIN — Medication 3 MG: at 21:06

## 2024-01-15 RX ADMIN — PANTOPRAZOLE SODIUM 40 MG: 40 TABLET, DELAYED RELEASE ORAL at 06:10

## 2024-01-15 RX ADMIN — HYDROXYZINE PAMOATE 25 MG: 25 CAPSULE ORAL at 14:13

## 2024-01-15 RX ADMIN — CLOPIDOGREL BISULFATE 75 MG: 75 TABLET ORAL at 07:53

## 2024-01-15 RX ADMIN — ROSUVASTATIN CALCIUM 10 MG: 5 TABLET, FILM COATED ORAL at 21:04

## 2024-01-15 RX ADMIN — SODIUM CHLORIDE, PRESERVATIVE FREE 10 ML: 5 INJECTION INTRAVENOUS at 21:04

## 2024-01-15 RX ADMIN — CEFTRIAXONE 1000 MG: 1 INJECTION, POWDER, FOR SOLUTION INTRAMUSCULAR; INTRAVENOUS at 16:15

## 2024-01-15 RX ADMIN — HYDROXYZINE PAMOATE 25 MG: 25 CAPSULE ORAL at 08:00

## 2024-01-15 RX ADMIN — LISINOPRIL 5 MG: 5 TABLET ORAL at 14:13

## 2024-01-15 RX ADMIN — APIXABAN 5 MG: 5 TABLET, FILM COATED ORAL at 07:53

## 2024-01-15 RX ADMIN — ACETAMINOPHEN 650 MG: 325 TABLET ORAL at 07:52

## 2024-01-15 RX ADMIN — DONEPEZIL HYDROCHLORIDE 5 MG: 5 TABLET, FILM COATED ORAL at 21:04

## 2024-01-15 RX ADMIN — ISOSORBIDE MONONITRATE 30 MG: 30 TABLET, EXTENDED RELEASE ORAL at 07:53

## 2024-01-15 RX ADMIN — HYDROCODONE BITARTRATE AND ACETAMINOPHEN 1 TABLET: 5; 325 TABLET ORAL at 14:13

## 2024-01-15 RX ADMIN — METOPROLOL TARTRATE 25 MG: 25 TABLET, FILM COATED ORAL at 07:53

## 2024-01-15 RX ADMIN — APIXABAN 5 MG: 5 TABLET, FILM COATED ORAL at 21:03

## 2024-01-15 RX ADMIN — SODIUM CHLORIDE, PRESERVATIVE FREE 10 ML: 5 INJECTION INTRAVENOUS at 07:53

## 2024-01-15 RX ADMIN — HYDROCODONE BITARTRATE AND ACETAMINOPHEN 1 TABLET: 5; 325 TABLET ORAL at 08:00

## 2024-01-15 ASSESSMENT — PAIN DESCRIPTION - ORIENTATION
ORIENTATION: MID

## 2024-01-15 ASSESSMENT — PAIN SCALES - GENERAL
PAINLEVEL_OUTOF10: 6
PAINLEVEL_OUTOF10: 7
PAINLEVEL_OUTOF10: 7

## 2024-01-15 ASSESSMENT — PAIN DESCRIPTION - DESCRIPTORS
DESCRIPTORS: ACHING

## 2024-01-15 ASSESSMENT — PAIN DESCRIPTION - LOCATION
LOCATION: PENIS
LOCATION: ABDOMEN
LOCATION: PELVIS

## 2024-01-15 NOTE — CARE COORDINATION
01/15/24 1558   Service Assessment   Patient Orientation Alert and Oriented   Cognition Alert   History Provided By Patient   Primary Caregiver Self   Support Systems Family Members   Patient's Healthcare Decision Maker is: Named in Scanned ACP Document   PCP Verified by CM Yes   Last Visit to PCP Within last year   Prior Functional Level Independent in ADLs/IADLs   Current Functional Level Assistance with the following:   Can patient return to prior living arrangement Yes   Ability to make needs known: Good   Family able to assist with home care needs: No   Social/Functional History   Lives With Alone   Type of Home Apartment   Active  Yes  (transportation info placed in AVS.)   Mode of Transportation Car   Condition of Participation: Discharge Planning   The Patient and/or Patient Representative was provided with a Choice of Provider? Patient   The Patient and/Or Patient Representative agree with the Discharge Plan? Yes   Freedom of Choice list was provided with basic dialogue that supports the patient's individualized plan of care/goals, treatment preferences, and shares the quality data associated with the providers?  Yes     Reviewed chart, discussed in IDR,  Nursing ambulated pt and may  SNU vs HC.  Gave PPO list for both.  CM revisit in am.

## 2024-01-15 NOTE — CARE COORDINATION
Reviewed chart, discussed in IDR, plan is for nursing to ambulate pt. Spoke with Mark Charge nurse and pt needs assistance to Bathroom. PS to Dr Jones requesting PT/OT orders.  WB to therapy.

## 2024-01-15 NOTE — CONSULTS
[facility] ACUTE CARE PHYSICAL THERAPY EVALUATION  Brett Lozada, 1940, 4111/4111-A, 1/15/2024    History  [place]:  The primary encounter diagnosis was Acute UTI. A diagnosis of KENDALL (acute kidney injury) (HCC) was also pertinent to this visit.  Patient  has a past medical history of CAD (coronary artery disease), Cancer (HCC), Complicated UTI (urinary tract infection), H/O echocardiogram, History of cardiac catheterization, History of echocardiogram, HTN (hypertension), and Hyperlipidemia.  Patient  has a past surgical history that includes back surgery; Cardiac surgery; Coronary angioplasty with stent (05/14/2014); and Colonoscopy (4/14/15).    Discharge Recommendation: Swing Bed    Subjective:    Patient states:  \"I never thought anything like this would kick my ass, not Ole Myron\"     Pain:  denies.      Communication with other providers:  Handoff to RN, co-eval with DILLON Judd for safety and tolerance    Restrictions: General Precautions, Fall Risk, Contact Precautions, Mahmood, Bed/chair alarm     Home Setup/Prior level of function  Social/Functional History  Lives With: Alone  Type of Home: Apartment  Home Layout: One level (2nd floor flat apartment with elevator)  Home Access: Level entry, Elevator  Bathroom Shower/Tub: Walk-in shower  Bathroom Toilet: Standard  Home Equipment: Walker, 4 wheeled, Walker, rolling  ADL Assistance: Independent  Homemaking Assistance: Needs assistance (has lady who sometimes cooks and cleans)  Homemaking Responsibilities: Yes  Ambulation Assistance: Independent (uses no AD)  Transfer Assistance: Independent  Active : Yes  Occupation: Retired    Examination of body systems (includes body structures/functions, activity/participation limitations):  Observation:  supine in bed upon arrival and agreeable to therapy  Vision:  WFL  Hearing:  WFL  Cardiopulmonary:  on room air  Cognition: WFL, see OT/SLP note for further evaluation.    Musculoskeletal  ROM R/L:  WFL.  
lobe heterogeneous opacities with right basilar consolidation.  Differential considerations include atelectasis, infection and developing infarction. Critical results were called by Dr. Brady Sessions to Dr. Philip Cao on 12/27/2023 at 00:56.     XR CHEST PORTABLE    Result Date: 12/26/2023  EXAMINATION: ONE XRAY VIEW OF THE CHEST 12/26/2023 8:12 pm COMPARISON: 12/16/2023 HISTORY: ORDERING SYSTEM PROVIDED HISTORY: chest pain TECHNOLOGIST PROVIDED HISTORY: Reason for exam:->chest pain Reason for Exam: chest pain Additional signs and symptoms: na Relevant Medical/Surgical History: prostate cancer, hypertension FINDINGS: The cardiomediastinal silhouette is stable.  Mild subsegmental linear atelectatic changes are seen in the lung bases.  No focal infiltrates or pleural effusions.  No pneumothorax.  Median sternotomy wires are noted.     No acute cardiopulmonary process.     XR CHEST (2 VW)    Result Date: 12/16/2023  EXAMINATION: TWO XRAY VIEWS OF THE CHEST 12/16/2023 8:11 am COMPARISON: 04/29/2023 HISTORY: ORDERING SYSTEM PROVIDED HISTORY: cough; covid 2 weeks ago TECHNOLOGIST PROVIDED HISTORY: Reason for exam:->cough; covid 2 weeks ago Reason for Exam: cough; covid 2 weeks ago FINDINGS: The lungs appear clear.  The heart appears unremarkable.  Patient is status post sternotomy and CABG.  Bony structures unremarkable.  There is no change from prior examination.     No active cardiopulmonary disease.         Impression:   Acute urinary Retention  BPH/LUTS  UTI  Left Renal calculus, non obstructive        Recommendation:   Continue arechiga catheter drainage, Treat with cx sens antibiotics  2.   Voiding trial in 2-3 days  3    Flomax 0.4 mg po qhs  4.   Follow up with [facility] (prior patient of Dr. Fonseca) as outpatient once discharged home          Patient seen and examined, chart reviewed.     Ranjan Carrillo DO     Electronically signed at 1/14/2024

## 2024-01-16 PROCEDURE — 1200000000 HC SEMI PRIVATE

## 2024-01-16 PROCEDURE — 6370000000 HC RX 637 (ALT 250 FOR IP): Performed by: STUDENT IN AN ORGANIZED HEALTH CARE EDUCATION/TRAINING PROGRAM

## 2024-01-16 PROCEDURE — 6360000002 HC RX W HCPCS: Performed by: STUDENT IN AN ORGANIZED HEALTH CARE EDUCATION/TRAINING PROGRAM

## 2024-01-16 PROCEDURE — 94010 BREATHING CAPACITY TEST: CPT

## 2024-01-16 PROCEDURE — 2580000003 HC RX 258: Performed by: STUDENT IN AN ORGANIZED HEALTH CARE EDUCATION/TRAINING PROGRAM

## 2024-01-16 PROCEDURE — 94761 N-INVAS EAR/PLS OXIMETRY MLT: CPT

## 2024-01-16 PROCEDURE — 6370000000 HC RX 637 (ALT 250 FOR IP): Performed by: FAMILY MEDICINE

## 2024-01-16 PROCEDURE — 97535 SELF CARE MNGMENT TRAINING: CPT

## 2024-01-16 RX ADMIN — HYDROXYZINE PAMOATE 25 MG: 25 CAPSULE ORAL at 01:02

## 2024-01-16 RX ADMIN — SODIUM CHLORIDE, PRESERVATIVE FREE 10 ML: 5 INJECTION INTRAVENOUS at 20:46

## 2024-01-16 RX ADMIN — TAMSULOSIN HYDROCHLORIDE 0.4 MG: 0.4 CAPSULE ORAL at 20:45

## 2024-01-16 RX ADMIN — LISINOPRIL 5 MG: 5 TABLET ORAL at 09:33

## 2024-01-16 RX ADMIN — HYDROXYZINE PAMOATE 25 MG: 25 CAPSULE ORAL at 20:45

## 2024-01-16 RX ADMIN — CEFTRIAXONE 1000 MG: 1 INJECTION, POWDER, FOR SOLUTION INTRAMUSCULAR; INTRAVENOUS at 20:56

## 2024-01-16 RX ADMIN — DONEPEZIL HYDROCHLORIDE 5 MG: 5 TABLET, FILM COATED ORAL at 20:45

## 2024-01-16 RX ADMIN — METOPROLOL TARTRATE 25 MG: 25 TABLET, FILM COATED ORAL at 20:45

## 2024-01-16 RX ADMIN — APIXABAN 5 MG: 5 TABLET, FILM COATED ORAL at 20:45

## 2024-01-16 RX ADMIN — Medication 3 MG: at 20:45

## 2024-01-16 RX ADMIN — CLOPIDOGREL BISULFATE 75 MG: 75 TABLET ORAL at 09:33

## 2024-01-16 RX ADMIN — APIXABAN 5 MG: 5 TABLET, FILM COATED ORAL at 09:33

## 2024-01-16 RX ADMIN — ISOSORBIDE MONONITRATE 30 MG: 30 TABLET, EXTENDED RELEASE ORAL at 09:33

## 2024-01-16 RX ADMIN — METOPROLOL TARTRATE 25 MG: 25 TABLET, FILM COATED ORAL at 09:33

## 2024-01-16 RX ADMIN — ROSUVASTATIN CALCIUM 10 MG: 5 TABLET, FILM COATED ORAL at 20:45

## 2024-01-16 RX ADMIN — SODIUM CHLORIDE, PRESERVATIVE FREE 5 ML: 5 INJECTION INTRAVENOUS at 11:03

## 2024-01-16 RX ADMIN — PANTOPRAZOLE SODIUM 40 MG: 40 TABLET, DELAYED RELEASE ORAL at 05:54

## 2024-01-16 ASSESSMENT — COPD QUESTIONNAIRES
QUESTION4_WALKINCLINE: 5
CAT_TOTALSCORE: 20
QUESTION2_CHESTPHLEGM: 5
QUESTION5_HOMEACTIVITIES: 0
QUESTION6_LEAVINGHOUSE: 5
QUESTION3_CHESTTIGHTNESS: 0
QUESTION7_SLEEPQUALITY: 0
QUESTION8_ENERGYLEVEL: 5
QUESTION1_COUGHFREQUENCY: 0
TOTAL_EXACERBATIONS_PASTYEAR: 0

## 2024-01-16 ASSESSMENT — PULMONARY FUNCTION TESTS
FEV1 (%PREDICTED): 78
PIF_VALUE: 100
POST BRONCHODILATOR FEV1/FVC: 92

## 2024-01-16 NOTE — PLAN OF CARE
Problem: Discharge Planning  Goal: Discharge to home or other facility with appropriate resources  Outcome: Progressing     Problem: Pain  Goal: Verbalizes/displays adequate comfort level or baseline comfort level  Outcome: Progressing     Problem: Safety - Adult  Goal: Free from fall injury  Outcome: Progressing     Problem: Skin/Tissue Integrity  Goal: Absence of new skin breakdown  Outcome: Progressing     Problem: Nutrition Deficit:  Goal: Optimize nutritional status  Outcome: Progressing

## 2024-01-16 NOTE — DISCHARGE INSTR - COC
Continuity of Care Form    Patient Name: Brett Lozada   :  1940  MRN:  [id number]    Admit date:  2024  Discharge date:  ***    Code Status Order: DNR-CCA   Advance Directives:     Admitting Physician:  No admitting provider for patient encounter.  PCP: Delfin Gibbons MD    Discharging Nurse: ***  Discharging Hospital Unit/Room#: 4111/4111-A  Discharging Unit Phone Number: ***    Emergency Contact:   Extended Emergency Contact Information  Primary Emergency Contact: Dacia Barrera  Home Phone: [phone]  Relation: Grandchild   needed? No  Secondary Emergency Contact: Mya Blackmon  Address: ADDRESS UNKNOWN           [address]  Home Phone: [phone]  Relation: Brother/Sister    Past Surgical History:  Past Surgical History:   Procedure Laterality Date    BACK SURGERY      CARDIAC SURGERY      cabg    COLONOSCOPY  4/14/15    divertics    CORONARY ANGIOPLASTY WITH STENT PLACEMENT  05/14/2014    X2       Immunization History:   Immunization History   Administered Date(s) Administered    COVID-19, PFIZER Bivalent, DO NOT Dilute, (age 12y+), IM, 30 mcg/0.3 mL 2022    COVID-19, PFIZER GRAY top, DO NOT Dilute, (age 12 y+), IM, 30 mcg/0.3 mL 2022    Hep A, HAVRIX, VAQTA, (age 19y+), IM, 1mL 2019, 2020    Influenza, FLUAD, (age 65 y+), Adjuvanted, 0.5mL 2020    Influenza, FLUARIX, FLULAVAL, FLUZONE (age 6 mo+) AND AFLURIA, (age 3 y+), PF, 0.5mL 2020, 2021    Influenza, High Dose (Fluzone 65 yrs and older) 2015, 2016, 10/03/2017    Influenza, Triv, inactivated, subunit, adjuvanted, IM (Fluad 65 yrs and older) 2018, 2019    Pneumococcal, PCV-13, PREVNAR 13, (age 6w+), IM, 0.5mL 2015    Pneumococcal, PPSV23, PNEUMOVAX 23, (age 2y+), SC/IM, 0.5mL 2017    TDaP, ADACEL (age 10y-64y), BOOSTRIX (age 10y+), IM, 0.5mL 2017       Active Problems:  Patient Active Problem List

## 2024-01-16 NOTE — CARE COORDINATION
Received call from [facility] that they are out of network and cannot accept pt.    Referral called to Nia with [facility], pt's next choice, left vm.  Faxed facesheet.     Received call back from Nia with [facility] and they can accept pt and okay for precert to be initiated.       Precert initiated via [employer]: Pending at this time 4:00 PM   [id number] 01/16/2024   Electronic PAS completed

## 2024-01-16 NOTE — CARE COORDINATION
Reviewed chart, discussed in IDR, spoke with pt then his grand daughter Dacia [phone].  Reviewed  PPO SNU with both pt/grand daughter, [facility]st choice is [facility] then [facility] as backup plan. Called Referral to [facility]ia at [facility].

## 2024-01-17 VITALS
OXYGEN SATURATION: 98 % | WEIGHT: 152.12 LBS | HEIGHT: 66 IN | BODY MASS INDEX: 24.45 KG/M2 | DIASTOLIC BLOOD PRESSURE: 57 MMHG | RESPIRATION RATE: 16 BRPM | HEART RATE: 63 BPM | TEMPERATURE: 98.2 F | SYSTOLIC BLOOD PRESSURE: 130 MMHG

## 2024-01-17 LAB
ANION GAP SERPL CALCULATED.3IONS-SCNC: 12 MMOL/L (ref 7–16)
BUN SERPL-MCNC: 17 MG/DL (ref 6–23)
CALCIUM SERPL-MCNC: 9.2 MG/DL (ref 8.3–10.6)
CHLORIDE BLD-SCNC: 105 MMOL/L (ref 99–110)
CO2: 24 MMOL/L (ref 21–32)
CREAT SERPL-MCNC: 1.1 MG/DL (ref 0.9–1.3)
CULTURE: NORMAL
CULTURE: NORMAL
GFR SERPL CREATININE-BSD FRML MDRD: >60 ML/MIN/1.73M2
GLUCOSE SERPL-MCNC: 83 MG/DL (ref 70–99)
HCT VFR BLD CALC: 41 % (ref 42–52)
HEMOGLOBIN: 12.7 GM/DL (ref 13.5–18)
Lab: NORMAL
Lab: NORMAL
MCH RBC QN AUTO: 29.3 PG (ref 27–31)
MCHC RBC AUTO-ENTMCNC: 31 % (ref 32–36)
MCV RBC AUTO: 94.5 FL (ref 78–100)
PDW BLD-RTO: 13.9 % (ref 11.7–14.9)
PLATELET # BLD: 177 K/CU MM (ref 140–440)
PMV BLD AUTO: 12 FL (ref 7.5–11.1)
POTASSIUM SERPL-SCNC: 4.2 MMOL/L (ref 3.5–5.1)
RBC # BLD: 4.34 M/CU MM (ref 4.6–6.2)
SODIUM BLD-SCNC: 141 MMOL/L (ref 135–145)
SPECIMEN: NORMAL
SPECIMEN: NORMAL
WBC # BLD: 6.6 K/CU MM (ref 4–10.5)

## 2024-01-17 PROCEDURE — 85027 COMPLETE CBC AUTOMATED: CPT

## 2024-01-17 PROCEDURE — 97116 GAIT TRAINING THERAPY: CPT

## 2024-01-17 PROCEDURE — 94761 N-INVAS EAR/PLS OXIMETRY MLT: CPT

## 2024-01-17 PROCEDURE — 6370000000 HC RX 637 (ALT 250 FOR IP): Performed by: STUDENT IN AN ORGANIZED HEALTH CARE EDUCATION/TRAINING PROGRAM

## 2024-01-17 PROCEDURE — 2580000003 HC RX 258: Performed by: STUDENT IN AN ORGANIZED HEALTH CARE EDUCATION/TRAINING PROGRAM

## 2024-01-17 PROCEDURE — 36415 COLL VENOUS BLD VENIPUNCTURE: CPT

## 2024-01-17 PROCEDURE — 80048 BASIC METABOLIC PNL TOTAL CA: CPT

## 2024-01-17 RX ORDER — CEFDINIR 300 MG/1
300 CAPSULE ORAL 2 TIMES DAILY
Qty: 10 CAPSULE | Refills: 0 | Status: SHIPPED | OUTPATIENT
Start: 2024-01-17 | End: 2024-01-22

## 2024-01-17 RX ADMIN — PANTOPRAZOLE SODIUM 40 MG: 40 TABLET, DELAYED RELEASE ORAL at 05:50

## 2024-01-17 RX ADMIN — LISINOPRIL 5 MG: 5 TABLET ORAL at 09:01

## 2024-01-17 RX ADMIN — ISOSORBIDE MONONITRATE 30 MG: 30 TABLET, EXTENDED RELEASE ORAL at 09:01

## 2024-01-17 RX ADMIN — SODIUM CHLORIDE, PRESERVATIVE FREE 10 ML: 5 INJECTION INTRAVENOUS at 09:00

## 2024-01-17 RX ADMIN — APIXABAN 5 MG: 5 TABLET, FILM COATED ORAL at 09:01

## 2024-01-17 RX ADMIN — CLOPIDOGREL BISULFATE 75 MG: 75 TABLET ORAL at 09:01

## 2024-01-17 RX ADMIN — METOPROLOL TARTRATE 25 MG: 25 TABLET, FILM COATED ORAL at 09:01

## 2024-01-17 NOTE — CARE COORDINATION
[facility] has [facility] SNF [facility].  P2P has been offered and needs to be called to [phone] Option 5  Deadline to call: 24 9:00 AM  Need patient name/  (1940)/ Member ID: [id number]

## 2024-01-17 NOTE — CARE COORDINATION
Reviewed chart, discussed in IDR , [facility] not take pt.  Referral to [facility] and they will take pt.  Prior auth started late last PM, still pending this AM       1415 Insurance denied SNU, spoke with pt , neighbor Xander, then grand daughter Dacia about appeal vs home with HC.  They are ok with home with HC reviewed list with Dacia and open to [facility].  PS to Denia MERLOS at [facility] and Dr Carroll

## 2024-01-17 NOTE — PROGRESS NOTES
V2.0    [facility] [place]      Name:  Brett Lozada /Age/Sex: 1940  (83 y.o. male)   MRN & CSN:  [id number] & [id number] Encounter Date/Time: 2024 1:12 PM EST   Location:  [address] [place] [address] PCP: Delfin Gibbons MD     Attending:William Baron,*       Hospital Day: 3    Assessment and Recommendations   Brett Lozada is a 83 y.o. male who presents with Complicated UTI (urinary tract infection)      Plan:     Acute complicated UTI  UA positive for LE and nitrites, negative for bacteria  UCx from 2024 Klebsiella pneumonia  CT abdomen and pelvis revealed no acute intra-abdominal or pelvic process, left-sided nephrolithiasis without evidence of hydronephrosis or obstructive uropathy.  Mahmood's catheter replaced in ER  IV Rocephin x5 days  Continue p.o. Flomax  May attempt voiding trial before discharge, recommend outpatient urology follow-up  Ux pending      KENDALL > Improving   Likely related to urinary retention and UTI with underlying ACE inhibitor use  UA >300 protein and large blood  Check protein creatinine ratio  IVF given in ER, hold off on further infusion  Monitor BMP and avoid nephrotoxic agents  Cr trended down to 1.5     Hypertension  Hold home lisinopril  Continue Imdur and Lopressor     CAD status post CABG  Continue home Plavix and Eliquis  Discontinue home aspirin to avoid triple therapy     Hx of PE  Continue Eliquis 5 mg twice daily. Monitor for bleeding      Inpatient, MedSurg  DNR CCA      Diet ADULT DIET; Regular; Low Sodium (2 gm)  ADULT ORAL NUTRITION SUPPLEMENT; Breakfast, Lunch, Dinner; Standard High Calorie/High Protein Oral Supplement   DVT Prophylaxis [] Lovenox, []  Heparin, [] SCDs, [] Ambulation,  [x] Eliquis, [] Xarelto  [] Coumadin   Code Status DNR-CCA   Disposition From: home  Expected Disposition: home  Estimated Date of Discharge: 2 days  Patient requires continued admission due to pending ux   Surrogate Decision Maker/ POA       Personally reviewed Lab 
    V2.0    [facility] [place] Progress Note      Name:  Brett Lozada /Age/Sex: 1940  (83 y.o. male)   MRN & CSN:  [id number] & [id number] Encounter Date/Time: 2024 1:12 PM EST   Location:  [address] [place] [address] PCP: Delfin Gibbons MD     Attending:William Baron,*       Hospital Day: 2    Assessment and Recommendations   Brett Lozada is a 83 y.o. male who presents with Complicated UTI (urinary tract infection)      Plan:     Acute complicated UTI  UA positive for LE and nitrites, negative for bacteria  UCx from 2024 Klebsiella pneumonia  CT abdomen and pelvis revealed no acute intra-abdominal or pelvic process, left-sided nephrolithiasis without evidence of hydronephrosis or obstructive uropathy.  Mahmood's catheter replaced in ER  IV Rocephin x5 days  Continue p.o. Flomax  May attempt voiding trial before discharge, recommend outpatient urology follow-up  Ux pending      KENDALL > Improving   Likely related to urinary retention and UTI with underlying ACE inhibitor use  UA >300 protein and large blood  Check protein creatinine ratio  IVF given in ER, hold off on further infusion  Monitor BMP and avoid nephrotoxic agents     Hypertension  Hold home lisinopril  Continue Imdur and Lopressor     CAD status post CABG  Continue home Plavix and Eliquis  Discontinue home aspirin to avoid triple therapy     Hx of PE  Continue Eliquis 5 mg twice daily. Monitor for bleeding      Inpatient, MedSurg  DNR CCA      Diet ADULT DIET; Regular; Low Sodium (2 gm)  ADULT ORAL NUTRITION SUPPLEMENT; Breakfast, Lunch, Dinner; Standard High Calorie/High Protein Oral Supplement   DVT Prophylaxis [] Lovenox, []  Heparin, [] SCDs, [] Ambulation,  [x] Eliquis, [] Xarelto  [] Coumadin   Code Status DNR-CCA   Disposition From: home  Expected Disposition: home  Estimated Date of Discharge: 2 days  Patient requires continued admission due to pending ux   Surrogate Decision Maker/ POA       Personally reviewed Lab Studies and Imaging 
    V2.0    [facility] [place] Progress Note      Name:  Brett Lozada /Age/Sex: 1940  (83 y.o. male)   MRN & CSN:  [id number] & [id number] Encounter Date/Time: 1/15/2024 1:12 PM EST   Location:  [address] [place] [address]A PCP: Delfin Gibbons MD     Attending:William Baron,*       Hospital Day: 4    Assessment and Recommendations   Brett Lozada is a 83 y.o. male who presents with Complicated UTI (urinary tract infection)      Plan:     Patient is medically stable for Discharge. Discussed with CM, Need PT/ OT eval as he is needing assistant     Acute complicated UTI  UA positive for LE and nitrites, negative for bacteria  UCx from 2024 Klebsiella pneumonia  CT abdomen and pelvis revealed no acute intra-abdominal or pelvic process, left-sided nephrolithiasis without evidence of hydronephrosis or obstructive uropathy.  Arechiga's catheter replaced in ER  IV Rocephin x5 days - will DC on cefdinir  Continue p.o. Flomax  Personally Discussed with Urology today - Plan to DC with arechiga and follow up in clinic for voiding trail .  Ux NGTD    KENDALL > Resolved   Likely related to urinary retention and UTI with underlying ACE inhibitor use  UA >300 protein and large blood  Check protein creatinine ratio  IVF given in ER, hold off on further infusion  Monitor BMP and avoid nephrotoxic agents  Cr trended down      Hypertension  Resume lisinopril  Continue Imdur and Lopressor     CAD status post CABG  Continue home Plavix and Eliquis  Discontinue home aspirin to avoid triple therapy     Hx of PE  Continue Eliquis 5 mg twice daily. Monitor for bleeding      Inpatient, MedSurg  DNR CCA      Diet ADULT DIET; Regular; Low Sodium (2 gm)  ADULT ORAL NUTRITION SUPPLEMENT; Breakfast, Lunch, Dinner; Standard High Calorie/High Protein Oral Supplement   DVT Prophylaxis [] Lovenox, []  Heparin, [] SCDs, [] Ambulation,  [x] Eliquis, [] Xarelto  [] Coumadin   Code Status DNR-CCA   Disposition From: home  Expected Disposition: home  Estimated 
   01/16/24 1643   Spirometry Assessment   FEV1 (%PRED) 78   Post Bronchodilator FEV/FVC 92   COPD Exacerbations in last year 0    L/min   COPD Assessment (CAT Score)   Cough Assessment 0   Phlegm Assessment 5   Chest tightness 0   Walking on an incline 5   Home Activities 0   Confident Leaving The Home 5   Sleeping Soundly 0   Have Energy 5   Assessment Score 20   $RT COPD Assessment Yes     Current GOLD classification for Brett Lozada      GOLD Stage:    Group:  B  Recorded domestic exacerbations past 12 months: 0  Current recorded COPD Assessment Tool (CAT) score of (P) 20  Current eosinophil count: 0.2     Inhaler Device   Acceptable for Use   Respimat  Not Breath Actuated Yes   MDI  Not Breath Actuated Yes           DPI  Observed PIF   using  In-Check Meter   Optimal PIF   Acceptable for Use   HANDIHALER 110 >30    Pressair 110 >45    NEOHALER 110 >50    Diskus 110 >60    ELLIPTA 110 >60      Records show Brett Lozada was not using maintenance therapy prior to admission.          LONG-ACTING (LABA)   Arformoterol (Brovana) NEBULIZER   Indacaterol (Arcapta) NEOHALER   Olodaterol (Striverdi) Respimat   Salmeterol (Serevent) MDI, DISKUS   LONG-ACTING (LAMA)   Aclidinium bromide (Tudorza) PRESSAIR   Glycopyrronium bromide (Seebri) NEOHALER   Tiotropium (Spiriva) Respimat, HANDIHALER   Umeclidinium (Incruse) ELLIPTA   (LABA/LAMA)   Formoterol/glycopyrronium (Bevespi) MDI   Indacaterol/glycopyrronium (Utibron) NEOHALER   Vilanterol/umeclidinium (Anoro) ELLIPTA   Olodaterol/tiotropium (Stiolto) Respimat   (LABA/ICS)   Formoterol/budesomide (Symbicort) MDI   Formoterol/mometasone (Dulera) MDI   Salmeterol/fluticasone (Advair) MDI, DISKUS   Vilanterol/fluticasone (Breo) ELLIPTA   (LABA/LAMA/ICS)   Fluticasone/umeclidinium/vilanterol (Trelegy) ELLIPTA   Budesonide/glycopyrrolate/formoterol fumarate (Beztri) aerosphere       
  Physician Progress Note      PATIENT:               MARIANA ROA  CSN #:                  [id number]  :                       1940  ADMIT DATE:       2024 2:56 PM  DISCH DATE:  RESPONDING  PROVIDER #:        William Ordonez MD          QUERY TEXT:    Pt admitted with UTI.  Pt noted to have chronic indwelling urinary catheter,   previous urinary catheter. If possible, please document in the progress notes   and discharge summary if you are evaluating and/or treating any of the   following:    The medical record reflects the following:  Risk Factors: UTI, Previous indwelling catheter, KENDALL, BPH  Clinical Indicators: presented from home with discomfort related to urinary   catheter, decreased appetite and chills.  Patient reports that he has been   having complaints regarding urinary retention for almost 6 weeks, urinary   catheter was placed at [facility] ER on 2023, on his follow-up appointment at   urologist office it was removed on 2023.  On the same date he had severe   suprapubic discomfort and urinary retention hence came to [facility] ER on 2023,   underwent a UA and Ucx, was given p.o. cephalexin Mahmood's catheter was   replaced and discharged home.  States that since   Treatment: IV Rocephin x5 days, Continue p.o. Flomax, May attempt voiding   trial before discharge, recommend outpatient urology follow-up, Catheter   replaced in the ED    Thank you Pooja Chance RN, CDS [phone]  Options provided:  -- UTI due to chronic indwelling urinary catheter  -- UTI due to previous urinary catheter 2023  -- UTI not due to indwelling urinary catheter  -- Other - I will add my own diagnosis  -- Disagree - Not applicable / Not valid  -- Disagree - Clinically unable to determine / Unknown  -- Refer to Clinical Documentation Reviewer    PROVIDER RESPONSE TEXT:    UTI is due to the chronic indwelling urinary catheter.    Query created by: Pooja Chance on 1/15/2024 11:00 AM      Electronically signed 
4 Eyes Skin Assessment     NAME:  Brett Lozada  YOB: 1940  MEDICAL RECORD NUMBER:  [id number]    The patient is being assess for  Admission    I agree that 2 RN's have performed a thorough Head to Toe Skin Assessment on the patient. ALL assessment sites listed below have been assessed.      Areas assessed by both nurses:    Head, Face, Ears, Shoulders, Back, Chest, Arms, Elbows, Hands, Sacrum. Buttock, Coccyx, Ischium, Legs. Feet and Heels, and Under Medical Devices         Does the Patient have a Wound? Yes wound(s) were present on assessment. LDA wound assessment was Initiated and completed by RN      Pressure injury stage 2 on coccyx area. (Small area)    Bernard Prevention initiated:  Yes   Wound Care Orders initiated:  No (small opening, Meplix dressin applied)    Pressure Injury (Stage 3,4, Unstageable, DTI, NWPT, and Complex wounds) if present place consult order under :: No    New and Established Ostomies if present place consult order under : No      Nurse 1 eSignature: Electronically signed by Jeff Bowman RN on 1/12/24 at 9:52 PM EST    **SHARE this note so that the co-signing nurse is able to place an eSignature**    Nurse 2 eSignature: Electronically signed by Ashly Livingston LPN on 1/12/24 at 10:22 PM EST         
Occupational Therapy    Occupational Therapy Treatment Note    Name: Brett Lozada MRN: [id number] :   1940   Date:  2024   Admission Date: 2024 Room:  [address] [place] [address]-A     Primary Problem: UTI, KENDALL, Lt renal calculus     Restrictions/Precautions: General Precautions, Fall Risk, Contact Precautions, Mahmood, Bed/chair alarm     Communication with other providers: RN    Subjective:  Patient states: \"You can't trust a drunk!\"   Pain: Pt denied pain this date    Objective:    Observation: Pt received in supine upon OT arrival. Pleasant and agreeable to treatment.  Objective Measures: Orientation grossly WFL    Treatment, including education:  Self Care Training:   Cues were given for safety, sequence, UE/LE placement, visual cues, and balance.      Pt received in supine upon OT arrival. Pt re-educated on role of OT, POC, and importance of EOB/OOB activity. Pt transferred supine to sitting EOB SBA with HOB elevated to 30' and use of bed rail. Pt able to sit at EOB level SBA with good static sitting balance. Pt completed sit to stand transfer from bed SBA with min cues for safe hand placement. Pt requesting to ambulate, and ambulated ~200 ft in hallway CGA progressing to SBA with RW. Pt with continued decreased speed and step-length but improved overall activity tolerance this date. Pt returned to room and ambulated to sink SBA with RW. Pt stood at sink, and completed hand hygiene, facial hygiene, and oral hygiene task of rinsing with mouthwash SBA with min cues for safe RW placement at sink. Pt ambulated from sink to chair SBA with RW, and transferred stand to sit to chair SBA with min cues for reaching back with arms. Pt left positioned for comfort in chair with all lines intact, all needs within reach, and chair alarm on.    Assessment / Impression:    Patient's tolerance of treatment: Well  Adverse Reaction: None  Significant change in status and impact: Improved activity tolerance from initial 
Physical Therapy    Physical Therapy Treatment Note  Name: Brett Lozada MRN: [id number] :   1940   Date:  2024   Admission Date: 2024 Room:  [address] [place] [address]   Restrictions/Precautions:          fall risk, gen prec  Communication with other providers:    Subjective:  Patient states:  agreeable   Pain:   Location, Type, Intensity (0/10 to 10/10):  denies pain    Objective:    Observation:  in bed  Treatment, including education/measures:  Transfers   Rolling: SBA  Supine to sit :SBA  Sit to supine :SBA  Scooting :SBA  Sit to stand :SBA  Stand to sit :SBA  SPT:SBA/CGA /c FWW and vc for safety  Gait:  Pt amb with FWW x~200' /c CGA, vc to slow francisco j. Displays decreased step length/ht, vc for closer to AD.  Safety  Patient left safely in the chair, with call light/phone in reach with alarm applied. Gait belt was used for transfers and gait.      Assessment / Impression:    Patient's tolerance of treatment:  good   Adverse Reaction: na  Significant change in status and impact:  na  Barriers to improvement:  weakness and endurance  Plan for Next Session:    Cont gait progression  Time in:  1144  Time out:  1200  Timed treatment minutes:  16  Total treatment time:  16    Previously filed items:  Social/Functional History  Lives With: Alone  Type of Home: Apartment  Home Layout: One level (2nd floor flat apartment with elevator)  Home Access: Level entry, Elevator  Bathroom Shower/Tub: Walk-in shower  Bathroom Toilet: Standard  Home Equipment: Walker, 4 wheeled, Walker, rolling  ADL Assistance: Independent  Homemaking Assistance: Needs assistance (has lady who sometimes cooks and cleans)  Homemaking Responsibilities: Yes  Ambulation Assistance: Independent (uses no AD)  Transfer Assistance: Independent  Active : Yes (transportation info placed in AVS.)  Mode of Transportation: Car  Occupation: Retired        Short Term Goals  Time Frame for Short Term Goals: 1 week  Short Term Goal 1: Patient will 
able to don BL socks seated EOB SBA by crossing legs into \"figure 4 position\")  Toileting: CGA    Cognitive and Psychosocial Functioning:  Overall cognitive status: WFL (grossly; mildly decreased overall insight/safety awareness)  Affect: Normal     Balance:   Sitting: SBA in unsupported sitting EOB  Standing: CGA with RW    Functional Mobility:  Bed Mobility: SBA supine to sitting EOB (HOB elevated to 30', utilized bed rail)  Transfers: CGA to/from bed and chair (min cues for safe hand placement each direction)  Ambulation: CGA with RW ~75 ft (See PT evaluation for full gait assessment)      AM-PAC 6 click short form for inpatient daily activity:   How much help from another person does the patient currently need... Unable  Dep A Lot  Max A A Lot   Mod A A Little  Min A A Little   CGA  SBA None   Mod I  Indep  Sup   1.  Putting on and taking off regular lower body clothing? [] 1    [] 2   [] 2   [] 3   [x] 3   [] 4      2. Bathing (including washing, rinsing, drying)? [] 1   [] 2   [] 2 [] 3 [x] 3 [] 4   3. Toileting, which includes using toilet, bedpan, or urinal? [] 1    [] 2   [] 2   [] 3   [x] 3   [] 4     4. Putting on and taking off regular upper body clothing? [] 1   [] 2   [] 2   [] 3   [x] 3    [] 4      5. Taking care of personal grooming such as brushing teeth? [] 1   [] 2    [] 2 [] 3    [x] 3   [] 4      6. Eating meals?   [] 1   [] 2   [] 2   [] 3   [] 3   [x] 4      Raw Score:  19   [24=0% impaired(CH), 23=1-19%(CI), 20-22=20-39%(CJ), 15-19=40-59%(CK), 10-14=60-79%(CL), 7-9=80-99%(CM), 6=100%(CN)]     Treatment:  Therapeutic Activity Training:   Therapeutic activity training was instructed today.  Cues were given for safety, sequence, UE/LE placement, awareness, and balance.    Activities performed today included bed mobility training, sitting balance/tolerance, transfer training, household mobility with RW, education on role of OT, POC, importance of EOB/OOB activity, use of RW, d/c planning and 
of Nutrition Care: Continue to monitor while inpatient       Goals:     Goals: Meet at least 75% of estimated needs       Nutrition Monitoring and Evaluation:   Behavioral-Environmental Outcomes: None Identified  Food/Nutrient Intake Outcomes: Food and Nutrient Intake, Supplement Intake  Physical Signs/Symptoms Outcomes: Weight, Nutrition Focused Physical Findings    Discharge Planning:    Continue Oral Nutrition Supplement     Liat Hamm RD, LD  Contact: [phone]      
Date: 1/12/2024  EXAMINATION: CT OF THE ABDOMEN AND PELVIS WITHOUT CONTRAST 1/12/2024 5:16 pm TECHNIQUE: CT of the abdomen and pelvis was performed without the administration of intravenous contrast. Multiplanar reformatted images are provided for review. Automated exposure control, iterative reconstruction, and/or weight based adjustment of the mA/kV was utilized to reduce the radiation dose to as low as reasonably achievable. COMPARISON: 10/04/2018 HISTORY: ORDERING SYSTEM PROVIDED HISTORY: lower abd pain TECHNOLOGIST PROVIDED HISTORY: Reason for exam:->lower abd pain Additional Contrast?->None Decision Support Exception - unselect if not a suspected or confirmed emergency medical condition->Emergency Medical Condition (MA) Reason for Exam: lower abd pain FINDINGS: Lower Chest: There is right lower lobe infiltrate versus rounded atelectasis in the lateral costophrenic angle.  There is minimal bilateral peribronchial wall thickening. Organs: There is left nephrolithiasis without evidence for hydronephrosis or obstructive uropathy.  The gallbladder liver spleen right kidney adrenal glands and pancreas are unremarkable GI/Bowel: There is no evidence for bowel obstruction and the appendix is normal Pelvis: There is a Mahmood catheter in the urinary bladder as well as air within the urinary bladder which may have been introduced iatrogenically. There is diverticulosis in the colon. Peritoneum/Retroperitoneum: Calcific atherosclerotic disease in the aorta without evidence for aneurysm. Bones/Soft Tissues: Degenerative change in the spine     1. No acute intra-abdominal or pelvic process. 2. Right lower lobe infiltrate versus rounded atelectasis in the lateral costophrenic angle.  Recommend follow-up imaging to confirm resolution 3. Left-sided nephrolithiasis without evidence for hydronephrosis or obstructive uropathy. 4. Diverticulosis     POC US POST VOID RESIDUAL    Result Date: 1/10/2024  POCUS_Bladder_Retention

## 2024-01-22 ENCOUNTER — APPOINTMENT (OUTPATIENT)
Dept: CT IMAGING | Age: 84
End: 2024-01-22
Attending: EMERGENCY MEDICINE
Payer: MEDICARE

## 2024-01-22 ENCOUNTER — HOSPITAL ENCOUNTER (INPATIENT)
Age: 84
LOS: 1 days | Discharge: OTHER INSTITUTION | End: 2024-01-25
Attending: INTERNAL MEDICINE | Admitting: FAMILY MEDICINE
Payer: MEDICARE

## 2024-01-22 DIAGNOSIS — I10 ESSENTIAL HYPERTENSION: ICD-10-CM

## 2024-01-22 DIAGNOSIS — R20.2 PARESTHESIA OF BOTH LOWER EXTREMITIES: ICD-10-CM

## 2024-01-22 DIAGNOSIS — R53.1 GENERALIZED WEAKNESS: Primary | ICD-10-CM

## 2024-01-22 DIAGNOSIS — S09.90XA CLOSED HEAD INJURY, INITIAL ENCOUNTER: ICD-10-CM
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
## 2024-01-22 DIAGNOSIS — R55 SYNCOPE AND COLLAPSE: ICD-10-CM

## 2024-01-22 DIAGNOSIS — R11.0 NAUSEA: ICD-10-CM

## 2024-01-22 DIAGNOSIS — I49.3 PVC (PREMATURE VENTRICULAR CONTRACTION): ICD-10-CM

## 2024-01-22 LAB
ALBUMIN SERPL-MCNC: 4.1 GM/DL (ref 3.4–5)
ALP BLD-CCNC: 115 IU/L (ref 40–129)
ALT SERPL-CCNC: 16 U/L (ref 10–40)
ANION GAP SERPL CALCULATED.3IONS-SCNC: 11 MMOL/L (ref 7–16)
AST SERPL-CCNC: 16 IU/L (ref 15–37)
B PARAP IS1001 DNA NPH QL NAA+NON-PROBE: NOT DETECTED
B PERT.PT PRMT NPH QL NAA+NON-PROBE: NOT DETECTED
BACTERIA: NEGATIVE /HPF
BASOPHILS ABSOLUTE: 0 K/CU MM
BASOPHILS RELATIVE PERCENT: 0.8 % (ref 0–1)
BILIRUB SERPL-MCNC: 0.5 MG/DL (ref 0–1)
BILIRUBIN URINE: NEGATIVE MG/DL
BLOOD, URINE: ABNORMAL
BUN SERPL-MCNC: 10 MG/DL (ref 6–23)
C PNEUM DNA NPH QL NAA+NON-PROBE: NOT DETECTED
CALCIUM SERPL-MCNC: 9.3 MG/DL (ref 8.3–10.6)
CHLORIDE BLD-SCNC: 105 MMOL/L (ref 99–110)
CLARITY: CLEAR
CO2: 26 MMOL/L (ref 21–32)
COLOR: YELLOW
CREAT SERPL-MCNC: 1.1 MG/DL (ref 0.9–1.3)
DIFFERENTIAL TYPE: ABNORMAL
EOSINOPHILS ABSOLUTE: 0.3 K/CU MM
EOSINOPHILS RELATIVE PERCENT: 5.5 % (ref 0–3)
FLUAV H1 2009 PAN RNA NPH NAA+NON-PROBE: NOT DETECTED
FLUAV H1 RNA NPH QL NAA+NON-PROBE: NOT DETECTED
FLUAV H3 RNA NPH QL NAA+NON-PROBE: NOT DETECTED
FLUAV RNA NPH QL NAA+NON-PROBE: NOT DETECTED
FLUBV RNA NPH QL NAA+NON-PROBE: NOT DETECTED
GFR SERPL CREATININE-BSD FRML MDRD: >60 ML/MIN/1.73M2
GLUCOSE SERPL-MCNC: 101 MG/DL (ref 70–99)
GLUCOSE, URINE: NEGATIVE MG/DL
HADV DNA NPH QL NAA+NON-PROBE: NOT DETECTED
HCOV 229E RNA NPH QL NAA+NON-PROBE: NOT DETECTED
HCOV HKU1 RNA NPH QL NAA+NON-PROBE: NOT DETECTED
HCOV NL63 RNA NPH QL NAA+NON-PROBE: NOT DETECTED
HCOV OC43 RNA NPH QL NAA+NON-PROBE: NOT DETECTED
HCT VFR BLD CALC: 38 % (ref 42–52)
HEMOGLOBIN: 12 GM/DL (ref 13.5–18)
HMPV RNA NPH QL NAA+NON-PROBE: NOT DETECTED
HPIV1 RNA NPH QL NAA+NON-PROBE: NOT DETECTED
HPIV2 RNA NPH QL NAA+NON-PROBE: NOT DETECTED
HPIV3 RNA NPH QL NAA+NON-PROBE: NOT DETECTED
HPIV4 RNA NPH QL NAA+NON-PROBE: NOT DETECTED
IMMATURE NEUTROPHIL %: 0.2 % (ref 0–0.43)
KETONES, URINE: NEGATIVE MG/DL
LACTIC ACID, SEPSIS: 1 MMOL/L (ref 0.4–2)
LACTIC ACID, SEPSIS: 1.2 MMOL/L (ref 0.4–2)
LEUKOCYTE ESTERASE, URINE: ABNORMAL
LIPASE: 32 IU/L (ref 13–60)
LYMPHOCYTES ABSOLUTE: 1.1 K/CU MM
LYMPHOCYTES RELATIVE PERCENT: 20.9 % (ref 24–44)
M PNEUMO DNA NPH QL NAA+NON-PROBE: NOT DETECTED
MCH RBC QN AUTO: 29.5 PG (ref 27–31)
MCHC RBC AUTO-ENTMCNC: 31.6 % (ref 32–36)
MCV RBC AUTO: 93.4 FL (ref 78–100)
MONOCYTES ABSOLUTE: 0.4 K/CU MM
MONOCYTES RELATIVE PERCENT: 7.4 % (ref 0–4)
MUCUS: ABNORMAL HPF
NITRITE URINE, QUANTITATIVE: NEGATIVE
NUCLEATED RBC %: 0 %
PDW BLD-RTO: 13.9 % (ref 11.7–14.9)
PH, URINE: 6 (ref 5–8)
PLATELET # BLD: 134 K/CU MM (ref 140–440)
PMV BLD AUTO: 12.5 FL (ref 7.5–11.1)
POTASSIUM SERPL-SCNC: 3.6 MMOL/L (ref 3.5–5.1)
PROTEIN UA: 30 MG/DL
RBC # BLD: 4.07 M/CU MM (ref 4.6–6.2)
RBC URINE: 12 /HPF (ref 0–3)
RSV RNA NPH QL NAA+NON-PROBE: NOT DETECTED
RV+EV RNA NPH QL NAA+NON-PROBE: NOT DETECTED
SARS-COV-2 RNA NPH QL NAA+NON-PROBE: NOT DETECTED
SEGMENTED NEUTROPHILS ABSOLUTE COUNT: 3.4 K/CU MM
SEGMENTED NEUTROPHILS RELATIVE PERCENT: 65.2 % (ref 36–66)
SODIUM BLD-SCNC: 142 MMOL/L (ref 135–145)
SPECIFIC GRAVITY UA: <1.005 (ref 1–1.03)
TOTAL CK: 47 IU/L (ref 38–174)
TOTAL IMMATURE NEUTOROPHIL: 0.01 K/CU MM
TOTAL NUCLEATED RBC: 0 K/CU MM
TOTAL PROTEIN: 6.4 GM/DL (ref 6.4–8.2)
TRICHOMONAS: ABNORMAL /HPF
TROPONIN, HIGH SENSITIVITY: 21 NG/L (ref 0–22)
TROPONIN, HIGH SENSITIVITY: 23 NG/L (ref 0–22)
UROBILINOGEN, URINE: 0.2 MG/DL (ref 0.2–1)
WBC # BLD: 5.3 K/CU MM (ref 4–10.5)
WBC UA: 3 /HPF (ref 0–2)

## 2024-01-22 PROCEDURE — 84484 ASSAY OF TROPONIN QUANT: CPT

## 2024-01-22 PROCEDURE — 82550 ASSAY OF CK (CPK): CPT

## 2024-01-22 PROCEDURE — 6360000002 HC RX W HCPCS: Performed by: NURSE PRACTITIONER

## 2024-01-22 PROCEDURE — 83690 ASSAY OF LIPASE: CPT

## 2024-01-22 PROCEDURE — 81001 URINALYSIS AUTO W/SCOPE: CPT

## 2024-01-22 PROCEDURE — 2580000003 HC RX 258: Performed by: NURSE PRACTITIONER

## 2024-01-22 PROCEDURE — 6370000000 HC RX 637 (ALT 250 FOR IP): Performed by: INTERNAL MEDICINE

## 2024-01-22 PROCEDURE — 2580000003 HC RX 258: Performed by: INTERNAL MEDICINE

## 2024-01-22 PROCEDURE — 99285 EMERGENCY DEPT VISIT HI MDM: CPT

## 2024-01-22 PROCEDURE — 85025 COMPLETE CBC W/AUTO DIFF WBC: CPT

## 2024-01-22 PROCEDURE — 72125 CT NECK SPINE W/O DYE: CPT

## 2024-01-22 PROCEDURE — 96374 THER/PROPH/DIAG INJ IV PUSH: CPT

## 2024-01-22 PROCEDURE — 87086 URINE CULTURE/COLONY COUNT: CPT

## 2024-01-22 PROCEDURE — 36415 COLL VENOUS BLD VENIPUNCTURE: CPT

## 2024-01-22 PROCEDURE — 83605 ASSAY OF LACTIC ACID: CPT

## 2024-01-22 PROCEDURE — G0378 HOSPITAL OBSERVATION PER HR: HCPCS

## 2024-01-22 PROCEDURE — 70450 CT HEAD/BRAIN W/O DYE: CPT

## 2024-01-22 PROCEDURE — 0202U NFCT DS 22 TRGT SARS-COV-2: CPT

## 2024-01-22 PROCEDURE — 80053 COMPREHEN METABOLIC PANEL: CPT

## 2024-01-22 PROCEDURE — 6370000000 HC RX 637 (ALT 250 FOR IP): Performed by: FAMILY MEDICINE

## 2024-01-22 PROCEDURE — 93005 ELECTROCARDIOGRAM TRACING: CPT | Performed by: INTERNAL MEDICINE

## 2024-01-22 RX ORDER — DONEPEZIL HYDROCHLORIDE 5 MG/1
5 TABLET, FILM COATED ORAL NIGHTLY
Status: DISCONTINUED | OUTPATIENT
Start: 2024-01-22 | End: 2024-01-25 | Stop reason: HOSPADM

## 2024-01-22 RX ORDER — OMEPRAZOLE 40 MG/1
40 CAPSULE, DELAYED RELEASE ORAL DAILY
COMMUNITY
Start: 2023-10-27

## 2024-01-22 RX ORDER — PANTOPRAZOLE SODIUM 40 MG/1
40 TABLET, DELAYED RELEASE ORAL
Status: DISCONTINUED | OUTPATIENT
Start: 2024-01-22 | End: 2024-01-25 | Stop reason: HOSPADM

## 2024-01-22 RX ORDER — TAMSULOSIN HYDROCHLORIDE 0.4 MG/1
0.4 CAPSULE ORAL NIGHTLY
Status: DISCONTINUED | OUTPATIENT
Start: 2024-01-22 | End: 2024-01-25 | Stop reason: HOSPADM

## 2024-01-22 RX ORDER — ACETAMINOPHEN 650 MG/1
650 SUPPOSITORY RECTAL EVERY 6 HOURS PRN
Status: DISCONTINUED | OUTPATIENT
Start: 2024-01-22 | End: 2024-01-25 | Stop reason: HOSPADM

## 2024-01-22 RX ORDER — POLYETHYLENE GLYCOL 3350 17 G/17G
17 POWDER, FOR SOLUTION ORAL DAILY PRN
Status: DISCONTINUED | OUTPATIENT
Start: 2024-01-22 | End: 2024-01-25 | Stop reason: HOSPADM

## 2024-01-22 RX ORDER — ISOSORBIDE MONONITRATE 30 MG/1
30 TABLET, EXTENDED RELEASE ORAL DAILY
Status: DISCONTINUED | OUTPATIENT
Start: 2024-01-22 | End: 2024-01-25 | Stop reason: HOSPADM

## 2024-01-22 RX ORDER — LISINOPRIL 5 MG/1
5 TABLET ORAL DAILY
Status: DISCONTINUED | OUTPATIENT
Start: 2024-01-22 | End: 2024-01-25 | Stop reason: HOSPADM

## 2024-01-22 RX ORDER — ACETAMINOPHEN 325 MG/1
650 TABLET ORAL EVERY 6 HOURS PRN
Status: DISCONTINUED | OUTPATIENT
Start: 2024-01-22 | End: 2024-01-25 | Stop reason: HOSPADM

## 2024-01-22 RX ORDER — SODIUM CHLORIDE 9 MG/ML
INJECTION, SOLUTION INTRAVENOUS PRN
Status: DISCONTINUED | OUTPATIENT
Start: 2024-01-22 | End: 2024-01-25 | Stop reason: HOSPADM

## 2024-01-22 RX ORDER — CLOPIDOGREL BISULFATE 75 MG/1
75 TABLET ORAL DAILY
Status: DISCONTINUED | OUTPATIENT
Start: 2024-01-22 | End: 2024-01-25 | Stop reason: HOSPADM

## 2024-01-22 RX ORDER — GUAIFENESIN 600 MG/1
600 TABLET, EXTENDED RELEASE ORAL 2 TIMES DAILY
Status: DISCONTINUED | OUTPATIENT
Start: 2024-01-22 | End: 2024-01-25 | Stop reason: HOSPADM

## 2024-01-22 RX ORDER — ROSUVASTATIN CALCIUM 20 MG/1
40 TABLET, COATED ORAL NIGHTLY
Status: DISCONTINUED | OUTPATIENT
Start: 2024-01-22 | End: 2024-01-25 | Stop reason: HOSPADM

## 2024-01-22 RX ORDER — OXYBUTYNIN CHLORIDE 10 MG/1
10 TABLET, EXTENDED RELEASE ORAL DAILY
COMMUNITY
Start: 2024-01-10

## 2024-01-22 RX ORDER — ONDANSETRON 2 MG/ML
4 INJECTION INTRAMUSCULAR; INTRAVENOUS EVERY 6 HOURS PRN
Status: DISCONTINUED | OUTPATIENT
Start: 2024-01-22 | End: 2024-01-25 | Stop reason: HOSPADM

## 2024-01-22 RX ORDER — SODIUM CHLORIDE 0.9 % (FLUSH) 0.9 %
5-40 SYRINGE (ML) INJECTION PRN
Status: DISCONTINUED | OUTPATIENT
Start: 2024-01-22 | End: 2024-01-25 | Stop reason: HOSPADM

## 2024-01-22 RX ORDER — LANOLIN ALCOHOL/MO/W.PET/CERES
3 CREAM (GRAM) TOPICAL
Status: COMPLETED | OUTPATIENT
Start: 2024-01-22 | End: 2024-01-22

## 2024-01-22 RX ORDER — 0.9 % SODIUM CHLORIDE 0.9 %
1000 INTRAVENOUS SOLUTION INTRAVENOUS ONCE
Status: COMPLETED | OUTPATIENT
Start: 2024-01-22 | End: 2024-01-22

## 2024-01-22 RX ORDER — CEFDINIR 300 MG/1
300 CAPSULE ORAL 2 TIMES DAILY
Status: DISPENSED | OUTPATIENT
Start: 2024-01-22 | End: 2024-01-23

## 2024-01-22 RX ORDER — ONDANSETRON 2 MG/ML
4 INJECTION INTRAMUSCULAR; INTRAVENOUS ONCE
Status: COMPLETED | OUTPATIENT
Start: 2024-01-22 | End: 2024-01-22

## 2024-01-22 RX ORDER — SODIUM CHLORIDE 0.9 % (FLUSH) 0.9 %
5-40 SYRINGE (ML) INJECTION EVERY 12 HOURS SCHEDULED
Status: DISCONTINUED | OUTPATIENT
Start: 2024-01-22 | End: 2024-01-25 | Stop reason: HOSPADM

## 2024-01-22 RX ORDER — GUAIFENESIN 400 MG/1
400 TABLET ORAL PRN
Status: ON HOLD | COMMUNITY
End: 2024-01-25 | Stop reason: HOSPADM

## 2024-01-22 RX ORDER — ONDANSETRON 4 MG/1
4 TABLET, ORALLY DISINTEGRATING ORAL EVERY 8 HOURS PRN
Status: DISCONTINUED | OUTPATIENT
Start: 2024-01-22 | End: 2024-01-25 | Stop reason: HOSPADM

## 2024-01-22 RX ADMIN — SODIUM CHLORIDE, PRESERVATIVE FREE 10 ML: 5 INJECTION INTRAVENOUS at 13:06

## 2024-01-22 RX ADMIN — CEFDINIR 300 MG: 300 CAPSULE ORAL at 13:18

## 2024-01-22 RX ADMIN — Medication 3 MG: at 23:27

## 2024-01-22 RX ADMIN — TAMSULOSIN HYDROCHLORIDE 0.4 MG: 0.4 CAPSULE ORAL at 20:46

## 2024-01-22 RX ADMIN — GUAIFENESIN 600 MG: 600 TABLET, EXTENDED RELEASE ORAL at 23:27

## 2024-01-22 RX ADMIN — ROSUVASTATIN CALCIUM 40 MG: 20 TABLET, FILM COATED ORAL at 20:46

## 2024-01-22 RX ADMIN — ONDANSETRON 4 MG: 2 INJECTION INTRAMUSCULAR; INTRAVENOUS at 09:03

## 2024-01-22 RX ADMIN — METOPROLOL TARTRATE 25 MG: 50 TABLET, FILM COATED ORAL at 20:46

## 2024-01-22 RX ADMIN — SODIUM CHLORIDE, PRESERVATIVE FREE 10 ML: 5 INJECTION INTRAVENOUS at 23:27

## 2024-01-22 RX ADMIN — SODIUM CHLORIDE 1000 ML: 9 INJECTION, SOLUTION INTRAVENOUS at 09:01

## 2024-01-22 RX ADMIN — DONEPEZIL HYDROCHLORIDE 5 MG: 5 TABLET, FILM COATED ORAL at 23:27

## 2024-01-22 RX ADMIN — APIXABAN 5 MG: 5 TABLET, FILM COATED ORAL at 20:46

## 2024-01-22 NOTE — ED NOTES
Pt presents to Ed with complaints of nausea on going for a while. States that he woke up and was wet from his cath, states that he noticed that it was open.

## 2024-01-22 NOTE — ED NOTES
Spoke with DR. Blackmon about fall on blood thinners, Ct head and Cspine placed per verbal order.

## 2024-01-22 NOTE — ED PROVIDER NOTES
[facility] EMERGENCY DEPARTMENT  EMERGENCY DEPARTMENT ENCOUNTER        Pt Name: Brett Lozada  MRN: [id number]  Birthdate 1940  Date of evaluation: 1/22/2024  Provider: MIGUEL FERNANDEZ - CNP  PCP: Delfin Gibbons MD    ELVA. I have evaluated this patient.        Triage CHIEF COMPLAINT       Chief Complaint   Patient presents with    Nausea     On going     Fall     States woke up on the floor, states that he crawled to his bed, unsure if he on blood thinners, denies neck or head pain            HISTORY OF PRESENT ILLNESS      Chief Complaint: nausea, weakness, fall    Brett Lozada is a 83 y.o. male who presents for evaluation of nausea and chills for the last couple of days.  He states he was recently in the hospital for UTI, was discharged 5 days ago, was feeling well for a few days after going home and then started feeling worse the last 2 days.  Has been taking his ATB as prescribed.  Denies abdominal pain, vomiting, diarrhea, F/C, cough.  Has had some mild congestion.    This morning he woke up on the floor, cannot recall getting up in the night or how he got there.  Denies any pain at present.  Denies HA, neck or back pain.  He is on eloquis.  He lives alone.  He usually ambulates unaided but has been a unsteady recently.  He does not have a walker or cane at home.  States he has felt unsafe since discharge because he feels so weak.  Has had a friend stay a couple of days but the friend cannot be there all of the time.    Nursing Notes were all reviewed and agreed with or any disagreements were addressed in the HPI.    REVIEW OF SYSTEMS     Pertinent ROS as noted in HPI.      PAST MEDICAL HISTORY     Past Medical History:   Diagnosis Date    CAD (coronary artery disease)     Cancer (HCC)     prostate    Complicated UTI (urinary tract infection) 01/12/2024    H/O echocardiogram 04/29/2019    Patient in atrial fibrillation during exam. Left  ventricular function is normal, EF is estimated at 55-60%. Mildly dilated left atrium. Mildly dilated left atrium. Mildli enlarged right ventricle cavity. Trace to mild mitral regurgitation is present. No evidence of pericardial effusion. No evidence of pleural effusion.    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 LVEF is 55 %.Patient tolerated the procedure well.No immediate complications.    History of echocardiogram 01/12/2018    Left ventricular systolic function is normal with an ejection fraction of55-60%. Grade I diastolic dysfunction. Mildly dilated left atrium.Moderate mitral regurgitation.Mild to moderate tricuspid regurgitation.No evidence of pericardial effusion.    HTN (hypertension)     Hyperlipidemia        SURGICAL HISTORY     Past Surgical History:   Procedure Laterality Date    BACK SURGERY      CARDIAC SURGERY      cabg    COLONOSCOPY  4/14/15    divertics    CORONARY ANGIOPLASTY WITH STENT PLACEMENT  05/14/2014    X2       CURRENTMEDICATIONS       Previous Medications    APIXABAN STARTER PACK (ELIQUIS) 5 MG TBPK TABLET    Take 1 tablet by mouth See Admin Instructions    ASCORBIC ACID (VITAMIN C PO)    Take 1 tablet by mouth daily Patient states he doesn't take this daily    CEFDINIR (OMNICEF) 300 MG CAPSULE    Take 1 capsule by mouth 2 times daily for 5 days    CLOPIDOGREL (PLAVIX) 75 MG TABLET    Take 1 tablet by mouth daily    DEXTROMETHORPHAN-GUAIFENESIN (MUCINEX DM)  MG TB12    Take 1 tablet by mouth in the morning and at bedtime    DONEPEZIL (ARICEPT) 5 MG TABLET    Take 1 tablet by mouth nightly    ISOSORBIDE MONONITRATE (IMDUR) 30 MG EXTENDED RELEASE TABLET    Take 1 tablet by mouth daily    LISINOPRIL (PRINIVIL;ZESTRIL) 5 MG TABLET    Take 1 tablet by mouth

## 2024-01-22 NOTE — H&P
V2.0  History and Physical      Name:  Brett Lozada /Age/Sex: 1940  (83 y.o. male)   MRN & CSN:  [id number] & [id number] Encounter Date/Time: 2024 11:40 AM EST   Location:  ED16/ED-16 PCP: Delfin Gibbons MD       Hospital Day: 1    Assessment and Plan:   Brett Lozada is a 83 y.o. male who presents with Generalized weakness    Hospital Problems             Last Modified POA    * (Principal) Generalized weakness 2024 Yes       Plan:  Syncope: Woke up on the ground does not recall his fall from his chair; CT head unremarkable, CT C-spine unremarkable, EKG with no acute ischemic changes; will check orthostatics, check echo, will check CK level, continue telemetry for 24 hours  Weakness: Given cough will check respiratory PCR, PT OT consulted, out of bed with assistance and will monitor  History of heart failure with preserved ejection fraction: Noted on last echo in ; is compensated and will monitor  History of coronary disease status post CABG: Continue home Plavix and Crestor  History of PE: Continue Eliquis  History of hypertension: Continue metoprolol, lisinopril, Imdur  History of dementia: Continue home Aricept  History of urinary retention: Continue Mahmood for now with tamsulosin; if does require hospitalization for more than 24 to 48 can trial voiding trial; otherwise is scheduled to follow-up with urology for voiding trial as an outpatient  History of recent UTI: 1 more day of antibiotic therapy and will continue      Disposition:   Current Living situation: Home   Expected Disposition: Rehab/SNF  Estimated D/C: 1-2 days    Diet ADULT DIET; Regular   DVT Prophylaxis [] Lovenox, []  Heparin, [] SCDs, [] Ambulation,  [x] Eliquis, [] Xarelto, [] Coumadin   Code Status Full Code   Surrogate Decision Maker/ POA      Personally reviewed Lab Studies and Imaging     Discussed management of the case with     EKG interpreted personally and results no acute ischemic changes; normal sinus  rhythm    Imaging that was interpreted personally includes CT head with no evidence of any intracranial hemorrhage        History from:     patient    History of Present Illness:     Chief Complaint: Weakness, syncope  Brett Lozada is a 83 y.o. male with pmh of hypertension, coronary disease status post CABG, PE, urinary retention with Mahmood and recent hospitalization for chills and decreased appetite found to have a complicated UTI discharged on p.o. antibiotics is representing with a syncopal fall as well as weakness and cough.  States that he woke up this morning was on the floor.  Prior to this he healing recalls watching the football game in his chair and then woke up on the floor.  Denied any chest pain or shortness of breath prior to the episode.  Only recent symptoms patient has been having as a cough which is productive for which she is on Guiafenasin.  He denies any fevers chills.  No nausea or vomiting.    In the ED patient's workup was relatively unremarkable except for very mild elevation of troponin which normalized.  EKG was reviewed and was nonischemic.  Patient is resting comfortably in bed with stable vital signs and doing very well.     Review of Systems:        Pertinent positives and negatives discussed in HPI     Objective:   No intake or output data in the 24 hours ending 01/22/24 1140   Vitals:   Vitals:    01/22/24 0721 01/22/24 0902 01/22/24 1031 01/22/24 1044   BP: (!) 167/64 (!) 146/57 (!) 148/56    Pulse: 64  59 64   Resp: 16  18 12   Temp: 98.1 °F (36.7 °C)      TempSrc: Oral      SpO2: 97% 95% 95% 97%   Weight: 65.8 kg (145 lb)      Height: 1.676 m (5' 6\")          Medications Prior to Admission     Prior to Admission medications    Medication Sig Start Date End Date Taking? Authorizing Provider   cefdinir (OMNICEF) 300 MG capsule Take 1 capsule by mouth 2 times daily for 5 days 1/17/24 1/22/24  Tobi Carroll MD   apixaban starter pack (ELIQUIS) 5 MG TBPK tablet Take 1 tablet by

## 2024-01-22 NOTE — CARE COORDINATION
CM consulted for possible skilled placement. CM went and spoke to pt. Pt and his granddaughter in room. Pt is from home, normally independent, has insurance and PCP. Pt reports being d/c not long ago and has just been getting weaker. Pt would like to try rehab. CM provided list. Pt requested time to look it over and speak with both granddaughters who also are his family support. CM placed handoff note for inpt follow up.

## 2024-01-22 NOTE — ED NOTES
Patient ambulated to bathroom with slow and steady gait noted. 0 complaints voiced, tolerated well.

## 2024-01-22 NOTE — ED NOTES
Medication History  [facility]    Patient Name: Brett Lozada 1940     Medication history has been completed by: Demetria Gao CPhT    Source(s) of information: patient insurance claims, medications provided from home and family friend     Primary Care Physician: Delfin Gibbons MD     Pharmacy: [facility]    Allergies as of 01/22/2024    (No Known Allergies)        Prior to Admission medications    Medication Sig Start Date End Date Taking? Authorizing Provider   guaiFENesin 400 MG tablet Take 1 tablet by mouth as needed for Cough   Yes Sara Rubi MD   oxyBUTYnin (DITROPAN-XL) 10 MG extended release tablet Take 1 tablet by mouth daily 1/10/24   Sara Rubi MD   omeprazole (PRILOSEC) 40 MG delayed release capsule Take 1 capsule by mouth daily 10/27/23   Sara Rubi MD   cefdinir (OMNICEF) 300 MG capsule Take 1 capsule by mouth 2 times daily for 5 days 1/17/24 1/22/24  Tobi Carroll MD   apixaban starter pack (ELIQUIS) 5 MG TBPK tablet  Take 1 tablet by mouth daily 12/27/23   Philip Cao MD   lisinopril (PRINIVIL;ZESTRIL) 5 MG tablet Take 1 tablet by mouth daily 11/6/23   Smitha Armas APRN - CNP   donepezil (ARICEPT) 5 MG tablet Take 1 tablet by mouth nightly    Sara Rubi MD   isosorbide mononitrate (IMDUR) 30 MG extended release tablet Take 1 tablet by mouth daily 9/11/23   Kaushal Brooks MD   meclizine (ANTIVERT) 25 MG tablet Take 1 tablet by mouth 3 times daily as needed 5/9/23   Sara Rubi MD   metoprolol tartrate (LOPRESSOR) 25 MG tablet Take 1 tablet by mouth 2 times daily 5/15/23   Kaushal Brooks MD   ondansetron (ZOFRAN) 4 MG tablet Take 1 tablet by mouth every 8 hours as needed for Nausea 5/7/23   Valerio Phillips PA-C   nitroGLYCERIN (NITROSTAT) 0.4 MG SL tablet Place 1 tablet under the tongue once for 1 dose up to max of 3 total doses. If no relief after 1 dose, call 911. 5/2/23 10/12/23  [facility]

## 2024-01-23 ENCOUNTER — APPOINTMENT (OUTPATIENT)
Dept: NON INVASIVE DIAGNOSTICS | Age: 84
End: 2024-01-23
Attending: INTERNAL MEDICINE
Payer: MEDICARE

## 2024-01-23 LAB
ANION GAP SERPL CALCULATED.3IONS-SCNC: 9 MMOL/L (ref 7–16)
BASOPHILS ABSOLUTE: 0 K/CU MM
BASOPHILS RELATIVE PERCENT: 0.6 % (ref 0–1)
BUN SERPL-MCNC: 11 MG/DL (ref 6–23)
CALCIUM SERPL-MCNC: 8.5 MG/DL (ref 8.3–10.6)
CHLORIDE BLD-SCNC: 109 MMOL/L (ref 99–110)
CO2: 23 MMOL/L (ref 21–32)
CREAT SERPL-MCNC: 0.9 MG/DL (ref 0.9–1.3)
CULTURE: NORMAL
DIFFERENTIAL TYPE: ABNORMAL
ECHO AO ROOT DIAM: 3.7 CM
ECHO AO ROOT INDEX: 2.02 CM/M2
ECHO AV AREA PEAK VELOCITY: 2.6 CM2
ECHO AV AREA VTI: 2.7 CM2
ECHO AV AREA/BSA PEAK VELOCITY: 1.4 CM2/M2
ECHO AV AREA/BSA VTI: 1.5 CM2/M2
ECHO AV MEAN GRADIENT: 3 MMHG
ECHO AV MEAN VELOCITY: 0.8 M/S
ECHO AV PEAK GRADIENT: 6 MMHG
ECHO AV PEAK VELOCITY: 1.3 M/S
ECHO AV VELOCITY RATIO: 0.85
ECHO AV VTI: 27 CM
ECHO BSA: 1.85 M2
ECHO EST RA PRESSURE: 3 MMHG
ECHO LA AREA 4C: 14.9 CM2
ECHO LA DIAMETER INDEX: 1.53 CM/M2
ECHO LA DIAMETER: 2.8 CM
ECHO LA MAJOR AXIS: 5.4 CM
ECHO LA TO AORTIC ROOT RATIO: 0.76
ECHO LA VOL MOD A4C: 31 ML (ref 18–58)
ECHO LA VOLUME INDEX MOD A4C: 17 ML/M2 (ref 16–34)
ECHO LV E' LATERAL VELOCITY: 10 CM/S
ECHO LV E' SEPTAL VELOCITY: 6 CM/S
ECHO LV EDV A4C: 81 ML
ECHO LV EDV INDEX A4C: 44 ML/M2
ECHO LV EJECTION FRACTION A4C: 55 %
ECHO LV ESV A4C: 37 ML
ECHO LV ESV INDEX A4C: 20 ML/M2
ECHO LV FRACTIONAL SHORTENING: 33 % (ref 28–44)
ECHO LV INTERNAL DIMENSION DIASTOLE INDEX: 2.62 CM/M2
ECHO LV INTERNAL DIMENSION DIASTOLIC: 4.8 CM (ref 4.2–5.9)
ECHO LV INTERNAL DIMENSION SYSTOLIC INDEX: 1.75 CM/M2
ECHO LV INTERNAL DIMENSION SYSTOLIC: 3.2 CM
ECHO LV IVSD: 0.9 CM (ref 0.6–1)
ECHO LV MASS 2D: 158.8 G (ref 88–224)
ECHO LV MASS INDEX 2D: 86.8 G/M2 (ref 49–115)
ECHO LV POSTERIOR WALL DIASTOLIC: 1 CM (ref 0.6–1)
ECHO LV RELATIVE WALL THICKNESS RATIO: 0.42
ECHO LVOT AREA: 3.1 CM2
ECHO LVOT AV VTI INDEX: 0.86
ECHO LVOT DIAM: 2 CM
ECHO LVOT MEAN GRADIENT: 2 MMHG
ECHO LVOT PEAK GRADIENT: 4 MMHG
ECHO LVOT PEAK VELOCITY: 1.1 M/S
ECHO LVOT STROKE VOLUME INDEX: 40 ML/M2
ECHO LVOT SV: 73.2 ML
ECHO LVOT VTI: 23.3 CM
ECHO MV A VELOCITY: 0.86 M/S
ECHO MV E DECELERATION TIME (DT): 257 MS
ECHO MV E VELOCITY: 0.84 M/S
ECHO MV E/A RATIO: 0.98
ECHO MV E/E' LATERAL: 8.4
ECHO MV E/E' RATIO (AVERAGED): 11.2
ECHO RIGHT VENTRICULAR SYSTOLIC PRESSURE (RVSP): 30 MMHG
ECHO RV MID DIMENSION: 2.7 CM
ECHO TV REGURGITANT MAX VELOCITY: 2.61 M/S
ECHO TV REGURGITANT PEAK GRADIENT: 27 MMHG
EKG ATRIAL RATE: 61 BPM
EKG DIAGNOSIS: NORMAL
EKG P AXIS: 81 DEGREES
EKG P-R INTERVAL: 186 MS
EKG Q-T INTERVAL: 462 MS
EKG QRS DURATION: 156 MS
EKG QTC CALCULATION (BAZETT): 465 MS
EKG R AXIS: 38 DEGREES
EKG T AXIS: 63 DEGREES
EKG VENTRICULAR RATE: 61 BPM
EOSINOPHILS ABSOLUTE: 0.3 K/CU MM
EOSINOPHILS RELATIVE PERCENT: 5.4 % (ref 0–3)
GFR SERPL CREATININE-BSD FRML MDRD: >60 ML/MIN/1.73M2
GLUCOSE SERPL-MCNC: 90 MG/DL (ref 70–99)
HCT VFR BLD CALC: 36.1 % (ref 42–52)
HEMOGLOBIN: 11.3 GM/DL (ref 13.5–18)
IMMATURE NEUTROPHIL %: 0.4 % (ref 0–0.43)
LYMPHOCYTES ABSOLUTE: 1.1 K/CU MM
LYMPHOCYTES RELATIVE PERCENT: 19.9 % (ref 24–44)
Lab: NORMAL
MCH RBC QN AUTO: 29.2 PG (ref 27–31)
MCHC RBC AUTO-ENTMCNC: 31.3 % (ref 32–36)
MCV RBC AUTO: 93.3 FL (ref 78–100)
MONOCYTES ABSOLUTE: 0.4 K/CU MM
MONOCYTES RELATIVE PERCENT: 6.9 % (ref 0–4)
NUCLEATED RBC %: 0 %
PDW BLD-RTO: 14.1 % (ref 11.7–14.9)
PLATELET # BLD: 127 K/CU MM (ref 140–440)
PMV BLD AUTO: 13.2 FL (ref 7.5–11.1)
POTASSIUM SERPL-SCNC: 3.6 MMOL/L (ref 3.5–5.1)
RBC # BLD: 3.87 M/CU MM (ref 4.6–6.2)
SEGMENTED NEUTROPHILS ABSOLUTE COUNT: 3.6 K/CU MM
SEGMENTED NEUTROPHILS RELATIVE PERCENT: 66.8 % (ref 36–66)
SODIUM BLD-SCNC: 141 MMOL/L (ref 135–145)
SPECIMEN: NORMAL
TOTAL IMMATURE NEUTOROPHIL: 0.02 K/CU MM
TOTAL NUCLEATED RBC: 0 K/CU MM
WBC # BLD: 5.4 K/CU MM (ref 4–10.5)

## 2024-01-23 PROCEDURE — 93010 ELECTROCARDIOGRAM REPORT: CPT | Performed by: INTERNAL MEDICINE

## 2024-01-23 PROCEDURE — 36415 COLL VENOUS BLD VENIPUNCTURE: CPT

## 2024-01-23 PROCEDURE — 97162 PT EVAL MOD COMPLEX 30 MIN: CPT

## 2024-01-23 PROCEDURE — 6370000000 HC RX 637 (ALT 250 FOR IP): Performed by: INTERNAL MEDICINE

## 2024-01-23 PROCEDURE — 93306 TTE W/DOPPLER COMPLETE: CPT

## 2024-01-23 PROCEDURE — 97166 OT EVAL MOD COMPLEX 45 MIN: CPT

## 2024-01-23 PROCEDURE — 2580000003 HC RX 258: Performed by: INTERNAL MEDICINE

## 2024-01-23 PROCEDURE — 93306 TTE W/DOPPLER COMPLETE: CPT | Performed by: INTERNAL MEDICINE

## 2024-01-23 PROCEDURE — G0378 HOSPITAL OBSERVATION PER HR: HCPCS

## 2024-01-23 PROCEDURE — 94761 N-INVAS EAR/PLS OXIMETRY MLT: CPT

## 2024-01-23 PROCEDURE — 85025 COMPLETE CBC W/AUTO DIFF WBC: CPT

## 2024-01-23 PROCEDURE — 80048 BASIC METABOLIC PNL TOTAL CA: CPT

## 2024-01-23 RX ADMIN — CLOPIDOGREL BISULFATE 75 MG: 75 TABLET ORAL at 08:57

## 2024-01-23 RX ADMIN — METOPROLOL TARTRATE 25 MG: 50 TABLET, FILM COATED ORAL at 08:57

## 2024-01-23 RX ADMIN — ROSUVASTATIN CALCIUM 40 MG: 20 TABLET, FILM COATED ORAL at 22:10

## 2024-01-23 RX ADMIN — GUAIFENESIN 600 MG: 600 TABLET, EXTENDED RELEASE ORAL at 08:58

## 2024-01-23 RX ADMIN — APIXABAN 5 MG: 5 TABLET, FILM COATED ORAL at 08:57

## 2024-01-23 RX ADMIN — ISOSORBIDE MONONITRATE 30 MG: 30 TABLET, EXTENDED RELEASE ORAL at 08:57

## 2024-01-23 RX ADMIN — SODIUM CHLORIDE, PRESERVATIVE FREE 10 ML: 5 INJECTION INTRAVENOUS at 22:11

## 2024-01-23 RX ADMIN — APIXABAN 5 MG: 5 TABLET, FILM COATED ORAL at 22:10

## 2024-01-23 RX ADMIN — DONEPEZIL HYDROCHLORIDE 5 MG: 5 TABLET, FILM COATED ORAL at 22:11

## 2024-01-23 RX ADMIN — PANTOPRAZOLE SODIUM 40 MG: 40 TABLET, DELAYED RELEASE ORAL at 05:58

## 2024-01-23 RX ADMIN — LISINOPRIL 5 MG: 5 TABLET ORAL at 08:58

## 2024-01-23 RX ADMIN — SODIUM CHLORIDE, PRESERVATIVE FREE 10 ML: 5 INJECTION INTRAVENOUS at 08:57

## 2024-01-23 RX ADMIN — METOPROLOL TARTRATE 25 MG: 50 TABLET, FILM COATED ORAL at 22:11

## 2024-01-23 RX ADMIN — TAMSULOSIN HYDROCHLORIDE 0.4 MG: 0.4 CAPSULE ORAL at 22:10

## 2024-01-23 RX ADMIN — GUAIFENESIN 600 MG: 600 TABLET, EXTENDED RELEASE ORAL at 22:10

## 2024-01-23 ASSESSMENT — PAIN DESCRIPTION - ORIENTATION: ORIENTATION: MID

## 2024-01-23 ASSESSMENT — PAIN SCALES - GENERAL: PAINLEVEL_OUTOF10: 0

## 2024-01-23 ASSESSMENT — PAIN - FUNCTIONAL ASSESSMENT: PAIN_FUNCTIONAL_ASSESSMENT: ACTIVITIES ARE NOT PREVENTED

## 2024-01-23 ASSESSMENT — PAIN DESCRIPTION - LOCATION: LOCATION: ABDOMEN

## 2024-01-23 NOTE — ED NOTES
High Sensitivity 23 (*)     All other components within normal limits   URINALYSIS - Abnormal; Notable for the following components:    Blood, Urine LARGE NUMBER OR AMOUNT OBSERVED (*)     Protein, UA 30 (*)     Leukocyte Esterase, Urine SMALL NUMBER OR AMOUNT OBSERVED (*)     All other components within normal limits   MICROSCOPIC URINALYSIS - Abnormal; Notable for the following components:    RBC, UA 12 (*)     WBC, UA 3 (*)     Mucus, UA RARE (*)     All other components within normal limits       Background  History:   Past Medical History:   Diagnosis Date    CAD (coronary artery disease)     Cancer (HCC)     prostate    Complicated UTI (urinary tract infection) 01/12/2024    H/O echocardiogram 04/29/2019    Patient in atrial fibrillation during exam. Left ventricular function is normal, EF is estimated at 55-60%. Mildly dilated left atrium. Mildly dilated left atrium. Mildli enlarged right ventricle cavity. Trace to mild mitral regurgitation is present. No evidence of pericardial effusion. No evidence of pleural effusion.    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 LVEF is 55 %.Patient tolerated the procedure well.No immediate complications.    History of echocardiogram 01/12/2018    Left ventricular systolic function is normal with an ejection fraction of55-60%. Grade I diastolic dysfunction. Mildly dilated left atrium.Moderate mitral regurgitation.Mild to moderate tricuspid regurgitation.No evidence of pericardial effusion.    HTN (hypertension)     Hyperlipidemia        Assessment    Vitals:        Vitals:    01/22/24 1802 01/22/24 1831 01/22/24 1902 01/22/24 1917   BP: (!) 144/49 (!) 135/57 (!) 116/96    Pulse: 65 72 66 64   Resp: 19 21 20 20   Temp:       TempSrc:       SpO2: 91%  93% 90% 90%   Weight:       Height:         PO Status: Regular  O2 Flow Rate:      Cardiac Rhythm: NSR  Last documented pain medication administered: none- denies pain   NIH Score: NIH     Active LDA's:   Peripheral IV Left Antecubital (Active)   Site Assessment Clean, dry & intact 01/22/24 0740   Line Status Blood return noted;Brisk blood return 01/22/24 0740   Line Care Connections checked and tightened 01/22/24 0740   Phlebitis Assessment No symptoms 01/22/24 0740   Infiltration Assessment 0 01/22/24 0740   Dressing Status New dressing applied;Clean, dry & intact 01/22/24 0740   Dressing Type Transparent 01/22/24 0740   Dressing Intervention New 01/22/24 0740       Pertinent or High Risk Medications/Drips: no   If Yes, please provide details:   Blood Product Administration: no  If Yes, please provide details:     Recommendation    Incomplete orders   Additional Comments:    If any further questions, please call Sending RN at [phone]      Electronically signed by: Electronically signed by Soledad Mcelroy RN on 1/22/2024 at 7:17 PM

## 2024-01-23 NOTE — PROGRESS NOTES
dressing: SBA   LB dressing: CGA (dynamic standing balance for safety with clothing mgmt to hips, able to don BL socks seated EOB SBA by crossing legs into \"figure 4 position\")   Toileting: CGA    Cognitive and Psychosocial Functioning:  Overall cognitive status: WFL (grossly; decreased overall insight/safety awareness and impulsive behaviors noted)  Affect: Normal     Balance:   Sitting: SBA in unsupported sitting EOB  Standing: CGA with RW    Functional Mobility:  Bed Mobility: SBA supine to sitting EOB (HOB elevated to 30', utilized bed rail)  Transfers: CGA to/from bed and chair (min cues for safe hand placement each direction)  Ambulation: CGA with RW ~300 ft in hallway (See PT evaluation for gait assessment)      AM-PAC 6 click short form for inpatient daily activity:   How much help from another person does the patient currently need... Unable  Dep A Lot  Max A A Lot   Mod A A Little  Min A A Little   CGA  SBA None   Mod I  Indep  Sup   1.  Putting on and taking off regular lower body clothing? [] 1    [] 2   [] 2   [] 3   [x] 3   [] 4      2. Bathing (including washing, rinsing, drying)? [] 1   [] 2   [] 2 [] 3 [x] 3 [] 4   3. Toileting, which includes using toilet, bedpan, or urinal? [] 1    [] 2   [] 2   [] 3   [x] 3   [] 4     4. Putting on and taking off regular upper body clothing? [] 1   [] 2   [] 2   [] 3   [x] 3    [] 4      5. Taking care of personal grooming such as brushing teeth? [] 1   [] 2    [] 2 [] 3    [x] 3   [] 4      6. Eating meals?   [] 1   [] 2   [] 2   [] 3   [] 3   [x] 4      Raw Score:  19   [24=0% impaired(CH), 23=1-19%(CI), 20-22=20-39%(CJ), 15-19=40-59%(CK), 10-14=60-79%(CL), 7-9=80-99%(CM), 6=100%(CN)]     Safety Measures: Gait belt used, Left in Chair, Alarm in place    Assessment:  Pt is an 83 year old male with a past medical history of CAD (coronary artery disease), Cancer (HCC), Complicated UTI (urinary tract infection), H/O echocardiogram, History of cardiac  catheterization, History of echocardiogram, HTN (hypertension), and Hyperlipidemia. Pt admitted after being down on the floor following a fall. Pt lives alone in an apartment and at baseline is independent with ADLs, mobility, and driving. Pt currently presents with the above impairments. Recommend short rehab stay in SNF at discharge followed by transition to AL.    Complexity: Moderate  Prognosis: Good  Plan: 2+x/week    Goals:  1. Pt will complete all aspects of bed mobility for EOB/OOB ADLs with supervision/HOB flat  2. Pt will complete UB/LB bathing SBA with setup  3. Pt will complete all aspects of LB dressing SBA with setup  4. Pt will complete all functional transfers to and from bed, chair, toilet, shower chair with supervision/good safety awareness  5. Pt will ambulate HH distance to bathroom for toileting with supervision using RW  6. Pt will complete all aspects of toileting task SBA  7. Pt will complete oral hygiene/grooming routine in standing at sink with supervision/no seated rest breaks    Time:   Time in: 928  Time out: 943  Timed treatment minutes: 0  Total time: 15    Electronically signed by:    YORDY Osorio/L, MOT, [id number]

## 2024-01-23 NOTE — CARE COORDINATION
CM consult per Hospitalist for SNF rehab as recommended for this pt by PT/OT.  CM visited pt who is alert and orient, he did state he is a little confused about the cords around him, CM reminded pt that he did not need to worry about the cords around him the RN will take care of them.     CM ask pt about going to SNF rehab, Pt replied that rehab would be fine he will go. CM ask pt what facility he wanted to go to, pt and family had been provided a list of rehab facilities. Pt stated he did not care what facility he went to just pick one. Pt stated that he had been denied by insurance in the past.    Pt has [facility] Medicare.    Pt took the list of facilities that are in network, cm read down through the list. Pt recognized [facility], stating he use to go to [facility] to visit a friend. Pt is familiar with [facility] and choices that facility as his first choice.  E fax pt information to [facility].    Information faxed to Natalie # [phone].  Call to Natalie # [phone], left .

## 2024-01-23 NOTE — PROGRESS NOTES
4 Eyes Skin Assessment     NAME:  Brett Lozada  YOB: 1940  MEDICAL RECORD NUMBER:  [id number]    The patient is being assessed for  Admission    I agree that at least one RN has performed a thorough Head to Toe Skin Assessment on the patient. ALL assessment sites listed below have been assessed.      Areas assessed by both nurses:    Head, Face, Ears, Shoulders, Back, Chest, Arms, Elbows, Hands, Sacrum. Buttock, Coccyx, Ischium, Legs. Feet and Heels, and Under Medical Devices         Does the Patient have a Wound? No noted wound(s)       Bernard Prevention initiated by RN: Yes  Wound Care Orders initiated by RN: No    Pressure Injury (Stage 3,4, Unstageable, DTI, NWPT, and Complex wounds) if present, place Wound referral order by RN under : No    New Ostomies, if present place, Ostomy referral order under : No     Nurse 1 eSignature: Electronically signed by Arelis Huggins RN on 1/23/24 at 4:11 AM EST    **SHARE this note so that the co-signing nurse can place an eSignature**    Nurse 2 eSignature: Electronically signed by Alvin Alicea RN on 1/23/24 at 5:19 AM EST

## 2024-01-23 NOTE — CONSULTS
Nutrition Note    Dietitian consulted for weight loss and weakness, oral nutrition supplements. Pt does not meet criteria for malnutrition. Refer to full Nutrition note below. Will restart oral nutrition supplement to trial optimize nutrition status within stay.     Electronically signed by Eveline Ruff RD, JODY on 1/23/24 at 2:51 PM EST    Contact: [phone]

## 2024-01-23 NOTE — PROGRESS NOTES
V2.0  [facility] Hospitalist Progress Note      Name:  Brett Lozada /Age/Sex: 1940  (83 y.o. male)   MRN & CSN:  [id number] & [id number] Encounter Date/Time: 2024 7:57 AM EST    Location:  OBS  PCP: Delfin Gibbons MD       Hospital Day: 2    Assessment and Plan:   Brett Lozada is a 83 y.o. male with pmh of hypertension, coronary disease status post CABG, PE, urinary retention with Mahmood and recent hospitalization for chills and decreased appetite found to have a complicated UTI discharged on p.o. antibiotics,    who presents with Generalized weakness      Plan:    Syncope: Woke up on the ground does not recall his fall from his chair; CT head unremarkable, CT C-spine unremarkable, EKG with no acute ischemic changes; will check orthostatics, check echo, will check CK level, continue telemetry for 24 hours  ECHO: EF 50-55%, normal LV function  Weakness: Given cough will check respiratory PCR: NEG, PT OT consulted, CM Consulted, out of bed with assistance and will monitor  PT/OT recommend SNF placement (he lives in apartment alone)   Dietary Consulted: for weight loss and weakness   CM Consulted for placement   History of heart failure with preserved ejection fraction: Noted on last echo in ; is compensated and will monitor  History of coronary disease status post CABG: Continue home Plavix and Crestor  History of PE: Continue Eliquis  History of hypertension: Continue metoprolol, lisinopril, Imdur  History of dementia: Continue home Aricept  History of urinary retention: Continue Mahmood for now with tamsulosin; if does require hospitalization for more than 24 to 48 can trial voiding trial; otherwise is scheduled to follow-up with urology for voiding trial as an outpatient  History of recent UTI: 1 more day of antibiotic therapy and will continue         Diet ADULT DIET; Regular   DVT Prophylaxis [] Lovenox, []  Heparin, [] SCDs, [] Ambulation,  [x] Eliquis, [] Xarelto  [] Coumadin

## 2024-01-23 NOTE — PROGRESS NOTES
Comprehensive Nutrition Assessment    Type and Reason for Visit:  Initial, Wound, Positive Nutrition Screen (MST: 2)    Nutrition Recommendations/Plan:   Discontinue oral nutrition supplement upon pt request  Recommend consult to wound care team, pt endorses wound has healed  Encourage proper hydration/fluids intake  Endorse adequate PO intakes/ONS Intake and record in I/O   May offer supplements if pt is willing to consume      Malnutrition Assessment:  Malnutrition Status:  At risk for malnutrition (Comment) (01/23/24 1236)    Context:  Acute Illness     Findings of the 6 clinical characteristics of malnutrition:  Energy Intake:  75% or less of estimated energy requirements for 7 or more days  Weight Loss:  No significant weight loss (5% in 3 mo)     Body Fat Loss:  No significant body fat loss Orbital, Triceps, Fat Overlying Ribs, Buccal region   Muscle Mass Loss:  No significant muscle mass loss Temples (temporalis), Clavicles (pectoralis & deltoids), Calf (gastrocnemius), Hand (interosseous), Scapula (trapezius), Thigh (quadraceps)  Fluid Accumulation:  No significant fluid accumulation Extremities   Strength:  Not Performed    Nutrition Assessment:    Admitted with Generalized Weakness, Syncope, Fall. Currently on regular diet, reports oral intake has been poor pta due to multiple acute illnesses recently; however, endorses appetite improving. Admits to weight loss due to poor appetite. Denies wounds, referred to ns staff to verify LDA information. Offered increased protein-lauren intake via oral nutrition supplements, pt denied at this time, wants to trial eating most of his meals first. Performed NFPE, no significant wasting noted, monitor as moderate nutrition risk.    Nutrition Related Findings:    Meds and labs reviewed. Sitting up in chair. Able to feed self. Wound Type: Pressure Injury, Stage II (per LDA 1/23)       Current Nutrition Intake & Therapies:    Average Meal Intake: 51-75% (per pt)  Average

## 2024-01-23 NOTE — CONSULTS
[facility] ACUTE CARE PHYSICAL THERAPY EVALUATION  Brett Lozada, 1940, OBS [id number], 1/23/2024    History  [place]:  The primary encounter diagnosis was Generalized weakness. Diagnoses of Nausea, Closed head injury, initial encounter, Fall, initial encounter, and Syncope and collapse were also pertinent to this visit.  Patient  has a past medical history of CAD (coronary artery disease), Cancer (HCC), Complicated UTI (urinary tract infection), H/O echocardiogram, History of cardiac catheterization, History of echocardiogram, HTN (hypertension), and Hyperlipidemia.  Patient  has a past surgical history that includes back surgery; Cardiac surgery; Coronary angioplasty with stent (05/14/2014); and Colonoscopy (4/14/15).    Discharge Recommendation: short SNF stay with transition to assisted living    Subjective:    Patient states:  \"I feel a hell of a lot better!\".      Pain:  Pt denied having any pain.      Communication with other providers:  Handoff to RN, co-evaluation with OTDutch to maximize safety and for tolerance    Restrictions: general precautions, contact precautions, fall risk     Home Setup/Prior level of function  Social/Functional History  Lives With: Alone  Type of Home: Apartment (2nd floor flat apartment with elevator)  Home Layout: One level  Home Access: Level entry, Elevator  Bathroom Shower/Tub: Walk-in shower  Home Equipment: None  Has the patient had two or more falls in the past year or any fall with injury in the past year?: Yes  ADL Assistance: Independent  Homemaking Assistance: Needs assistance  Ambulation Assistance: Independent (uses no AD)  Transfer Assistance: Independent  Active : Yes  Occupation: Retired    Examination of body systems (includes body structures/functions, activity/participation limitations):  Observation:  Pt found resting in bed upon arrival and agreeable to therapy  Vision:  WFL  Hearing:  WFL  Cardiopulmonary:  telemetry, on RA, vitals  stable throughout  Cognition: AxOx4, see OT/SLP note for further evaluation.    Musculoskeletal  ROM R/L:  WFL.    Strength R/L:  grossly 3+ to 4-/5 BLE, minimal decline in function and endurance.    Neuro:  sensation grossly intact BLE       Mobility:  Rolling L/R:  SBA  Supine to sit:  SBA with HOB elevated using bed rail, was able to scoot to EOB with SBA  Sit to stand: CGA using FWW, cues provided for hand placement and general safety as pt stood prior to PT instructions  Transfers: CGA using FWW , cues provided for sequencing and hand placement   Sitting balance:  good, SBA, no noted retropulsion or instability during dynamic activities.    Standing balance:  good- using FWW for support with CGA, minimal instability without LOB during dynamic activities.    Gait: 300ft using FWW with CGA. Cues provided for FWW mgmt as pt was lifting walker off of ground during ambulation trial. Gait speed was moderate with adequate step length and foot clearance. Minimal instability without LOB. No noted knee buckling or abnormal gait deviations.  Education: safety     [facility] 6 Clicks Inpatient Mobility:  AM-PAC Inpatient Mobility Raw Score : 17    Safety: patient left in chair, chair alarm intact, call light within reach, RN notified, gait belt used.    Assessment:  Pt is a 82 yo M who presents with generalized weakness and a fall. Prior to this admission, pt reports was primarily IND with all mobility and basic ADLs, however, did require some assistance with higher level IADLs. Pt did not use any AD for ambulation. Pt currently requires SBA for bed mobility; and grossly CGA for STS, transfers, and gait training. Pt demonstrates a minimal decline in his overall functional mobility, reduced strength, impaired balance, reduced activity tolerance, and reduced safety awareness with impulsivity. Pt will continue to benefit from acute PT services with DC to short SNF stay and transition to assisted living to maximize safety and

## 2024-01-24 PROBLEM — R53.1 GENERAL WEAKNESS: Status: ACTIVE | Noted: 2024-01-24

## 2024-01-24 LAB
ANION GAP SERPL CALCULATED.3IONS-SCNC: 10 MMOL/L (ref 7–16)
BASOPHILS ABSOLUTE: 0 K/CU MM
BASOPHILS RELATIVE PERCENT: 0.6 % (ref 0–1)
BUN SERPL-MCNC: 11 MG/DL (ref 6–23)
CALCIUM SERPL-MCNC: 8.6 MG/DL (ref 8.3–10.6)
CHLORIDE BLD-SCNC: 107 MMOL/L (ref 99–110)
CO2: 24 MMOL/L (ref 21–32)
CREAT SERPL-MCNC: 0.9 MG/DL (ref 0.9–1.3)
DIFFERENTIAL TYPE: ABNORMAL
EOSINOPHILS ABSOLUTE: 0.3 K/CU MM
EOSINOPHILS RELATIVE PERCENT: 5.3 % (ref 0–3)
GFR SERPL CREATININE-BSD FRML MDRD: >60 ML/MIN/1.73M2
GLUCOSE SERPL-MCNC: 93 MG/DL (ref 70–99)
HCT VFR BLD CALC: 35.1 % (ref 42–52)
HEMOGLOBIN: 11 GM/DL (ref 13.5–18)
IMMATURE NEUTROPHIL %: 0.2 % (ref 0–0.43)
LYMPHOCYTES ABSOLUTE: 1.5 K/CU MM
LYMPHOCYTES RELATIVE PERCENT: 23.9 % (ref 24–44)
MCH RBC QN AUTO: 29.4 PG (ref 27–31)
MCHC RBC AUTO-ENTMCNC: 31.3 % (ref 32–36)
MCV RBC AUTO: 93.9 FL (ref 78–100)
MONOCYTES ABSOLUTE: 0.5 K/CU MM
MONOCYTES RELATIVE PERCENT: 7.4 % (ref 0–4)
NUCLEATED RBC %: 0 %
PDW BLD-RTO: 13.8 % (ref 11.7–14.9)
PLATELET # BLD: 120 K/CU MM (ref 140–440)
PMV BLD AUTO: 12.3 FL (ref 7.5–11.1)
POTASSIUM SERPL-SCNC: 3.9 MMOL/L (ref 3.5–5.1)
RBC # BLD: 3.74 M/CU MM (ref 4.6–6.2)
SEGMENTED NEUTROPHILS ABSOLUTE COUNT: 3.9 K/CU MM
SEGMENTED NEUTROPHILS RELATIVE PERCENT: 62.6 % (ref 36–66)
SODIUM BLD-SCNC: 141 MMOL/L (ref 135–145)
TOTAL IMMATURE NEUTOROPHIL: 0.01 K/CU MM
TOTAL NUCLEATED RBC: 0 K/CU MM
WBC # BLD: 6.2 K/CU MM (ref 4–10.5)

## 2024-01-24 PROCEDURE — 1200000000 HC SEMI PRIVATE

## 2024-01-24 PROCEDURE — 6370000000 HC RX 637 (ALT 250 FOR IP): Performed by: INTERNAL MEDICINE

## 2024-01-24 PROCEDURE — 36415 COLL VENOUS BLD VENIPUNCTURE: CPT

## 2024-01-24 PROCEDURE — 85025 COMPLETE CBC W/AUTO DIFF WBC: CPT

## 2024-01-24 PROCEDURE — 51798 US URINE CAPACITY MEASURE: CPT

## 2024-01-24 PROCEDURE — 2580000003 HC RX 258: Performed by: INTERNAL MEDICINE

## 2024-01-24 PROCEDURE — 80048 BASIC METABOLIC PNL TOTAL CA: CPT

## 2024-01-24 PROCEDURE — G0378 HOSPITAL OBSERVATION PER HR: HCPCS

## 2024-01-24 RX ADMIN — ISOSORBIDE MONONITRATE 30 MG: 30 TABLET, EXTENDED RELEASE ORAL at 10:17

## 2024-01-24 RX ADMIN — METOPROLOL TARTRATE 25 MG: 50 TABLET, FILM COATED ORAL at 10:17

## 2024-01-24 RX ADMIN — DONEPEZIL HYDROCHLORIDE 5 MG: 5 TABLET, FILM COATED ORAL at 22:51

## 2024-01-24 RX ADMIN — CLOPIDOGREL BISULFATE 75 MG: 75 TABLET ORAL at 10:17

## 2024-01-24 RX ADMIN — PANTOPRAZOLE SODIUM 40 MG: 40 TABLET, DELAYED RELEASE ORAL at 05:46

## 2024-01-24 RX ADMIN — TAMSULOSIN HYDROCHLORIDE 0.4 MG: 0.4 CAPSULE ORAL at 22:51

## 2024-01-24 RX ADMIN — LISINOPRIL 5 MG: 5 TABLET ORAL at 10:17

## 2024-01-24 RX ADMIN — GUAIFENESIN 600 MG: 600 TABLET, EXTENDED RELEASE ORAL at 22:51

## 2024-01-24 RX ADMIN — SODIUM CHLORIDE, PRESERVATIVE FREE 10 ML: 5 INJECTION INTRAVENOUS at 22:52

## 2024-01-24 RX ADMIN — APIXABAN 5 MG: 5 TABLET, FILM COATED ORAL at 22:51

## 2024-01-24 RX ADMIN — METOPROLOL TARTRATE 25 MG: 50 TABLET, FILM COATED ORAL at 22:51

## 2024-01-24 RX ADMIN — SODIUM CHLORIDE, PRESERVATIVE FREE 10 ML: 5 INJECTION INTRAVENOUS at 10:18

## 2024-01-24 RX ADMIN — GUAIFENESIN 600 MG: 600 TABLET, EXTENDED RELEASE ORAL at 10:17

## 2024-01-24 RX ADMIN — ROSUVASTATIN CALCIUM 40 MG: 20 TABLET, FILM COATED ORAL at 22:51

## 2024-01-24 RX ADMIN — APIXABAN 5 MG: 5 TABLET, FILM COATED ORAL at 10:17

## 2024-01-24 NOTE — PROGRESS NOTES
V2.0  [facility] [place]      Name:  Brett Lozada /Age/Sex: 1940  (83 y.o. male)   MRN & CSN:  [id number] & [id number] Encounter Date/Time: 2024 7:57 AM EST    Location:  OBS  PCP: Delfin Gibbons MD       Hospital Day: 3    Assessment and Plan:   Brett Lozada is a 83 y.o. male with pmh of hypertension, coronary disease status post CABG, PE, urinary retention with Mahmood and recent hospitalization for chills and decreased appetite found to have a complicated UTI discharged on p.o. antibiotics,    who presents with Generalized weakness      Plan:    Syncope: Woke up on the ground does not recall his fall from his chair; CT head unremarkable, CT C-spine unremarkable, EKG with no acute ischemic changes; will check orthostatics, check echo, will check CK level, continue telemetry for 24 hours  ECHO: EF 50-55%, normal LV function  Weakness: Given cough will check respiratory PCR: NEG, PT OT consulted, CM Consulted, out of bed with assistance and will monitor  PT/OT recommend SNF placement (he lives in apartment alone)   Dietary Consulted: for weight loss and weakness   CM Consulted for placement   History of heart failure with preserved ejection fraction: Noted on last echo in ; is compensated and will monitor  History of coronary disease status post CABG: Continue home Plavix and Crestor  History of PE: Continue Eliquis  History of hypertension: Continue metoprolol, lisinopril, Imdur  History of dementia: Continue home Aricept  History of urinary retention: Continue Mahmood for now with tamsulosin; if does require hospitalization for more than 24 to 48 can trial voiding trial; otherwise is scheduled to follow-up with urology for voiding trial as an outpatient  History of recent UTI: 1 more day of antibiotic therapy and will continue  Consulted Urology due to Mahmood Catheter in place for many weeks, he wants it removed.   Mahmood removed, Post Void Residual was 280 ml, after

## 2024-01-24 NOTE — CONSULTS
[address] [address]   Consult Note  [facility] 1 2 3 4 5    Date: 2024   Patient: Brett Lozada   : 1940   DOA: 2024   MRN: [id number]   ROOM#: OBS [id number]     Reason for Consult: Advise on Mahmood Catheter- placed for obstruction weeks ago, had UTI now resolved, wants Mahmood to come out  Requesting Physician: Berenice English CNP  Collaborating Urologist on Call at time of admission: Dr. Fonseca  CHIEF COMPLAINT: nausea and fall    History Obtained From: patient, electronic medical record    HISTORY OF PRESENT ILLNESS:                The patient is a 83 y.o. male with significant past medical history of CAD s/p CABG on Plavix, PE on Eliquis, prostate cancer s/p XRT , dementia, UTI's, BPH, urinary retention, HLD, and HTN who presented with several days of nausea/chills and a recent fall with a head injury. His work-up in the was non-acute. His urine cx is negative and pt reports having regular BM's. Of note, pt had a catheter placed 23 in the [facility] ED for urinary retention, likely secondary to constipation. This morning, he is feeling well with no complaints and is agreeable with having a voiding trial, understanding that the catheter may have to be reinserted if he is unable to adequately empty his bladder. Pt is expected to be discharged to a SNF for rehab.    ED Provider's HPI 24: Brett Lozada is a 83 y.o. male who presents for evaluation of nausea and chills for the last couple of days.  He states he was recently in the hospital for UTI, was discharged 5 days ago, was feeling well for a few days after going home and then started feeling worse the last 2 days.  Has been taking his ATB as prescribed.  Denies abdominal pain, vomiting, diarrhea, F/C, cough.  Has had some mild congestion.    This morning he woke up on the floor, cannot recall getting up in the night or how he got there.  Denies any pain at present.  Denies HA, neck or back pain.  He is on  condition->Emergency Medical Condition (MA) Reason for Exam: lower abd pain FINDINGS: Lower Chest: There is right lower lobe infiltrate versus rounded atelectasis in the lateral costophrenic angle.  There is minimal bilateral peribronchial wall thickening. Organs: There is left nephrolithiasis without evidence for hydronephrosis or obstructive uropathy.  The gallbladder liver spleen right kidney adrenal glands and pancreas are unremarkable GI/Bowel: There is no evidence for bowel obstruction and the appendix is normal Pelvis: There is a Mahmood catheter in the urinary bladder as well as air within the urinary bladder which may have been introduced iatrogenically. There is diverticulosis in the colon. Peritoneum/Retroperitoneum: Calcific atherosclerotic disease in the aorta without evidence for aneurysm. Bones/Soft Tissues: Degenerative change in the spine     1. No acute intra-abdominal or pelvic process. 2. Right lower lobe infiltrate versus rounded atelectasis in the lateral costophrenic angle.  Recommend follow-up imaging to confirm resolution 3. Left-sided nephrolithiasis without evidence for hydronephrosis or obstructive uropathy. 4. Diverticulosis     POC US POST VOID RESIDUAL    Result Date: 1/10/2024  POCUS_Bladder_Retention     Exam Information:          Exam type:  Clinically indicated     Indication(s) for Exam:          The exam was performed with the following indications::  Decreased urine output     Views Obtained & Images Saved for These Views:                  Transverse view         Sagittal (longitudinal) view     Interpretation:          Urinary Retention     Confirmatory study:          What confirmatory study was done?:  Not applicable  Electronically signed by Mark Lam on Wednesday, January 10, 2024 at 2:51 AM : Attending: Mark Lam     CTA PULMONARY W CONTRAST    Result Date: 12/27/2023  EXAMINATION: CTA OF THE CHEST 12/26/2023 11:32 pm TECHNIQUE: CTA of the chest was performed

## 2024-01-24 NOTE — DISCHARGE INSTR - COC
Medical Equipment (for information only, NOT a DME order):  walker  Other Treatments: Mahmood Care     Patient's personal belongings (please select all that are sent with patient):  Cell phone, , phone book, clothes    RN SIGNATURE:  Electronically signed by Chester Ramachandran RN on 1/25/24 at 11:36 AM EST    CASE MANAGEMENT/SOCIAL WORK SECTION    Inpatient Status Date: 01/25/24    Readmission Risk Assessment Score:  Readmission Risk              Risk of Unplanned Readmission:  0           Discharging to Facility/ Agency   Name: [facility]  Address:  Phone:  Fax:    Dialysis Facility (if applicable)   Name:  Address:  Dialysis Schedule:  Phone:  Fax:    / signature: Electronically signed by Minerva Rodriguez RN on 1/25/24 at 11:40 AM EST    PHYSICIAN SECTION    Prognosis: Good    Condition at Discharge: Stable    Rehab Potential (if transferring to Rehab): Good    Recommended Labs or Other Treatments After Discharge: CBC w/ Diff and CMP prior to Urology Follow Up     Physician Certification: I certify the above information and transfer of Brett Lozada  is necessary for the continuing treatment of the diagnosis listed and that he requires Skilled Nursing Facility for greater 30 days.     Update Admission H&P: No change in H&P    PHYSICIAN SIGNATURE:  Electronically signed by MIGUEL DAILY CNP on 1/25/24 at 11:02 AM EST

## 2024-01-24 NOTE — PLAN OF CARE
Problem: Discharge Planning  Goal: Discharge to home or other facility with appropriate resources  Outcome: Progressing  Flowsheets (Taken 1/23/2024 2208 by Colin Bansal, RN)  Discharge to home or other facility with appropriate resources:   Identify barriers to discharge with patient and caregiver   Arrange for needed discharge resources and transportation as appropriate   Identify discharge learning needs (meds, wound care, etc)   Refer to discharge planning if patient needs post-hospital services based on physician order or complex needs related to functional status, cognitive ability or social support system     Problem: Safety - Adult  Goal: Free from fall injury  Outcome: Progressing     Problem: Nutrition Deficit:  Goal: Optimize nutritional status  Outcome: Progressing

## 2024-01-24 NOTE — CARE COORDINATION
ALYSSA spoke with NatalieMary Ann/# [phone] pt has been approved for SNF admission. She now ask for PASRR to be completed.    Update to pt, and to hospitalist Berenice APRN-CNP.        Berenice APRN-CNP/Hospitalist states pt can not be cleared until he has urinated/arechiga cath removed and a bladder scan done.    Pt has urinated 50 cc  Bladder scan 283, may hold up pt discharge to SNF.  1450 Berenice APRN-CNP stated urology has to be consulted, pt not yet cleared for discharge.    Addendum/update:  Urology ordered arechiga to be replaced, pt will be discharged with Arechiga catheter, Will monitor pt until tomorrow.    POC will be for discharge to [facility] tomorrow. [phone] to update of POC. GENERN/ALYSSA

## 2024-01-25 VITALS
TEMPERATURE: 99 F | OXYGEN SATURATION: 97 % | HEART RATE: 55 BPM | WEIGHT: 162.04 LBS | SYSTOLIC BLOOD PRESSURE: 146 MMHG | HEIGHT: 66 IN | BODY MASS INDEX: 26.04 KG/M2 | DIASTOLIC BLOOD PRESSURE: 84 MMHG | RESPIRATION RATE: 14 BRPM

## 2024-01-25 LAB
ANION GAP SERPL CALCULATED.3IONS-SCNC: 9 MMOL/L (ref 7–16)
BASOPHILS ABSOLUTE: 0 K/CU MM
BASOPHILS RELATIVE PERCENT: 0.3 % (ref 0–1)
BUN SERPL-MCNC: 11 MG/DL (ref 6–23)
CALCIUM SERPL-MCNC: 9 MG/DL (ref 8.3–10.6)
CHLORIDE BLD-SCNC: 106 MMOL/L (ref 99–110)
CO2: 26 MMOL/L (ref 21–32)
CREAT SERPL-MCNC: 0.9 MG/DL (ref 0.9–1.3)
DIFFERENTIAL TYPE: ABNORMAL
EOSINOPHILS ABSOLUTE: 0.3 K/CU MM
EOSINOPHILS RELATIVE PERCENT: 5.3 % (ref 0–3)
GFR SERPL CREATININE-BSD FRML MDRD: >60 ML/MIN/1.73M2
GLUCOSE SERPL-MCNC: 91 MG/DL (ref 70–99)
HCT VFR BLD CALC: 37.8 % (ref 42–52)
HEMOGLOBIN: 11.9 GM/DL (ref 13.5–18)
IMMATURE NEUTROPHIL %: 0.2 % (ref 0–0.43)
LYMPHOCYTES ABSOLUTE: 1.4 K/CU MM
LYMPHOCYTES RELATIVE PERCENT: 22 % (ref 24–44)
MCH RBC QN AUTO: 28.7 PG (ref 27–31)
MCHC RBC AUTO-ENTMCNC: 31.5 % (ref 32–36)
MCV RBC AUTO: 91.1 FL (ref 78–100)
MONOCYTES ABSOLUTE: 0.5 K/CU MM
MONOCYTES RELATIVE PERCENT: 7.8 % (ref 0–4)
NUCLEATED RBC %: 0 %
PDW BLD-RTO: 13.7 % (ref 11.7–14.9)
PLATELET # BLD: 131 K/CU MM (ref 140–440)
PMV BLD AUTO: 13 FL (ref 7.5–11.1)
POTASSIUM SERPL-SCNC: 4.3 MMOL/L (ref 3.5–5.1)
RBC # BLD: 4.15 M/CU MM (ref 4.6–6.2)
SEGMENTED NEUTROPHILS ABSOLUTE COUNT: 4 K/CU MM
SEGMENTED NEUTROPHILS RELATIVE PERCENT: 64.4 % (ref 36–66)
SODIUM BLD-SCNC: 141 MMOL/L (ref 135–145)
TOTAL IMMATURE NEUTOROPHIL: 0.01 K/CU MM
TOTAL NUCLEATED RBC: 0 K/CU MM
WBC # BLD: 6.2 K/CU MM (ref 4–10.5)

## 2024-01-25 PROCEDURE — 85025 COMPLETE CBC W/AUTO DIFF WBC: CPT

## 2024-01-25 PROCEDURE — 2580000003 HC RX 258: Performed by: INTERNAL MEDICINE

## 2024-01-25 PROCEDURE — 6370000000 HC RX 637 (ALT 250 FOR IP): Performed by: INTERNAL MEDICINE

## 2024-01-25 PROCEDURE — 94761 N-INVAS EAR/PLS OXIMETRY MLT: CPT

## 2024-01-25 PROCEDURE — 80048 BASIC METABOLIC PNL TOTAL CA: CPT

## 2024-01-25 PROCEDURE — 36415 COLL VENOUS BLD VENIPUNCTURE: CPT

## 2024-01-25 RX ORDER — POLYETHYLENE GLYCOL 3350 17 G/17G
17 POWDER, FOR SOLUTION ORAL DAILY PRN
Qty: 527 G | Refills: 0
Start: 2024-01-25 | End: 2024-02-24

## 2024-01-25 RX ORDER — UBIDECARENONE 75 MG
1000 CAPSULE ORAL DAILY
Qty: 90 TABLET | Refills: 3
Start: 2024-01-25 | End: 2025-01-24

## 2024-01-25 RX ORDER — CHOLECALCIFEROL (VITAMIN D3) 50 MCG
2000 TABLET ORAL EVERY OTHER DAY
Qty: 30 TABLET | Refills: 0
Start: 2024-01-25

## 2024-01-25 RX ORDER — ONDANSETRON 4 MG/1
4 TABLET, ORALLY DISINTEGRATING ORAL EVERY 8 HOURS PRN
Qty: 30 TABLET | Refills: 0
Start: 2024-01-25 | End: 2024-01-25 | Stop reason: HOSPADM

## 2024-01-25 RX ORDER — GUAIFENESIN 600 MG/1
600 TABLET, EXTENDED RELEASE ORAL 2 TIMES DAILY
Qty: 30 TABLET | Refills: 0
Start: 2024-01-25

## 2024-01-25 RX ADMIN — PANTOPRAZOLE SODIUM 40 MG: 40 TABLET, DELAYED RELEASE ORAL at 06:54

## 2024-01-25 RX ADMIN — GUAIFENESIN 600 MG: 600 TABLET, EXTENDED RELEASE ORAL at 09:55

## 2024-01-25 RX ADMIN — LISINOPRIL 5 MG: 5 TABLET ORAL at 09:56

## 2024-01-25 RX ADMIN — APIXABAN 5 MG: 5 TABLET, FILM COATED ORAL at 09:55

## 2024-01-25 RX ADMIN — METOPROLOL TARTRATE 25 MG: 50 TABLET, FILM COATED ORAL at 10:07

## 2024-01-25 RX ADMIN — SODIUM CHLORIDE, PRESERVATIVE FREE 10 ML: 5 INJECTION INTRAVENOUS at 09:55

## 2024-01-25 RX ADMIN — ISOSORBIDE MONONITRATE 30 MG: 30 TABLET, EXTENDED RELEASE ORAL at 09:56

## 2024-01-25 RX ADMIN — ACETAMINOPHEN 650 MG: 325 TABLET ORAL at 11:03

## 2024-01-25 RX ADMIN — CLOPIDOGREL BISULFATE 75 MG: 75 TABLET ORAL at 09:55

## 2024-01-25 ASSESSMENT — PAIN SCALES - GENERAL
PAINLEVEL_OUTOF10: 0
PAINLEVEL_OUTOF10: 2

## 2024-01-25 ASSESSMENT — PAIN DESCRIPTION - LOCATION: LOCATION: PENIS

## 2024-01-25 ASSESSMENT — PAIN DESCRIPTION - DESCRIPTORS: DESCRIPTORS: ACHING

## 2024-01-25 NOTE — PROGRESS NOTES
Ngozi with [facility] called and updated on heart rate and metoprolol dosage being reduced to 12.5, pt updated.

## 2024-01-25 NOTE — PROGRESS NOTES
Pt heart rate is between 55-65, Berenice English stated to give metoprolol    5885- Berenice English paged- pt heart rate is staying at 47-50. He is not having any symptoms are you ok for him to still discharge? is he ok to remain on the same dose?   See new orders

## 2024-01-25 NOTE — PLAN OF CARE
Problem: Discharge Planning  Goal: Discharge to home or other facility with appropriate resources  Outcome: Progressing     Problem: Safety - Adult  Goal: Free from fall injury  Outcome: Progressing     Problem: Nutrition Deficit:  Goal: Optimize nutritional status  Outcome: Progressing

## 2024-01-25 NOTE — PLAN OF CARE
Problem: Discharge Planning  Goal: Discharge to home or other facility with appropriate resources  1/25/2024 1138 by Chester Ramachandran RN  Outcome: Completed  1/25/2024 0528 by Feli Sigala LPN  Outcome: Progressing     Problem: Safety - Adult  Goal: Free from fall injury  1/25/2024 1138 by Chester Ramachandran, RN  Outcome: Completed  1/25/2024 0528 by Feli Sigala LPN  Outcome: Progressing     Problem: Nutrition Deficit:  Goal: Optimize nutritional status  1/25/2024 1138 by Chester Ramachandran, RN  Outcome: Completed  1/25/2024 0528 by Feli Sigala LPN  Outcome: Progressing     Problem: Pain  Goal: Verbalizes/displays adequate comfort level or baseline comfort level  Outcome: Completed

## 2024-01-25 NOTE — CARE COORDINATION
ALYSSA spoke with Dimitri, pt is cleared for discharge to the facility and they are ready for the pt who is going to the [facility] alonzo RN report to [phone].  Chester ENRIQUEZ is aware and completing rajat, printing AVS, will call report.  [facility] to transport.

## 2024-01-25 NOTE — DISCHARGE SUMMARY
V2.0  Discharge Summary    Name:  Brett Lozada /Age/Sex: 1940 (83 y.o. male)   Admit Date: 2024  Discharge Date: 24    MRN & CSN:  [id number] & [id number] Encounter Date and Time 24 7:31 AM EST    Attending:  Colin Deal MD Discharging [facility]:     Brief HPI: Brett Lozada is a 83 y.o. male who presented with Nausea, Generalized Weakness and a Fall due to Syncope. He was admitted for evaluation and his symptoms completely resolved, except he continues to be very weak and PT/OT recommend SNF placement. His arechiga catheter was removed yesterday and he failed a voiding trial, therefore it was replaced in the evening per Urology. He will need a follow up with Dr. Fonseca in 2-3 week for his urinary obstruction due to BPH.     Brief Problem Based Course:     Syncope: Woke up on the ground does not recall his fall from his chair; CT head unremarkable, CT C-spine unremarkable, EKG with no acute ischemic changes; will check orthostatics, check echo, will check CK level, continue telemetry for 24 hours  ECHO: EF 50-55%, normal LV function  His HR has been running low, so will decrease his metoprolol tartrate from 25 to 12.5 mg BID   Weakness: Given cough will check respiratory PCR: NEG, PT OT consulted, CM Consulted, out of bed with assistance and will monitor  PT/OT recommend SNF placement (he lives in apartment alone)   Dietary Consulted: for weight loss and weakness   CM Consulted for placement   History of heart failure with preserved ejection fraction: Noted on last echo in ; is compensated and will monitor  History of coronary disease status post CABG: Continue home Plavix and Crestor  History of PE: Continue Eliquis5 mg BID (dxd 23)   History of hypertension: Continue metoprolol, lisinopril, Imdur  History of dementia: Continue home Aricept  History of urinary retention: Continue Arechiga for now with tamsulosin; if does    Component Value Date/Time    NITRU NEGATIVE 01/22/2024 09:05 AM    NITRU NEGATIVE 09/16/2012 03:45 AM    COLORU YELLOW 01/22/2024 09:05 AM    WBCUA 3 01/22/2024 09:05 AM    RBCUA 12 01/22/2024 09:05 AM    MUCUS RARE 01/22/2024 09:05 AM    TRICHOMONAS NONE SEEN 01/22/2024 09:05 AM    BACTERIA NEGATIVE 01/22/2024 09:05 AM    CLARITYU CLEAR 01/22/2024 09:05 AM    SPECGRAV <1.005 01/22/2024 09:05 AM    LEUKOCYTESUR SMALL NUMBER OR AMOUNT OBSERVED 01/22/2024 09:05 AM    UROBILINOGEN 0.2 01/22/2024 09:05 AM    BILIRUBINUR NEGATIVE 01/22/2024 09:05 AM    BLOODU LARGE NUMBER OR AMOUNT OBSERVED 01/22/2024 09:05 AM    GLUCOSEU negative 09/16/2012 03:57 AM    KETUA NEGATIVE 01/22/2024 09:05 AM     Urine Cultures: No results found for: \"LABURIN\"  Blood Cultures: No results found for: \"BC\"  No results found for: \"BLOODCULT2\"  Organism: No results found for: \"ORG\"    Time Spent Discharging patient 38 minutes    Electronically signed by MIGUEL DAILY CNP on 1/25/2024 at 1:40 PM

## 2024-01-25 NOTE — FLOWSHEET NOTE
01/25/24 0946   Vital Signs   Orthostatic B/P and Pulse? Yes   Blood Pressure Lying 140/65   Pulse Lying 55 PER MINUTE   Blood Pressure Sitting 143/61   Pulse Sitting 60 PER MINUTE   Blood Pressure Standing 145/66   Pulse Standing 64 PER MINUTE       
Pt. c/o chest pain, dizziness and difficulty breathing while at work this morning.  Stated she was lifting heavy objects this morning. also endorsed RLQ pain. s/p tummy tuck in April, 2021.

## 2024-02-10 ENCOUNTER — APPOINTMENT (OUTPATIENT)
Dept: CT IMAGING | Age: 84
End: 2024-02-10
Payer: MEDICARE

## 2024-02-10 ENCOUNTER — HOSPITAL ENCOUNTER (EMERGENCY)
Age: 84
Discharge: HOME | End: 2024-02-10
Attending: EMERGENCY MEDICINE
Payer: MEDICARE

## 2024-02-10 VITALS
DIASTOLIC BLOOD PRESSURE: 68 MMHG | HEART RATE: 64 BPM | SYSTOLIC BLOOD PRESSURE: 155 MMHG | TEMPERATURE: 98 F | RESPIRATION RATE: 16 BRPM | OXYGEN SATURATION: 96 %

## 2024-02-10 DIAGNOSIS — R33.9 URINARY RETENTION: Primary | ICD-10-CM

## 2024-02-10 LAB
ANION GAP SERPL CALCULATED.3IONS-SCNC: 11 MMOL/L (ref 7–16)
BACTERIA: NEGATIVE /HPF
BILIRUBIN URINE: NEGATIVE MG/DL
BLOOD, URINE: ABNORMAL
BUN SERPL-MCNC: 14 MG/DL (ref 6–23)
CALCIUM SERPL-MCNC: 8.8 MG/DL (ref 8.3–10.6)
CHLORIDE BLD-SCNC: 109 MMOL/L (ref 99–110)
CLARITY: CLEAR
CO2: 24 MMOL/L (ref 21–32)
COLOR: YELLOW
CREAT SERPL-MCNC: 0.9 MG/DL (ref 0.9–1.3)
GFR SERPL CREATININE-BSD FRML MDRD: >60 ML/MIN/1.73M2
GLUCOSE SERPL-MCNC: 100 MG/DL (ref 70–99)
GLUCOSE, URINE: NEGATIVE MG/DL
KETONES, URINE: NEGATIVE MG/DL
LEUKOCYTE ESTERASE, URINE: ABNORMAL
NITRITE URINE, QUANTITATIVE: NEGATIVE
PH, URINE: 6 (ref 5–8)
POTASSIUM SERPL-SCNC: 3.9 MMOL/L (ref 3.5–5.1)
PROTEIN UA: NEGATIVE MG/DL
RBC URINE: 42 /HPF (ref 0–3)
SODIUM BLD-SCNC: 144 MMOL/L (ref 135–145)
SPECIFIC GRAVITY UA: 1.01 (ref 1–1.03)
TRICHOMONAS: ABNORMAL /HPF
UROBILINOGEN, URINE: 0.2 MG/DL (ref 0.2–1)
WBC UA: 24 /HPF (ref 0–2)

## 2024-02-10 PROCEDURE — 81001 URINALYSIS AUTO W/SCOPE: CPT

## 2024-02-10 PROCEDURE — 99284 EMERGENCY DEPT VISIT MOD MDM: CPT

## 2024-02-10 PROCEDURE — 80048 BASIC METABOLIC PNL TOTAL CA: CPT

## 2024-02-10 PROCEDURE — 74176 CT ABD & PELVIS W/O CONTRAST: CPT

## 2024-02-10 PROCEDURE — 87086 URINE CULTURE/COLONY COUNT: CPT

## 2024-02-10 PROCEDURE — 6370000000 HC RX 637 (ALT 250 FOR IP): Performed by: EMERGENCY MEDICINE

## 2024-02-10 PROCEDURE — 87186 SC STD MICRODIL/AGAR DIL: CPT

## 2024-02-10 PROCEDURE — 87077 CULTURE AEROBIC IDENTIFY: CPT

## 2024-02-10 RX ORDER — CEPHALEXIN 250 MG/1
500 CAPSULE ORAL ONCE
Status: COMPLETED | OUTPATIENT
Start: 2024-02-10 | End: 2024-02-10

## 2024-02-10 RX ORDER — CEPHALEXIN 500 MG/1
500 CAPSULE ORAL 2 TIMES DAILY
Qty: 14 CAPSULE | Refills: 0 | Status: SHIPPED | OUTPATIENT
Start: 2024-02-10 | End: 2024-02-12

## 2024-02-10 RX ADMIN — CEPHALEXIN 500 MG: 250 CAPSULE ORAL at 03:55

## 2024-02-10 ASSESSMENT — PAIN - FUNCTIONAL ASSESSMENT: PAIN_FUNCTIONAL_ASSESSMENT: NONE - DENIES PAIN

## 2024-02-10 NOTE — DISCHARGE INSTRUCTIONS
Please follow your urine output closely.  She began to retain again, please seek immediate medical attention.    Please follow-up with urologist and primary care physician.    You are being placed on an antibiotic.  Please fill your urine culture or have your primary care physician or urologist follow-up.    If you develop any worsening or concerning symptoms, please seek immediate medical evaluation

## 2024-02-10 NOTE — ED PROVIDER NOTES
(ELIQUIS) 5 MG TBPK tablet Take 1 tablet by mouth See Admin Instructions 12/27/23   Philip Cao MD   lisinopril (PRINIVIL;ZESTRIL) 5 MG tablet Take 1 tablet by mouth daily 11/6/23   Smitha Armas APRN - CNP   donepezil (ARICEPT) 5 MG tablet Take 1 tablet by mouth nightly    Sara Rubi MD   isosorbide mononitrate (IMDUR) 30 MG extended release tablet Take 1 tablet by mouth daily 9/11/23   Kaushal Brooks MD   meclizine (ANTIVERT) 25 MG tablet Take 1 tablet by mouth 3 times daily as needed 5/9/23   Sara Rubi MD   ondansetron (ZOFRAN) 4 MG tablet Take 1 tablet by mouth every 8 hours as needed for Nausea 5/7/23   Valerio Phillips PA-C   nitroGLYCERIN (NITROSTAT) 0.4 MG SL tablet Place 1 tablet under the tongue once for 1 dose up to max of 3 total doses. If no relief after 1 dose, call 911. 5/2/23 10/12/23  Silvio Escoto PA-C   tamsulosin (FLOMAX) 0.4 MG capsule Take 1 capsule by mouth at bedtime 5/2/23   Silvio Escoto PA-C   clopidogrel (PLAVIX) 75 MG tablet Take 1 tablet by mouth daily 2/3/22   Kaushal Brooks MD   rosuvastatin (CRESTOR) 40 MG tablet Take 1 tablet by mouth daily  Patient taking differently: Take 1 tablet by mouth at bedtime 2/23/21   Kaushal Brooks MD        Patient Active Problem List   Diagnosis    STEMI (ST elevation myocardial infarction) ([facility])    ASHD (arteriosclerotic heart disease)    S/P CABG x 2    HTN (hypertension)    Hyperlipidemia, mixed    COPD, moderate ([facility])    PVD (peripheral vascular disease) ([facility])    Exertional dyspnea    Diarrhea    Nausea    Coronary artery disease involving coronary bypass graft of native heart without angina pectoris    Unstable angina ([facility])    SBO (small bowel obstruction) ([facility])    Thrush    PVC (premature ventricular contraction)    Dizziness    Ataxia    Chest pain    Acute ST elevation myocardial infarction (STEMI) due to occlusion of distal portion of left anterior descending (LAD) coronary artery ([facility])  URINALYSIS - Abnormal; Notable for the following components:    RBC, UA 42 (*)     WBC, UA 24 (*)     All other components within normal limits   CULTURE, URINE          RADIOLOGY:     Non-plain film images such as CT, Ultrasound and MRI are read by the radiologist. Plain radiographic images are visualized and preliminarily interpreted by the emergency physician.       Interpretation per the Radiologist below, if available at the time of this note:    CT ABDOMEN PELVIS WO CONTRAST Additional Contrast? None   Final Result   1.  Findings suggestive of cystitis.  Mild bladder distension.      2.  No hydronephrosis.      3.  Diverticulosis.  Moderate stool burden.               ED BEDSIDE ULTRASOUND:   Performed by ED Physician Herlinda Webber MD       LABS:  Labs Reviewed   URINALYSIS - Abnormal; Notable for the following components:       Result Value    Blood, Urine LARGE NUMBER OR AMOUNT OBSERVED (*)     Leukocyte Esterase, Urine SMALL NUMBER OR AMOUNT OBSERVED (*)     All other components within normal limits   BASIC METABOLIC PANEL - Abnormal; Notable for the following components:    Glucose 100 (*)     All other components within normal limits   MICROSCOPIC URINALYSIS - Abnormal; Notable for the following components:    RBC, UA 42 (*)     WBC, UA 24 (*)     All other components within normal limits   CULTURE, URINE       All other labs were within normal range or not returned as of this dictation.    EMERGENCY DEPARTMENT COURSE and DIFFERENTIAL DIAGNOSIS/MDM:   Vitals:    Vitals:    02/10/24 0059   BP: (!) 155/68   Pulse: 64   Resp: 16   Temp: 98 °F (36.7 °C)   TempSrc: Oral   SpO2: 96%           MDM  Number of Diagnoses or Management Options  Urinary retention  Diagnosis management comments: 83-year-old male presents complaining of urinary retention.  He recently had a Mahmood catheter in place but reports it was taken out about 1 week ago.  States initially was been urinating well but over the past couple

## 2024-02-10 NOTE — ED TRIAGE NOTES
Patient came in with urinary retention. Patient states that he \"dribbles\" but that's about it in the last couple of days.

## 2024-02-11 LAB
CULTURE: NORMAL
Lab: NORMAL
SPECIMEN: NORMAL

## 2024-02-12 ENCOUNTER — TELEPHONE (OUTPATIENT)
Dept: PHARMACY | Age: 84
End: 2024-02-12

## 2024-02-12 LAB
CULTURE: ABNORMAL
CULTURE: ABNORMAL
Lab: ABNORMAL
SPECIMEN: ABNORMAL

## 2024-02-12 NOTE — TELEPHONE ENCOUNTER
Pharmacy Note  ED Culture Follow-up    Brett Lozada is a 83 y.o. male.     Allergies: Patient has no known allergies.     Labs:  Lab Results   Component Value Date    BUN 14 02/10/2024    CREATININE 0.9 02/10/2024    WBC 6.2 01/25/2024     CrCl cannot be calculated (Unknown ideal weight.).    Current antimicrobials:   Cephalexin     ASSESSMENT:  Micro results:   Urine culture 50,000 CFU/ml staphylococcus epidermidis     PLAN:  Need for intervention: Yes  Discussed with: Dr. Dempsey  Chosen treatment:    Informed patient of urine culture result. Patient reports symptoms have completely resolved. Instructed patient to discontinue cephalexin as culture count insignificant and patient is asymptomatic.    Patient response:   Called and spoke with patient.   Counseled patient on following up with PCP    Called/sent in prescription to: Not applicable    Please call with any questions. Ext. [phone]    Margarita Saenz RPH, PharmD 3:03 PM 2/12/2024

## 2024-02-12 NOTE — PROGRESS NOTES
Pharmacy Note  ED Culture Follow-up    Brett Lozada is a 83 y.o. male.     Allergies: Patient has no known allergies.     Labs:  Lab Results   Component Value Date    BUN 14 02/10/2024    CREATININE 0.9 02/10/2024    WBC 6.2 01/25/2024     CrCl cannot be calculated (Unknown ideal weight.).    Current antimicrobials:   Cephalexin     ASSESSMENT:  Micro results:   Urine culture 50,000 CFU/ml staphylococcus epidermidis     PLAN:  Need for intervention: Yes  Discussed with: Dr. Dempsey  Chosen treatment:    Informed patient of urine culture result. Patient reports symptoms have completely resolved. Instructed patient to discontinue cephalexin as culture count insignificant and patient is asymptomatic.    Patient response:   Called and spoke with patient.    Counseled patient on following up with PCP    Called/sent in prescription to: Not applicable    Please call with any questions. Ext. [phone]    Margarita Saenz RPH, PharmD 3:03 PM 2/12/2024  '

## 2024-02-13 NOTE — PROGRESS NOTES
Patient Name:  Brett Lozada  Patient :  1940  Patient MRN:  [id number]     Primary Oncologist: Hailey Caruso MD  Referring Provider: Delfin Gibbons MD     Date of Service: 2024      Reason for Consult:  PE      Chief Complaint:    Chief Complaint   Patient presents with    New Patient        Patient Active Problem List:     STEMI (ST elevation myocardial infarction) ([facility])     ASHD (arteriosclerotic heart disease)     S/P CABG x 2     HTN (hypertension)     Hyperlipidemia, mixed     COPD, moderate ([facility])     PVD (peripheral vascular disease) ([facility])     Coronary artery disease involving coronary bypass graft of native heart without angina pectoris     SBO (small bowel obstruction) ([facility])     PVC (premature ventricular contraction)     Ataxia     Acute ST elevation myocardial infarction (STEMI) due to occlusion of distal portion of left anterior descending (LAD) coronary artery ([facility])     MI (myocardial infarction), initial episode of care ([facility])     HPI:   Brett Lozada is a pleasant 84 yo male patient who was referred for evaluation of b/l PE in 2024. He has mild dementia.    He went to ED in 2023 due to not feeling well. He had COVID 3 weeks prior.    2023 CTA chest:   1.  Multiple bilateral pulmonary arterial thromboemboli, right greater than left.  No right ventricular strain.   2.  Trace right pleural effusion.   3.  Right greater than left lower lobe heterogeneous opacities with right basilar consolidation.  Differential considerations include atelectasis, infection and developing infarction.  Currently on DOAC.     2023 WBC 8.4, Hg 13.6, MCV 93.9, plat 129.  2024 WBC 7.4, Hg 12.1, MCV 93.1, plat 217.   1/15/2024 CMP grossly unremarkable.    He has 4 children, 2 biological.  Mother has GI tract ca.   No thromboembolism in the family. No prior h/o DVT/PE.  Not on testosterone supplement.     He has h/o prostate cancer 25 years ago. FU with urologist.    Past Medical

## 2024-02-22 PROBLEM — I21.4 ACUTE NON Q WAVE MI (MYOCARDIAL INFARCTION), INITIAL EPISODE OF CARE (HCC): Status: RESOLVED | Noted: 2023-05-02 | Resolved: 2024-02-22

## 2024-02-22 PROBLEM — I21.02 ACUTE ST ELEVATION MYOCARDIAL INFARCTION (STEMI) DUE TO OCCLUSION OF DISTAL PORTION OF LEFT ANTERIOR DESCENDING (LAD) CORONARY ARTERY (HCC): Status: RESOLVED | Noted: 2023-04-29 | Resolved: 2024-02-22

## 2024-02-23 ENCOUNTER — HOSPITAL ENCOUNTER (OUTPATIENT)
Dept: INFUSION THERAPY | Age: 84
Discharge: HOME | End: 2024-02-23
Payer: MEDICARE

## 2024-02-23 ENCOUNTER — INITIAL CONSULT (OUTPATIENT)
Dept: ONCOLOGY | Age: 84
End: 2024-02-23
Payer: MEDICARE

## 2024-02-23 VITALS
BODY MASS INDEX: 26.1 KG/M2 | TEMPERATURE: 97.9 F | HEART RATE: 65 BPM | WEIGHT: 162.4 LBS | DIASTOLIC BLOOD PRESSURE: 66 MMHG | SYSTOLIC BLOOD PRESSURE: 147 MMHG | HEIGHT: 66 IN | RESPIRATION RATE: 16 BRPM | OXYGEN SATURATION: 95 %

## 2024-02-23 DIAGNOSIS — D68.59 HYPERCOAGULABLE STATE (HCC): ICD-10-CM

## 2024-02-23 DIAGNOSIS — D64.9 ANEMIA, UNSPECIFIED TYPE: ICD-10-CM

## 2024-02-23 DIAGNOSIS — D64.9 ANEMIA, UNSPECIFIED TYPE: Primary | ICD-10-CM

## 2024-02-23 LAB
ALBUMIN SERPL-MCNC: 4.2 GM/DL (ref 3.4–5)
ALP BLD-CCNC: 120 IU/L (ref 40–129)
ALT SERPL-CCNC: 8 U/L (ref 10–40)
ANION GAP SERPL CALCULATED.3IONS-SCNC: 10 MMOL/L (ref 7–16)
AST SERPL-CCNC: 12 IU/L (ref 15–37)
BASOPHILS ABSOLUTE: 0 K/CU MM
BASOPHILS RELATIVE PERCENT: 0.3 % (ref 0–1)
BILIRUB SERPL-MCNC: 0.4 MG/DL (ref 0–1)
BUN SERPL-MCNC: 15 MG/DL (ref 6–23)
CALCIUM SERPL-MCNC: 9.1 MG/DL (ref 8.3–10.6)
CHLORIDE BLD-SCNC: 106 MMOL/L (ref 99–110)
CO2: 25 MMOL/L (ref 21–32)
CREAT SERPL-MCNC: 0.9 MG/DL (ref 0.9–1.3)
DIFFERENTIAL TYPE: ABNORMAL
EOSINOPHILS ABSOLUTE: 0.2 K/CU MM
EOSINOPHILS RELATIVE PERCENT: 3 % (ref 0–3)
FERRITIN: 28 NG/ML (ref 30–400)
FOLATE SERPL-MCNC: 6.1 NG/ML (ref 3.1–17.5)
GFR SERPL CREATININE-BSD FRML MDRD: >60 ML/MIN/1.73M2
GLUCOSE SERPL-MCNC: 92 MG/DL (ref 70–99)
HCT VFR BLD CALC: 39.7 % (ref 42–52)
HEMOGLOBIN: 12 GM/DL (ref 13.5–18)
IMMATURE NEUTROPHIL %: 0.3 % (ref 0–0.43)
IRON: 49 UG/DL (ref 59–158)
LYMPHOCYTES ABSOLUTE: 1 K/CU MM
LYMPHOCYTES RELATIVE PERCENT: 14.8 % (ref 24–44)
MCH RBC QN AUTO: 28.9 PG (ref 27–31)
MCHC RBC AUTO-ENTMCNC: 30.2 % (ref 32–36)
MCV RBC AUTO: 95.7 FL (ref 78–100)
MONOCYTES ABSOLUTE: 0.5 K/CU MM
MONOCYTES RELATIVE PERCENT: 7.4 % (ref 0–4)
NUCLEATED RBC %: 0 %
PCT TRANSFERRIN: 20 % (ref 10–44)
PDW BLD-RTO: 15.4 % (ref 11.7–14.9)
PLATELET # BLD: 117 K/CU MM (ref 140–440)
PMV BLD AUTO: 12.4 FL (ref 7.5–11.1)
POTASSIUM SERPL-SCNC: 4 MMOL/L (ref 3.5–5.1)
RBC # BLD: 4.15 M/CU MM (ref 4.6–6.2)
SEGMENTED NEUTROPHILS ABSOLUTE COUNT: 5.1 K/CU MM
SEGMENTED NEUTROPHILS RELATIVE PERCENT: 74.2 % (ref 36–66)
SODIUM BLD-SCNC: 141 MMOL/L (ref 135–145)
TOTAL IMMATURE NEUTOROPHIL: 0.02 K/CU MM
TOTAL IRON BINDING CAPACITY: 248 UG/DL (ref 250–450)
TOTAL NUCLEATED RBC: 0 K/CU MM
TOTAL PROTEIN: 6.2 GM/DL (ref 6.4–8.2)
UNSATURATED IRON BINDING CAPACITY: 199 UG/DL (ref 110–370)
VITAMIN B-12: 1129 PG/ML (ref 211–911)
WBC # BLD: 6.9 K/CU MM (ref 4–10.5)

## 2024-02-23 PROCEDURE — 1111F DSCHRG MED/CURRENT MED MERGE: CPT | Performed by: INTERNAL MEDICINE

## 2024-02-23 PROCEDURE — 83550 IRON BINDING TEST: CPT

## 2024-02-23 PROCEDURE — 85303 CLOT INHIBIT PROT C ACTIVITY: CPT

## 2024-02-23 PROCEDURE — 86147 CARDIOLIPIN ANTIBODY EA IG: CPT

## 2024-02-23 PROCEDURE — 1123F ACP DISCUSS/DSCN MKR DOCD: CPT | Performed by: INTERNAL MEDICINE

## 2024-02-23 PROCEDURE — 99204 OFFICE O/P NEW MOD 45 MIN: CPT | Performed by: INTERNAL MEDICINE

## 2024-02-23 PROCEDURE — G8484 FLU IMMUNIZE NO ADMIN: HCPCS | Performed by: INTERNAL MEDICINE

## 2024-02-23 PROCEDURE — 99211 OFF/OP EST MAY X REQ PHY/QHP: CPT

## 2024-02-23 PROCEDURE — 83540 ASSAY OF IRON: CPT

## 2024-02-23 PROCEDURE — 80053 COMPREHEN METABOLIC PANEL: CPT

## 2024-02-23 PROCEDURE — 82607 VITAMIN B-12: CPT

## 2024-02-23 PROCEDURE — 81240 F2 GENE: CPT

## 2024-02-23 PROCEDURE — 85300 ANTITHROMBIN III ACTIVITY: CPT

## 2024-02-23 PROCEDURE — 3078F DIAST BP <80 MM HG: CPT | Performed by: INTERNAL MEDICINE

## 2024-02-23 PROCEDURE — 85302 CLOT INHIBIT PROT C ANTIGEN: CPT

## 2024-02-23 PROCEDURE — 85305 CLOT INHIBIT PROT S TOTAL: CPT

## 2024-02-23 PROCEDURE — 1036F TOBACCO NON-USER: CPT | Performed by: INTERNAL MEDICINE

## 2024-02-23 PROCEDURE — 85025 COMPLETE CBC W/AUTO DIFF WBC: CPT

## 2024-02-23 PROCEDURE — 36415 COLL VENOUS BLD VENIPUNCTURE: CPT

## 2024-02-23 PROCEDURE — 82728 ASSAY OF FERRITIN: CPT

## 2024-02-23 PROCEDURE — 82746 ASSAY OF FOLIC ACID SERUM: CPT

## 2024-02-23 PROCEDURE — G8417 CALC BMI ABV UP PARAM F/U: HCPCS | Performed by: INTERNAL MEDICINE

## 2024-02-23 PROCEDURE — 86146 BETA-2 GLYCOPROTEIN ANTIBODY: CPT

## 2024-02-23 PROCEDURE — 3077F SYST BP >= 140 MM HG: CPT | Performed by: INTERNAL MEDICINE

## 2024-02-23 PROCEDURE — 81241 F5 GENE: CPT

## 2024-02-23 PROCEDURE — G8427 DOCREV CUR MEDS BY ELIG CLIN: HCPCS | Performed by: INTERNAL MEDICINE

## 2024-02-23 ASSESSMENT — PATIENT HEALTH QUESTIONNAIRE - PHQ9
SUM OF ALL RESPONSES TO PHQ QUESTIONS 1-9: 0
2. FEELING DOWN, DEPRESSED OR HOPELESS: 0
1. LITTLE INTEREST OR PLEASURE IN DOING THINGS: 0
SUM OF ALL RESPONSES TO PHQ9 QUESTIONS 1 & 2: 0
SUM OF ALL RESPONSES TO PHQ QUESTIONS 1-9: 0

## 2024-02-23 NOTE — PROGRESS NOTES
MA Rooming Questions  Patient: Brett Lozada  MRN: [id number]    Date: 2/23/2024        New Patient        5. Did the patient have a depression screening completed today? YES    No data recorded     PHQ-9 Given to (if applicable):               PHQ-9 Score (if applicable):                     [] Positive     []  Negative              Does question #9 need addressed (if applicable)                     [] Yes    []  No               Ngozi York MA

## 2024-02-27 NOTE — PROGRESS NOTES
Patient Name:  Brett Lozada  Patient :  1940  Patient MRN:  [id number]     Primary Oncologist: Hailey Caruso MD  Referring Provider: Delfin Gibbons MD     Date of Service: 3/20/2024      Chief Complaint:    No chief complaint on file.    FU visit.     Patient Active Problem List:     STEMI (ST elevation myocardial infarction) ([facility])     ASHD (arteriosclerotic heart disease)     S/P CABG x 2     HTN (hypertension)     Hyperlipidemia, mixed     COPD, moderate ([facility])     PVD (peripheral vascular disease) ([facility])     Coronary artery disease involving coronary bypass graft of native heart without angina pectoris     SBO (small bowel obstruction) ([facility])     PVC (premature ventricular contraction)     Ataxia     ST elevation myocardial infarction (STEMI) due to occlusion of distal portion of left anterior descending (LAD) coronary artery ([facility])     MI (myocardial infarction), initial episode of care ([facility])     HPI:   Brett Lozada is a pleasant 82 yo male patient who was referred for evaluation of b/l PE in 2024. He has mild dementia.  He went to ED in 2023 due to not feeling well. He had COVID 3 weeks prior.    2023 CTA chest:   1.  Multiple bilateral pulmonary arterial thromboemboli, right greater than left.  No right ventricular strain.   2.  Trace right pleural effusion.   3.  Right greater than left lower lobe heterogeneous opacities with right basilar consolidation.  Differential considerations include atelectasis, infection and developing infarction.  Currently on DOAC.     2023 WBC 8.4, Hg 13.6, MCV 93.9, plat 129.  2024 WBC 7.4, Hg 12.1, MCV 93.1, plat 217.   1/15/2024 CMP grossly unremarkable.    He has 4 children, 2 biological.  Mother has GI tract ca.   No thromboembolism in the family. No prior h/o DVT/PE.  Not on testosterone supplement.     He has h/o prostate cancer 25 years ago. FU with urologist.    3/20/2024 follow up visit.  2024 creat 0.9. B-12 1129. Folate 6.1.

## 2024-02-28 ENCOUNTER — OFFICE VISIT (OUTPATIENT)
Dept: CARDIOLOGY CLINIC | Age: 84
End: 2024-02-28
Payer: MEDICARE

## 2024-02-28 VITALS
SYSTOLIC BLOOD PRESSURE: 116 MMHG | WEIGHT: 163 LBS | BODY MASS INDEX: 26.2 KG/M2 | HEIGHT: 66 IN | DIASTOLIC BLOOD PRESSURE: 60 MMHG | HEART RATE: 64 BPM

## 2024-02-28 DIAGNOSIS — I25.810 CORONARY ARTERY DISEASE INVOLVING CORONARY BYPASS GRAFT OF NATIVE HEART WITHOUT ANGINA PECTORIS: Primary | ICD-10-CM

## 2024-02-28 DIAGNOSIS — I10 PRIMARY HYPERTENSION: Chronic | ICD-10-CM

## 2024-02-28 DIAGNOSIS — I25.10 ASHD (ARTERIOSCLEROTIC HEART DISEASE): Chronic | ICD-10-CM

## 2024-02-28 DIAGNOSIS — E78.2 HYPERLIPIDEMIA, MIXED: Chronic | ICD-10-CM

## 2024-02-28 DIAGNOSIS — I73.9 PVD (PERIPHERAL VASCULAR DISEASE) (HCC): Chronic | ICD-10-CM

## 2024-02-28 DIAGNOSIS — Z95.1 S/P CABG X 2: Chronic | ICD-10-CM

## 2024-02-28 DIAGNOSIS — I25.2 HISTORY OF ACUTE MYOCARDIAL INFARCTION: ICD-10-CM

## 2024-02-28 PROCEDURE — 1123F ACP DISCUSS/DSCN MKR DOCD: CPT | Performed by: INTERNAL MEDICINE

## 2024-02-28 PROCEDURE — G8484 FLU IMMUNIZE NO ADMIN: HCPCS | Performed by: INTERNAL MEDICINE

## 2024-02-28 PROCEDURE — 3078F DIAST BP <80 MM HG: CPT | Performed by: INTERNAL MEDICINE

## 2024-02-28 PROCEDURE — 3074F SYST BP LT 130 MM HG: CPT | Performed by: INTERNAL MEDICINE

## 2024-02-28 PROCEDURE — G8427 DOCREV CUR MEDS BY ELIG CLIN: HCPCS | Performed by: INTERNAL MEDICINE

## 2024-02-28 PROCEDURE — 99213 OFFICE O/P EST LOW 20 MIN: CPT | Performed by: INTERNAL MEDICINE

## 2024-02-28 PROCEDURE — 1036F TOBACCO NON-USER: CPT | Performed by: INTERNAL MEDICINE

## 2024-02-28 PROCEDURE — G8417 CALC BMI ABV UP PARAM F/U: HCPCS | Performed by: INTERNAL MEDICINE

## 2024-02-28 RX ORDER — BENZONATATE 100 MG/1
100 CAPSULE ORAL 3 TIMES DAILY PRN
COMMUNITY

## 2024-02-28 RX ORDER — ISOSORBIDE MONONITRATE 30 MG/1
30 TABLET, EXTENDED RELEASE ORAL DAILY
Qty: 30 TABLET | Refills: 5 | Status: SHIPPED | OUTPATIENT
Start: 2024-02-28

## 2024-02-28 RX ORDER — ATORVASTATIN CALCIUM 80 MG/1
80 TABLET, FILM COATED ORAL DAILY
COMMUNITY

## 2024-02-28 NOTE — PATIENT INSTRUCTIONS
Excessive PVCs resolved since patient has been on magnesium     TESTS ORDERED:None this visit     PREVIOUSLY ORDERED TESTS REVIEWED & DISCUSSED WITH THE PATIENT:     I personally reviewed & interpreted, all previously ordered tests as copied above. Latest Labs are pulled in to the note with dates.   Labs, specially in Reference to Lipid profile, Cardiac testing in the form of Echo ( dated: ), stress tests ( dated: ) & other relevant cardiac testing reviewed with patient & recommendations made based on assessment of the results.    Discussed role of Cardiac risk factors & effects + treatment of co morbidities with patient & advised accordingly.     MEDICATIONS: List of medications patient is currently taking is reviewed in detail with the patient. Discussed any side effects or problems taking the medication.     Recommend Continue present management & medications as listed.     AFFIRMATION: I spent at least 20 minutes of time reviewing patient's history, previous & current medical problems & all Labs + testing. This includes chart prep even prior to the vosit. Various goals are discussed and multiple questions answered.Relevant concelling performed.     Office follow up in six months.

## 2024-02-28 NOTE — PROGRESS NOTES
CATH  Coronary angiogram findings, 4/29/2023:  Dominance: Right dominant system  Left main artery: Normal caliber vessel, free of significant disease  Left anterior descending artery: Type IV LAD, free of significant disease  Left circumflex artery: Normal caliber vessel, free of significant disease  Right coronary artery: Dominant right coronary artery, it is currently occluded in its proximal segment.  SVG to RCA is widely patent     HTN: Not well controlled on current medical regimen, see list above.              - changes in  treatment:   no, on Lopressor & HCTZ.   CARDIOMYOPATHY: None known   CONGESTIVE HEART FAILURE: NO KNOWN HISTORY.     VHD: No significant VHD noted  ECHO 9/2021   Left ventricular systolic function is normal.   Ejection fraction is visually estimated at 50-55%.   Grade I diastolic dysfunction.   No evidence of any pericardial effusion.  DYSLIPIDEMIA: Patient's profile is at / near Goal.yes,                                                           Tolerating current medical regimen wellyes,  Takes Lipitor                                                       See most recent Lab values in Labs section above.  PAD:  known.               claudication symptoms..no               Up to date on non invasive testing .yes,                Patient on optimum medical regimen .yes,       ARRHYTHMIAS: Excessive PVCs resolved since patient has been on magnesium     TESTS ORDERED:None this visit     PREVIOUSLY ORDERED TESTS REVIEWED & DISCUSSED WITH THE PATIENT:     I personally reviewed & interpreted, all previously ordered tests as copied above. Latest Labs are pulled in to the note with dates.   Labs, specially in Reference to Lipid profile, Cardiac testing in the form of Echo ( dated: ), stress tests ( dated: ) & other relevant cardiac testing reviewed with patient & recommendations made based on assessment of the results.    Discussed role of Cardiac risk factors & effects + treatment of co

## 2024-02-29 ENCOUNTER — CLINICAL DOCUMENTATION (OUTPATIENT)
Dept: ONCOLOGY | Age: 84
End: 2024-02-29

## 2024-02-29 RX ORDER — FERROUS SULFATE 325(65) MG
325 TABLET ORAL EVERY OTHER DAY
Qty: 30 TABLET | Refills: 1 | Status: SHIPPED | OUTPATIENT
Start: 2024-02-29

## 2024-02-29 NOTE — PROGRESS NOTES
Most recent lab results reviewed. Iron levels low: Ferritin (28), Iron (49). Physician recommending oral iron supplement. Called patient @ [phone] to notify. Patient voices understanding and requesting that prescription be sent to [facility] on [address]. RX for ferrous sulfate 325 mg QOD e-scribed to preferred pharmacy. No further needs addressed at this time.

## 2024-03-18 ENCOUNTER — APPOINTMENT (OUTPATIENT)
Dept: GENERAL RADIOLOGY | Age: 84
End: 2024-03-18
Payer: MEDICARE

## 2024-03-18 ENCOUNTER — HOSPITAL ENCOUNTER (EMERGENCY)
Age: 84
Discharge: HOME | End: 2024-03-18
Payer: MEDICARE

## 2024-03-18 VITALS
TEMPERATURE: 98.1 F | HEART RATE: 61 BPM | SYSTOLIC BLOOD PRESSURE: 151 MMHG | DIASTOLIC BLOOD PRESSURE: 70 MMHG | RESPIRATION RATE: 18 BRPM | OXYGEN SATURATION: 99 %

## 2024-03-18 DIAGNOSIS — J22 LOWER RESP. TRACT INFECTION: Primary | ICD-10-CM

## 2024-03-18 DIAGNOSIS — R05.9 COUGH, UNSPECIFIED TYPE: ICD-10-CM

## 2024-03-18 LAB
INFLUENZA A ANTIGEN: NOT DETECTED
INFLUENZA B ANTIGEN: NOT DETECTED
SARS-COV-2 RDRP RESP QL NAA+PROBE: NOT DETECTED
SOURCE: NORMAL

## 2024-03-18 PROCEDURE — 87635 SARS-COV-2 COVID-19 AMP PRB: CPT

## 2024-03-18 PROCEDURE — 6370000000 HC RX 637 (ALT 250 FOR IP): Performed by: PHYSICIAN ASSISTANT

## 2024-03-18 PROCEDURE — 71046 X-RAY EXAM CHEST 2 VIEWS: CPT

## 2024-03-18 PROCEDURE — 87502 INFLUENZA DNA AMP PROBE: CPT

## 2024-03-18 PROCEDURE — 99284 EMERGENCY DEPT VISIT MOD MDM: CPT

## 2024-03-18 RX ORDER — GUAIFENESIN/DEXTROMETHORPHAN 100-10MG/5
5 SYRUP ORAL 3 TIMES DAILY PRN
Qty: 120 ML | Refills: 0 | Status: SHIPPED | OUTPATIENT
Start: 2024-03-18 | End: 2024-03-23

## 2024-03-18 RX ORDER — PREDNISONE 20 MG/1
60 TABLET ORAL ONCE
Status: COMPLETED | OUTPATIENT
Start: 2024-03-18 | End: 2024-03-18

## 2024-03-18 RX ORDER — PREDNISONE 50 MG/1
50 TABLET ORAL DAILY
Qty: 5 TABLET | Refills: 0 | Status: SHIPPED | OUTPATIENT
Start: 2024-03-18 | End: 2024-03-23

## 2024-03-18 RX ORDER — AZITHROMYCIN 250 MG/1
TABLET, FILM COATED ORAL
Qty: 6 TABLET | Refills: 0 | Status: SHIPPED | OUTPATIENT
Start: 2024-03-18 | End: 2024-03-28

## 2024-03-18 RX ORDER — GUAIFENESIN/DEXTROMETHORPHAN 100-10MG/5
5 SYRUP ORAL ONCE
Status: COMPLETED | OUTPATIENT
Start: 2024-03-18 | End: 2024-03-18

## 2024-03-18 RX ORDER — ACETAMINOPHEN 325 MG/1
650 TABLET ORAL ONCE
Status: COMPLETED | OUTPATIENT
Start: 2024-03-18 | End: 2024-03-18

## 2024-03-18 RX ADMIN — GUAIFENESIN AND DEXTROMETHORPHAN 5 ML: 100; 10 SYRUP ORAL at 08:45

## 2024-03-18 RX ADMIN — PREDNISONE 60 MG: 20 TABLET ORAL at 08:45

## 2024-03-18 RX ADMIN — ACETAMINOPHEN 650 MG: 325 TABLET ORAL at 08:45

## 2024-03-18 NOTE — ED PROVIDER NOTES
Patient was given thefollowing medications:  Medications   guaiFENesin-dextromethorphan (ROBITUSSIN DM) 100-10 MG/5ML syrup 5 mL (5 mLs Oral Given 3/18/24 0845)   predniSONE (DELTASONE) tablet 60 mg (60 mg Oral Given 3/18/24 0845)   acetaminophen (TYLENOL) tablet 650 mg (650 mg Oral Given 3/18/24 0845)         Is this patient to be included in the SEP-1 Core Measure due to severe sepsis or septic shock?   No   Exclusion criteria - the patient is NOT to be included for SEP-1 Core Measure due to:  2+ SIRS criteria are not met    MDM:    CC/HPI Summary, DDx, ED Course, and Reassessment:     Patient presents as above.  Emergent etiologies considered.  Patient seen and examined.  Work-up initiated secondary to presentation, physical exam findings, vital signs and medical chart review.    In brief, 83-year-old male presenting to the emerged from today with continued upper respiratory symptoms, cough congestion, generalized malaise.  States he had a lingering cold over the last 2 weeks.  No recent antibiotics.  No other recent medications.  He admits that he has been exposed to COVID which makes him concerned.    He overall appears well, he is in no obvious distress, no signs of significant infectious process.  Does demonstrate cough, has some adventitious lower lung sounds    COVID screening negative, flu negative.  Chest x-ray demonstrating no signs of acute abnormality.  However secondary to patient's COPD, history of smoking, lingering symptoms will treat for COPD exacerbation and cover for any atypical pneumonia.  He will be placed on prednisone, azithromycin, cough medication.  Advised the importance of symptom control, following up with primary care if symptoms are not improving over the next several days.  I feel the patient can be discharged safely just secondary to his well appearance is normal vital signs.  Will send prescriptions of medications.  Will discharge home in stable condition.    History from :

## 2024-03-20 ENCOUNTER — OFFICE VISIT (OUTPATIENT)
Dept: ONCOLOGY | Age: 84
End: 2024-03-20

## 2024-03-20 ENCOUNTER — HOSPITAL ENCOUNTER (OUTPATIENT)
Dept: INFUSION THERAPY | Age: 84
Discharge: HOME | End: 2024-03-20
Payer: MEDICARE

## 2024-03-20 VITALS
OXYGEN SATURATION: 98 % | BODY MASS INDEX: 25.91 KG/M2 | HEART RATE: 69 BPM | HEIGHT: 66 IN | TEMPERATURE: 98 F | WEIGHT: 161.2 LBS | SYSTOLIC BLOOD PRESSURE: 162 MMHG | DIASTOLIC BLOOD PRESSURE: 77 MMHG

## 2024-03-20 DIAGNOSIS — D64.9 ANEMIA, UNSPECIFIED TYPE: Primary | ICD-10-CM

## 2024-03-20 PROCEDURE — 99211 OFF/OP EST MAY X REQ PHY/QHP: CPT

## 2024-03-20 NOTE — PROGRESS NOTES
MA Rooming Questions  Patient: Brett Lozada  MRN: [id number]    Date: 3/20/2024        1. Do you have any new issues?   no     Patients blood pressure is slightly elivated due to the patient hasn't taken any medications this morning.     2. Do you need any refills on medications?    no    3. Have you had any imaging done since your last visit?   yes - xray-3/18     4. Have you been hospitalized or seen in the emergency room since your last visit here?   yes - 3/18- was feeling a little under the weather.    5. Did the patient have a depression screening completed today? No    No data recorded     PHQ-9 Given to (if applicable):               PHQ-9 Score (if applicable):                     [] Positive     []  Negative              Does question #9 need addressed (if applicable)                     [] Yes    []  No               Louisa Masterson MA

## 2024-03-22 ENCOUNTER — TELEPHONE (OUTPATIENT)
Dept: GASTROENTEROLOGY | Age: 84
End: 2024-03-22

## 2024-03-22 NOTE — TELEPHONE ENCOUNTER
Called pt. In regards to a referral for anemia. Made appt for pt to see arslan on 4/16/24 @11:15am

## 2024-04-16 ENCOUNTER — OFFICE VISIT (OUTPATIENT)
Dept: GASTROENTEROLOGY | Age: 84
End: 2024-04-16
Payer: MEDICARE

## 2024-04-16 VITALS
HEART RATE: 71 BPM | BODY MASS INDEX: 26.13 KG/M2 | OXYGEN SATURATION: 94 % | HEIGHT: 66 IN | DIASTOLIC BLOOD PRESSURE: 66 MMHG | TEMPERATURE: 98.1 F | SYSTOLIC BLOOD PRESSURE: 132 MMHG | WEIGHT: 162.6 LBS

## 2024-04-16 DIAGNOSIS — D64.9 ANEMIA, UNSPECIFIED TYPE: Primary | ICD-10-CM

## 2024-04-16 DIAGNOSIS — R19.5 DARK STOOLS: ICD-10-CM

## 2024-04-16 DIAGNOSIS — R19.4 ALTERED BOWEL HABITS: ICD-10-CM

## 2024-04-16 DIAGNOSIS — K21.9 GASTROESOPHAGEAL REFLUX DISEASE, UNSPECIFIED WHETHER ESOPHAGITIS PRESENT: ICD-10-CM

## 2024-04-16 DIAGNOSIS — R19.7 DIARRHEA, UNSPECIFIED TYPE: ICD-10-CM

## 2024-04-16 PROCEDURE — G8427 DOCREV CUR MEDS BY ELIG CLIN: HCPCS | Performed by: NURSE PRACTITIONER

## 2024-04-16 PROCEDURE — 99204 OFFICE O/P NEW MOD 45 MIN: CPT | Performed by: NURSE PRACTITIONER

## 2024-04-16 PROCEDURE — G8417 CALC BMI ABV UP PARAM F/U: HCPCS | Performed by: NURSE PRACTITIONER

## 2024-04-16 PROCEDURE — 3075F SYST BP GE 130 - 139MM HG: CPT | Performed by: NURSE PRACTITIONER

## 2024-04-16 PROCEDURE — 1123F ACP DISCUSS/DSCN MKR DOCD: CPT | Performed by: NURSE PRACTITIONER

## 2024-04-16 PROCEDURE — 3078F DIAST BP <80 MM HG: CPT | Performed by: NURSE PRACTITIONER

## 2024-04-16 PROCEDURE — 1036F TOBACCO NON-USER: CPT | Performed by: NURSE PRACTITIONER

## 2024-04-16 RX ORDER — POLYETHYLENE GLYCOL 3350, SODIUM SULFATE ANHYDROUS, SODIUM BICARBONATE, SODIUM CHLORIDE, POTASSIUM CHLORIDE 236; 22.74; 6.74; 5.86; 2.97 G/4L; G/4L; G/4L; G/4L; G/4L
4 POWDER, FOR SOLUTION ORAL ONCE
Qty: 4000 ML | Refills: 0 | Status: SHIPPED | OUTPATIENT
Start: 2024-04-16 | End: 2024-04-16

## 2024-04-16 ASSESSMENT — ENCOUNTER SYMPTOMS
SHORTNESS OF BREATH: 0
BLOOD IN STOOL: 0
COUGH: 1
DIARRHEA: 1
BACK PAIN: 0
WHEEZING: 0
EYE DISCHARGE: 0
VOMITING: 0
EYE PAIN: 0
COLOR CHANGE: 0
CONSTIPATION: 0
ABDOMINAL PAIN: 0
NAUSEA: 0

## 2024-04-16 NOTE — PROGRESS NOTES
Brett Lozada 83 y.o. male was seen by CARY Velez on 4/16/2024     Wt Readings from Last 3 Encounters:   04/16/24 73.8 kg (162 lb 9.6 oz)   03/20/24 73.1 kg (161 lb 3.2 oz)   02/28/24 73.9 kg (163 lb)       HPI  Brett Lozada is a pleasant 83 y.o.  male who presents today for acid reflux, altered bowel habits with diarrhea, anemia, and dark stools.  He has a past medical history of acute non Q wave MI (myocardial infarction), initial episode of care, acute pulmonary embolism, acute ST elevation myocardial infarction (STEMI) due to occlusion of distal portion of left anterior descending (LAD) coronary artery, CAD (coronary artery disease), cancer, complicated UTI (urinary tract infection), H/O echocardiogram, history of cardiac catheterization, history of echocardiogram, HTN (hypertension), hyperlipidemia, and STEMI (ST elevation myocardial infarction).  He denies alcohol use or smoking for 25 years.  He was referred for anemia by Dr. Caruso.  He has fatigue with no worsening shortness of breath.  Per chart review his anemia has been ongoing since 4- with RBC 4.23, Hgb 12.5, Hct 38.8, MCV 91.7 and Platelets 114.  His lab work done on 2- showed Iron 49, Transferrin 20%, WBC 6.9, Hgb 12, Hct 39.7, MCV 95.7, and Platelets 117.  His CT of the abdomen/pelvis done on 2- showed findings suggestive of cystitis, mild bladder distension, no hydronephrosis, diverticulosis, and moderate stool burden.  He denies NSAID use.  He is on Eliquis.  His last colonoscopy was done by Dr. Busch done on 4- showing diverticulosis.  His prior colonoscopy showed a small AVM in transverse colon.  His appetite is fair without early satiety.  His weight is stable.  No nausea or vomiting.  No abdominal pain, bloating or distention.  His GERD has been ongoing for years.  His heartburn and acid reflux is controlled with Prilosec.  No nocturnal awakenings with acid reflux.  No dysphagia or pain with

## 2024-04-17 ENCOUNTER — TELEPHONE (OUTPATIENT)
Dept: CARDIOLOGY CLINIC | Age: 84
End: 2024-04-17

## 2024-04-17 ENCOUNTER — APPOINTMENT (OUTPATIENT)
Dept: GENERAL RADIOLOGY | Age: 84
End: 2024-04-17
Payer: MEDICARE

## 2024-04-17 ENCOUNTER — HOSPITAL ENCOUNTER (EMERGENCY)
Age: 84
Discharge: HOME | End: 2024-04-17
Attending: EMERGENCY MEDICINE
Payer: MEDICARE

## 2024-04-17 ENCOUNTER — APPOINTMENT (OUTPATIENT)
Dept: CT IMAGING | Age: 84
End: 2024-04-17
Payer: MEDICARE

## 2024-04-17 VITALS
HEIGHT: 67 IN | BODY MASS INDEX: 25.11 KG/M2 | WEIGHT: 160 LBS | OXYGEN SATURATION: 98 % | SYSTOLIC BLOOD PRESSURE: 172 MMHG | DIASTOLIC BLOOD PRESSURE: 75 MMHG | TEMPERATURE: 98.7 F | RESPIRATION RATE: 16 BRPM | HEART RATE: 64 BPM

## 2024-04-17 DIAGNOSIS — R19.7 DIARRHEA, UNSPECIFIED TYPE: Primary | ICD-10-CM

## 2024-04-17 DIAGNOSIS — R05.9 COUGH, UNSPECIFIED TYPE: ICD-10-CM

## 2024-04-17 LAB
ALBUMIN SERPL-MCNC: 4 GM/DL (ref 3.4–5)
ALP BLD-CCNC: 101 IU/L (ref 40–128)
ALT SERPL-CCNC: 9 U/L (ref 10–40)
ANION GAP SERPL CALCULATED.3IONS-SCNC: 9 MMOL/L (ref 7–16)
AST SERPL-CCNC: 12 IU/L (ref 15–37)
BASOPHILS ABSOLUTE: 0 K/CU MM
BASOPHILS RELATIVE PERCENT: 0.6 % (ref 0–1)
BILIRUB SERPL-MCNC: 0.5 MG/DL (ref 0–1)
BUN SERPL-MCNC: 19 MG/DL (ref 6–23)
CALCIUM SERPL-MCNC: 8.9 MG/DL (ref 8.3–10.6)
CHLORIDE BLD-SCNC: 106 MMOL/L (ref 99–110)
CO2: 24 MMOL/L (ref 21–32)
CREAT SERPL-MCNC: 1 MG/DL (ref 0.9–1.3)
DIFFERENTIAL TYPE: ABNORMAL
EOSINOPHILS ABSOLUTE: 0.1 K/CU MM
EOSINOPHILS RELATIVE PERCENT: 2.6 % (ref 0–3)
GFR SERPL CREATININE-BSD FRML MDRD: 75 ML/MIN/1.73M2
GLUCOSE SERPL-MCNC: 92 MG/DL (ref 70–99)
HCT VFR BLD CALC: 38.9 % (ref 42–52)
HEMOGLOBIN: 12 GM/DL (ref 13.5–18)
IMMATURE NEUTROPHIL %: 0.2 % (ref 0–0.43)
INFLUENZA A ANTIGEN: NOT DETECTED
INFLUENZA B ANTIGEN: NOT DETECTED
LACTATE: 1 MMOL/L (ref 0.5–1.9)
LIPASE: 33 IU/L (ref 13–60)
LYMPHOCYTES ABSOLUTE: 1 K/CU MM
LYMPHOCYTES RELATIVE PERCENT: 18.3 % (ref 24–44)
MCH RBC QN AUTO: 29.1 PG (ref 27–31)
MCHC RBC AUTO-ENTMCNC: 30.8 % (ref 32–36)
MCV RBC AUTO: 94.4 FL (ref 78–100)
MONOCYTES ABSOLUTE: 0.5 K/CU MM
MONOCYTES RELATIVE PERCENT: 8.6 % (ref 0–4)
NEUTROPHILS RELATIVE PERCENT: 69.7 % (ref 36–66)
NUCLEATED RBC %: 0 %
PDW BLD-RTO: 15 % (ref 11.7–14.9)
PLATELET # BLD: 127 K/CU MM (ref 140–440)
PMV BLD AUTO: 12.3 FL (ref 7.5–11.1)
POTASSIUM SERPL-SCNC: 4 MMOL/L (ref 3.5–5.1)
RBC # BLD: 4.12 M/CU MM (ref 4.6–6.2)
SARS-COV-2 RDRP RESP QL NAA+PROBE: NOT DETECTED
SEGMENTED NEUTROPHILS ABSOLUTE COUNT: 3.7 K/CU MM
SODIUM BLD-SCNC: 139 MMOL/L (ref 135–145)
SOURCE: NORMAL
TOTAL IMMATURE NEUTOROPHIL: 0.01 K/CU MM
TOTAL NUCLEATED RBC: 0 K/CU MM
TOTAL PROTEIN: 6.5 GM/DL (ref 6.4–8.2)
WBC # BLD: 5.4 K/CU MM (ref 4–10.5)

## 2024-04-17 PROCEDURE — 80053 COMPREHEN METABOLIC PANEL: CPT

## 2024-04-17 PROCEDURE — 85025 COMPLETE CBC W/AUTO DIFF WBC: CPT

## 2024-04-17 PROCEDURE — 83605 ASSAY OF LACTIC ACID: CPT

## 2024-04-17 PROCEDURE — 83690 ASSAY OF LIPASE: CPT

## 2024-04-17 PROCEDURE — 74177 CT ABD & PELVIS W/CONTRAST: CPT

## 2024-04-17 PROCEDURE — 6360000004 HC RX CONTRAST MEDICATION: Performed by: EMERGENCY MEDICINE

## 2024-04-17 PROCEDURE — 71045 X-RAY EXAM CHEST 1 VIEW: CPT

## 2024-04-17 PROCEDURE — 87502 INFLUENZA DNA AMP PROBE: CPT

## 2024-04-17 PROCEDURE — 99285 EMERGENCY DEPT VISIT HI MDM: CPT

## 2024-04-17 PROCEDURE — 87635 SARS-COV-2 COVID-19 AMP PRB: CPT

## 2024-04-17 RX ORDER — 0.9 % SODIUM CHLORIDE 0.9 %
1000 INTRAVENOUS SOLUTION INTRAVENOUS ONCE
Status: DISCONTINUED | OUTPATIENT
Start: 2024-04-17 | End: 2024-04-17 | Stop reason: HOSPADM

## 2024-04-17 RX ORDER — MINERAL OIL, PETROLATUM, PHENYLEPHRINE HCL 2.5; 140; 749 MG/G; MG/G; MG/G
OINTMENT TOPICAL 2 TIMES DAILY PRN
Qty: 25 G | Refills: 0 | Status: SHIPPED | OUTPATIENT
Start: 2024-04-17

## 2024-04-17 RX ADMIN — IOPAMIDOL 80 ML: 755 INJECTION, SOLUTION INTRAVENOUS at 12:21

## 2024-04-17 ASSESSMENT — ENCOUNTER SYMPTOMS
BLOOD IN STOOL: 1
NAUSEA: 1
DIARRHEA: 1
ALLERGIC/IMMUNOLOGIC NEGATIVE: 1
COUGH: 1
EYES NEGATIVE: 1

## 2024-04-17 ASSESSMENT — PAIN SCALES - GENERAL: PAINLEVEL_OUTOF10: 0

## 2024-04-17 NOTE — CARE COORDINATION
CM consult per Dr Romero for discharge planning. Review of pt chart pt has insurance and PCP. Pt last seen in ER on 3/18/24 treated and discharged home. Pt did follow up o/p with Oncology, Gastro and Cardiology.  Pt returns

## 2024-04-17 NOTE — ED PROVIDER NOTES
[facility]      TRIAGE CHIEF COMPLAINT:   Diarrhea      [place]:  Brett Lozada is a 83 y.o. male that presents with complaint of diarrhea cough.  Patient states he was just here in the hospital for the same today but upon going home he is having diarrhea still black stools.  He is on omeprazole, Eliquis, iron.  He just complains of diarrhea fatigue malaise cough.  No fevers no vomiting no chest pain shortness of breath no urine complaints no other questions or concerns.  He had diarrhea for the past week or 2.  Denies being on antibiotics.    REVIEW OF SYSTEMS:  At least 10 systems reviewed and otherwise acutely negative except as in the [place].    Review of Systems   Constitutional:  Positive for fatigue.   HENT: Negative.     Eyes: Negative.    Respiratory:  Positive for cough.    Cardiovascular: Negative.    Gastrointestinal:  Positive for blood in stool, diarrhea and nausea.   Endocrine: Negative.    Genitourinary: Negative.    Musculoskeletal: Negative.    Skin: Negative.    Allergic/Immunologic: Negative.    Neurological: Negative.    Hematological: Negative.    Psychiatric/Behavioral: Negative.     All other systems reviewed and are negative.      Past Medical History:   Diagnosis Date    Acute non Q wave MI (myocardial infarction), initial episode of care ([facility]) 5/2/2023    Acute pulmonary embolism ([facility]) 12/26/2023    Acute ST elevation myocardial infarction (STEMI) due to occlusion of distal portion of left anterior descending (LAD) coronary artery ([facility]) 4/29/2023    CAD (coronary artery disease)     Cancer ([facility])     prostate    Complicated UTI (urinary tract infection) 01/12/2024    H/O echocardiogram 04/29/2019    Patient in atrial fibrillation during exam. Left ventricular function is normal, EF is estimated at 55-60%. Mildly dilated left atrium. Mildly dilated left atrium. Mildli enlarged right ventricle cavity. Trace to mild mitral regurgitation is present. No evidence of pericardial

## 2024-04-17 NOTE — TELEPHONE ENCOUNTER
Cardiologist: Dr. Brooks  Surgeon: Dr. Manzo  Surgery: Colonoscopy & EGD  Surgery/Procedure should be done in the hospital setting    _____ yes    _____  no    Anesthesia: Propofol  Date: 6/18/2024  Fax# [phone]  # [phone]    Last OV 2/28/2024 w/Cecilia    CORONARY ARTERY DISEASE:Yes   clinically stable. Patient is on optimal medical regimen ( see medication list above )  -   Patient is currently  asymptomatic from CAD.            - changes in  treatment:   no, on Plavix& Metoprolol.    HTN: Not well controlled on current medical regimen, see list above.              - changes in  treatment:   no, on Lopressor & HCTZ.   CARDIOMYOPATHY: None known   CONGESTIVE HEART FAILURE: NO KNOWN HISTORY.     VHD: No significant VHD noted  DYSLIPIDEMIA: Patient's profile is at / near Goal.yes,                                                           Tolerating current medical regimen wellyes,  Takes Lipitor                                                       See most recent Lab values in Labs section above.  PAD:  known.               claudication symptoms..no               Up to date on non invasive testing .yes,                Patient on optimum medical regimen .yes,       ARRHYTHMIAS: Excessive PVCs resolved since patient has been on magnesium        Last EKG- 1/22/2024      NM- 4/29/2022  Normal study.   Normal perfusion study with normal distribution in all coronal, short, and   horizontal axis.   The observed defect is consistent with diaphragmatic attenuation.   Normal LV function. LVEF is > 70 %.    Echo- 1/23/2024  Left Ventricle: Normal left ventricular systolic function with a visually estimated EF of 50 - 55%. Left ventricle size is normal. Normal wall thickness. Normal wall motion.    No significant valvular abnormalities.    Pericardium: No pericardial effusion.      Cath- 4/29/2023  Normal      CABG- Hx      Eliquis

## 2024-04-17 NOTE — ED TRIAGE NOTES
Diarrhea for one week, increase in nausea and now \"feels like but is burning\" states tool is dark.  Pt answered yes to wanting to go to sleep and not wake up/wishes he was dead. Pt denies any desire to kill self and has no plan but states he has had a lot happen recently with infections and illness. Dr. Romero made aware.

## 2024-04-26 ENCOUNTER — HOSPITAL ENCOUNTER (EMERGENCY)
Age: 84
Discharge: HOME | End: 2024-04-26
Attending: EMERGENCY MEDICINE
Payer: MEDICARE

## 2024-04-26 VITALS
RESPIRATION RATE: 18 BRPM | TEMPERATURE: 98.1 F | OXYGEN SATURATION: 99 % | SYSTOLIC BLOOD PRESSURE: 202 MMHG | HEART RATE: 70 BPM | DIASTOLIC BLOOD PRESSURE: 81 MMHG

## 2024-04-26 DIAGNOSIS — S30.817A ABRASION OF ANUS, INITIAL ENCOUNTER: ICD-10-CM

## 2024-04-26 DIAGNOSIS — R19.7 DIARRHEA, UNSPECIFIED TYPE: Primary | ICD-10-CM

## 2024-04-26 PROCEDURE — 6370000000 HC RX 637 (ALT 250 FOR IP): Performed by: EMERGENCY MEDICINE

## 2024-04-26 PROCEDURE — 99283 EMERGENCY DEPT VISIT LOW MDM: CPT

## 2024-04-26 RX ORDER — LIDOCAINE HYDROCHLORIDE 20 MG/ML
JELLY TOPICAL ONCE
Status: COMPLETED | OUTPATIENT
Start: 2024-04-26 | End: 2024-04-26

## 2024-04-26 RX ADMIN — LIDOCAINE HYDROCHLORIDE: 20 JELLY TOPICAL at 07:21

## 2024-04-26 ASSESSMENT — PAIN SCALES - GENERAL
PAINLEVEL_OUTOF10: 8
PAINLEVEL_OUTOF10: 0

## 2024-04-26 ASSESSMENT — PAIN - FUNCTIONAL ASSESSMENT
PAIN_FUNCTIONAL_ASSESSMENT: 0-10
PAIN_FUNCTIONAL_ASSESSMENT: 0-10

## 2024-04-26 NOTE — DISCHARGE INSTRUCTIONS
You may apply lidocaine jelly sparingly on as-needed basis for pain  After use bathroom please avoid wiping the area with toilet paper.  Instead wash the area with lukewarm water and dab it with the toilet paper to dry it  If symptoms do not improve please consult with your primary care physician

## 2024-04-26 NOTE — ED PROVIDER NOTES
Impression:  1. Diarrhea, unspecified type    2. Abrasion of anus, initial encounter      Disposition referral (if applicable):  No follow-up provider specified.  Disposition medications (if applicable):  New Prescriptions    No medications on file     ED Provider Disposition Time  DISPOSITION Decision To Discharge 04/26/2024 06:44:51 AM      Comment: Please note this report has been produced using speech recognition software and may contain errors related to that system including errors in grammar, punctuation, and spelling, as well as words and phrases that may be inappropriate.  Efforts were made to edit the dictations.       Alexi Montero MD  04/26/24 0648

## 2024-04-28 NOTE — PROGRESS NOTES
Patient Name:  Brett Lozada  Patient :  1940  Patient MRN:  [id number]     Primary Oncologist: Hailey Caruso MD  Referring Provider: Delfin Gibbons MD     Date of Service: 2024      Chief Complaint:    Chief Complaint   Patient presents with    Follow-up     FU visit.     Patient Active Problem List:     STEMI (ST elevation myocardial infarction) ([facility])     ASHD (arteriosclerotic heart disease)     S/P CABG x 2     HTN (hypertension)     Hyperlipidemia, mixed     COPD, moderate ([facility])     PVD (peripheral vascular disease) ([facility])     Coronary artery disease involving coronary bypass graft of native heart without angina pectoris     SBO (small bowel obstruction) ([facility])     PVC (premature ventricular contraction)     Ataxia     ST elevation myocardial infarction (STEMI) due to occlusion of distal portion of left anterior descending (LAD) coronary artery ([facility])      HPI:   Brett Lozada is a pleasant 84 yo male patient who was referred for evaluation of b/l PE in 2024. He has mild dementia.  He went to ED in 2023 due to not feeling well. He had COVID 3 weeks prior.  2023 CTA chest:   1.  Multiple bilateral pulmonary arterial thromboemboli, right greater than left.  No right ventricular strain.   2.  Trace right pleural effusion.   3.  Right greater than left lower lobe heterogeneous opacities with right basilar consolidation.  Differential considerations include atelectasis, infection and developing infarction.  Currently on DOAC.     2023 WBC 8.4, Hg 13.6, MCV 93.9, plat 129.  2024 WBC 7.4, Hg 12.1, MCV 93.1, plat 217.   1/15/2024 CMP grossly unremarkable.    He has 4 children, 2 biological.  Mother has GI tract ca.   No thromboembolism in the family. No prior h/o DVT/PE.  Not on testosterone supplement.     He has h/o prostate cancer 25 years ago. FU with urologist.  2024 creat 0.9. B-12 1129. Folate 6.1.   LFTs stable. WBC 6.9, Hg 12, MCV 95.7, plat 117. MPV

## 2024-05-16 ENCOUNTER — HOSPITAL ENCOUNTER (EMERGENCY)
Age: 84
Discharge: HOME | End: 2024-05-16
Payer: MEDICARE

## 2024-05-16 ENCOUNTER — HOSPITAL ENCOUNTER (OUTPATIENT)
Dept: INFUSION THERAPY | Age: 84
Discharge: HOME | End: 2024-05-16
Payer: MEDICARE

## 2024-05-16 ENCOUNTER — APPOINTMENT (OUTPATIENT)
Dept: CT IMAGING | Age: 84
End: 2024-05-16
Payer: MEDICARE

## 2024-05-16 DIAGNOSIS — D64.9 ANEMIA, UNSPECIFIED TYPE: ICD-10-CM

## 2024-05-16 DIAGNOSIS — R33.8 ACUTE URINARY RETENTION: Primary | ICD-10-CM

## 2024-05-16 LAB
ALBUMIN SERPL-MCNC: 3.9 GM/DL (ref 3.4–5)
ALP BLD-CCNC: 130 IU/L (ref 40–129)
ALT SERPL-CCNC: 9 U/L (ref 10–40)
ANION GAP SERPL CALCULATED.3IONS-SCNC: 12 MMOL/L (ref 7–16)
AST SERPL-CCNC: 15 IU/L (ref 15–37)
BACTERIA: ABNORMAL /HPF
BASOPHILS ABSOLUTE: 0 K/CU MM
BASOPHILS ABSOLUTE: 0 K/CU MM
BASOPHILS RELATIVE PERCENT: 0.2 % (ref 0–1)
BASOPHILS RELATIVE PERCENT: 0.3 % (ref 0–1)
BILIRUB SERPL-MCNC: 0.4 MG/DL (ref 0–1)
BILIRUBIN, URINE: NEGATIVE MG/DL
BLOOD, URINE: ABNORMAL
BUN SERPL-MCNC: 12 MG/DL (ref 6–23)
CALCIUM SERPL-MCNC: 8.8 MG/DL (ref 8.3–10.6)
CHLORIDE BLD-SCNC: 103 MMOL/L (ref 99–110)
CLARITY: CLEAR
CO2: 23 MMOL/L (ref 21–32)
COLOR: YELLOW
CREAT SERPL-MCNC: 1 MG/DL (ref 0.9–1.3)
DIFFERENTIAL TYPE: ABNORMAL
DIFFERENTIAL TYPE: ABNORMAL
EOSINOPHILS ABSOLUTE: 0.1 K/CU MM
EOSINOPHILS ABSOLUTE: 0.2 K/CU MM
EOSINOPHILS RELATIVE PERCENT: 1.3 % (ref 0–3)
EOSINOPHILS RELATIVE PERCENT: 3.3 % (ref 0–3)
FERRITIN: 28 NG/ML (ref 30–400)
GFR, ESTIMATED: 75 ML/MIN/1.73M2
GLUCOSE SERPL-MCNC: 94 MG/DL (ref 70–99)
GLUCOSE URINE: NEGATIVE MG/DL
HCT VFR BLD CALC: 41.4 % (ref 42–52)
HCT VFR BLD CALC: 41.9 % (ref 42–52)
HEMOGLOBIN: 13.1 GM/DL (ref 13.5–18)
HEMOGLOBIN: 13.6 GM/DL (ref 13.5–18)
IMMATURE NEUTROPHIL %: 0.4 % (ref 0–0.43)
IRON: 51 UG/DL (ref 59–158)
KETONES, URINE: NEGATIVE MG/DL
LEUKOCYTE ESTERASE, URINE: ABNORMAL
LYMPHOCYTES ABSOLUTE: 1 K/CU MM
LYMPHOCYTES ABSOLUTE: 1.3 K/CU MM
LYMPHOCYTES RELATIVE PERCENT: 20.7 % (ref 24–44)
LYMPHOCYTES RELATIVE PERCENT: 9 % (ref 24–44)
MCH RBC QN AUTO: 30.2 PG (ref 27–31)
MCH RBC QN AUTO: 30.2 PG (ref 27–31)
MCHC RBC AUTO-ENTMCNC: 31.6 % (ref 32–36)
MCHC RBC AUTO-ENTMCNC: 32.5 % (ref 32–36)
MCV RBC AUTO: 93.1 FL (ref 78–100)
MCV RBC AUTO: 95.4 FL (ref 78–100)
MONOCYTES ABSOLUTE: 0.5 K/CU MM
MONOCYTES ABSOLUTE: 0.5 K/CU MM
MONOCYTES RELATIVE PERCENT: 5 % (ref 0–4)
MONOCYTES RELATIVE PERCENT: 7.7 % (ref 0–4)
MUCUS: ABNORMAL HPF
NEUTROPHILS ABSOLUTE: 4.3 K/CU MM
NEUTROPHILS ABSOLUTE: 9 K/CU MM
NEUTROPHILS RELATIVE PERCENT: 68.1 % (ref 36–66)
NEUTROPHILS RELATIVE PERCENT: 84 % (ref 36–66)
NITRITE URINE, QUANTITATIVE: NEGATIVE
NUCLEATED RBC %: 0 %
PCT TRANSFERRIN: 20 % (ref 10–44)
PDW BLD-RTO: 13.3 % (ref 11.7–14.9)
PDW BLD-RTO: 13.8 % (ref 11.7–14.9)
PH, URINE: 5.5 (ref 5–8)
PLATELET # BLD: 129 K/CU MM (ref 140–440)
PLATELET # BLD: 71 K/CU MM (ref 140–440)
PMV BLD AUTO: 12.9 FL (ref 7.5–11.1)
PMV BLD AUTO: ABNORMAL FL (ref 7.5–11.1)
POTASSIUM SERPL-SCNC: 4.2 MMOL/L (ref 3.5–5.1)
PROTEIN UA: ABNORMAL MG/DL
RBC # BLD: 4.34 M/CU MM (ref 4.6–6.2)
RBC # BLD: 4.5 M/CU MM (ref 4.6–6.2)
RBC URINE: 6 /HPF (ref 0–3)
SODIUM BLD-SCNC: 138 MMOL/L (ref 135–145)
SPECIFIC GRAVITY UA: 1.01 (ref 1–1.03)
TOTAL IMMATURE NEUTOROPHIL: 0.04 K/CU MM
TOTAL IRON BINDING CAPACITY: 255 UG/DL (ref 250–450)
TOTAL NUCLEATED RBC: 0 K/CU MM
TOTAL PROTEIN: 6.8 GM/DL (ref 6.4–8.2)
TRICHOMONAS: ABNORMAL /HPF
UNSATURATED IRON BINDING CAPACITY: 204 UG/DL (ref 110–370)
UROBILINOGEN, URINE: 0.2 MG/DL (ref 0.2–1)
WBC # BLD: 10.7 K/CU MM (ref 4–10.5)
WBC # BLD: 6.3 K/CU MM (ref 4–10.5)
WBC CLUMP: ABNORMAL /HPF
WBC UA: 98 /HPF (ref 0–2)

## 2024-05-16 PROCEDURE — 87186 SC STD MICRODIL/AGAR DIL: CPT

## 2024-05-16 PROCEDURE — 51702 INSERT TEMP BLADDER CATH: CPT

## 2024-05-16 PROCEDURE — 85025 COMPLETE CBC W/AUTO DIFF WBC: CPT

## 2024-05-16 PROCEDURE — 6370000000 HC RX 637 (ALT 250 FOR IP): Performed by: PHYSICIAN ASSISTANT

## 2024-05-16 PROCEDURE — 99284 EMERGENCY DEPT VISIT MOD MDM: CPT

## 2024-05-16 PROCEDURE — 82728 ASSAY OF FERRITIN: CPT

## 2024-05-16 PROCEDURE — 51798 US URINE CAPACITY MEASURE: CPT

## 2024-05-16 PROCEDURE — 2580000003 HC RX 258: Performed by: PHYSICIAN ASSISTANT

## 2024-05-16 PROCEDURE — 87077 CULTURE AEROBIC IDENTIFY: CPT

## 2024-05-16 PROCEDURE — 80053 COMPREHEN METABOLIC PANEL: CPT

## 2024-05-16 PROCEDURE — 36415 COLL VENOUS BLD VENIPUNCTURE: CPT

## 2024-05-16 PROCEDURE — 74176 CT ABD & PELVIS W/O CONTRAST: CPT

## 2024-05-16 PROCEDURE — 83540 ASSAY OF IRON: CPT

## 2024-05-16 PROCEDURE — 83550 IRON BINDING TEST: CPT

## 2024-05-16 PROCEDURE — 87086 URINE CULTURE/COLONY COUNT: CPT

## 2024-05-16 PROCEDURE — 81001 URINALYSIS AUTO W/SCOPE: CPT

## 2024-05-16 RX ORDER — 0.9 % SODIUM CHLORIDE 0.9 %
500 INTRAVENOUS SOLUTION INTRAVENOUS ONCE
Status: COMPLETED | OUTPATIENT
Start: 2024-05-16 | End: 2024-05-16

## 2024-05-16 RX ORDER — SULFAMETHOXAZOLE AND TRIMETHOPRIM 800; 160 MG/1; MG/1
1 TABLET ORAL ONCE
Status: COMPLETED | OUTPATIENT
Start: 2024-05-16 | End: 2024-05-16

## 2024-05-16 RX ORDER — HYDROCODONE BITARTRATE AND ACETAMINOPHEN 5; 325 MG/1; MG/1
1 TABLET ORAL ONCE
Status: DISCONTINUED | OUTPATIENT
Start: 2024-05-16 | End: 2024-05-17 | Stop reason: HOSPADM

## 2024-05-16 RX ORDER — LISINOPRIL 5 MG/1
5 TABLET ORAL DAILY
Qty: 30 TABLET | Refills: 5 | Status: SHIPPED | OUTPATIENT
Start: 2024-05-16

## 2024-05-16 RX ORDER — SULFAMETHOXAZOLE AND TRIMETHOPRIM 800; 160 MG/1; MG/1
1 TABLET ORAL 2 TIMES DAILY
Qty: 14 TABLET | Refills: 0 | Status: SHIPPED | OUTPATIENT
Start: 2024-05-16 | End: 2024-05-23

## 2024-05-16 RX ADMIN — SODIUM CHLORIDE 500 ML: 9 INJECTION, SOLUTION INTRAVENOUS at 17:38

## 2024-05-16 RX ADMIN — SULFAMETHOXAZOLE AND TRIMETHOPRIM 1 TABLET: 800; 160 TABLET ORAL at 19:53

## 2024-05-16 ASSESSMENT — PAIN - FUNCTIONAL ASSESSMENT: PAIN_FUNCTIONAL_ASSESSMENT: 0-10

## 2024-05-16 ASSESSMENT — PAIN SCALES - GENERAL: PAINLEVEL_OUTOF10: 0

## 2024-05-16 NOTE — ED PROVIDER NOTES
EMERGENCY DEPARTMENT ENCOUNTER        Pt Name: Brett Lozada  MRN: [id number]  Birthdate 1940  Date of evaluation: 5/16/2024  Provider: WILL VILLALBA  PCP: Delfin Gibbons MD    ELVA. I have evaluated this patient.        Triage CHIEF COMPLAINT       Chief Complaint   Patient presents with    Urinary Retention     Pt had arechiga removed today at dr office. Unable to void after about 5 hrs prior. Pt reporting significant pain and discomfort.          HISTORY OF PRESENT ILLNESS      Chief Complaint: Urinary retention    Brett Lozada is a 83 y.o.  male who presents to the ED for concern of acute urinary retention.  Patient has had reoccurring issues with retention, he is followed with Dr. Loera urology, was seen here yesterday, states that he had his Arechiga catheter removed today per his request, he-\"feels like it has been in there for too long\".  Patient has been on ciprofloxacin for infection, been taking his medications.  He states once he got home he has been unable to urinate since earlier today, he presents via EMS with lower abdominal pain and abdominal distention secondary to the inability to urinate.  Describing no fevers or chills, no altered mental status, no flank tenderness.  Patient does have history of prostate cancer but is otherwise in remission.  Patient anticoagulated, he has a significant bleeding from the urethra or signs of clots    Nursing Notes were all reviewed and agreed with or any disagreements were addressed in the HPI.    REVIEW OF SYSTEMS     At least 10 systems reviewed and otherwise acutely negative except as in the [place].     PAST MEDICAL HISTORY     Past Medical History:   Diagnosis Date    Acute non Q wave MI (myocardial infarction), initial episode of care ([facility]) 5/2/2023    Acute pulmonary embolism ([facility]) 12/26/2023    Acute ST elevation myocardial infarction (STEMI) due to occlusion of distal portion of left anterior descending (LAD) coronary artery ([facility])  met    MDM:    CC/HPI Summary, DDx, ED Course, and Reassessment:     Patient presents as above.  Emergent etiologies considered.  Patient seen and examined.  Work-up initiated secondary to presentation, physical exam findings, vital signs and medical chart review.    In brief, 83-year-old male presenting to the ED via EMS with acute urinary retention.  Has been followed with urology secondary to this and had his Mahmood removed earlier today per his request according to the patient.  He states that since has been removed he has been unable to urinate, having worsening pain swelling and abdominal distention prompting him to call EMS to return back.  Patient pretty uncomfortable on initial arrival, had a bladder scan over 300 closer to 400, Mahmood catheter was placed, did show some blood and sediment within the urine, patient did have immediate relief.  Feeling better on evaluation.  Patient does admit that he is concerned about infection, he states that he has been on Cipro and has been taking it.  Describes no other fevers chills, no flank tenderness, no altered mental status or confusion.    Patient's urine was sent, does show signs of infection on initial evaluation, will obtain some lab work, will send down for CAT scan, will given fluids, pain medication initially.    Patient is reassured that her white blood kidney function, electrolytes significant, will send culture.  CT scan demonstrated no signs of ascending Infection but there was some inflammation around the bladder-secondary to being on oral antibiotics-ciprofloxacin which there is no record of, I did consult urology, secondary as well appearance, normal labs, mild bladder infection and stable vitals.  Alert initiated on Bactrim and follow-up as an outpatient, I will advise him to continue to keep his outpatient appointment with urology, will be given strict return precautions.  Will otherwise discharged home in stable condition        History from :

## 2024-05-16 NOTE — ED TRIAGE NOTES
Patient presents to ED with complaints of urinary retention.  Patient was seen here this morning and had arechiga removed, now having urinary retention and pressure.

## 2024-05-17 VITALS
HEART RATE: 69 BPM | SYSTOLIC BLOOD PRESSURE: 137 MMHG | BODY MASS INDEX: 25.23 KG/M2 | RESPIRATION RATE: 16 BRPM | HEIGHT: 66 IN | TEMPERATURE: 98.2 F | OXYGEN SATURATION: 98 % | DIASTOLIC BLOOD PRESSURE: 78 MMHG | WEIGHT: 157 LBS

## 2024-05-19 ENCOUNTER — HOSPITAL ENCOUNTER (EMERGENCY)
Age: 84
Discharge: HOME | End: 2024-05-19
Attending: EMERGENCY MEDICINE
Payer: MEDICARE

## 2024-05-19 VITALS
OXYGEN SATURATION: 98 % | SYSTOLIC BLOOD PRESSURE: 170 MMHG | TEMPERATURE: 98.6 F | WEIGHT: 148 LBS | HEIGHT: 66 IN | BODY MASS INDEX: 23.78 KG/M2 | DIASTOLIC BLOOD PRESSURE: 61 MMHG | RESPIRATION RATE: 16 BRPM | HEART RATE: 60 BPM

## 2024-05-19 DIAGNOSIS — R33.9 URINARY RETENTION: Primary | ICD-10-CM

## 2024-05-19 LAB
CULTURE: ABNORMAL
CULTURE: ABNORMAL
Lab: ABNORMAL
SPECIMEN: ABNORMAL

## 2024-05-19 PROCEDURE — 51798 US URINE CAPACITY MEASURE: CPT

## 2024-05-19 PROCEDURE — 99283 EMERGENCY DEPT VISIT LOW MDM: CPT

## 2024-05-19 PROCEDURE — 51702 INSERT TEMP BLADDER CATH: CPT

## 2024-05-19 PROCEDURE — 6370000000 HC RX 637 (ALT 250 FOR IP)

## 2024-05-19 RX ORDER — LIDOCAINE HYDROCHLORIDE 20 MG/ML
JELLY TOPICAL PRN
Status: DISCONTINUED | OUTPATIENT
Start: 2024-05-19 | End: 2024-05-19 | Stop reason: HOSPADM

## 2024-05-19 RX ORDER — SULFAMETHOXAZOLE AND TRIMETHOPRIM 800; 160 MG/1; MG/1
1 TABLET ORAL 2 TIMES DAILY
Qty: 14 TABLET | Refills: 0 | Status: SHIPPED | OUTPATIENT
Start: 2024-05-22 | End: 2024-05-29

## 2024-05-19 RX ADMIN — LIDOCAINE HYDROCHLORIDE: 20 JELLY TOPICAL at 10:08

## 2024-05-19 ASSESSMENT — PAIN - FUNCTIONAL ASSESSMENT: PAIN_FUNCTIONAL_ASSESSMENT: NONE - DENIES PAIN

## 2024-05-19 ASSESSMENT — PAIN SCALES - GENERAL: PAINLEVEL_OUTOF10: 0

## 2024-05-19 ASSESSMENT — LIFESTYLE VARIABLES: HOW OFTEN DO YOU HAVE A DRINK CONTAINING ALCOHOL: NEVER

## 2024-05-19 NOTE — ED NOTES
New arechiga catheter placed. Discussed catheter care. Discussed follow up with Dr. Fonseca. No further questions at this time.

## 2024-05-19 NOTE — ED PROVIDER NOTES
[facility] EMERGENCY DEPARTMENT  EMERGENCY DEPARTMENT ENCOUNTER        Pt Name: Brett Lozada  MRN: [id number]  Birthdate 1940  Date of evaluation: 5/19/2024  Provider: MIGUEL Alvarez - CNP  PCP: Delfin Gibbons MD  Note Started: 8:58 AM EDT 5/19/24       I have seen and evaluated this patient with my supervising physician Dr Cao .      CHIEF COMPLAINT       Chief Complaint   Patient presents with    Hematuria     Indwelling catheter       HISTORY OF PRESENT ILLNESS: 1 or more Elements     History From: Patient    Limitations to history : None    Social Determinants Significantly Affecting Health : None    Chief Complaint: Urinary catheter problems    Brett Lozada is a 83 y.o. male history of urinary retention with catheter use who presents to ED stated that when he woke up he noticed blood on the end of his penis.  Stated his catheter was not draining well.  Stated when he stood up to change it it did feel well.  Stated that in the catheter did have some blood in it and he was concerned.  Denied any dysuria.  Reported he did have pain at the tip of his penis.  Stated he is being seen by urology and is on Bactrim for 7 days.  Stated they change this from Keflex initially.  Denies any abdominal pain nausea vomiting body aches chills or fevers.    Nursing Notes were all reviewed and agreed with or any disagreements were addressed in the HPI.    REVIEW OF SYSTEMS :      Review of Systems    Positives and Pertinent negatives as per HPI.     SURGICAL HISTORY     Past Surgical History:   Procedure Laterality Date    BACK SURGERY      CARDIAC SURGERY      cabg    COLONOSCOPY  4/14/15    divertics    CORONARY ANGIOPLASTY WITH STENT PLACEMENT  05/14/2014    X2       CURRENTMEDICATIONS       Discharge Medication List as of 5/19/2024 10:30 AM        CONTINUE these medications which have NOT CHANGED    Details   lisinopril (PRINIVIL;ZESTRIL) 5 MG tablet Take 1

## 2024-05-19 NOTE — ED PROVIDER NOTES
HPI: In brief, for evaluation of issues with his catheter.  He states he woke up he noticed little bit of blood and so he wanted to come in to get evaluated.  The patient stated that he has no pain right now but knows he cannot urinate so he wants his Mahmood catheter placed.  He is on Bactrim 7 days worth and he states that he wanted to follow-up on the culture results as well.  He does have a urologist with whom he can follow, Dr. Fonseca.  He has not called him yet.  Denies any fevers or chills.  No trouble with her bowel movements.  Patient has not had any nausea or vomiting.  No chest pain or shortness of breath.    Focused exam revealed scant blood at the urethral meatus.  The patient does not have any abdominal tenderness palpation no.  CVA tenderness.  Has good distal pulses.  Motor, sensory, coordination appears to be intact.  Patient is alert and oriented x 3..    ED course/MDM: Patient did have some slight urinary tension here on bedside scan.  Patient had Mahmood catheter placed.  Cultures most recently does show the patient is sensitive to Bactrim which she was recently started on and so we will ask him to continue this but extend this to 14 days total.  Will also give him instructions to follow-up closely with urology and return with any change or worsening.  He was agreeable to plan of care provided         I personally saw the patient and made/approved the management plan (in conjunction with the [facility]) and take responsibility for the patient management.     For all further details of the patient's emergency department visit, please see the advanced practice provider's documentation.    I am the Primary Clinician of Record.    Comment: Please note this report has been produced using speech recognition software and may contain errors related to that system including errors in grammar, punctuation, and spelling, as well as words and phrases that may be inappropriate. If there are any questions or concerns

## 2024-05-19 NOTE — ED TRIAGE NOTES
Patient came to ED today because of blood in his arechiga. Says he woke up this morning and the bag was empty, then he stood up and it drained with yellow urine. He emptied that, and when the bag started filling again it started to turn red. Some blood noted at urinary meatus.

## 2024-05-20 ENCOUNTER — APPOINTMENT (OUTPATIENT)
Dept: CT IMAGING | Age: 84
End: 2024-05-20
Payer: MEDICARE

## 2024-05-20 ENCOUNTER — HOSPITAL ENCOUNTER (EMERGENCY)
Age: 84
Discharge: HOME | End: 2024-05-20
Attending: EMERGENCY MEDICINE
Payer: MEDICARE

## 2024-05-20 VITALS
WEIGHT: 145 LBS | OXYGEN SATURATION: 100 % | TEMPERATURE: 98.2 F | BODY MASS INDEX: 23.3 KG/M2 | SYSTOLIC BLOOD PRESSURE: 162 MMHG | HEART RATE: 66 BPM | DIASTOLIC BLOOD PRESSURE: 68 MMHG | HEIGHT: 66 IN | RESPIRATION RATE: 13 BRPM

## 2024-05-20 DIAGNOSIS — R31.0 GROSS HEMATURIA: Primary | ICD-10-CM

## 2024-05-20 LAB
ALBUMIN SERPL-MCNC: 4 GM/DL (ref 3.4–5)
ALP BLD-CCNC: 114 IU/L (ref 40–128)
ALT SERPL-CCNC: 8 U/L (ref 10–40)
ANION GAP SERPL CALCULATED.3IONS-SCNC: 11 MMOL/L (ref 7–16)
AST SERPL-CCNC: 14 IU/L (ref 15–37)
BASOPHILS ABSOLUTE: 0 K/CU MM
BASOPHILS RELATIVE PERCENT: 0.4 % (ref 0–1)
BILIRUB SERPL-MCNC: 0.3 MG/DL (ref 0–1)
BILIRUBIN, URINE: NEGATIVE MG/DL
BLOOD, URINE: ABNORMAL
BUN SERPL-MCNC: 9 MG/DL (ref 6–23)
CALCIUM SERPL-MCNC: 8.6 MG/DL (ref 8.3–10.6)
CHLORIDE BLD-SCNC: 106 MMOL/L (ref 99–110)
CLARITY: ABNORMAL
CO2: 21 MMOL/L (ref 21–32)
COLOR: ABNORMAL
CREAT SERPL-MCNC: 1.2 MG/DL (ref 0.9–1.3)
DIFFERENTIAL TYPE: ABNORMAL
EOSINOPHILS ABSOLUTE: 0.2 K/CU MM
EOSINOPHILS RELATIVE PERCENT: 3.3 % (ref 0–3)
GFR, ESTIMATED: 60 ML/MIN/1.73M2
GLUCOSE SERPL-MCNC: 97 MG/DL (ref 70–99)
GLUCOSE URINE: NEGATIVE MG/DL
HCT VFR BLD CALC: 36.5 % (ref 42–52)
HEMOGLOBIN: 11.7 GM/DL (ref 13.5–18)
IMMATURE NEUTROPHIL %: 0.2 % (ref 0–0.43)
KETONES, URINE: NEGATIVE MG/DL
LEUKOCYTE ESTERASE, URINE: NEGATIVE
LYMPHOCYTES ABSOLUTE: 0.8 K/CU MM
LYMPHOCYTES RELATIVE PERCENT: 16.9 % (ref 24–44)
MCH RBC QN AUTO: 30.2 PG (ref 27–31)
MCHC RBC AUTO-ENTMCNC: 32.1 % (ref 32–36)
MCV RBC AUTO: 94.1 FL (ref 78–100)
MONOCYTES ABSOLUTE: 0.4 K/CU MM
MONOCYTES RELATIVE PERCENT: 7.9 % (ref 0–4)
NEUTROPHILS ABSOLUTE: 3.4 K/CU MM
NEUTROPHILS RELATIVE PERCENT: 71.3 % (ref 36–66)
NITRITE URINE, QUANTITATIVE: NEGATIVE
NUCLEATED RBC %: 0 %
PDW BLD-RTO: 13.4 % (ref 11.7–14.9)
PH, URINE: 6 (ref 5–8)
PLATELET # BLD: 126 K/CU MM (ref 140–440)
PMV BLD AUTO: 12.8 FL (ref 7.5–11.1)
POTASSIUM SERPL-SCNC: 3.8 MMOL/L (ref 3.5–5.1)
PROTEIN UA: 100 MG/DL
RBC # BLD: 3.88 M/CU MM (ref 4.6–6.2)
SODIUM BLD-SCNC: 138 MMOL/L (ref 135–145)
SPECIFIC GRAVITY UA: 1.02 (ref 1–1.03)
TOTAL IMMATURE NEUTOROPHIL: 0.01 K/CU MM
TOTAL NUCLEATED RBC: 0 K/CU MM
TOTAL PROTEIN: 6.5 GM/DL (ref 6.4–8.2)
UROBILINOGEN, URINE: NORMAL MG/DL (ref 0.2–1)
WBC # BLD: 4.8 K/CU MM (ref 4–10.5)

## 2024-05-20 PROCEDURE — 99284 EMERGENCY DEPT VISIT MOD MDM: CPT

## 2024-05-20 PROCEDURE — 74176 CT ABD & PELVIS W/O CONTRAST: CPT

## 2024-05-20 PROCEDURE — 96365 THER/PROPH/DIAG IV INF INIT: CPT

## 2024-05-20 PROCEDURE — 85025 COMPLETE CBC W/AUTO DIFF WBC: CPT

## 2024-05-20 PROCEDURE — 81003 URINALYSIS AUTO W/O SCOPE: CPT

## 2024-05-20 PROCEDURE — 80053 COMPREHEN METABOLIC PANEL: CPT

## 2024-05-20 PROCEDURE — 2580000003 HC RX 258: Performed by: EMERGENCY MEDICINE

## 2024-05-20 PROCEDURE — 6360000002 HC RX W HCPCS: Performed by: EMERGENCY MEDICINE

## 2024-05-20 RX ORDER — 0.9 % SODIUM CHLORIDE 0.9 %
1000 INTRAVENOUS SOLUTION INTRAVENOUS ONCE
Status: COMPLETED | OUTPATIENT
Start: 2024-05-20 | End: 2024-05-20

## 2024-05-20 RX ADMIN — CEFEPIME 1000 MG: 1 INJECTION, POWDER, FOR SOLUTION INTRAMUSCULAR; INTRAVENOUS at 12:28

## 2024-05-20 RX ADMIN — SODIUM CHLORIDE 1000 ML: 9 INJECTION, SOLUTION INTRAVENOUS at 12:30

## 2024-05-20 ASSESSMENT — PAIN - FUNCTIONAL ASSESSMENT
PAIN_FUNCTIONAL_ASSESSMENT: NONE - DENIES PAIN
PAIN_FUNCTIONAL_ASSESSMENT: 0-10

## 2024-05-20 ASSESSMENT — PAIN SCALES - GENERAL: PAINLEVEL_OUTOF10: 0

## 2024-05-20 NOTE — ED PROVIDER NOTES
Emergency Department Encounter    Patient: Brett Lozada  MRN: [id number]  : 1940  Date of Evaluation: 2024  ED Provider:  Deya Blackmon DO    Triage Chief Complaint:   Hematuria (Pt was cleaning around arechiga this AM and noticed some blood in the urine. Pt here yesterday for same thing. )    [place]:  Brett Lozada is a 83 y.o. male with history of CABG, hypertension, hyperlipidemia, COPD, peripheral vascular disease, coronary artery disease, bowel obstruction, ataxia, complicated urinary tract infection, urinary retention, Arechiga catheter, pulmonary embolus, that presents to the emergency department complaining of blood in the catheter.  Patient states he was cleaning the catheter this morning noticed more blood in the catheter tubing.  Patient states he was seen in the emergency department yesterday for the same and they change the catheter out.  Patient states he was started on Bactrim antibiotic 4 days ago.  Patient states he has appointment with urologist Dr. Fonseca in 2 days.  Patient states no pain no fever no nausea vomiting not dizzy or lightheaded.  Patient here for evaluation.    ROS - see HPI, below listed is current ROS at time of my eval:  General:  No fevers, no chills, no weakness  Eyes:  No recent vison changes, no discharge  ENT:  No sore throat, no nasal congestion, no hearing changes  Cardiovascular:  No chest pain, no palpitations  Respiratory:  No shortness of breath, no cough, no wheezing  Gastrointestinal:  No pain, no nausea, no vomiting, no diarrhea  Musculoskeletal:  No muscle pain, no joint pain  Skin:  No rash, no pruritis, no easy bruising  Neurologic:  No speech problems, no headache, no extremity numbness, no extremity tingling, no extremity weakness  Psychiatric:  No anxiety  Genitourinary:  No dysuria, positive for hematuria  Endocrine:  No unexpected weight gain, no unexpected weight loss  Extremities:  no edema, no pain    Past Medical History:   Diagnosis Date

## 2024-05-20 NOTE — ED NOTES
Changed pt's leg bag to 2000mL bag, leg bag had approximately 1000mL on emptying. Copious amounts of blood and clots noted in leg bag w/ dark maroon colored urine. Pt stated it was worse last night when they changed their arechiga bag.  Showed Dr. Blackmon specimen obtained

## 2024-05-20 NOTE — DISCHARGE INSTRUCTIONS
Follow-up with your urologist Dr. Fonseca in 2 days as scheduled for reevaluation of blood in your urine.  Keep scheduled appointment  Hold Eliquis and MADISON see the urologist on Wednesday.  Return to the emergency department for Mahmood catheter not draining or any nausea vomiting corwin pain or any worsening symptoms.

## 2024-05-23 ENCOUNTER — OFFICE VISIT (OUTPATIENT)
Dept: ONCOLOGY | Age: 84
End: 2024-05-23
Payer: MEDICARE

## 2024-05-23 ENCOUNTER — HOSPITAL ENCOUNTER (OUTPATIENT)
Dept: INFUSION THERAPY | Age: 84
Discharge: HOME | End: 2024-05-23
Payer: MEDICARE

## 2024-05-23 VITALS
TEMPERATURE: 97.9 F | HEIGHT: 66 IN | BODY MASS INDEX: 24.43 KG/M2 | OXYGEN SATURATION: 99 % | SYSTOLIC BLOOD PRESSURE: 128 MMHG | HEART RATE: 84 BPM | DIASTOLIC BLOOD PRESSURE: 80 MMHG | RESPIRATION RATE: 16 BRPM | WEIGHT: 152 LBS

## 2024-05-23 DIAGNOSIS — D50.0 IRON DEFICIENCY ANEMIA DUE TO CHRONIC BLOOD LOSS: Primary | ICD-10-CM

## 2024-05-23 PROCEDURE — 3074F SYST BP LT 130 MM HG: CPT | Performed by: INTERNAL MEDICINE

## 2024-05-23 PROCEDURE — 1123F ACP DISCUSS/DSCN MKR DOCD: CPT | Performed by: INTERNAL MEDICINE

## 2024-05-23 PROCEDURE — 1036F TOBACCO NON-USER: CPT | Performed by: INTERNAL MEDICINE

## 2024-05-23 PROCEDURE — 3079F DIAST BP 80-89 MM HG: CPT | Performed by: INTERNAL MEDICINE

## 2024-05-23 PROCEDURE — G8427 DOCREV CUR MEDS BY ELIG CLIN: HCPCS | Performed by: INTERNAL MEDICINE

## 2024-05-23 PROCEDURE — G8420 CALC BMI NORM PARAMETERS: HCPCS | Performed by: INTERNAL MEDICINE

## 2024-05-23 PROCEDURE — 99211 OFF/OP EST MAY X REQ PHY/QHP: CPT

## 2024-05-23 PROCEDURE — 99213 OFFICE O/P EST LOW 20 MIN: CPT | Performed by: INTERNAL MEDICINE

## 2024-05-23 RX ORDER — ACETAMINOPHEN 325 MG/1
650 TABLET ORAL
OUTPATIENT
Start: 2024-05-29

## 2024-05-23 RX ORDER — SODIUM CHLORIDE 9 MG/ML
5-250 INJECTION, SOLUTION INTRAVENOUS PRN
OUTPATIENT
Start: 2024-05-29

## 2024-05-23 RX ORDER — ONDANSETRON 2 MG/ML
8 INJECTION INTRAMUSCULAR; INTRAVENOUS
OUTPATIENT
Start: 2024-05-29

## 2024-05-23 RX ORDER — CEPHALEXIN 500 MG/1
CAPSULE ORAL
COMMUNITY
Start: 2024-05-08 | End: 2024-05-23

## 2024-05-23 RX ORDER — EPINEPHRINE 1 MG/ML
0.3 INJECTION, SOLUTION, CONCENTRATE INTRAVENOUS PRN
OUTPATIENT
Start: 2024-05-29

## 2024-05-23 RX ORDER — SODIUM CHLORIDE 0.9 % (FLUSH) 0.9 %
5-40 SYRINGE (ML) INJECTION PRN
OUTPATIENT
Start: 2024-05-29

## 2024-05-23 RX ORDER — HEPARIN SODIUM (PORCINE) LOCK FLUSH IV SOLN 100 UNIT/ML 100 UNIT/ML
500 SOLUTION INTRAVENOUS PRN
OUTPATIENT
Start: 2024-05-29

## 2024-05-23 RX ORDER — ALBUTEROL SULFATE 90 UG/1
4 AEROSOL, METERED RESPIRATORY (INHALATION) PRN
OUTPATIENT
Start: 2024-05-29

## 2024-05-23 RX ORDER — FAMOTIDINE 10 MG/ML
20 INJECTION, SOLUTION INTRAVENOUS
OUTPATIENT
Start: 2024-05-29

## 2024-05-23 RX ORDER — SODIUM CHLORIDE 9 MG/ML
INJECTION, SOLUTION INTRAVENOUS CONTINUOUS
OUTPATIENT
Start: 2024-05-29

## 2024-05-23 RX ORDER — MEPERIDINE HYDROCHLORIDE 50 MG/ML
12.5 INJECTION INTRAMUSCULAR; INTRAVENOUS; SUBCUTANEOUS PRN
OUTPATIENT
Start: 2024-05-29

## 2024-05-23 RX ORDER — ACETAMINOPHEN 325 MG/1
650 TABLET ORAL ONCE
OUTPATIENT
Start: 2024-05-29 | End: 2024-05-29

## 2024-05-23 RX ORDER — DIPHENHYDRAMINE HYDROCHLORIDE 50 MG/ML
50 INJECTION INTRAMUSCULAR; INTRAVENOUS
OUTPATIENT
Start: 2024-05-29

## 2024-05-23 NOTE — PROGRESS NOTES
MA Rooming Questions  Patient: Brett Lozada  MRN: [id number]    Date: 5/23/2024        1. Do you have any new issues?   yes - prostate issues, was having hematuria         2. Do you need any refills on medications?    no    3. Have you had any imaging done since your last visit?   yes - CT    4. Have you been hospitalized or seen in the emergency room since your last visit here?   yes -     5. Did the patient have a depression screening completed today? No    No data recorded     PHQ-9 Given to (if applicable):               PHQ-9 Score (if applicable):                     [] Positive     []  Negative              Does question #9 need addressed (if applicable)                     [] Yes    []  No               Odalys Mortensen CMA

## 2024-05-29 DIAGNOSIS — K90.9 INTESTINAL MALABSORPTION, UNSPECIFIED TYPE: Primary | ICD-10-CM

## 2024-05-31 ENCOUNTER — TELEPHONE (OUTPATIENT)
Dept: CARDIOLOGY CLINIC | Age: 84
End: 2024-05-31

## 2024-06-04 ENCOUNTER — TELEPHONE (OUTPATIENT)
Dept: INFUSION THERAPY | Age: 84
End: 2024-06-04

## 2024-06-04 NOTE — TELEPHONE ENCOUNTER
Called patient to schedule iron infusion, patient is scheduled for appt. Advised pt to stop oral iron one day before infusion start date and can resume after iron infusions are complete. Advised pt to notify us if they are unable to keep their scheduled appt time.

## 2024-06-05 ENCOUNTER — PREP FOR PROCEDURE (OUTPATIENT)
Dept: GASTROENTEROLOGY | Age: 84
End: 2024-06-05

## 2024-06-05 RX ORDER — SODIUM CHLORIDE 9 MG/ML
25 INJECTION, SOLUTION INTRAVENOUS PRN
Status: CANCELLED | OUTPATIENT
Start: 2024-06-05

## 2024-06-05 RX ORDER — SODIUM CHLORIDE 0.9 % (FLUSH) 0.9 %
5-40 SYRINGE (ML) INJECTION EVERY 12 HOURS SCHEDULED
Status: CANCELLED | OUTPATIENT
Start: 2024-06-05

## 2024-06-05 RX ORDER — SODIUM CHLORIDE, SODIUM LACTATE, POTASSIUM CHLORIDE, CALCIUM CHLORIDE 600; 310; 30; 20 MG/100ML; MG/100ML; MG/100ML; MG/100ML
INJECTION, SOLUTION INTRAVENOUS CONTINUOUS
Status: CANCELLED | OUTPATIENT
Start: 2024-06-05

## 2024-06-05 RX ORDER — SODIUM CHLORIDE 0.9 % (FLUSH) 0.9 %
5-40 SYRINGE (ML) INJECTION PRN
Status: CANCELLED | OUTPATIENT
Start: 2024-06-05

## 2024-06-11 ENCOUNTER — TELEPHONE (OUTPATIENT)
Dept: INFUSION THERAPY | Age: 84
End: 2024-06-11

## 2024-06-12 NOTE — PROGRESS NOTES
.Surgery @ [facility] on 6/18/24 you will be called 6/17/24 with times    FOLLOW THE OFFICE INSTRUCTIONS FOR YOUR BOWEL PREP    1. Enter thru the hospital main entrance on day of surgery, check in at the Information Desk. If you arrive prior to 6:00am, enter thru the ER entrance.    2. Follow the directions as prescribed by the doctor for your procedure and medications.         Morning of surgery take:  Omeprazole, Metoprolol & Isosorbide with sips of water         Stop vitamins, supplements and NSAIDS:  NOW         Stop Plavix per the office: 6/13/24              Stop Eliquis per the office: 6/16/24    3. Check with your Doctor regarding stopping blood thinners and follow their instructions.    4. Do not smoke, vape or use chewing tobacco morning of surgery. Do not drink any alcoholic beverages 24 hours prior to surgery.       This includes NA Beer. No street drugs 7 days prior to surgery.    5. If you have dentures, contacts of glasses they will be removed before going to the OR; please bring a case.    6. Please bring picture ID, insurance card, paperwork from the doctor’s office (H & P, Consent, & card for implantable devices).    7. Take a shower with an antibacterial soap the night before surgery and the morning of surgery. Do not put anything on your skin      After your morning shower.    8. You will need a responsible adult to drive you home and check on you after surgery.

## 2024-06-17 ENCOUNTER — ANESTHESIA EVENT (OUTPATIENT)
Dept: ENDOSCOPY | Age: 84
End: 2024-06-17
Payer: MEDICARE

## 2024-06-17 ASSESSMENT — ENCOUNTER SYMPTOMS: SHORTNESS OF BREATH: 1

## 2024-06-17 NOTE — ANESTHESIA PRE PROCEDURE
Department of Anesthesiology  Preprocedure Note       Name:  Brett Lozada   Age:  83 y.o.  :  1940                                          MRN:  [id number]         Date:  2024      Surgeon: Surgeon(s):  Mikala Manzo MD    Procedure: Procedure(s):  COLONOSCOPY DIAGNOSTIC  ESOPHAGOGASTRODUODENOSCOPY    Medications prior to admission:   Prior to Admission medications    Medication Sig Start Date End Date Taking? Authorizing Provider   lisinopril (PRINIVIL;ZESTRIL) 5 MG tablet Take 1 tablet by mouth daily 24   Cecilia Chandler MD   phenylephrine-mineral oil-petrolatum (PREPARATION H) 0.25-14-74.9 % rectal ointment Place rectally 2 times daily as needed for Hemorrhoids 24   Arun Romero DO   ferrous sulfate (IRON 325) 325 (65 Fe) MG tablet Take 1 tablet by mouth every other day 24   Hailey Caruso MD   isosorbide mononitrate (IMDUR) 30 MG extended release tablet Take 1 tablet by mouth daily 24   Kaushal Brooks MD   benzonatate (TESSALON) 100 MG capsule Take 1 capsule by mouth 3 times daily as needed for Cough    ProviderSara MD   atorvastatin (LIPITOR) 80 MG tablet Take 1 tablet by mouth daily    ProviderSara MD   benzocaine-menthol (CEPACOL SORE THROAT) 15-3.6 MG lozenge Take 1 lozenge by mouth every 2 hours as needed for Sore Throat    ProviderSara MD   apixaban (ELIQUIS) 5 MG TABS tablet Take 1 tablet by mouth in the morning and at bedtime 24   Berenice English APRN - CNP   guaiFENesin (MUCINEX) 600 MG extended release tablet Take 1 tablet by mouth 2 times daily 24   Berenice English APRN - CNP   vitamin B-12 (CYANOCOBALAMIN) 100 MCG tablet Take 10 tablets by mouth daily 24  Berenice English APRN - CNP   vitamin D (CHOLECALCIFEROL) 50 MCG ( UT) TABS tablet Take 1 tablet by mouth every other day Patient states he doesn't take this daily 24   Narcelles, Berenice, APRN - CNP   metoprolol tartrate (LOPRESSOR) 25 MG

## 2024-06-17 NOTE — PROGRESS NOTES
6/17/24 - LM for granddaughter (Mauricehidevang) Brett's procedures @ [facility] on  6/18/24 is @ 1000, arrival 0830. NPO status and morning medications reviewed. Please call with any questions.

## 2024-06-18 ENCOUNTER — ANESTHESIA (OUTPATIENT)
Dept: ENDOSCOPY | Age: 84
End: 2024-06-18
Payer: MEDICARE

## 2024-06-18 ENCOUNTER — HOSPITAL ENCOUNTER (OUTPATIENT)
Age: 84
Setting detail: OUTPATIENT SURGERY
Discharge: HOME | End: 2024-06-18
Attending: INTERNAL MEDICINE | Admitting: INTERNAL MEDICINE
Payer: MEDICARE

## 2024-06-18 VITALS
BODY MASS INDEX: 24.43 KG/M2 | HEIGHT: 66 IN | DIASTOLIC BLOOD PRESSURE: 99 MMHG | WEIGHT: 152 LBS | OXYGEN SATURATION: 97 % | HEART RATE: 62 BPM | RESPIRATION RATE: 16 BRPM | SYSTOLIC BLOOD PRESSURE: 198 MMHG | TEMPERATURE: 97.5 F

## 2024-06-18 DIAGNOSIS — R19.7 DIARRHEA, UNSPECIFIED TYPE: ICD-10-CM

## 2024-06-18 DIAGNOSIS — K21.9 GASTROESOPHAGEAL REFLUX DISEASE, UNSPECIFIED WHETHER ESOPHAGITIS PRESENT: ICD-10-CM

## 2024-06-18 DIAGNOSIS — R19.4 ALTERED BOWEL HABITS: ICD-10-CM

## 2024-06-18 DIAGNOSIS — D64.9 ANEMIA, UNSPECIFIED TYPE: ICD-10-CM

## 2024-06-18 DIAGNOSIS — R19.5 DARK STOOLS: ICD-10-CM

## 2024-06-18 PROCEDURE — 43239 EGD BIOPSY SINGLE/MULTIPLE: CPT | Performed by: INTERNAL MEDICINE

## 2024-06-18 PROCEDURE — 3700000001 HC ADD 15 MINUTES (ANESTHESIA): Performed by: INTERNAL MEDICINE

## 2024-06-18 PROCEDURE — 3609012400 HC EGD TRANSORAL BIOPSY SINGLE/MULTIPLE: Performed by: INTERNAL MEDICINE

## 2024-06-18 PROCEDURE — 2580000003 HC RX 258: Performed by: NURSE PRACTITIONER

## 2024-06-18 PROCEDURE — 2500000003 HC RX 250 WO HCPCS: Performed by: NURSE ANESTHETIST, CERTIFIED REGISTERED

## 2024-06-18 PROCEDURE — 88342 IMHCHEM/IMCYTCHM 1ST ANTB: CPT | Performed by: PATHOLOGY

## 2024-06-18 PROCEDURE — 7100000010 HC PHASE II RECOVERY - FIRST 15 MIN: Performed by: INTERNAL MEDICINE

## 2024-06-18 PROCEDURE — 3609010300 HC COLONOSCOPY W/BIOPSY SINGLE/MULTIPLE: Performed by: INTERNAL MEDICINE

## 2024-06-18 PROCEDURE — 45380 COLONOSCOPY AND BIOPSY: CPT | Performed by: INTERNAL MEDICINE

## 2024-06-18 PROCEDURE — 6360000002 HC RX W HCPCS: Performed by: NURSE ANESTHETIST, CERTIFIED REGISTERED

## 2024-06-18 PROCEDURE — 3700000000 HC ANESTHESIA ATTENDED CARE: Performed by: INTERNAL MEDICINE

## 2024-06-18 PROCEDURE — 7100000011 HC PHASE II RECOVERY - ADDTL 15 MIN: Performed by: INTERNAL MEDICINE

## 2024-06-18 PROCEDURE — 88305 TISSUE EXAM BY PATHOLOGIST: CPT | Performed by: PATHOLOGY

## 2024-06-18 PROCEDURE — 2709999900 HC NON-CHARGEABLE SUPPLY: Performed by: INTERNAL MEDICINE

## 2024-06-18 RX ORDER — PROPOFOL 10 MG/ML
INJECTION, EMULSION INTRAVENOUS PRN
Status: DISCONTINUED | OUTPATIENT
Start: 2024-06-18 | End: 2024-06-18 | Stop reason: SDUPTHER

## 2024-06-18 RX ORDER — SODIUM CHLORIDE, SODIUM LACTATE, POTASSIUM CHLORIDE, CALCIUM CHLORIDE 600; 310; 30; 20 MG/100ML; MG/100ML; MG/100ML; MG/100ML
INJECTION, SOLUTION INTRAVENOUS CONTINUOUS
Status: DISCONTINUED | OUTPATIENT
Start: 2024-06-18 | End: 2024-06-18 | Stop reason: HOSPADM

## 2024-06-18 RX ORDER — LIDOCAINE HYDROCHLORIDE 20 MG/ML
INJECTION, SOLUTION EPIDURAL; INFILTRATION; INTRACAUDAL; PERINEURAL PRN
Status: DISCONTINUED | OUTPATIENT
Start: 2024-06-18 | End: 2024-06-18 | Stop reason: SDUPTHER

## 2024-06-18 RX ORDER — SODIUM CHLORIDE 9 MG/ML
25 INJECTION, SOLUTION INTRAVENOUS PRN
Status: DISCONTINUED | OUTPATIENT
Start: 2024-06-18 | End: 2024-06-18 | Stop reason: HOSPADM

## 2024-06-18 RX ORDER — SODIUM CHLORIDE 0.9 % (FLUSH) 0.9 %
5-40 SYRINGE (ML) INJECTION PRN
Status: DISCONTINUED | OUTPATIENT
Start: 2024-06-18 | End: 2024-06-18 | Stop reason: HOSPADM

## 2024-06-18 RX ORDER — SODIUM CHLORIDE 0.9 % (FLUSH) 0.9 %
5-40 SYRINGE (ML) INJECTION EVERY 12 HOURS SCHEDULED
Status: DISCONTINUED | OUTPATIENT
Start: 2024-06-18 | End: 2024-06-18 | Stop reason: HOSPADM

## 2024-06-18 RX ADMIN — PROPOFOL 100 MG: 10 INJECTION, EMULSION INTRAVENOUS at 10:16

## 2024-06-18 RX ADMIN — LIDOCAINE HYDROCHLORIDE 100 MG: 20 INJECTION, SOLUTION EPIDURAL; INFILTRATION; INTRACAUDAL; PERINEURAL at 10:16

## 2024-06-18 RX ADMIN — PROPOFOL 50 MG: 10 INJECTION, EMULSION INTRAVENOUS at 10:45

## 2024-06-18 RX ADMIN — SODIUM CHLORIDE, POTASSIUM CHLORIDE, SODIUM LACTATE AND CALCIUM CHLORIDE: 600; 310; 30; 20 INJECTION, SOLUTION INTRAVENOUS at 08:46

## 2024-06-18 RX ADMIN — PROPOFOL 50 MG: 10 INJECTION, EMULSION INTRAVENOUS at 10:35

## 2024-06-18 RX ADMIN — PROPOFOL 50 MG: 10 INJECTION, EMULSION INTRAVENOUS at 10:30

## 2024-06-18 RX ADMIN — PROPOFOL 100 MG: 10 INJECTION, EMULSION INTRAVENOUS at 10:24

## 2024-06-18 RX ADMIN — PROPOFOL 50 MG: 10 INJECTION, EMULSION INTRAVENOUS at 10:39

## 2024-06-18 ASSESSMENT — ENCOUNTER SYMPTOMS: SHORTNESS OF BREATH: 1

## 2024-06-18 ASSESSMENT — PAIN - FUNCTIONAL ASSESSMENT
PAIN_FUNCTIONAL_ASSESSMENT: 0-10
PAIN_FUNCTIONAL_ASSESSMENT: 0-10

## 2024-06-18 NOTE — ANESTHESIA POSTPROCEDURE EVALUATION
Department of Anesthesiology  Postprocedure Note    Patient: Brett Lozada  MRN: [id number]  YOB: 1940  Date of evaluation: 6/18/2024    Procedure Summary       Date: 06/18/24 Room / Location: [place] [address] / [facility]    Anesthesia Start: 1006 Anesthesia Stop: 1055    Procedures:       COLONOSCOPY DIAGNOSTIC      ESOPHAGOGASTRODUODENOSCOPY BIOPSY Diagnosis:       Anemia, unspecified type      Gastroesophageal reflux disease, unspecified whether esophagitis present      Diarrhea, unspecified type      Altered bowel habits      Dark stools      (Anemia, unspecified type [D64.9])      (Gastroesophageal reflux disease, unspecified whether esophagitis present [K21.9])      (Diarrhea, unspecified type [R19.7])      (Altered bowel habits [R19.4])      (Dark stools [R19.5])    Surgeons: Mikala Manzo MD Responsible Provider: Sonu Greenwood MD    Anesthesia Type: MAC ASA Status: 3            Anesthesia Type: No value filed.    Matilda Phase I: Matilda Score: 10    Matilda Phase II:      Anesthesia Post Evaluation    Patient location during evaluation: bedside  Patient participation: complete - patient participated  Level of consciousness: awake  Pain score: 0  Airway patency: patent  Nausea & Vomiting: no nausea and no vomiting  Cardiovascular status: hemodynamically stable  Respiratory status: acceptable  Hydration status: stable  Pain management: adequate    No notable events documented.

## 2024-06-18 NOTE — H&P
ENDOSCOPY   Pre-operative History and Physical    Patient: Brett Lozada  : 1940      History Obtained From:  patient, electronic medical record        HISTORY OF PRESENT ILLNESS:                The patient is a 83 y.o. male with significant past medical history as below who presents for EGD/colonoscopy    Indication GERD, Anemia of iron def, Alterted bowel habits, Dark stool     Past Medical History:        Diagnosis Date    Acute non Q wave MI (myocardial infarction), initial episode of care ([facility]) 2023    Acute pulmonary embolism ([facility]) 2023    Acute ST elevation myocardial infarction (STEMI) due to occlusion of distal portion of left anterior descending (LAD) coronary artery ([facility]) 2023    CAD (coronary artery disease)     Cancer ([facility])     prostate    Complicated UTI (urinary tract infection) 2024    H/O echocardiogram 2019    Patient in atrial fibrillation during exam. Left ventricular function is normal, EF is estimated at 55-60%. Mildly dilated left atrium. Mildly dilated left atrium. Mildli enlarged right ventricle cavity. Trace to mild mitral regurgitation is present. No evidence of pericardial effusion. No evidence of pleural effusion.    History of cardiac catheterization 2017    Critical Single vessel CAD with chronic occlusion of RCA.No significant disease of the other vessels.SVBG to RCA is patent with mild Proximal disease.LIMA to LAD did not mature.Normal LV systolic function & wall motion. LVEF is 55 %.Patient tolerated the procedure well.No immediate complications.    History of echocardiogram 2018    Left ventricular systolic function is normal with an ejection fraction of55-60%. Grade I diastolic dysfunction. Mildly dilated left atrium.Moderate mitral regurgitation.Mild to moderate tricuspid regurgitation.No evidence of pericardial effusion.    HTN (hypertension)     Hyperlipidemia     STEMI (ST elevation myocardial infarction) ([facility]) 2014       Past  THEE NgN - CNP   oxyBUTYnin (DITROPAN-XL) 10 MG extended release tablet Take 1 tablet by mouth daily 1/10/24   Sara Rubi MD   omeprazole (PRILOSEC) 40 MG delayed release capsule Take 1 capsule by mouth daily 10/27/23   Sara Rubi MD   donepezil (ARICEPT) 5 MG tablet Take 1 tablet by mouth nightly    Sara Rubi MD   meclizine (ANTIVERT) 25 MG tablet Take 1 tablet by mouth 3 times daily as needed 5/9/23   Sara Rubi MD   ondansetron (ZOFRAN) 4 MG tablet Take 1 tablet by mouth every 8 hours as needed for Nausea 5/7/23   Valreio Phillips PA-C   nitroGLYCERIN (NITROSTAT) 0.4 MG SL tablet Place 1 tablet under the tongue once for 1 dose up to max of 3 total doses. If no relief after 1 dose, call 911. 5/2/23 3/20/24  Silvio Escoto PA-C   tamsulosin (FLOMAX) 0.4 MG capsule Take 1 capsule by mouth at bedtime 5/2/23   Silvio Escoto PA-C   clopidogrel (PLAVIX) 75 MG tablet Take 1 tablet by mouth daily 2/3/22   Kaushal Brooks MD      Scheduled Medications:    sodium chloride flush  5-40 mL IntraVENous 2 times per day     Infusions:    lactated ringers IV soln 75 mL/hr at 06/18/24 0846    sodium chloride       PRN Medications: sodium chloride flush, sodium chloride    Allergies: No Known Allergies      Allergies:  Patient has no known allergies.    Social History:   TOBACCO:   reports that he quit smoking about 18 years ago. His smoking use included cigarettes. He started smoking about 48 years ago. He has a 90.0 pack-year smoking history. He has never used smokeless tobacco.  ETOH:   reports that he does not currently use alcohol.    Family History:       Problem Relation Age of Onset    High Blood Pressure Father        REVIEW OF SYSTEMS:    Negative except for HPI        PHYSICAL EXAM:      Vitals:    BP (!) 193/86   Pulse 71   Temp 97.6 °F (36.4 °C) (Temporal)   Resp 18   Ht 1.676 m (5' 6\")   Wt 68.9 kg (152 lb)   SpO2 94%   BMI 24.53 kg/m²

## 2024-06-18 NOTE — ANESTHESIA PRE PROCEDURE
Department of Anesthesiology  Preprocedure Note       Name:  Brett Lozada   Age:  83 y.o.  :  1940                                          MRN:  [id number]         Date:  2024      Surgeon: Surgeon(s):  Mikala Manzo MD    Procedure: Procedure(s):  COLONOSCOPY DIAGNOSTIC  ESOPHAGOGASTRODUODENOSCOPY    Medications prior to admission:   Prior to Admission medications    Medication Sig Start Date End Date Taking? Authorizing Provider   lisinopril (PRINIVIL;ZESTRIL) 5 MG tablet Take 1 tablet by mouth daily 24   Cecilia Chandler MD   phenylephrine-mineral oil-petrolatum (PREPARATION H) 0.25-14-74.9 % rectal ointment Place rectally 2 times daily as needed for Hemorrhoids 24   Arun Romero DO   ferrous sulfate (IRON 325) 325 (65 Fe) MG tablet Take 1 tablet by mouth every other day 24   Hailey Caruso MD   isosorbide mononitrate (IMDUR) 30 MG extended release tablet Take 1 tablet by mouth daily 24   Kaushal Brooks MD   benzonatate (TESSALON) 100 MG capsule Take 1 capsule by mouth 3 times daily as needed for Cough    ProviderSara MD   atorvastatin (LIPITOR) 80 MG tablet Take 1 tablet by mouth daily    ProviderSara MD   benzocaine-menthol (CEPACOL SORE THROAT) 15-3.6 MG lozenge Take 1 lozenge by mouth every 2 hours as needed for Sore Throat    ProviderSara MD   apixaban (ELIQUIS) 5 MG TABS tablet Take 1 tablet by mouth in the morning and at bedtime 24   Berenice English APRN - CNP   guaiFENesin (MUCINEX) 600 MG extended release tablet Take 1 tablet by mouth 2 times daily 24   Berenice English APRN - CNP   vitamin B-12 (CYANOCOBALAMIN) 100 MCG tablet Take 10 tablets by mouth daily 24  Berenice Engilsh APRN - CNP   vitamin D (CHOLECALCIFEROL) 50 MCG ( UT) TABS tablet Take 1 tablet by mouth every other day Patient states he doesn't take this daily 24   Narcelles, Berenice, APRN - CNP   metoprolol tartrate (LOPRESSOR) 25 MG

## 2024-06-18 NOTE — DISCHARGE INSTRUCTIONS
EGD    _________________________________    OFFICE [phone]__________________    FOLLOW UP APPOINTMENT:  CALL OFFICE FOR RESULTS IN ONE WEEK OR AS NEEDED.    REPEAT PROCEDURE AS  NEEDED.    TEST ORDERED:NONE__________________________________________________________________.    What to Expect at Home  Your Recovery:  The only discomfort after your EGD is generally limited to a mild soreness of the throat, which may last a day or two. Call your physician immediately if you have severe chest pain, shortness of breath or a temperature of 100 degrees or higher if taken orally.    How can you care for yourself at home?  Activity  Rest as much as you need to after you go home.  You should be able to go back to your usual activities the day after the test.  Diet  Follow your doctor's directions for eating after the test.  Drink plenty of fluids (unless your doctor has told you not to).  Medications  If you have a sore throat the day after the test, use an over-the-counter spray to numb your throat.  Your doctor will tell you if and when you can restart your medicines. He or she will also give you instructions about taking any new medicines.  If you take blood thinners, such as warfarin (Coumadin), clopidogrel (Plavix), or aspirin, be sure to talk to your doctor. He or she will tell you if and when to start taking those medicines again. Make sure that you understand exactly what your doctor wants you to do.  If a biopsy was done during the test, your doctor may tell you not to take aspirin or other anti-inflammatory medicines for a few days. These include ibuprofen (Advil, Motrin) and naproxen (Aleve).  DO NOT DRINK ANYTHING WITH ALCOHOL TODAY.    Other instructions:Anesthesia  For your safety, do not drive or operate machinery for 24 hours.   Do not sign legal documents or make major decisions for 24 hours. The anesthesia can make it hard for you to fully understand what you are agreeing

## 2024-06-18 NOTE — PROGRESS NOTES
1130:  Discharge instructions discussed with patient and guardian, both verbalized an understanding.

## 2024-06-24 NOTE — RESULT ENCOUNTER NOTE
Surgical pathology from EGD and colonoscopy revealed that you have no H. pylori/infection.  There was some small amount of gastritis.  Random colon biopsies revealed no precancerous or cancer.  You will not need a repeat colonoscopy.

## 2024-06-25 ENCOUNTER — HOSPITAL ENCOUNTER (OUTPATIENT)
Dept: INFUSION THERAPY | Age: 84
Discharge: HOME | End: 2024-06-25
Payer: MEDICARE

## 2024-06-25 VITALS
RESPIRATION RATE: 16 BRPM | SYSTOLIC BLOOD PRESSURE: 177 MMHG | OXYGEN SATURATION: 98 % | BODY MASS INDEX: 24.97 KG/M2 | HEART RATE: 61 BPM | TEMPERATURE: 97.8 F | DIASTOLIC BLOOD PRESSURE: 82 MMHG | WEIGHT: 155.38 LBS | HEIGHT: 66 IN

## 2024-06-25 DIAGNOSIS — K90.9 IDIOPATHIC STEATORRHEA: ICD-10-CM

## 2024-06-25 DIAGNOSIS — D50.0 IRON DEFICIENCY ANEMIA DUE TO CHRONIC BLOOD LOSS: Primary | ICD-10-CM

## 2024-06-25 PROCEDURE — 96367 TX/PROPH/DG ADDL SEQ IV INF: CPT

## 2024-06-25 PROCEDURE — 96365 THER/PROPH/DIAG IV INF INIT: CPT

## 2024-06-25 PROCEDURE — 96366 THER/PROPH/DIAG IV INF ADDON: CPT

## 2024-06-25 PROCEDURE — 6370000000 HC RX 637 (ALT 250 FOR IP): Performed by: INTERNAL MEDICINE

## 2024-06-25 PROCEDURE — 2580000003 HC RX 258: Performed by: INTERNAL MEDICINE

## 2024-06-25 PROCEDURE — 96376 TX/PRO/DX INJ SAME DRUG ADON: CPT

## 2024-06-25 PROCEDURE — 6360000002 HC RX W HCPCS: Performed by: INTERNAL MEDICINE

## 2024-06-25 RX ORDER — SODIUM CHLORIDE 0.9 % (FLUSH) 0.9 %
5-40 SYRINGE (ML) INJECTION PRN
Status: DISCONTINUED | OUTPATIENT
Start: 2024-06-25 | End: 2024-06-25

## 2024-06-25 RX ORDER — SODIUM CHLORIDE 0.9 % (FLUSH) 0.9 %
5-40 SYRINGE (ML) INJECTION PRN
Status: CANCELLED | OUTPATIENT
Start: 2024-06-25

## 2024-06-25 RX ORDER — ACETAMINOPHEN 325 MG/1
650 TABLET ORAL ONCE
Status: CANCELLED | OUTPATIENT
Start: 2024-06-25 | End: 2024-06-25

## 2024-06-25 RX ORDER — SODIUM CHLORIDE 9 MG/ML
5-250 INJECTION, SOLUTION INTRAVENOUS PRN
Status: CANCELLED | OUTPATIENT
Start: 2024-06-25

## 2024-06-25 RX ORDER — ALBUTEROL SULFATE 90 UG/1
4 AEROSOL, METERED RESPIRATORY (INHALATION) PRN
Status: CANCELLED | OUTPATIENT
Start: 2024-06-25

## 2024-06-25 RX ORDER — HEPARIN 100 UNIT/ML
500 SYRINGE INTRAVENOUS PRN
Status: CANCELLED | OUTPATIENT
Start: 2024-06-25

## 2024-06-25 RX ORDER — FAMOTIDINE 10 MG/ML
20 INJECTION, SOLUTION INTRAVENOUS
Status: CANCELLED | OUTPATIENT
Start: 2024-06-25

## 2024-06-25 RX ORDER — DEXAMETHASONE SODIUM PHOSPHATE 4 MG/ML
10 INJECTION, SOLUTION INTRA-ARTICULAR; INTRALESIONAL; INTRAMUSCULAR; INTRAVENOUS; SOFT TISSUE ONCE
Status: CANCELLED | OUTPATIENT
Start: 2024-06-25 | End: 2024-06-25

## 2024-06-25 RX ORDER — ACETAMINOPHEN 325 MG/1
650 TABLET ORAL ONCE
Status: COMPLETED | OUTPATIENT
Start: 2024-06-25 | End: 2024-06-25

## 2024-06-25 RX ORDER — DIPHENHYDRAMINE HYDROCHLORIDE 50 MG/ML
50 INJECTION INTRAMUSCULAR; INTRAVENOUS
Status: CANCELLED | OUTPATIENT
Start: 2024-06-25

## 2024-06-25 RX ORDER — MEPERIDINE HYDROCHLORIDE 25 MG/ML
12.5 INJECTION INTRAMUSCULAR; INTRAVENOUS; SUBCUTANEOUS PRN
Status: CANCELLED | OUTPATIENT
Start: 2024-06-25

## 2024-06-25 RX ORDER — ACETAMINOPHEN 325 MG/1
650 TABLET ORAL
Status: CANCELLED | OUTPATIENT
Start: 2024-06-25

## 2024-06-25 RX ORDER — EPINEPHRINE 1 MG/ML
0.3 INJECTION, SOLUTION, CONCENTRATE INTRAVENOUS PRN
Status: CANCELLED | OUTPATIENT
Start: 2024-06-25

## 2024-06-25 RX ORDER — SODIUM CHLORIDE 9 MG/ML
5-250 INJECTION, SOLUTION INTRAVENOUS PRN
Status: DISCONTINUED | OUTPATIENT
Start: 2024-06-25 | End: 2024-06-25

## 2024-06-25 RX ORDER — ONDANSETRON 2 MG/ML
8 INJECTION INTRAMUSCULAR; INTRAVENOUS
Status: CANCELLED | OUTPATIENT
Start: 2024-06-25

## 2024-06-25 RX ORDER — DEXAMETHASONE SODIUM PHOSPHATE 4 MG/ML
10 INJECTION, SOLUTION INTRA-ARTICULAR; INTRALESIONAL; INTRAMUSCULAR; INTRAVENOUS; SOFT TISSUE ONCE
Status: DISCONTINUED | OUTPATIENT
Start: 2024-06-25 | End: 2024-06-25 | Stop reason: SDUPTHER

## 2024-06-25 RX ORDER — SODIUM CHLORIDE 9 MG/ML
INJECTION, SOLUTION INTRAVENOUS CONTINUOUS
Status: CANCELLED | OUTPATIENT
Start: 2024-06-25

## 2024-06-25 RX ADMIN — SODIUM CHLORIDE 975 MG: 9 INJECTION, SOLUTION INTRAVENOUS at 11:31

## 2024-06-25 RX ADMIN — DEXAMETHASONE SODIUM PHOSPHATE 10 MG: 10 INJECTION INTRAMUSCULAR; INTRAVENOUS at 10:56

## 2024-06-25 RX ADMIN — SODIUM CHLORIDE 25 MG: 9 INJECTION, SOLUTION INTRAVENOUS at 09:43

## 2024-06-25 RX ADMIN — SODIUM CHLORIDE 25 ML/HR: 9 INJECTION, SOLUTION INTRAVENOUS at 09:38

## 2024-06-25 RX ADMIN — ACETAMINOPHEN 650 MG: 325 TABLET ORAL at 10:56

## 2024-06-25 ASSESSMENT — PAIN SCALES - GENERAL: PAINLEVEL_OUTOF10: 0

## 2024-06-25 NOTE — PROGRESS NOTES
Ambulated to treatment suite for Infed Iron infusion. Patient has no concerns at this time. PIV placed in right FA blood return noted. Treatment approved and given. Call light within reach. Test dose given. 60 min post-infusion monitoring completed with no infusion reaction. Premedications given after test dose as ordered. Tolerated infusion without incident. Discharge instructions provided, left treatment suite ambulatory.

## 2024-07-05 NOTE — CARE COORDINATION
CM consult per Dr Morrell Homans for discharge planning for pt cleared for discharge home. See weight changes noted above.

## 2024-07-08 ENCOUNTER — HOSPITAL ENCOUNTER (OUTPATIENT)
Age: 84
Discharge: HOME | End: 2024-07-08
Payer: MEDICARE

## 2024-07-08 PROCEDURE — 84154 ASSAY OF PSA FREE: CPT

## 2024-07-08 PROCEDURE — 36415 COLL VENOUS BLD VENIPUNCTURE: CPT

## 2024-07-08 PROCEDURE — 84153 ASSAY OF PSA TOTAL: CPT

## 2024-07-08 PROCEDURE — 84403 ASSAY OF TOTAL TESTOSTERONE: CPT

## 2024-07-08 PROCEDURE — 84270 ASSAY OF SEX HORMONE GLOBUL: CPT

## 2024-07-09 LAB
PSA FREE MFR SERPL: <17 %
PSA FREE SERPL-MCNC: <0.1 NG/ML
PSA SERPL IA-MCNC: 0.6 NG/ML (ref 0–4)

## 2024-07-10 LAB
SHBG SERPL-SCNC: 58 NMOL/L (ref 19–76)
TESTOST FREE MFR SERPL: <1.2 % (ref 1.6–2.9)
TESTOST FREE SERPL-MCNC: <1 PG/ML (ref 47–244)
TESTOST SERPL-MCNC: <3 NG/DL (ref 300–720)

## 2024-07-17 ENCOUNTER — HOSPITAL ENCOUNTER (OUTPATIENT)
Dept: INFUSION THERAPY | Age: 84
Discharge: HOME | End: 2024-07-17
Payer: MEDICARE

## 2024-07-17 DIAGNOSIS — D50.0 IRON DEFICIENCY ANEMIA DUE TO CHRONIC BLOOD LOSS: ICD-10-CM

## 2024-07-17 LAB
BASOPHILS ABSOLUTE: 0 K/CU MM
BASOPHILS RELATIVE PERCENT: 0.4 % (ref 0–1)
DIFFERENTIAL TYPE: ABNORMAL
EOSINOPHILS ABSOLUTE: 0.2 K/CU MM
EOSINOPHILS RELATIVE PERCENT: 2.8 % (ref 0–3)
FERRITIN: 547 NG/ML (ref 30–400)
HCT VFR BLD CALC: 38.9 % (ref 42–52)
HEMOGLOBIN: 12.7 GM/DL (ref 13.5–18)
IRON: 82 UG/DL (ref 59–158)
LYMPHOCYTES ABSOLUTE: 1.2 K/CU MM
LYMPHOCYTES RELATIVE PERCENT: 21.7 % (ref 24–44)
MCH RBC QN AUTO: 30.4 PG (ref 27–31)
MCHC RBC AUTO-ENTMCNC: 32.6 % (ref 32–36)
MCV RBC AUTO: 93.1 FL (ref 78–100)
MONOCYTES ABSOLUTE: 0.5 K/CU MM
MONOCYTES RELATIVE PERCENT: 8.3 % (ref 0–4)
NEUTROPHILS ABSOLUTE: 3.6 K/CU MM
NEUTROPHILS RELATIVE PERCENT: 66.8 % (ref 36–66)
PCT TRANSFERRIN: 39 % (ref 10–44)
PDW BLD-RTO: 14.4 % (ref 11.7–14.9)
PLATELET # BLD: 87 K/CU MM (ref 140–440)
PMV BLD AUTO: ABNORMAL FL (ref 7.5–11.1)
RBC # BLD: 4.18 M/CU MM (ref 4.6–6.2)
TOTAL IRON BINDING CAPACITY: 209 UG/DL (ref 250–450)
UNSATURATED IRON BINDING CAPACITY: 127 UG/DL (ref 110–370)
WBC # BLD: 5.4 K/CU MM (ref 4–10.5)

## 2024-07-17 PROCEDURE — 36415 COLL VENOUS BLD VENIPUNCTURE: CPT

## 2024-07-17 PROCEDURE — 82728 ASSAY OF FERRITIN: CPT

## 2024-07-17 PROCEDURE — 83540 ASSAY OF IRON: CPT

## 2024-07-17 PROCEDURE — 83550 IRON BINDING TEST: CPT

## 2024-07-17 PROCEDURE — 85025 COMPLETE CBC W/AUTO DIFF WBC: CPT

## 2024-07-23 RX ORDER — LISINOPRIL 5 MG/1
5 TABLET ORAL DAILY
Qty: 90 TABLET | Refills: 1 | Status: SHIPPED | OUTPATIENT
Start: 2024-07-23

## 2024-07-28 NOTE — PROGRESS NOTES
Patient Name:  Brett Lozada  Patient :  1940  Patient MRN:  [id number]     Primary Oncologist: Hailey Caruso MD  Referring Provider: Delfin Gibbons MD     Date of Service: 2024      Chief Complaint:    Chief Complaint   Patient presents with    Follow-up     FU visit.     Patient Active Problem List:     STEMI (ST elevation myocardial infarction) (HCC)     ASHD (arteriosclerotic heart disease)     S/P CABG x 2     HTN (hypertension)     Hyperlipidemia, mixed     COPD, moderate (HCC)     PVD (peripheral vascular disease) (HCC)     Coronary artery disease involving coronary bypass graft of native heart without angina pectoris     SBO (small bowel obstruction) (HCC)     PVC (premature ventricular contraction)     Ataxia     ST elevation myocardial infarction (STEMI) due to occlusion of distal portion of left anterior descending (LAD) coronary artery       HPI:   Brett Lozada is a pleasant 82 yo male patient who was referred for evaluation of b/l PE in 2024. He has mild dementia.  He went to ED in 2023 due to not feeling well. He had COVID 3 weeks prior.  2023 CTA chest:   1.  Multiple bilateral pulmonary arterial thromboemboli, right greater than left.  No right ventricular strain.   2.  Trace right pleural effusion.   3.  Right greater than left lower lobe heterogeneous opacities with right basilar consolidation.  Differential considerations include atelectasis, infection and developing infarction.  Currently on DOAC.     2023 WBC 8.4, Hg 13.6, MCV 93.9, plat 129.  2024 WBC 7.4, Hg 12.1, MCV 93.1, plat 217.   1/15/2024 CMP grossly unremarkable.  He has 4 children, 2 biological.  Mother has GI tract ca.   No thromboembolism in the family. No prior h/o DVT/PE.  Not on testosterone supplement.     He has h/o prostate cancer 25 years ago. FU with urologist.  2024 creat 0.9. B-12 1129. Folate 6.1.   LFTs stable. WBC 6.9, Hg 12, MCV 95.7, plat 117. MPV 12.4.  Ferritin  hematuria like related to Mahmood catheter.  He has history of prostate cancer years ago.  He has issue with difficulty with urination hence he has been having indwelling Mahmood catheter.  He has follow-up with urologist, Dr. Fonseca in the near future.    4. Seen by GI in April 2024.  6/2024 EGD and colonoscopy with Dr Manzo. No FU colonoscopy.    RTC in 4 months or sooner.    I have discussed the above stated plan with the patient and they verbalized understanding and agreed with the plan. Thank you for allowing us to participate in this patient's care.

## 2024-08-08 ENCOUNTER — HOSPITAL ENCOUNTER (OUTPATIENT)
Age: 84
Discharge: HOME | End: 2024-08-08
Payer: MEDICARE

## 2024-08-08 PROCEDURE — 36415 COLL VENOUS BLD VENIPUNCTURE: CPT

## 2024-08-08 PROCEDURE — 84153 ASSAY OF PSA TOTAL: CPT

## 2024-08-08 PROCEDURE — 84270 ASSAY OF SEX HORMONE GLOBUL: CPT

## 2024-08-08 PROCEDURE — 84154 ASSAY OF PSA FREE: CPT

## 2024-08-08 PROCEDURE — 84403 ASSAY OF TOTAL TESTOSTERONE: CPT

## 2024-08-11 LAB
PSA FREE MFR SERPL: <20 %
PSA FREE SERPL-MCNC: <0.1 NG/ML
PSA SERPL IA-MCNC: 0.5 NG/ML (ref 0–4)
SHBG SERPL-SCNC: 58 NMOL/L (ref 19–76)
TESTOST FREE MFR SERPL: <1.2 % (ref 1.6–2.9)
TESTOST FREE SERPL-MCNC: <1 PG/ML (ref 47–244)
TESTOST SERPL-MCNC: <3 NG/DL (ref 300–720)

## 2024-08-14 ENCOUNTER — HOSPITAL ENCOUNTER (EMERGENCY)
Age: 84
Discharge: HOME | End: 2024-08-14

## 2024-08-14 VITALS
OXYGEN SATURATION: 97 % | DIASTOLIC BLOOD PRESSURE: 78 MMHG | BODY MASS INDEX: 24.27 KG/M2 | TEMPERATURE: 98.1 F | HEART RATE: 64 BPM | RESPIRATION RATE: 18 BRPM | SYSTOLIC BLOOD PRESSURE: 166 MMHG | HEIGHT: 66 IN | WEIGHT: 151 LBS

## 2024-08-14 DIAGNOSIS — R39.15 URINARY URGENCY: Primary | ICD-10-CM

## 2024-08-14 ASSESSMENT — PAIN - FUNCTIONAL ASSESSMENT: PAIN_FUNCTIONAL_ASSESSMENT: 0-10

## 2024-08-14 ASSESSMENT — PAIN SCALES - GENERAL: PAINLEVEL_OUTOF10: 5

## 2024-08-14 ASSESSMENT — PAIN DESCRIPTION - LOCATION: LOCATION: ABDOMEN

## 2024-08-15 NOTE — ED NOTES
Spoke with provider regarding pt feeling better and only having 230 ml in bladder after scan. Pt sates he then urinated in his brief and wanted to leave.

## 2024-08-16 ENCOUNTER — HOSPITAL ENCOUNTER (EMERGENCY)
Age: 84
Discharge: HOME | End: 2024-08-17
Payer: MEDICARE

## 2024-08-16 DIAGNOSIS — N39.0 URINARY TRACT INFECTION WITH HEMATURIA, SITE UNSPECIFIED: ICD-10-CM

## 2024-08-16 DIAGNOSIS — R33.9 URINARY RETENTION: Primary | ICD-10-CM

## 2024-08-16 DIAGNOSIS — R31.9 URINARY TRACT INFECTION WITH HEMATURIA, SITE UNSPECIFIED: ICD-10-CM

## 2024-08-16 PROCEDURE — 51701 INSERT BLADDER CATHETER: CPT

## 2024-08-16 PROCEDURE — 51798 US URINE CAPACITY MEASURE: CPT

## 2024-08-16 PROCEDURE — 99283 EMERGENCY DEPT VISIT LOW MDM: CPT

## 2024-08-16 ASSESSMENT — PAIN SCALES - GENERAL: PAINLEVEL_OUTOF10: 0

## 2024-08-17 VITALS
RESPIRATION RATE: 15 BRPM | HEIGHT: 66 IN | WEIGHT: 150 LBS | SYSTOLIC BLOOD PRESSURE: 173 MMHG | TEMPERATURE: 98.8 F | OXYGEN SATURATION: 99 % | BODY MASS INDEX: 24.11 KG/M2 | HEART RATE: 64 BPM | DIASTOLIC BLOOD PRESSURE: 91 MMHG

## 2024-08-17 LAB
BACTERIA: NEGATIVE /HPF
BILIRUBIN, URINE: NEGATIVE MG/DL
BLOOD, URINE: ABNORMAL
CLARITY, UA: CLEAR
COLOR, UA: YELLOW
GLUCOSE URINE: NEGATIVE MG/DL
KETONES, URINE: NEGATIVE MG/DL
LEUKOCYTE ESTERASE, URINE: ABNORMAL
MUCUS: ABNORMAL HPF
NITRITE URINE, QUANTITATIVE: NEGATIVE
PH, URINE: 6 (ref 5–8)
PROTEIN UA: NEGATIVE MG/DL
RBC URINE: 9 /HPF (ref 0–3)
SPECIFIC GRAVITY UA: 1.01 (ref 1–1.03)
SQUAMOUS EPITHELIAL: <1 /HPF
TRICHOMONAS: ABNORMAL /HPF
UROBILINOGEN, URINE: 0.2 MG/DL (ref 0.2–1)
WBC UA: 194 /HPF (ref 0–2)

## 2024-08-17 PROCEDURE — 87086 URINE CULTURE/COLONY COUNT: CPT

## 2024-08-17 PROCEDURE — 6370000000 HC RX 637 (ALT 250 FOR IP): Performed by: PHYSICIAN ASSISTANT

## 2024-08-17 PROCEDURE — 81001 URINALYSIS AUTO W/SCOPE: CPT

## 2024-08-17 RX ORDER — SULFAMETHOXAZOLE AND TRIMETHOPRIM 800; 160 MG/1; MG/1
1 TABLET ORAL ONCE
Status: COMPLETED | OUTPATIENT
Start: 2024-08-17 | End: 2024-08-17

## 2024-08-17 RX ORDER — SULFAMETHOXAZOLE AND TRIMETHOPRIM 800; 160 MG/1; MG/1
1 TABLET ORAL 2 TIMES DAILY
Qty: 14 TABLET | Refills: 0 | Status: SHIPPED | OUTPATIENT
Start: 2024-08-17 | End: 2024-08-24

## 2024-08-17 RX ADMIN — SULFAMETHOXAZOLE AND TRIMETHOPRIM 1 TABLET: 800; 160 TABLET ORAL at 03:06

## 2024-08-17 ASSESSMENT — PAIN - FUNCTIONAL ASSESSMENT: PAIN_FUNCTIONAL_ASSESSMENT: 0-10

## 2024-08-17 ASSESSMENT — PAIN SCALES - GENERAL: PAINLEVEL_OUTOF10: 0

## 2024-08-17 NOTE — ED PROVIDER NOTES
**ADVANCED PRACTICE PROVIDER, I HAVE EVALUATED THIS PATIENT**        [facility] EMERGENCY DEPARTMENT  EMERGENCY DEPARTMENT ENCOUNTER      Pt Name: Brett Lozada  MRN:[id number]  Birthdate 1940  Date of evaluation: 8/16/2024  Provider: Madhu Marcos PA-C      Chief Complaint:    Chief Complaint   Patient presents with    catheter issues         Nursing Notes, Past Medical Hx, Past Surgical Hx, Social Hx, Allergies, and Family Hx were all reviewed and agreed with or any disagreements were addressed in the HPI.    HISTORY OF PRESENT ILLNESS     History from : Patient    Limitations to history : None    Brett Lozada is a 83 y.o. male who presents from home via EMS with complaint of catheter issues.  He states that he intermittently does a straight catheterization, and was unable to place the catheter today.  He does mention he noticed some wetness in his bed, describing some urinary incontinence or leakage today.  He mentions he does see a urologist, states that he occasionally has some need to straight cath but this is very rarely.  He denies any fevers, dysuria, flank pain, abdominal pain, nausea, vomiting testicular pain, recent illness    PastMedical/Surgical History:      Diagnosis Date    Acute non Q wave MI (myocardial infarction), initial episode of care ([facility]) 5/2/2023    Acute pulmonary embolism ([facility]) 12/26/2023    Acute ST elevation myocardial infarction (STEMI) due to occlusion of distal portion of left anterior descending (LAD) coronary artery ([facility]) 4/29/2023    CAD (coronary artery disease)     Cancer ([facility])     prostate    Complicated UTI (urinary tract infection) 01/12/2024    H/O echocardiogram 04/29/2019    Patient in atrial fibrillation during exam. Left ventricular function is normal, EF is estimated at 55-60%. Mildly dilated left atrium. Mildly dilated left atrium. Mildli enlarged right ventricle cavity. Trace to mild mitral regurgitation is present. No  Take 1 capsule by mouth 3 times daily as needed for CoughHistorical Med      atorvastatin (LIPITOR) 80 MG tablet Take 1 tablet by mouth dailyHistorical Med      benzocaine-menthol (CEPACOL SORE THROAT) 15-3.6 MG lozenge Take 1 lozenge by mouth every 2 hours as needed for Sore ThroatHistorical Med      apixaban (ELIQUIS) 5 MG TABS tablet Take 1 tablet by mouth in the morning and at bedtime, Disp-60 tablet, R-0NO PRINT      guaiFENesin (MUCINEX) 600 MG extended release tablet Take 1 tablet by mouth 2 times daily, Disp-30 tablet, R-0NO PRINT      vitamin B-12 (CYANOCOBALAMIN) 100 MCG tablet Take 10 tablets by mouth daily, Disp-90 tablet, R-3NO PRINT      vitamin D (CHOLECALCIFEROL) 50 MCG (2000 UT) TABS tablet Take 1 tablet by mouth every other day Patient states he doesn't take this daily, Disp-30 tablet, R-0NO PRINT      metoprolol tartrate (LOPRESSOR) 25 MG tablet Take 0.5 tablets by mouth 2 times daily HOLD if HR <55 or Systolic BP <110, Disp-60 tablet, R-5NO PRINT      oxyBUTYnin (DITROPAN-XL) 10 MG extended release tablet Take 1 tablet by mouth dailyHistorical Med      omeprazole (PRILOSEC) 40 MG delayed release capsule Take 1 capsule by mouth dailyHistorical Med      donepezil (ARICEPT) 5 MG tablet Take 1 tablet by mouth nightlyHistorical Med      meclizine (ANTIVERT) 25 MG tablet Take 1 tablet by mouth 3 times daily as neededHistorical Med      ondansetron (ZOFRAN) 4 MG tablet Take 1 tablet by mouth every 8 hours as needed for Nausea, Disp-10 tablet, R-0Normal      nitroGLYCERIN (NITROSTAT) 0.4 MG SL tablet Place 1 tablet under the tongue once for 1 dose up to max of 3 total doses. If no relief after 1 dose, call 911., Disp-25 tablet, R-3Print      tamsulosin (FLOMAX) 0.4 MG capsule Take 1 capsule by mouth at bedtime, Disp-30 capsule, R-3Print      clopidogrel (PLAVIX) 75 MG tablet Take 1 tablet by mouth daily, Disp-30 tablet, R-3Normal               Review of Systems:  (1 systems needed)  Pertinent positives

## 2024-08-17 NOTE — DISCHARGE INSTRUCTIONS
Keep your appointment with Dr. Fonseca to discuss your urinary symptoms.    Call Dr. Gibbons's office to discuss your visit to the emergency department.      Return with any fever, abdominal pain, jaundice, flank pain, worsening symptoms or any new concerns.

## 2024-08-18 LAB
CULTURE: NORMAL
Lab: NORMAL
SPECIMEN: NORMAL

## 2024-08-22 ENCOUNTER — TELEPHONE (OUTPATIENT)
Dept: CARDIOLOGY CLINIC | Age: 84
End: 2024-08-22

## 2024-08-22 NOTE — TELEPHONE ENCOUNTER
Pt has had yearly assessment and told the nurse that he was told not to take 3 meds: Eliquis, Lipitor, and Plavix. The pt is unsure who told him to d/c those med and the pt suffers from dementia. Nurse wanted to someone to review and see if the med have be ordered to be d/c and if not, to have someone call him and let him know that he is to continue those meds. Please call and advise pt.

## 2024-08-22 NOTE — TELEPHONE ENCOUNTER
Spoke with g'daughter, unsure who would call and unaware of meds cancel. Patient has hospice for personal care, not at end of life. Atorvastatin and eliquis ordered by other provider. She will look into and call back if she needs anything

## 2024-08-27 ENCOUNTER — HOSPITAL ENCOUNTER (OUTPATIENT)
Dept: INFUSION THERAPY | Age: 84
Discharge: HOME | End: 2024-08-27
Payer: MEDICARE

## 2024-08-27 ENCOUNTER — OFFICE VISIT (OUTPATIENT)
Dept: ONCOLOGY | Age: 84
End: 2024-08-27
Payer: MEDICARE

## 2024-08-27 VITALS
BODY MASS INDEX: 25.49 KG/M2 | TEMPERATURE: 97.8 F | DIASTOLIC BLOOD PRESSURE: 75 MMHG | HEART RATE: 74 BPM | RESPIRATION RATE: 18 BRPM | HEIGHT: 66 IN | SYSTOLIC BLOOD PRESSURE: 168 MMHG | OXYGEN SATURATION: 95 % | WEIGHT: 158.6 LBS

## 2024-08-27 DIAGNOSIS — D50.0 IRON DEFICIENCY ANEMIA DUE TO CHRONIC BLOOD LOSS: Primary | ICD-10-CM

## 2024-08-27 DIAGNOSIS — D50.0 IRON DEFICIENCY ANEMIA DUE TO CHRONIC BLOOD LOSS: ICD-10-CM

## 2024-08-27 DIAGNOSIS — D69.6 THROMBOCYTOPENIA (HCC): ICD-10-CM

## 2024-08-27 LAB
BASOPHILS ABSOLUTE: 0 K/CU MM
BASOPHILS RELATIVE PERCENT: 0.2 % (ref 0–1)
DIFFERENTIAL TYPE: ABNORMAL
EOSINOPHILS ABSOLUTE: 0.1 K/CU MM
EOSINOPHILS RELATIVE PERCENT: 2 % (ref 0–3)
FERRITIN: 363 NG/ML (ref 30–400)
HAV IGM SERPL QL IA: NON REACTIVE
HBV CORE IGM SERPL QL IA: NON REACTIVE
HBV SURFACE AG SERPL QL IA: NON REACTIVE
HCT VFR BLD CALC: 41.8 % (ref 42–52)
HCV AB SERPL QL IA: NON REACTIVE
HEMOGLOBIN: 13.7 GM/DL (ref 13.5–18)
HIV 1+2 AB+HIV1P24 AG SERPLBLD IA.RAPID: NON REACTIVE
IRON: 66 UG/DL (ref 59–158)
LYMPHOCYTES ABSOLUTE: 1.1 K/CU MM
LYMPHOCYTES RELATIVE PERCENT: 20 % (ref 24–44)
MCH RBC QN AUTO: 30.8 PG (ref 27–31)
MCHC RBC AUTO-ENTMCNC: 32.8 % (ref 32–36)
MCV RBC AUTO: 93.9 FL (ref 78–100)
MONOCYTES ABSOLUTE: 0.3 K/CU MM
MONOCYTES RELATIVE PERCENT: 6 % (ref 0–4)
NEUTROPHILS ABSOLUTE: 4 K/CU MM
NEUTROPHILS RELATIVE PERCENT: 71.8 % (ref 36–66)
PCT TRANSFERRIN: 32 % (ref 10–44)
PDW BLD-RTO: 14.4 % (ref 11.7–14.9)
PLATELET # BLD: 120 K/CU MM (ref 140–440)
PMV BLD AUTO: 12.3 FL (ref 7.5–11.1)
RBC # BLD: 4.45 M/CU MM (ref 4.6–6.2)
TOTAL IRON BINDING CAPACITY: 206 UG/DL (ref 250–450)
UNSATURATED IRON BINDING CAPACITY: 140 UG/DL (ref 110–370)
WBC # BLD: 5.5 K/CU MM (ref 4–10.5)

## 2024-08-27 PROCEDURE — 99213 OFFICE O/P EST LOW 20 MIN: CPT | Performed by: INTERNAL MEDICINE

## 2024-08-27 PROCEDURE — G8417 CALC BMI ABV UP PARAM F/U: HCPCS | Performed by: INTERNAL MEDICINE

## 2024-08-27 PROCEDURE — 87389 HIV-1 AG W/HIV-1&-2 AB AG IA: CPT

## 2024-08-27 PROCEDURE — 3078F DIAST BP <80 MM HG: CPT | Performed by: INTERNAL MEDICINE

## 2024-08-27 PROCEDURE — 86038 ANTINUCLEAR ANTIBODIES: CPT

## 2024-08-27 PROCEDURE — 80074 ACUTE HEPATITIS PANEL: CPT

## 2024-08-27 PROCEDURE — 1036F TOBACCO NON-USER: CPT | Performed by: INTERNAL MEDICINE

## 2024-08-27 PROCEDURE — 83540 ASSAY OF IRON: CPT

## 2024-08-27 PROCEDURE — 3077F SYST BP >= 140 MM HG: CPT | Performed by: INTERNAL MEDICINE

## 2024-08-27 PROCEDURE — 85025 COMPLETE CBC W/AUTO DIFF WBC: CPT

## 2024-08-27 PROCEDURE — 99211 OFF/OP EST MAY X REQ PHY/QHP: CPT

## 2024-08-27 PROCEDURE — G8427 DOCREV CUR MEDS BY ELIG CLIN: HCPCS | Performed by: INTERNAL MEDICINE

## 2024-08-27 PROCEDURE — 86430 RHEUMATOID FACTOR TEST QUAL: CPT

## 2024-08-27 PROCEDURE — 83550 IRON BINDING TEST: CPT

## 2024-08-27 PROCEDURE — 1123F ACP DISCUSS/DSCN MKR DOCD: CPT | Performed by: INTERNAL MEDICINE

## 2024-08-27 PROCEDURE — 82728 ASSAY OF FERRITIN: CPT

## 2024-08-27 PROCEDURE — 36415 COLL VENOUS BLD VENIPUNCTURE: CPT

## 2024-08-27 NOTE — PROGRESS NOTES
MA Rooming Questions  Patient: Brett Lozada  MRN: [id number]    Date: 8/27/2024        1. Do you have any new issues?   no         2. Do you need any refills on medications?    no    3. Have you had any imaging done since your last visit?   yes - [facility]    4. Have you been hospitalized or seen in the emergency room since your last visit here?   yes - [facility]    5. Did the patient have a depression screening completed today? No    No data recorded     PHQ-9 Given to (if applicable):               PHQ-9 Score (if applicable):                     [] Positive     []  Negative              Does question #9 need addressed (if applicable)                     [] Yes    []  No               Ngozi York MA

## 2024-08-28 LAB — RHEUMATOID FACTOR: <10 IU/ML (ref 0–14)

## 2024-08-29 ENCOUNTER — OFFICE VISIT (OUTPATIENT)
Dept: CARDIOLOGY CLINIC | Age: 84
End: 2024-08-29
Payer: MEDICARE

## 2024-08-29 VITALS
HEIGHT: 66 IN | DIASTOLIC BLOOD PRESSURE: 86 MMHG | HEART RATE: 78 BPM | SYSTOLIC BLOOD PRESSURE: 154 MMHG | OXYGEN SATURATION: 96 % | WEIGHT: 159.4 LBS | BODY MASS INDEX: 25.62 KG/M2

## 2024-08-29 DIAGNOSIS — E78.2 HYPERLIPIDEMIA, MIXED: Chronic | ICD-10-CM

## 2024-08-29 DIAGNOSIS — I49.3 PVC (PREMATURE VENTRICULAR CONTRACTION): ICD-10-CM

## 2024-08-29 DIAGNOSIS — I25.2 HISTORY OF ACUTE MYOCARDIAL INFARCTION: ICD-10-CM

## 2024-08-29 DIAGNOSIS — I25.10 ASHD (ARTERIOSCLEROTIC HEART DISEASE): Primary | Chronic | ICD-10-CM

## 2024-08-29 DIAGNOSIS — I25.810 CORONARY ARTERY DISEASE INVOLVING CORONARY BYPASS GRAFT OF NATIVE HEART WITHOUT ANGINA PECTORIS: ICD-10-CM

## 2024-08-29 DIAGNOSIS — I73.9 PVD (PERIPHERAL VASCULAR DISEASE) (HCC): Chronic | ICD-10-CM

## 2024-08-29 DIAGNOSIS — Z95.1 S/P CABG X 2: Chronic | ICD-10-CM

## 2024-08-29 DIAGNOSIS — I10 PRIMARY HYPERTENSION: Chronic | ICD-10-CM

## 2024-08-29 LAB — NUCLEAR IGG SER QL IA: NORMAL

## 2024-08-29 PROCEDURE — 99213 OFFICE O/P EST LOW 20 MIN: CPT | Performed by: INTERNAL MEDICINE

## 2024-08-29 PROCEDURE — 3077F SYST BP >= 140 MM HG: CPT | Performed by: INTERNAL MEDICINE

## 2024-08-29 PROCEDURE — 1123F ACP DISCUSS/DSCN MKR DOCD: CPT | Performed by: INTERNAL MEDICINE

## 2024-08-29 PROCEDURE — 1036F TOBACCO NON-USER: CPT | Performed by: INTERNAL MEDICINE

## 2024-08-29 PROCEDURE — G8427 DOCREV CUR MEDS BY ELIG CLIN: HCPCS | Performed by: INTERNAL MEDICINE

## 2024-08-29 PROCEDURE — G8417 CALC BMI ABV UP PARAM F/U: HCPCS | Performed by: INTERNAL MEDICINE

## 2024-08-29 PROCEDURE — 3079F DIAST BP 80-89 MM HG: CPT | Performed by: INTERNAL MEDICINE

## 2024-08-29 RX ORDER — CLOPIDOGREL BISULFATE 75 MG/1
75 TABLET ORAL DAILY
Qty: 90 TABLET | Refills: 3 | Status: SHIPPED | OUTPATIENT
Start: 2024-08-29

## 2024-08-29 RX ORDER — ATORVASTATIN CALCIUM 80 MG/1
80 TABLET, FILM COATED ORAL DAILY
Qty: 90 TABLET | Refills: 3 | Status: SHIPPED | OUTPATIENT
Start: 2024-08-29

## 2024-08-29 NOTE — PATIENT INSTRUCTIONS
CORONARY ARTERY DISEASE:Yes   clinically stable. Patient is on optimal medical regimen ( see medication list above )  -   Patient is currently  asymptomatic from CAD.            - changes in  treatment:   no, on Plavix& Metoprolol.           - Testing ordered:  no  Hooker classification: 1     CATH 4/2019   POBA OF SVG TO RCA 99% TO 0%   OSTIAL SVG MILD DISESAE     4/29/2022   Normal study.    Normal perfusion study with normal distribution in all coronal, short, and    horizontal axis.    The observed defect is consistent with diaphragmatic attenuation.    Normal LV function. LVEF is > 70 %.      CATH  Coronary angiogram findings, 4/29/2023:  Dominance: Right dominant system  Left main artery: Normal caliber vessel, free of significant disease  Left anterior descending artery: Type IV LAD, free of significant disease  Left circumflex artery: Normal caliber vessel, free of significant disease  Right coronary artery: Dominant right coronary artery, it is currently occluded in its proximal segment.  SVG to RCA is widely patent     HTN: Not well controlled on current medical regimen, see list above.              - changes in  treatment:   no, on Lopressor & HCTZ.   CARDIOMYOPATHY: None known   CONGESTIVE HEART FAILURE: NO KNOWN HISTORY.     VHD: No significant VHD noted  ECHO 9/2021   Left ventricular systolic function is normal.   Ejection fraction is visually estimated at 50-55%.   Grade I diastolic dysfunction.   No evidence of any pericardial effusion.  DYSLIPIDEMIA: Patient's profile is at / near Goal.yes,                                                           Tolerating current medical regimen wellyes,  Takes Lipitor                                                       See most recent Lab values in Labs section above.  PAD:  known.               claudication symptoms..no               Up to date on non invasive testing .yes,                Patient on optimum medical regimen .yes,       ARRHYTHMIAS:

## 2024-08-29 NOTE — TELEPHONE ENCOUNTER
Patient called he was seen today   And thought Dr Brooks advised him  To start Atorvastatin /Plavix   Nothing was sent to his pharmacy   Pleased advise

## 2024-08-29 NOTE — PROGRESS NOTES
OFFICE PROGRESS NOTES      Brett is a 83 y.o. male who has    CHIEF COMPLAINT AS FOLLOWS:  CHEST PAIN:Patient denies any C/O chest pains at this time.      SOB:  C/O SOB at this time since after COVID a month.                 LEG EDEMA: minimal leg edema   PALPITATIONS: Denies any C/O Palpitations                                  DIZZINESS: No C/O Dizziness      SYNCOPE: None   OTHER/ ADDITIONAL COMPLAINTS:                                     HPI: Patient is here for F/U on his CAD, Arrhythmia, HTN & Dyslipidemia problems.   CAD: Patient has known CAD. Had CABG in the past.  Arrhythmia: Patient has known ROMELIA.  HTN: Patient has known essential HTN. Has been treated with guideline recommended medical / physical/ diet therapy as stated below.  Dyslipidemia: Patient has known mixed dyslipidemia. Has been treated with guideline recommended medical / physical/ diet therapy as stated below.                Current Outpatient Medications   Medication Sig Dispense Refill    lisinopril (PRINIVIL;ZESTRIL) 5 MG tablet Take 1 tablet by mouth daily 90 tablet 1    phenylephrine-mineral oil-petrolatum (PREPARATION H) 0.25-14-74.9 % rectal ointment Place rectally 2 times daily as needed for Hemorrhoids 25 g 0    ferrous sulfate (IRON 325) 325 (65 Fe) MG tablet Take 1 tablet by mouth every other day 30 tablet 1    isosorbide mononitrate (IMDUR) 30 MG extended release tablet Take 1 tablet by mouth daily 30 tablet 5    benzonatate (TESSALON) 100 MG capsule Take 1 capsule by mouth 3 times daily as needed for Cough      atorvastatin (LIPITOR) 80 MG tablet Take 1 tablet by mouth daily      benzocaine-menthol (CEPACOL SORE THROAT) 15-3.6 MG lozenge Take 1 lozenge by mouth every 2 hours as needed for Sore Throat      apixaban (ELIQUIS) 5 MG TABS tablet Take 1 tablet by mouth in the morning and at bedtime 60 tablet 0    guaiFENesin (MUCINEX) 600 MG extended release tablet Take 1 tablet by mouth 2 times daily 30 tablet 0    vitamin B-12  patient's history, previous & current medical problems & all Labs + testing. This includes chart prep even prior to the vosit. Various goals are discussed and multiple questions answered.Relevant concelling performed.     Office follow up in six months.

## 2024-09-11 RX ORDER — LISINOPRIL 5 MG/1
5 TABLET ORAL DAILY
Qty: 90 TABLET | Refills: 1 | Status: SHIPPED | OUTPATIENT
Start: 2024-09-11

## 2024-10-03 RX ORDER — ISOSORBIDE MONONITRATE 30 MG/1
30 TABLET, EXTENDED RELEASE ORAL DAILY
Qty: 30 TABLET | Refills: 5 | Status: SHIPPED | OUTPATIENT
Start: 2024-10-03

## 2024-12-06 ENCOUNTER — HOSPITAL ENCOUNTER (OUTPATIENT)
Dept: PET IMAGING | Age: 84
Discharge: HOME | End: 2024-12-06
Attending: UROLOGY
Payer: MEDICARE

## 2024-12-06 DIAGNOSIS — C61 MALIGNANT NEOPLASM OF PROSTATE (HCC): ICD-10-CM

## 2024-12-06 PROCEDURE — 3430000000 HC RX DIAGNOSTIC RADIOPHARMACEUTICAL: Performed by: UROLOGY

## 2024-12-06 PROCEDURE — 2580000003 HC RX 258: Performed by: UROLOGY

## 2024-12-06 PROCEDURE — A9609 HC RX DIAGNOSTIC RADIOPHARMACEUTICAL: HCPCS | Performed by: UROLOGY

## 2024-12-06 PROCEDURE — 78815 PET IMAGE W/CT SKULL-THIGH: CPT

## 2024-12-06 RX ORDER — FLUDEOXYGLUCOSE F 18 200 MCI/ML
9.72 INJECTION, SOLUTION INTRAVENOUS
Status: COMPLETED | OUTPATIENT
Start: 2024-12-06 | End: 2024-12-06

## 2024-12-06 RX ORDER — SODIUM CHLORIDE 0.9 % (FLUSH) 0.9 %
10 SYRINGE (ML) INJECTION PRN
Status: COMPLETED | OUTPATIENT
Start: 2024-12-06 | End: 2024-12-06

## 2024-12-06 RX ADMIN — SODIUM CHLORIDE, PRESERVATIVE FREE 10 ML: 5 INJECTION INTRAVENOUS at 13:05

## 2024-12-06 RX ADMIN — FLUDEOXYGLUCOSE F 18 9.72 MILLICURIE: 200 INJECTION, SOLUTION INTRAVENOUS at 13:05

## 2024-12-12 ENCOUNTER — APPOINTMENT (OUTPATIENT)
Dept: GENERAL RADIOLOGY | Age: 84
End: 2024-12-12
Payer: MEDICARE

## 2024-12-12 ENCOUNTER — HOSPITAL ENCOUNTER (EMERGENCY)
Age: 84
Discharge: HOME | End: 2024-12-12
Attending: STUDENT IN AN ORGANIZED HEALTH CARE EDUCATION/TRAINING PROGRAM
Payer: MEDICARE

## 2024-12-12 VITALS
OXYGEN SATURATION: 97 % | HEART RATE: 70 BPM | TEMPERATURE: 97.5 F | RESPIRATION RATE: 15 BRPM | SYSTOLIC BLOOD PRESSURE: 123 MMHG | DIASTOLIC BLOOD PRESSURE: 66 MMHG

## 2024-12-12 DIAGNOSIS — N30.01 ACUTE CYSTITIS WITH HEMATURIA: Primary | ICD-10-CM

## 2024-12-12 LAB
ALBUMIN SERPL-MCNC: 3.9 G/DL (ref 3.4–5)
ALBUMIN/GLOB SERPL: 1.9 {RATIO} (ref 1.1–2.2)
ALP SERPL-CCNC: 261 U/L (ref 40–129)
ALT SERPL-CCNC: 79 U/L (ref 10–40)
ANION GAP SERPL CALCULATED.3IONS-SCNC: 10 MMOL/L (ref 9–17)
AST SERPL-CCNC: 70 U/L (ref 15–37)
BASOPHILS # BLD: 0.05 K/UL
BASOPHILS NFR BLD: 1 % (ref 0–1)
BILIRUB SERPL-MCNC: 0.4 MG/DL (ref 0–1)
BILIRUB UR QL STRIP: ABNORMAL
BUN SERPL-MCNC: 13 MG/DL (ref 7–20)
CALCIUM SERPL-MCNC: 9.2 MG/DL (ref 8.3–10.6)
CHLORIDE SERPL-SCNC: 108 MMOL/L (ref 99–110)
CLARITY UR: ABNORMAL
CO2 SERPL-SCNC: 24 MMOL/L (ref 21–32)
COLOR UR: ABNORMAL
CREAT SERPL-MCNC: 1.2 MG/DL (ref 0.8–1.3)
EOSINOPHIL # BLD: 0.2 K/UL
EOSINOPHILS RELATIVE PERCENT: 3 % (ref 0–3)
ERYTHROCYTE [DISTWIDTH] IN BLOOD BY AUTOMATED COUNT: 13.6 % (ref 11.7–14.9)
GFR, ESTIMATED: 60 ML/MIN/1.73M2
GLUCOSE SERPL-MCNC: 105 MG/DL (ref 74–99)
GLUCOSE UR STRIP-MCNC: ABNORMAL MG/DL
HCT VFR BLD AUTO: 38.3 % (ref 42–52)
HGB BLD-MCNC: 11.8 G/DL (ref 13.5–18)
HGB UR QL STRIP.AUTO: ABNORMAL
IMM GRANULOCYTES # BLD AUTO: 0.02 K/UL
IMM GRANULOCYTES NFR BLD: 0 %
INFLUENZA A BY PCR: NOT DETECTED
INFLUENZA B BY PCR: NOT DETECTED
KETONES UR STRIP-MCNC: ABNORMAL MG/DL
LACTATE BLDV-SCNC: 1.4 MMOL/L (ref 0.4–2)
LEUKOCYTE ESTERASE UR QL STRIP: ABNORMAL
LYMPHOCYTES NFR BLD: 0.94 K/UL
LYMPHOCYTES RELATIVE PERCENT: 14 % (ref 24–44)
MAGNESIUM SERPL-MCNC: 2 MG/DL (ref 1.8–2.4)
MCH RBC QN AUTO: 29.9 PG (ref 27–31)
MCHC RBC AUTO-ENTMCNC: 30.8 G/DL (ref 32–36)
MCV RBC AUTO: 97.2 FL (ref 78–100)
MONOCYTES NFR BLD: 0.58 K/UL
MONOCYTES NFR BLD: 8 % (ref 0–4)
NEUTROPHILS NFR BLD: 74 % (ref 36–66)
NEUTS SEG NFR BLD: 5.1 K/UL
NITRITE UR QL STRIP: ABNORMAL
PH UR STRIP: ABNORMAL [PH] (ref 5–8)
PLATELET # BLD AUTO: 148 K/UL (ref 140–440)
PMV BLD AUTO: 11.9 FL (ref 7.5–11.1)
POTASSIUM SERPL-SCNC: 4.1 MMOL/L (ref 3.5–5.1)
PROT SERPL-MCNC: 6 G/DL (ref 6.4–8.2)
PROT UR STRIP-MCNC: ABNORMAL MG/DL
RBC # BLD AUTO: 3.94 M/UL (ref 4.6–6.2)
RBC #/AREA URNS HPF: 2718 /HPF (ref 0–2)
SARS-COV-2 RDRP RESP QL NAA+PROBE: NOT DETECTED
SODIUM SERPL-SCNC: 142 MMOL/L (ref 136–145)
SP GR UR STRIP: ABNORMAL (ref 1–1.03)
SPECIMEN DESCRIPTION: NORMAL
TRICHOMONAS #/AREA URNS HPF: ABNORMAL /[HPF]
TROPONIN I SERPL HS-MCNC: 14 NG/L (ref 0–22)
UROBILINOGEN UR STRIP-ACNC: ABNORMAL EU/DL (ref 0–1)
WBC #/AREA URNS HPF: 1810 /HPF (ref 0–5)
WBC OTHER # BLD: 6.9 K/UL (ref 4–10.5)

## 2024-12-12 PROCEDURE — 71046 X-RAY EXAM CHEST 2 VIEWS: CPT

## 2024-12-12 PROCEDURE — 87635 SARS-COV-2 COVID-19 AMP PRB: CPT

## 2024-12-12 PROCEDURE — 83605 ASSAY OF LACTIC ACID: CPT

## 2024-12-12 PROCEDURE — 85025 COMPLETE CBC W/AUTO DIFF WBC: CPT

## 2024-12-12 PROCEDURE — 99285 EMERGENCY DEPT VISIT HI MDM: CPT

## 2024-12-12 PROCEDURE — 81001 URINALYSIS AUTO W/SCOPE: CPT

## 2024-12-12 PROCEDURE — 96374 THER/PROPH/DIAG INJ IV PUSH: CPT

## 2024-12-12 PROCEDURE — 84484 ASSAY OF TROPONIN QUANT: CPT

## 2024-12-12 PROCEDURE — 2580000003 HC RX 258: Performed by: STUDENT IN AN ORGANIZED HEALTH CARE EDUCATION/TRAINING PROGRAM

## 2024-12-12 PROCEDURE — 83735 ASSAY OF MAGNESIUM: CPT

## 2024-12-12 PROCEDURE — 87502 INFLUENZA DNA AMP PROBE: CPT

## 2024-12-12 PROCEDURE — 6360000002 HC RX W HCPCS: Performed by: STUDENT IN AN ORGANIZED HEALTH CARE EDUCATION/TRAINING PROGRAM

## 2024-12-12 PROCEDURE — 6370000000 HC RX 637 (ALT 250 FOR IP): Performed by: STUDENT IN AN ORGANIZED HEALTH CARE EDUCATION/TRAINING PROGRAM

## 2024-12-12 PROCEDURE — 80053 COMPREHEN METABOLIC PANEL: CPT

## 2024-12-12 PROCEDURE — 93005 ELECTROCARDIOGRAM TRACING: CPT | Performed by: STUDENT IN AN ORGANIZED HEALTH CARE EDUCATION/TRAINING PROGRAM

## 2024-12-12 RX ORDER — ACETAMINOPHEN 500 MG
1000 TABLET ORAL ONCE
Status: COMPLETED | OUTPATIENT
Start: 2024-12-12 | End: 2024-12-12

## 2024-12-12 RX ORDER — SULFAMETHOXAZOLE AND TRIMETHOPRIM 800; 160 MG/1; MG/1
1 TABLET ORAL 2 TIMES DAILY
Qty: 14 TABLET | Refills: 0 | Status: SHIPPED | OUTPATIENT
Start: 2024-12-12 | End: 2024-12-19

## 2024-12-12 RX ADMIN — ACETAMINOPHEN 1000 MG: 500 TABLET ORAL at 15:27

## 2024-12-12 RX ADMIN — WATER 1000 MG: 1 INJECTION INTRAMUSCULAR; INTRAVENOUS; SUBCUTANEOUS at 18:23

## 2024-12-12 ASSESSMENT — PAIN SCALES - GENERAL: PAINLEVEL_OUTOF10: 0

## 2024-12-12 NOTE — DISCHARGE INSTRUCTIONS
You were seen in the emergency department for fatigue.  Here, you tested negative for viruses, your cell counts were appropriate, your organs are working fine, and your chest x-ray did not reveal any significant abnormality in your lungs.  A urine sample was suggestive of a urinary tract infection.  Based on prior cultures, will prescribe you the medication Bactrim for you to take twice daily for 7 days.  Please  the prescription in your pharmacy and take it as indicated.  Make an appointment to be seen by your primary care doctor next week.    If at some point you have severe shortness of breath, severe nausea or vomiting unable to keep anything down, feels severely weak unable to stand up, feels confused or are lethargic unable to do your normal daily activities, or have any other concerning symptoms, please come back promptly to the emergency department.

## 2024-12-12 NOTE — ED PROVIDER NOTES
cavity. Trace to mild mitral regurgitation is present. No evidence of pericardial effusion. No evidence of pleural effusion.    History of cardiac catheterization 2017    Critical Single vessel CAD with chronic occlusion of RCA.No significant disease of the other vessels.SVBG to RCA is patent with mild Proximal disease.LIMA to LAD did not mature.Normal LV systolic function & wall motion. LVEF is 55 %.Patient tolerated the procedure well.No immediate complications.    History of echocardiogram 2018    Left ventricular systolic function is normal with an ejection fraction of55-60%. Grade I diastolic dysfunction. Mildly dilated left atrium.Moderate mitral regurgitation.Mild to moderate tricuspid regurgitation.No evidence of pericardial effusion.    HTN (hypertension)     Hyperlipidemia     STEMI (ST elevation myocardial infarction) ([facility]) 2014     Past Surgical History:   Procedure Laterality Date    BACK SURGERY      CARDIAC SURGERY      cabg    COLONOSCOPY  4/14/15    divertics    COLONOSCOPY N/A 2024    COLONOSCOPY BIOPSY performed by Mikala Manzo MD at [facility] ENDOSCOPY    CORONARY ANGIOPLASTY WITH STENT PLACEMENT  05/14/2014    X2    UPPER GASTROINTESTINAL ENDOSCOPY N/A 2024    ESOPHAGOGASTRODUODENOSCOPY BIOPSY performed by Mikala Manzo MD at [facility] ENDOSCOPY     Family History   Problem Relation Age of Onset    High Blood Pressure Father      Social History     Socioeconomic History    Marital status:      Spouse name: Not on file    Number of children: Not on file    Years of education: Not on file    Highest education level: Not on file   Occupational History    Not on file   Tobacco Use    Smoking status: Former     Current packs/day: 0.00     Average packs/day: 3.0 packs/day for 30.0 years (90.0 ttl pk-yrs)     Types: Cigarettes     Start date: 1976     Quit date: 2006     Years since quittin.9    Smokeless tobacco: Never   Vaping Use    Vaping status: Never Used

## 2024-12-13 LAB
EKG ATRIAL RATE: 64 BPM
EKG DIAGNOSIS: NORMAL
EKG P AXIS: 66 DEGREES
EKG P-R INTERVAL: 158 MS
EKG Q-T INTERVAL: 452 MS
EKG QRS DURATION: 136 MS
EKG QTC CALCULATION (BAZETT): 466 MS
EKG R AXIS: 71 DEGREES
EKG T AXIS: 62 DEGREES
EKG VENTRICULAR RATE: 64 BPM

## 2024-12-13 PROCEDURE — 93010 ELECTROCARDIOGRAM REPORT: CPT | Performed by: INTERNAL MEDICINE

## 2024-12-16 ENCOUNTER — HOSPITAL ENCOUNTER (OUTPATIENT)
Dept: INFUSION THERAPY | Age: 84
Discharge: HOME | End: 2024-12-16
Payer: MEDICARE

## 2024-12-16 DIAGNOSIS — D50.0 IRON DEFICIENCY ANEMIA DUE TO CHRONIC BLOOD LOSS: ICD-10-CM

## 2024-12-16 LAB
BASOPHILS # BLD: 0.05 K/UL
BASOPHILS NFR BLD: 1 % (ref 0–1)
EOSINOPHIL # BLD: 0.21 K/UL
EOSINOPHILS RELATIVE PERCENT: 4 % (ref 0–3)
ERYTHROCYTE [DISTWIDTH] IN BLOOD BY AUTOMATED COUNT: 13.6 % (ref 11.7–14.9)
FERRITIN SERPL-MCNC: 508 NG/ML (ref 30–400)
HCT VFR BLD AUTO: 40.8 % (ref 42–52)
HGB BLD-MCNC: 12.6 G/DL (ref 13.5–18)
IMM GRANULOCYTES # BLD AUTO: 0.04 K/UL
IMM GRANULOCYTES NFR BLD: 1 %
IRON SATN MFR SERPL: 39 % (ref 15–50)
IRON SERPL-MCNC: 82 UG/DL (ref 59–158)
LYMPHOCYTES NFR BLD: 1.26 K/UL
LYMPHOCYTES RELATIVE PERCENT: 21 % (ref 24–44)
MCH RBC QN AUTO: 30.6 PG (ref 27–31)
MCHC RBC AUTO-ENTMCNC: 30.9 G/DL (ref 32–36)
MCV RBC AUTO: 99 FL (ref 78–100)
MONOCYTES NFR BLD: 0.54 K/UL
MONOCYTES NFR BLD: 9 % (ref 0–4)
NEUTROPHILS NFR BLD: 64 % (ref 36–66)
NEUTS SEG NFR BLD: 3.78 K/UL
PLATELET # BLD AUTO: 188 K/UL (ref 140–440)
PMV BLD AUTO: 11.2 FL (ref 7.5–11.1)
RBC # BLD AUTO: 4.12 M/UL (ref 4.6–6.2)
TIBC SERPL-MCNC: 212 UG/DL (ref 260–445)
UNSATURATED IRON BINDING CAPACITY: 130 UG/DL (ref 110–370)
WBC OTHER # BLD: 5.9 K/UL (ref 4–10.5)

## 2024-12-16 PROCEDURE — 85025 COMPLETE CBC W/AUTO DIFF WBC: CPT

## 2024-12-16 PROCEDURE — 83550 IRON BINDING TEST: CPT

## 2024-12-16 PROCEDURE — 82728 ASSAY OF FERRITIN: CPT

## 2024-12-16 PROCEDURE — 36415 COLL VENOUS BLD VENIPUNCTURE: CPT

## 2024-12-16 PROCEDURE — 83540 ASSAY OF IRON: CPT

## 2024-12-23 ENCOUNTER — OFFICE VISIT (OUTPATIENT)
Dept: ONCOLOGY | Age: 84
End: 2024-12-23
Payer: MEDICARE

## 2024-12-23 ENCOUNTER — HOSPITAL ENCOUNTER (OUTPATIENT)
Dept: INFUSION THERAPY | Age: 84
Discharge: HOME | End: 2024-12-23
Payer: MEDICARE

## 2024-12-23 VITALS
HEART RATE: 69 BPM | HEIGHT: 66 IN | TEMPERATURE: 97.2 F | DIASTOLIC BLOOD PRESSURE: 62 MMHG | SYSTOLIC BLOOD PRESSURE: 145 MMHG | RESPIRATION RATE: 16 BRPM | OXYGEN SATURATION: 99 % | WEIGHT: 147 LBS | BODY MASS INDEX: 23.63 KG/M2

## 2024-12-23 DIAGNOSIS — D50.0 IRON DEFICIENCY ANEMIA DUE TO CHRONIC BLOOD LOSS: Primary | ICD-10-CM

## 2024-12-23 PROCEDURE — 3077F SYST BP >= 140 MM HG: CPT | Performed by: INTERNAL MEDICINE

## 2024-12-23 PROCEDURE — 3078F DIAST BP <80 MM HG: CPT | Performed by: INTERNAL MEDICINE

## 2024-12-23 PROCEDURE — 1159F MED LIST DOCD IN RCRD: CPT | Performed by: INTERNAL MEDICINE

## 2024-12-23 PROCEDURE — G8484 FLU IMMUNIZE NO ADMIN: HCPCS | Performed by: INTERNAL MEDICINE

## 2024-12-23 PROCEDURE — 1126F AMNT PAIN NOTED NONE PRSNT: CPT | Performed by: INTERNAL MEDICINE

## 2024-12-23 PROCEDURE — 1036F TOBACCO NON-USER: CPT | Performed by: INTERNAL MEDICINE

## 2024-12-23 PROCEDURE — G8427 DOCREV CUR MEDS BY ELIG CLIN: HCPCS | Performed by: INTERNAL MEDICINE

## 2024-12-23 PROCEDURE — 99211 OFF/OP EST MAY X REQ PHY/QHP: CPT

## 2024-12-23 PROCEDURE — G8420 CALC BMI NORM PARAMETERS: HCPCS | Performed by: INTERNAL MEDICINE

## 2024-12-23 PROCEDURE — 1123F ACP DISCUSS/DSCN MKR DOCD: CPT | Performed by: INTERNAL MEDICINE

## 2024-12-23 PROCEDURE — 99213 OFFICE O/P EST LOW 20 MIN: CPT | Performed by: INTERNAL MEDICINE

## 2024-12-23 ASSESSMENT — PATIENT HEALTH QUESTIONNAIRE - PHQ9
2. FEELING DOWN, DEPRESSED OR HOPELESS: NOT AT ALL
SUM OF ALL RESPONSES TO PHQ QUESTIONS 1-9: 0
SUM OF ALL RESPONSES TO PHQ QUESTIONS 1-9: 0
1. LITTLE INTEREST OR PLEASURE IN DOING THINGS: NOT AT ALL
SUM OF ALL RESPONSES TO PHQ9 QUESTIONS 1 & 2: 0
SUM OF ALL RESPONSES TO PHQ QUESTIONS 1-9: 0
SUM OF ALL RESPONSES TO PHQ QUESTIONS 1-9: 0

## 2024-12-23 NOTE — PROGRESS NOTES
MA Rooming Questions  Patient: Brett Lozada  MRN: [id number]    Date: 12/23/2024        1. Do you have any new issues?   no         2. Do you need any refills on medications?    no    3. Have you had any imaging done since your last visit?   yes - PET and cxr    4. Have you been hospitalized or seen in the emergency room since your last visit here?   yes - 12/12/24 cystitis     5. Did the patient have a depression screening completed today? Yes    PHQ-9 Total Score: 0 (12/23/2024  9:09 AM)       PHQ-9 Given to (if applicable):               PHQ-9 Score (if applicable):                     [] Positive     []  Negative              Does question #9 need addressed (if applicable)                     [] Yes    []  No               Silvina Caballero MA      
has anemia and intermittent thrombocytopenia..   2/23/2024 creat 0.9. B-12 1129. Folate 6.1.   LFTs stable. WBC 6.9, Hg 12, MCV 95.7, plat 117. MPV 12.4.  Ferritin 28, Iron 49, TIBC 248, sat 20.   5/20/2024 creat 1.2, LFTs stable. WBC 4.8, Hg 11.7, MCV 36.5, plat 126. MPV  12.8  He tried oral iron, but could not tolerate it.  5/2024 CT AP showed no hepatosplenomegaly.  6/25/2024 INFED  On Eliquis and plavix.  7/8/2024 WBC 5.4, Hg 12.7, MCV 93.1, plat 87. Ferritin 547, Iron 82, TIBC 209, sat 39.   8/27/2024 WBC 5.5, Hg 13.7, MCV 93.9, plat 120, MPV 12.3.  Ferritin 363, Iron 66, TIBC 206, sat 32. HIV non reactive.  Acute hep panel non reactive. RF <10. PATRICE not detected.   12/12/2024 creat 1.2. ALP 261H, ALT 79, AST 70. WBC 6.9, Hg 11.8, plat 148. MCV 97.2.   12/13/ED visit with UTI.   12/16/2024 WBC 5.9, Hg 12.6, MCV 99, plat 188. Ferritin 508, Ion 82, TIBC 212, sat 39.   FU with PCP in 10 D. Recommend to d/w PCP about elevated LFTs.  Repeat CBC and iron study prior to next OV.  Intermittent neuropathy to LE.    3. May 2024 he went to the emergency room due to the hematuria like related to Mahmood catheter.  He has history of prostate cancer years ago.    12/6/2024 [facility] PET  1. Focal hypermetabolic uptake identified in the posterior right peripheral zone of the prostate. Malignancy cannot be excluded. Recommend MRI for further characterization.   2. The 0.3 cm lateral left lower lobe nodule, may be too small to characterized. Control with subsequent studies is recommended.   Referred to Dr CAMACHO by Dr Fonseca.    4. Seen by GI in April 2024.  6/2024 EGD and colonoscopy with Dr Manzo. No FU colonoscopy.    RTC in 3 months or sooner.    I have discussed the above stated plan with the patient and they verbalized understanding and agreed with the plan. Thank you for allowing us to participate in this patient's care.

## 2025-02-01 ENCOUNTER — HOSPITAL ENCOUNTER (EMERGENCY)
Age: 85
Discharge: HOME | End: 2025-02-01
Attending: STUDENT IN AN ORGANIZED HEALTH CARE EDUCATION/TRAINING PROGRAM
Payer: MEDICARE

## 2025-02-01 VITALS
SYSTOLIC BLOOD PRESSURE: 120 MMHG | RESPIRATION RATE: 11 BRPM | TEMPERATURE: 98.1 F | DIASTOLIC BLOOD PRESSURE: 60 MMHG | BODY MASS INDEX: 24.11 KG/M2 | WEIGHT: 150 LBS | OXYGEN SATURATION: 99 % | HEIGHT: 66 IN | HEART RATE: 60 BPM

## 2025-02-01 DIAGNOSIS — R31.0 GROSS HEMATURIA: Primary | ICD-10-CM

## 2025-02-01 DIAGNOSIS — N39.0 URINARY TRACT INFECTION ASSOCIATED WITH INDWELLING URETHRAL CATHETER, INITIAL ENCOUNTER (HCC): ICD-10-CM

## 2025-02-01 DIAGNOSIS — T83.511A URINARY TRACT INFECTION ASSOCIATED WITH INDWELLING URETHRAL CATHETER, INITIAL ENCOUNTER (HCC): ICD-10-CM

## 2025-02-01 LAB
ANION GAP SERPL CALCULATED.3IONS-SCNC: 11 MMOL/L (ref 9–17)
BACTERIA URNS QL MICRO: ABNORMAL
BASOPHILS # BLD: 0.02 K/UL
BASOPHILS NFR BLD: 0 % (ref 0–1)
BILIRUB UR QL STRIP: ABNORMAL
BUN SERPL-MCNC: 11 MG/DL (ref 7–20)
CALCIUM SERPL-MCNC: 8.9 MG/DL (ref 8.3–10.6)
CHARACTER UR: ABNORMAL
CHLORIDE SERPL-SCNC: 101 MMOL/L (ref 99–110)
CLARITY UR: CLEAR
CO2 SERPL-SCNC: 23 MMOL/L (ref 21–32)
COLOR UR: ABNORMAL
CREAT SERPL-MCNC: 1.2 MG/DL (ref 0.8–1.3)
EOSINOPHIL # BLD: 0.21 K/UL
EOSINOPHILS RELATIVE PERCENT: 2 % (ref 0–3)
ERYTHROCYTE [DISTWIDTH] IN BLOOD BY AUTOMATED COUNT: 13.3 % (ref 11.7–14.9)
GFR, ESTIMATED: 58 ML/MIN/1.73M2
GLUCOSE SERPL-MCNC: 100 MG/DL (ref 74–99)
GLUCOSE UR STRIP-MCNC: ABNORMAL MG/DL
HCT VFR BLD AUTO: 36 % (ref 42–52)
HGB BLD-MCNC: 11.9 G/DL (ref 13.5–18)
HGB UR QL STRIP.AUTO: ABNORMAL
IMM GRANULOCYTES # BLD AUTO: 0.03 K/UL
IMM GRANULOCYTES NFR BLD: 0 %
KETONES UR STRIP-MCNC: ABNORMAL MG/DL
LEUKOCYTE ESTERASE UR QL STRIP: ABNORMAL
LYMPHOCYTES NFR BLD: 1.08 K/UL
LYMPHOCYTES RELATIVE PERCENT: 13 % (ref 24–44)
MCH RBC QN AUTO: 30.8 PG (ref 27–31)
MCHC RBC AUTO-ENTMCNC: 33.1 G/DL (ref 32–36)
MCV RBC AUTO: 93.3 FL (ref 78–100)
MONOCYTES NFR BLD: 0.82 K/UL
MONOCYTES NFR BLD: 10 % (ref 0–4)
MUCOUS THREADS URNS QL MICRO: ABNORMAL
NEUTROPHILS NFR BLD: 75 % (ref 36–66)
NEUTS SEG NFR BLD: 6.42 K/UL
NITRITE UR QL STRIP: ABNORMAL
PH UR STRIP: ABNORMAL [PH] (ref 5–8)
PLATELET # BLD AUTO: 148 K/UL (ref 140–440)
PMV BLD AUTO: 12 FL (ref 7.5–11.1)
POTASSIUM SERPL-SCNC: 3.6 MMOL/L (ref 3.5–5.1)
PROT UR STRIP-MCNC: ABNORMAL MG/DL
RBC # BLD AUTO: 3.86 M/UL (ref 4.6–6.2)
RBC #/AREA URNS HPF: 154 /HPF (ref 0–2)
SODIUM SERPL-SCNC: 135 MMOL/L (ref 136–145)
SP GR UR STRIP: ABNORMAL (ref 1–1.03)
UROBILINOGEN UR STRIP-ACNC: ABNORMAL EU/DL (ref 0–1)
WBC #/AREA URNS HPF: 153 /HPF (ref 0–5)
WBC OTHER # BLD: 8.6 K/UL (ref 4–10.5)

## 2025-02-01 PROCEDURE — 87086 URINE CULTURE/COLONY COUNT: CPT

## 2025-02-01 PROCEDURE — 99284 EMERGENCY DEPT VISIT MOD MDM: CPT

## 2025-02-01 PROCEDURE — 87077 CULTURE AEROBIC IDENTIFY: CPT

## 2025-02-01 PROCEDURE — 81001 URINALYSIS AUTO W/SCOPE: CPT

## 2025-02-01 PROCEDURE — 51702 INSERT TEMP BLADDER CATH: CPT

## 2025-02-01 PROCEDURE — 85025 COMPLETE CBC W/AUTO DIFF WBC: CPT

## 2025-02-01 PROCEDURE — 80048 BASIC METABOLIC PNL TOTAL CA: CPT

## 2025-02-01 PROCEDURE — 6370000000 HC RX 637 (ALT 250 FOR IP): Performed by: STUDENT IN AN ORGANIZED HEALTH CARE EDUCATION/TRAINING PROGRAM

## 2025-02-01 RX ORDER — CIPROFLOXACIN 500 MG/1
500 TABLET, FILM COATED ORAL 2 TIMES DAILY
Qty: 14 TABLET | Refills: 0 | Status: SHIPPED | OUTPATIENT
Start: 2025-02-01 | End: 2025-02-08

## 2025-02-01 RX ORDER — ACETAMINOPHEN 500 MG
1000 TABLET ORAL ONCE
Status: COMPLETED | OUTPATIENT
Start: 2025-02-01 | End: 2025-02-01

## 2025-02-01 RX ORDER — CIPROFLOXACIN 500 MG/1
500 TABLET, FILM COATED ORAL ONCE
Status: COMPLETED | OUTPATIENT
Start: 2025-02-01 | End: 2025-02-01

## 2025-02-01 RX ADMIN — CIPROFLOXACIN HYDROCHLORIDE 500 MG: 500 TABLET, FILM COATED ORAL at 02:36

## 2025-02-01 RX ADMIN — ACETAMINOPHEN 1000 MG: 500 TABLET ORAL at 01:09

## 2025-02-01 ASSESSMENT — PAIN - FUNCTIONAL ASSESSMENT
PAIN_FUNCTIONAL_ASSESSMENT: NONE - DENIES PAIN
PAIN_FUNCTIONAL_ASSESSMENT: NONE - DENIES PAIN

## 2025-02-01 ASSESSMENT — PAIN SCALES - GENERAL
PAINLEVEL_OUTOF10: 0
PAINLEVEL_OUTOF10: 0

## 2025-02-01 NOTE — ED TRIAGE NOTES
Pt noted blood in the arechiga bag at approx 1100 yesterday, denies pain. Pt reports he had a urinary catheter placed in the beginning of January.

## 2025-02-01 NOTE — DISCHARGE INSTRUCTIONS
Take the full course of antibiotics  You can call and follow up with Urology in the next 1-3 days regarding your symptoms  Call and follow-up with your family doctor in the next 1-3 days  Return to the ED if your symptoms worsen or you feel you need to be reevaluated

## 2025-02-01 NOTE — ED PROVIDER NOTES
Emergency Department Encounter        Pt Name: Brett Lozada  MRN: [id number]  Birthdate 1940  Date of evaluation: 2/1/2025  ED Physician: Juancho Dillard MD    CHIEF COMPLAINT     Triage Chief Complaint:   Hematuria      HISTORY OF PRESENT ILLNESS & REVIEW OF SYSTEMS     History obtained from the patient and staff.    Brett Lozada is a 84 y.o. male who presents to the emergency department for evaluation of hematuria.  Says that he has a chronic catheter that was replaced recently.  Says that he has been having some hematuria since yesterday.  Says he does have some pain with it.  Denies any discharge or odor though.  Denies any fevers.  Denies any abdominal pain.  Says that he is on blood thinners but he does not know which blood thinners he is on.  Says he has his chronic Mahmood.  Says is not having blood clots just that it is pretty dark.  Denies feeling like his got a pass out or lightheadedness.  Says that he has had bleeding in the past before.        Patient denies any new Headache, Fever, Chills, Cough, Chest pain, Shortness of breath, Abdominal pain, Nausea, Vomiting, Diarrhea, Constipation, and Leg swelling.    The patient has no other acute complaints at this time.  Review of systems as above.          PAST MED/SURG/SOCIAL/FAM HISTORY & ALLERGY & MEDICATIONS     Past Medical History:   Diagnosis Date    Acute non Q wave MI (myocardial infarction), initial episode of care ([facility]) 5/2/2023    Acute pulmonary embolism ([facility]) 12/26/2023    Acute ST elevation myocardial infarction (STEMI) due to occlusion of distal portion of left anterior descending (LAD) coronary artery ([facility]) 4/29/2023    CAD (coronary artery disease)     Cancer ([facility])     prostate    Complicated UTI (urinary tract infection) 01/12/2024    H/O echocardiogram 04/29/2019    Patient in atrial fibrillation during exam. Left ventricular function is normal, EF is estimated at 55-60%. Mildly dilated left atrium. Mildly dilated left atrium. Mildli  there is free-flowing urine in the bag so we will obtain urine sample and check some lab work to make sure he is not significantly anemic from the hematuria but otherwise I think likely will be appropriate for  follow-up outpatient.  Hemoglobin is 11.9, pretty similar to around his baseline.  Renal function is normal.  UA does show occasional bacteria although there is 158 WBCs and RBCs so this could be potentially obscuring the sample.  Will send off culture.  Given the bacteria though we will treat as a UTI as he reports some pain, although he does not report dysuria.  Will also exchange his catheter given the concern for potential UTI.  His vital signs are hemodynamically stable so I do not think he is hemorrhaging with his hematuria.  Discussed with him and he was agreeable for dose of antibiotics here, replacing the Mahmood, and course of antibiotics for potential UTI as well as following up with urology.  Otherwise well-appearing.  Patient to be discharged.            Chronic conditions affecting care:  hypertension hyperlipidemia COPD PVD CAD prostate cancer   Previous records reviewed:  Patient was seen 12/23/2024 for an outpatient oncology office visit regarding iron deficiency anemia  Records Reviewed : Outpatient Notes    Disposition Considerations (tests considered but not done, Shared Decision Making, Pt Expectation of Test or Tx.):   Appropriate for outpatient management    History from: see HPI & ED course  Clinical information obtained from an independent historian: Yes  Limitations to history : None        The patient was seen and examined unless otherwise specified. Appropriate diagnostic testing was performed and results reviewed with the patient.  My independent review and interpretation of data, imaging, labs and diagnostic evaluations are as above/below and in the ED Course.  Previous patient encounter documents & history available on EMR were reviewed  Case discussed with consulting clinician as

## 2025-02-01 NOTE — ED NOTES
Pt arrived to ED with 16Fr arechiga catheter in place.Pt reports noticing blood in the urine yesterday, denies pain. Prior to obtaining urine sample for urinalysis pt old leg bag replaced with new urinary drainage bag.

## 2025-02-02 LAB
MICROORGANISM SPEC CULT: ABNORMAL
SPECIMEN DESCRIPTION: ABNORMAL

## 2025-02-03 NOTE — PROGRESS NOTES
Pharmacy Note  ED Culture Follow-up    Brett Lozada is a 84 y.o. male.     Allergies: Patient has no known allergies.     Current antimicrobials:   Reviewed patient's urine culture - culture positive for aerococcus species 75,000 CFU/mL- no susceptibilities available.  Patient was discharged on ciprofloxacin, and culture is likely sensitive to prescribed medication.  Antibiotic prescribed at discharge is appropriate - no changes made to antibiotic regimen.      Please call with any questions. Ext. [phone]    Aby Mendez RPH, PharmD 12:15 PM 2/3/2025

## 2025-02-07 ENCOUNTER — TRANSCRIBE ORDERS (OUTPATIENT)
Dept: ADMINISTRATIVE | Age: 85
End: 2025-02-07

## 2025-02-07 DIAGNOSIS — N39.41 URGE INCONTINENCE: ICD-10-CM

## 2025-02-07 DIAGNOSIS — M81.0 OSTEOPOROSIS, UNSPECIFIED OSTEOPOROSIS TYPE, UNSPECIFIED PATHOLOGICAL FRACTURE PRESENCE: Primary | ICD-10-CM

## 2025-02-13 ENCOUNTER — HOSPITAL ENCOUNTER (OUTPATIENT)
Age: 85
Discharge: HOME | End: 2025-02-13
Payer: MEDICARE

## 2025-02-13 LAB
25(OH)D3 SERPL-MCNC: 44 NG/ML (ref 30–150)
PSA SERPL-MCNC: 0.04 NG/ML (ref 0–4)
TSH SERPL DL<=0.05 MIU/L-ACNC: 1.73 UIU/ML (ref 0.27–4.2)

## 2025-02-13 PROCEDURE — 84403 ASSAY OF TOTAL TESTOSTERONE: CPT

## 2025-02-13 PROCEDURE — G0103 PSA SCREENING: HCPCS

## 2025-02-13 PROCEDURE — 84402 ASSAY OF FREE TESTOSTERONE: CPT

## 2025-02-13 PROCEDURE — 82306 VITAMIN D 25 HYDROXY: CPT

## 2025-02-13 PROCEDURE — 84270 ASSAY OF SEX HORMONE GLOBUL: CPT

## 2025-02-13 PROCEDURE — 84443 ASSAY THYROID STIM HORMONE: CPT

## 2025-02-15 LAB
SEX HORMONE BINDING GLOBULIN: 194 NMOL/L (ref 19–76)
TESTOSTERONE FREE PERCENT: 0.5 % (ref 1.6–2.9)
TESTOSTERONE FREE-MALE: <1 PG/ML (ref 47–244)
TESTOSTERONE TOTAL-MALE: 7 NG/DL (ref 300–720)

## 2025-02-21 ENCOUNTER — HOSPITAL ENCOUNTER (INPATIENT)
Age: 85
LOS: 3 days | Discharge: HOME | DRG: 377 | End: 2025-02-24
Attending: STUDENT IN AN ORGANIZED HEALTH CARE EDUCATION/TRAINING PROGRAM | Admitting: STUDENT IN AN ORGANIZED HEALTH CARE EDUCATION/TRAINING PROGRAM
Payer: MEDICARE

## 2025-02-21 ENCOUNTER — HOSPITAL ENCOUNTER (OUTPATIENT)
Age: 85
Discharge: HOME | End: 2025-02-21
Payer: MEDICARE

## 2025-02-21 DIAGNOSIS — K92.2 GASTROINTESTINAL HEMORRHAGE, UNSPECIFIED GASTROINTESTINAL HEMORRHAGE TYPE: ICD-10-CM

## 2025-02-21 DIAGNOSIS — D50.0 IRON DEFICIENCY ANEMIA DUE TO CHRONIC BLOOD LOSS: ICD-10-CM

## 2025-02-21 DIAGNOSIS — K92.1 GASTROINTESTINAL HEMORRHAGE WITH MELENA: Primary | ICD-10-CM

## 2025-02-21 LAB
ANION GAP SERPL CALCULATED.3IONS-SCNC: 10 MMOL/L (ref 9–17)
BASOPHILS # BLD: 0.02 K/UL
BASOPHILS # BLD: 0.03 K/UL
BASOPHILS NFR BLD: 0 % (ref 0–1)
BASOPHILS NFR BLD: 1 % (ref 0–1)
BUN SERPL-MCNC: 16 MG/DL (ref 7–20)
CALCIUM SERPL-MCNC: 9.6 MG/DL (ref 8.3–10.6)
CHLORIDE SERPL-SCNC: 101 MMOL/L (ref 99–110)
CO2 SERPL-SCNC: 27 MMOL/L (ref 21–32)
CREAT SERPL-MCNC: 0.8 MG/DL (ref 0.8–1.3)
EOSINOPHIL # BLD: 0.19 K/UL
EOSINOPHIL # BLD: 0.21 K/UL
EOSINOPHILS RELATIVE PERCENT: 4 % (ref 0–3)
EOSINOPHILS RELATIVE PERCENT: 4 % (ref 0–3)
ERYTHROCYTE [DISTWIDTH] IN BLOOD BY AUTOMATED COUNT: 13.6 % (ref 11.7–14.9)
ERYTHROCYTE [DISTWIDTH] IN BLOOD BY AUTOMATED COUNT: 13.6 % (ref 11.7–14.9)
FERRITIN SERPL-MCNC: 463 NG/ML (ref 30–400)
GFR, ESTIMATED: 87 ML/MIN/1.73M2
GLUCOSE SERPL-MCNC: 113 MG/DL (ref 74–99)
HCT VFR BLD AUTO: 39.9 % (ref 42–52)
HCT VFR BLD AUTO: 40.2 % (ref 42–52)
HGB BLD-MCNC: 12.8 G/DL (ref 13.5–18)
HGB BLD-MCNC: 12.9 G/DL (ref 13.5–18)
IMM GRANULOCYTES # BLD AUTO: 0.01 K/UL
IMM GRANULOCYTES # BLD AUTO: 0.01 K/UL
IMM GRANULOCYTES NFR BLD: 0 %
IMM GRANULOCYTES NFR BLD: 0 %
IRON SATN MFR SERPL: 36 % (ref 15–50)
IRON SERPL-MCNC: 70 UG/DL (ref 59–158)
LYMPHOCYTES NFR BLD: 0.99 K/UL
LYMPHOCYTES NFR BLD: 1.12 K/UL
LYMPHOCYTES RELATIVE PERCENT: 18 % (ref 24–44)
LYMPHOCYTES RELATIVE PERCENT: 19 % (ref 24–44)
MCH RBC QN AUTO: 30.6 PG (ref 27–31)
MCH RBC QN AUTO: 30.8 PG (ref 27–31)
MCHC RBC AUTO-ENTMCNC: 32.1 G/DL (ref 32–36)
MCHC RBC AUTO-ENTMCNC: 32.1 G/DL (ref 32–36)
MCV RBC AUTO: 95.3 FL (ref 78–100)
MCV RBC AUTO: 96.1 FL (ref 78–100)
MONOCYTES NFR BLD: 0.51 K/UL
MONOCYTES NFR BLD: 0.59 K/UL
MONOCYTES NFR BLD: 11 % (ref 0–4)
MONOCYTES NFR BLD: 8 % (ref 0–4)
NEUTROPHILS NFR BLD: 67 % (ref 36–66)
NEUTROPHILS NFR BLD: 69 % (ref 36–66)
NEUTS SEG NFR BLD: 3.67 K/UL
NEUTS SEG NFR BLD: 4.2 K/UL
PLATELET # BLD AUTO: 143 K/UL (ref 140–440)
PLATELET # BLD AUTO: 146 K/UL (ref 140–440)
PMV BLD AUTO: 11.7 FL (ref 7.5–11.1)
PMV BLD AUTO: 12.1 FL (ref 7.5–11.1)
POTASSIUM SERPL-SCNC: 3.7 MMOL/L (ref 3.5–5.1)
RBC # BLD AUTO: 4.15 M/UL (ref 4.6–6.2)
RBC # BLD AUTO: 4.22 M/UL (ref 4.6–6.2)
SODIUM SERPL-SCNC: 138 MMOL/L (ref 136–145)
TIBC SERPL-MCNC: 193 UG/DL (ref 260–445)
UNSATURATED IRON BINDING CAPACITY: 123 UG/DL (ref 110–370)
WBC OTHER # BLD: 5.5 K/UL (ref 4–10.5)
WBC OTHER # BLD: 6.1 K/UL (ref 4–10.5)

## 2025-02-21 PROCEDURE — 85025 COMPLETE CBC W/AUTO DIFF WBC: CPT

## 2025-02-21 PROCEDURE — 82728 ASSAY OF FERRITIN: CPT

## 2025-02-21 PROCEDURE — 83550 IRON BINDING TEST: CPT

## 2025-02-21 PROCEDURE — 83540 ASSAY OF IRON: CPT

## 2025-02-21 PROCEDURE — 99285 EMERGENCY DEPT VISIT HI MDM: CPT

## 2025-02-21 PROCEDURE — 6360000002 HC RX W HCPCS: Performed by: STUDENT IN AN ORGANIZED HEALTH CARE EDUCATION/TRAINING PROGRAM

## 2025-02-21 PROCEDURE — 2580000003 HC RX 258: Performed by: STUDENT IN AN ORGANIZED HEALTH CARE EDUCATION/TRAINING PROGRAM

## 2025-02-21 PROCEDURE — 1200000000 HC SEMI PRIVATE

## 2025-02-21 PROCEDURE — 80048 BASIC METABOLIC PNL TOTAL CA: CPT

## 2025-02-21 RX ORDER — CITALOPRAM HYDROBROMIDE 10 MG/1
10 TABLET ORAL DAILY
COMMUNITY

## 2025-02-21 RX ORDER — PANTOPRAZOLE SODIUM 40 MG/10ML
80 INJECTION, POWDER, LYOPHILIZED, FOR SOLUTION INTRAVENOUS ONCE
Status: DISCONTINUED | OUTPATIENT
Start: 2025-02-21 | End: 2025-02-21

## 2025-02-21 RX ORDER — PANTOPRAZOLE SODIUM 40 MG/10ML
40 INJECTION, POWDER, LYOPHILIZED, FOR SOLUTION INTRAVENOUS 2 TIMES DAILY
Status: DISCONTINUED | OUTPATIENT
Start: 2025-02-21 | End: 2025-02-21 | Stop reason: ALTCHOICE

## 2025-02-21 RX ORDER — RELUGOLIX 120 MG/1
120 TABLET, FILM COATED ORAL DAILY
COMMUNITY

## 2025-02-21 RX ORDER — APALUTAMIDE 60 MG/1
240 TABLET, FILM COATED ORAL DAILY
COMMUNITY

## 2025-02-21 RX ADMIN — PANTOPRAZOLE SODIUM 40 MG: 40 INJECTION, POWDER, FOR SOLUTION INTRAVENOUS at 23:12

## 2025-02-21 ASSESSMENT — PAIN SCALES - GENERAL
PAINLEVEL_OUTOF10: 0
PAINLEVEL_OUTOF10: 4

## 2025-02-21 NOTE — ED PROVIDER NOTES
Vitals [facility]      BP (!) 153/85      Systolic BP Percentile       Diastolic BP Percentile       Pulse 73      Respirations 12      Temp 97.7 °F (36.5 °C)      Temp Source Oral      SpO2 98 %      Weight       Height       Head Circumference       Peak Flow       Pain Score       Pain Loc       Pain Education       Exclude from Growth Chart      Initial vital signs and nursing assessment reviewed and vitals are/show: Afebrile, Hypertensive, Normocardic, and Normal RR.   Pulsoximetry is normal per my interpretation.    Additional Vital Signs:  Vitals:    02/21/25 1624   BP: (!) 153/85   Pulse: 73   Resp: 12   Temp: 97.7 °F (36.5 °C)   SpO2: 98%       Physical Exam  Constitutional:       General: He is not in acute distress.     Appearance: Normal appearance. He is not ill-appearing, toxic-appearing or diaphoretic.   HENT:      Head: Normocephalic and atraumatic.      Right Ear: External ear normal.      Left Ear: External ear normal.   Eyes:      General: No scleral icterus.        Right eye: No discharge.         Left eye: No discharge.   Cardiovascular:      Rate and Rhythm: Normal rate and regular rhythm.      Comments: Bilateral radial pulses equal  Pulmonary:      Effort: Pulmonary effort is normal. No respiratory distress.      Breath sounds: Normal breath sounds. No stridor. No wheezing, rhonchi or rales.   Chest:      Chest wall: No tenderness.   Abdominal:      General: Abdomen is flat. There is no distension.      Palpations: Abdomen is soft.      Tenderness: There is no abdominal tenderness. There is no guarding or rebound.   Musculoskeletal:      Cervical back: Neck supple.      Right lower leg: No edema.      Left lower leg: No edema.   Skin:     General: Skin is warm and dry.   Neurological:      Mental Status: He is alert and oriented to person, place, and time. Mental status is at baseline.   Psychiatric:         Mood and Affect: Mood normal.         Behavior: Behavior normal.         Thought Content:  patient is NOT to be included for SEP-1 Core Measure due to:  Infection is not suspected      ED Medications administered this visit:    Medications   pantoprazole (PROTONIX) injection 80 mg (has no administration in time range)       DISCHARGE PRESCRIPTIONS: (None if blank)  New Prescriptions    No medications on file       I have reviewed and interpreted all of the currently available lab results from this visit (if applicable):    Radiographs (if obtained):  Radiologist's Report Reviewed:  No orders to display       LABS: (none if blank)  Labs Reviewed   CBC WITH AUTO DIFFERENTIAL - Abnormal; Notable for the following components:       Result Value    RBC 4.22 (*)     Hemoglobin 12.9 (*)     Hematocrit 40.2 (*)     MPV 11.7 (*)     Neutrophils % 69 (*)     Lymphocytes % 19 (*)     Monocytes % 8 (*)     Eosinophils % 4 (*)     All other components within normal limits   BASIC METABOLIC PANEL - Abnormal; Notable for the following components:    Glucose 113 (*)     All other components within normal limits       FINAL IMPRESSION      Final diagnoses:   Gastrointestinal hemorrhage with melena     Condition: stable  Dispo: Admission      This transcription was electronically signed. Parts of this transcriptions may have been dictated by use of voice recognition software and electronically transcribed, and parts may have been transcribed with the assistance of an ED scribe and may contain errors related to that system including errors in grammar, punctuation, and spelling, as well as words and phrases that may be inappropriate.  The transcription may contain errors not detected in proofreading.  Efforts were made to edit the dictations.    Electronically Signed: Juancho Dillard MD, 02/21/25, 8:02 PM    I am the Primary Clinician of Record.      Clinical Impression:  1. Gastrointestinal hemorrhage with melena      Disposition referral (if applicable):  No follow-up provider specified.  Disposition medications (if

## 2025-02-22 ENCOUNTER — ANESTHESIA EVENT (OUTPATIENT)
Dept: ENDOSCOPY | Age: 85
End: 2025-02-22
Payer: MEDICARE

## 2025-02-22 ENCOUNTER — ANESTHESIA (OUTPATIENT)
Dept: ENDOSCOPY | Age: 85
End: 2025-02-22
Payer: MEDICARE

## 2025-02-22 LAB
ALBUMIN SERPL-MCNC: 3.1 G/DL (ref 3.4–5)
ALBUMIN/GLOB SERPL: 1.5 {RATIO} (ref 1.1–2.2)
ALP SERPL-CCNC: 98 U/L (ref 40–129)
ALT SERPL-CCNC: 11 U/L (ref 10–40)
ANION GAP SERPL CALCULATED.3IONS-SCNC: 9 MMOL/L (ref 9–17)
AST SERPL-CCNC: 21 U/L (ref 15–37)
BASOPHILS # BLD: 0.03 K/UL
BASOPHILS NFR BLD: 1 % (ref 0–1)
BILIRUB SERPL-MCNC: 0.3 MG/DL (ref 0–1)
BUN SERPL-MCNC: 12 MG/DL (ref 7–20)
CALCIUM SERPL-MCNC: 9.2 MG/DL (ref 8.3–10.6)
CHLORIDE SERPL-SCNC: 106 MMOL/L (ref 99–110)
CO2 SERPL-SCNC: 24 MMOL/L (ref 21–32)
CREAT SERPL-MCNC: 0.8 MG/DL (ref 0.8–1.3)
EOSINOPHIL # BLD: 0.23 K/UL
EOSINOPHILS RELATIVE PERCENT: 5 % (ref 0–3)
ERYTHROCYTE [DISTWIDTH] IN BLOOD BY AUTOMATED COUNT: 13.7 % (ref 11.7–14.9)
GFR, ESTIMATED: >90 ML/MIN/1.73M2
GLUCOSE SERPL-MCNC: 90 MG/DL (ref 74–99)
HCT VFR BLD AUTO: 36.6 % (ref 42–52)
HGB BLD-MCNC: 11.9 G/DL (ref 13.5–18)
IMM GRANULOCYTES # BLD AUTO: 0.01 K/UL
IMM GRANULOCYTES NFR BLD: 0 %
INR PPP: 1.1
LYMPHOCYTES NFR BLD: 1.04 K/UL
LYMPHOCYTES RELATIVE PERCENT: 23 % (ref 24–44)
MAGNESIUM SERPL-MCNC: 1.9 MG/DL (ref 1.8–2.4)
MCH RBC QN AUTO: 30.8 PG (ref 27–31)
MCHC RBC AUTO-ENTMCNC: 32.5 G/DL (ref 32–36)
MCV RBC AUTO: 94.8 FL (ref 78–100)
MONOCYTES NFR BLD: 0.53 K/UL
MONOCYTES NFR BLD: 11 % (ref 0–4)
NEUTROPHILS NFR BLD: 60 % (ref 36–66)
NEUTS SEG NFR BLD: 2.79 K/UL
PLATELET # BLD AUTO: 121 K/UL (ref 140–440)
PMV BLD AUTO: 12.2 FL (ref 7.5–11.1)
POTASSIUM SERPL-SCNC: 3.4 MMOL/L (ref 3.5–5.1)
PROT SERPL-MCNC: 5.2 G/DL (ref 6.4–8.2)
PROTHROMBIN TIME: 14.3 SEC (ref 11.7–14.5)
RBC # BLD AUTO: 3.86 M/UL (ref 4.6–6.2)
SODIUM SERPL-SCNC: 139 MMOL/L (ref 136–145)
WBC OTHER # BLD: 4.6 K/UL (ref 4–10.5)

## 2025-02-22 PROCEDURE — 43239 EGD BIOPSY SINGLE/MULTIPLE: CPT | Performed by: INTERNAL MEDICINE

## 2025-02-22 PROCEDURE — 2500000003 HC RX 250 WO HCPCS: Performed by: STUDENT IN AN ORGANIZED HEALTH CARE EDUCATION/TRAINING PROGRAM

## 2025-02-22 PROCEDURE — 85025 COMPLETE CBC W/AUTO DIFF WBC: CPT

## 2025-02-22 PROCEDURE — 99221 1ST HOSP IP/OBS SF/LOW 40: CPT | Performed by: INTERNAL MEDICINE

## 2025-02-22 PROCEDURE — 2709999900 HC NON-CHARGEABLE SUPPLY: Performed by: INTERNAL MEDICINE

## 2025-02-22 PROCEDURE — 3700000001 HC ADD 15 MINUTES (ANESTHESIA): Performed by: INTERNAL MEDICINE

## 2025-02-22 PROCEDURE — 94761 N-INVAS EAR/PLS OXIMETRY MLT: CPT

## 2025-02-22 PROCEDURE — 3700000000 HC ANESTHESIA ATTENDED CARE: Performed by: INTERNAL MEDICINE

## 2025-02-22 PROCEDURE — 2580000003 HC RX 258

## 2025-02-22 PROCEDURE — 36415 COLL VENOUS BLD VENIPUNCTURE: CPT

## 2025-02-22 PROCEDURE — 80053 COMPREHEN METABOLIC PANEL: CPT

## 2025-02-22 PROCEDURE — 1200000000 HC SEMI PRIVATE

## 2025-02-22 PROCEDURE — 0DB68ZX EXCISION OF STOMACH, VIA NATURAL OR ARTIFICIAL OPENING ENDOSCOPIC, DIAGNOSTIC: ICD-10-PCS | Performed by: INTERNAL MEDICINE

## 2025-02-22 PROCEDURE — 2580000003 HC RX 258: Performed by: STUDENT IN AN ORGANIZED HEALTH CARE EDUCATION/TRAINING PROGRAM

## 2025-02-22 PROCEDURE — 6360000002 HC RX W HCPCS

## 2025-02-22 PROCEDURE — 88342 IMHCHEM/IMCYTCHM 1ST ANTB: CPT

## 2025-02-22 PROCEDURE — 83735 ASSAY OF MAGNESIUM: CPT

## 2025-02-22 PROCEDURE — 6360000002 HC RX W HCPCS: Performed by: STUDENT IN AN ORGANIZED HEALTH CARE EDUCATION/TRAINING PROGRAM

## 2025-02-22 PROCEDURE — 6370000000 HC RX 637 (ALT 250 FOR IP): Performed by: STUDENT IN AN ORGANIZED HEALTH CARE EDUCATION/TRAINING PROGRAM

## 2025-02-22 PROCEDURE — 3609012400 HC EGD TRANSORAL BIOPSY SINGLE/MULTIPLE: Performed by: INTERNAL MEDICINE

## 2025-02-22 PROCEDURE — 85610 PROTHROMBIN TIME: CPT

## 2025-02-22 PROCEDURE — 2500000003 HC RX 250 WO HCPCS

## 2025-02-22 PROCEDURE — 6370000000 HC RX 637 (ALT 250 FOR IP): Performed by: INTERNAL MEDICINE

## 2025-02-22 PROCEDURE — 88305 TISSUE EXAM BY PATHOLOGIST: CPT

## 2025-02-22 RX ORDER — METOPROLOL TARTRATE 25 MG/1
12.5 TABLET, FILM COATED ORAL 2 TIMES DAILY
Status: DISCONTINUED | OUTPATIENT
Start: 2025-02-22 | End: 2025-02-24 | Stop reason: HOSPADM

## 2025-02-22 RX ORDER — SODIUM CHLORIDE 9 MG/ML
25 INJECTION, SOLUTION INTRAVENOUS PRN
OUTPATIENT
Start: 2025-02-22

## 2025-02-22 RX ORDER — ACETAMINOPHEN 325 MG/1
650 TABLET ORAL EVERY 6 HOURS PRN
Status: DISCONTINUED | OUTPATIENT
Start: 2025-02-22 | End: 2025-02-24 | Stop reason: HOSPADM

## 2025-02-22 RX ORDER — SODIUM CHLORIDE 0.9 % (FLUSH) 0.9 %
5-40 SYRINGE (ML) INJECTION PRN
Status: DISCONTINUED | OUTPATIENT
Start: 2025-02-22 | End: 2025-02-22

## 2025-02-22 RX ORDER — POTASSIUM CHLORIDE 1500 MG/1
40 TABLET, EXTENDED RELEASE ORAL PRN
Status: DISCONTINUED | OUTPATIENT
Start: 2025-02-22 | End: 2025-02-24 | Stop reason: HOSPADM

## 2025-02-22 RX ORDER — KETAMINE HYDROCHLORIDE 10 MG/ML
INJECTION, SOLUTION INTRAMUSCULAR; INTRAVENOUS
Status: DISCONTINUED | OUTPATIENT
Start: 2025-02-22 | End: 2025-02-22 | Stop reason: SDUPTHER

## 2025-02-22 RX ORDER — CITALOPRAM HYDROBROMIDE 20 MG/1
10 TABLET ORAL DAILY
Status: DISCONTINUED | OUTPATIENT
Start: 2025-02-22 | End: 2025-02-24 | Stop reason: HOSPADM

## 2025-02-22 RX ORDER — SODIUM CHLORIDE 0.9 % (FLUSH) 0.9 %
5-40 SYRINGE (ML) INJECTION EVERY 12 HOURS SCHEDULED
Status: CANCELLED | OUTPATIENT
Start: 2025-02-22

## 2025-02-22 RX ORDER — MIDAZOLAM HYDROCHLORIDE 1 MG/ML
INJECTION, SOLUTION INTRAMUSCULAR; INTRAVENOUS
Status: DISCONTINUED | OUTPATIENT
Start: 2025-02-22 | End: 2025-02-22 | Stop reason: SDUPTHER

## 2025-02-22 RX ORDER — ONDANSETRON 4 MG/1
4 TABLET, ORALLY DISINTEGRATING ORAL EVERY 8 HOURS PRN
Status: CANCELLED | OUTPATIENT
Start: 2025-02-22

## 2025-02-22 RX ORDER — SODIUM CHLORIDE, SODIUM LACTATE, POTASSIUM CHLORIDE, CALCIUM CHLORIDE 600; 310; 30; 20 MG/100ML; MG/100ML; MG/100ML; MG/100ML
INJECTION, SOLUTION INTRAVENOUS
Status: DISCONTINUED | OUTPATIENT
Start: 2025-02-22 | End: 2025-02-22 | Stop reason: SDUPTHER

## 2025-02-22 RX ORDER — SODIUM CHLORIDE 0.9 % (FLUSH) 0.9 %
5-40 SYRINGE (ML) INJECTION PRN
Status: CANCELLED | OUTPATIENT
Start: 2025-02-22

## 2025-02-22 RX ORDER — POTASSIUM CHLORIDE 7.45 MG/ML
10 INJECTION INTRAVENOUS PRN
Status: DISCONTINUED | OUTPATIENT
Start: 2025-02-22 | End: 2025-02-24 | Stop reason: HOSPADM

## 2025-02-22 RX ORDER — ACETAMINOPHEN 650 MG/1
650 SUPPOSITORY RECTAL EVERY 6 HOURS PRN
Status: DISCONTINUED | OUTPATIENT
Start: 2025-02-22 | End: 2025-02-24 | Stop reason: HOSPADM

## 2025-02-22 RX ORDER — ONDANSETRON 4 MG/1
4 TABLET, ORALLY DISINTEGRATING ORAL EVERY 8 HOURS PRN
Status: DISCONTINUED | OUTPATIENT
Start: 2025-02-22 | End: 2025-02-24 | Stop reason: HOSPADM

## 2025-02-22 RX ORDER — ONDANSETRON 2 MG/ML
4 INJECTION INTRAMUSCULAR; INTRAVENOUS EVERY 6 HOURS PRN
Status: DISCONTINUED | OUTPATIENT
Start: 2025-02-22 | End: 2025-02-24 | Stop reason: HOSPADM

## 2025-02-22 RX ORDER — SODIUM CHLORIDE 9 MG/ML
INJECTION, SOLUTION INTRAVENOUS PRN
Status: CANCELLED | OUTPATIENT
Start: 2025-02-22

## 2025-02-22 RX ORDER — LISINOPRIL 5 MG/1
5 TABLET ORAL DAILY
Status: DISCONTINUED | OUTPATIENT
Start: 2025-02-22 | End: 2025-02-24 | Stop reason: HOSPADM

## 2025-02-22 RX ORDER — SODIUM CHLORIDE 0.9 % (FLUSH) 0.9 %
5-40 SYRINGE (ML) INJECTION EVERY 12 HOURS SCHEDULED
Status: DISCONTINUED | OUTPATIENT
Start: 2025-02-22 | End: 2025-02-22

## 2025-02-22 RX ORDER — ONDANSETRON 2 MG/ML
4 INJECTION INTRAMUSCULAR; INTRAVENOUS EVERY 6 HOURS PRN
Status: CANCELLED | OUTPATIENT
Start: 2025-02-22

## 2025-02-22 RX ORDER — SODIUM CHLORIDE 0.9 % (FLUSH) 0.9 %
5-40 SYRINGE (ML) INJECTION PRN
OUTPATIENT
Start: 2025-02-22

## 2025-02-22 RX ORDER — DONEPEZIL HYDROCHLORIDE 5 MG/1
5 TABLET, FILM COATED ORAL EVERY EVENING
Status: DISCONTINUED | OUTPATIENT
Start: 2025-02-22 | End: 2025-02-24 | Stop reason: HOSPADM

## 2025-02-22 RX ORDER — LIDOCAINE HYDROCHLORIDE 20 MG/ML
INJECTION, SOLUTION EPIDURAL; INFILTRATION; INTRACAUDAL; PERINEURAL
Status: DISCONTINUED | OUTPATIENT
Start: 2025-02-22 | End: 2025-02-22 | Stop reason: SDUPTHER

## 2025-02-22 RX ORDER — POLYETHYLENE GLYCOL 3350 17 G/17G
17 POWDER, FOR SOLUTION ORAL DAILY PRN
Status: DISCONTINUED | OUTPATIENT
Start: 2025-02-22 | End: 2025-02-24 | Stop reason: HOSPADM

## 2025-02-22 RX ORDER — SODIUM CHLORIDE 9 MG/ML
INJECTION, SOLUTION INTRAVENOUS PRN
Status: DISCONTINUED | OUTPATIENT
Start: 2025-02-22 | End: 2025-02-24 | Stop reason: HOSPADM

## 2025-02-22 RX ORDER — ISOSORBIDE MONONITRATE 30 MG/1
30 TABLET, EXTENDED RELEASE ORAL DAILY
Status: DISCONTINUED | OUTPATIENT
Start: 2025-02-22 | End: 2025-02-24 | Stop reason: HOSPADM

## 2025-02-22 RX ORDER — ENOXAPARIN SODIUM 100 MG/ML
40 INJECTION SUBCUTANEOUS DAILY
Status: DISCONTINUED | OUTPATIENT
Start: 2025-02-22 | End: 2025-02-24 | Stop reason: HOSPADM

## 2025-02-22 RX ORDER — CLOPIDOGREL BISULFATE 75 MG/1
75 TABLET ORAL DAILY
Status: DISCONTINUED | OUTPATIENT
Start: 2025-02-22 | End: 2025-02-24 | Stop reason: HOSPADM

## 2025-02-22 RX ORDER — SODIUM CHLORIDE 0.9 % (FLUSH) 0.9 %
5-40 SYRINGE (ML) INJECTION EVERY 12 HOURS SCHEDULED
OUTPATIENT
Start: 2025-02-22

## 2025-02-22 RX ORDER — ATORVASTATIN CALCIUM 40 MG/1
80 TABLET, FILM COATED ORAL DAILY
Status: DISCONTINUED | OUTPATIENT
Start: 2025-02-22 | End: 2025-02-24 | Stop reason: HOSPADM

## 2025-02-22 RX ORDER — PROPOFOL 10 MG/ML
INJECTION, EMULSION INTRAVENOUS
Status: DISCONTINUED | OUTPATIENT
Start: 2025-02-22 | End: 2025-02-22 | Stop reason: SDUPTHER

## 2025-02-22 RX ORDER — MAGNESIUM SULFATE IN WATER 40 MG/ML
2000 INJECTION, SOLUTION INTRAVENOUS PRN
Status: DISCONTINUED | OUTPATIENT
Start: 2025-02-22 | End: 2025-02-24 | Stop reason: HOSPADM

## 2025-02-22 RX ADMIN — PANTOPRAZOLE SODIUM 40 MG: 40 INJECTION, POWDER, FOR SOLUTION INTRAVENOUS at 09:22

## 2025-02-22 RX ADMIN — METOPROLOL TARTRATE 12.5 MG: 25 TABLET, FILM COATED ORAL at 02:23

## 2025-02-22 RX ADMIN — LIDOCAINE HYDROCHLORIDE 100 MG: 20 INJECTION, SOLUTION EPIDURAL; INFILTRATION; INTRACAUDAL; PERINEURAL at 11:40

## 2025-02-22 RX ADMIN — CITALOPRAM HYDROBROMIDE 10 MG: 20 TABLET ORAL at 09:22

## 2025-02-22 RX ADMIN — PROPOFOL 30 MG: 10 INJECTION, EMULSION INTRAVENOUS at 11:45

## 2025-02-22 RX ADMIN — MIDAZOLAM 2 MG: 1 INJECTION INTRAMUSCULAR; INTRAVENOUS at 11:45

## 2025-02-22 RX ADMIN — POTASSIUM CHLORIDE 40 MEQ: 1500 TABLET, EXTENDED RELEASE ORAL at 06:58

## 2025-02-22 RX ADMIN — DONEPEZIL HYDROCHLORIDE 5 MG: 5 TABLET, FILM COATED ORAL at 02:23

## 2025-02-22 RX ADMIN — ISOSORBIDE MONONITRATE 30 MG: 30 TABLET, EXTENDED RELEASE ORAL at 09:23

## 2025-02-22 RX ADMIN — SODIUM CHLORIDE, POTASSIUM CHLORIDE, SODIUM LACTATE AND CALCIUM CHLORIDE: 600; 310; 30; 20 INJECTION, SOLUTION INTRAVENOUS at 11:34

## 2025-02-22 RX ADMIN — METOPROLOL TARTRATE 12.5 MG: 25 TABLET, FILM COATED ORAL at 09:23

## 2025-02-22 RX ADMIN — KETAMINE HYDROCHLORIDE 35 MG: 10 INJECTION INTRAMUSCULAR; INTRAVENOUS at 11:45

## 2025-02-22 RX ADMIN — DONEPEZIL HYDROCHLORIDE 5 MG: 5 TABLET, FILM COATED ORAL at 20:59

## 2025-02-22 RX ADMIN — PANTOPRAZOLE SODIUM 40 MG: 40 INJECTION, POWDER, FOR SOLUTION INTRAVENOUS at 20:59

## 2025-02-22 RX ADMIN — ATORVASTATIN CALCIUM 80 MG: 40 TABLET, FILM COATED ORAL at 09:23

## 2025-02-22 RX ADMIN — METOPROLOL TARTRATE 12.5 MG: 25 TABLET, FILM COATED ORAL at 20:59

## 2025-02-22 RX ADMIN — PROPOFOL 30 MG: 10 INJECTION, EMULSION INTRAVENOUS at 11:52

## 2025-02-22 RX ADMIN — LISINOPRIL 5 MG: 5 TABLET ORAL at 09:23

## 2025-02-22 RX ADMIN — POLYETHYLENE GLYCOL 3350, SODIUM SULFATE ANHYDROUS, SODIUM BICARBONATE, SODIUM CHLORIDE, POTASSIUM CHLORIDE 4000 ML: 236; 22.74; 6.74; 5.86; 2.97 POWDER, FOR SOLUTION ORAL at 19:06

## 2025-02-22 RX ADMIN — SODIUM CHLORIDE, PRESERVATIVE FREE 10 ML: 5 INJECTION INTRAVENOUS at 09:27

## 2025-02-22 ASSESSMENT — PAIN SCALES - GENERAL: PAINLEVEL_OUTOF10: 0

## 2025-02-22 ASSESSMENT — ENCOUNTER SYMPTOMS: SHORTNESS OF BREATH: 1

## 2025-02-22 NOTE — ANESTHESIA POSTPROCEDURE EVALUATION
Department of Anesthesiology  Postprocedure Note    Patient: Brett Lozada  MRN: [id number]  YOB: 1940  Date of evaluation: 2/22/2025    Procedure Summary       Date: 02/22/25 Room / Location: [place] [address] / [facility]    Anesthesia Start: 1134 Anesthesia Stop: 1200    Procedure: ESOPHAGOGASTRODUODENOSCOPY BIOPSY Diagnosis:       Gastrointestinal hemorrhage, unspecified gastrointestinal hemorrhage type      (Gastrointestinal hemorrhage, unspecified gastrointestinal hemorrhage type [K92.2])    Surgeons: Vicki Lo MD Responsible Provider: Calvin Al APRN - CRNA    Anesthesia Type: MAC ASA Status: 3            Anesthesia Type: MAC    Matilda Phase I:      Matilda Phase II:      Anesthesia Post Evaluation    Patient location during evaluation: bedside  Patient participation: complete - patient participated  Level of consciousness: awake  Pain score: 0  Airway patency: patent  Nausea & Vomiting: no nausea and no vomiting  Cardiovascular status: hemodynamically stable  Respiratory status: acceptable, room air and spontaneous ventilation  Hydration status: euvolemic  Pain management: adequate        There were no known notable events for this encounter.

## 2025-02-22 NOTE — ED NOTES
Floor notified of SBAR at this time.   
Report received from Ioana ENRIQUEZ, care assumed at this time.   
effusion. No evidence of pleural effusion.    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 LVEF is 55 %.Patient tolerated the procedure well.No immediate complications.    History of echocardiogram 01/12/2018    Left ventricular systolic function is normal with an ejection fraction of55-60%. Grade I diastolic dysfunction. Mildly dilated left atrium.Moderate mitral regurgitation.Mild to moderate tricuspid regurgitation.No evidence of pericardial effusion.    HTN (hypertension)     Hyperlipidemia     STEMI (ST elevation myocardial infarction) ([facility]) 5/14/2014       Assessment    Vitals:        Vitals:    02/21/25 1624 02/21/25 1932   BP: (!) 153/85    Pulse: 73    Resp: 12    Temp: 97.7 °F (36.5 °C)    TempSrc: Oral    SpO2: 98%    Weight:  68 kg (150 lb)   Height:  1.676 m (5' 6\")       PO Status: Nothing by Mouth    O2 Flow Rate:        Cardiac Rhythm: NS    Last documented pain medication administered: none    NIH Score: NIH       Active LDA's:   Peripheral IV 02/21/25 Left Antecubital (Active)       Pertinent or High Risk Medications/Drips: no   If Yes, please provide details: none      Blood Product Administration: no  If Yes, please provide details: none    Recommendation    Incomplete orders: Admission orders   Additional Comments: none   If any further questions, please call Sending RN at [phone]    Electronically signed by: Electronically signed by Clarice Lloyd RN on 2/21/2025 at 7:34 PM

## 2025-02-22 NOTE — PLAN OF CARE
Problem: Discharge Planning  Goal: Discharge to home or other facility with appropriate resources  Outcome: Progressing     Problem: Pain  Goal: Verbalizes/displays adequate comfort level or baseline comfort level  Outcome: Progressing     Problem: Skin/Tissue Integrity  Goal: Skin integrity remains intact  Description: 1.  Monitor for areas of redness and/or skin breakdown  2.  Assess vascular access sites hourly  3.  Every 4-6 hours minimum:  Change oxygen saturation probe site  4.  Every 4-6 hours:  If on nasal continuous positive airway pressure, respiratory therapy assess nares and determine need for appliance change or resting period  Outcome: Progressing     Problem: ABCDS Injury Assessment  Goal: Absence of physical injury  Outcome: Progressing     Problem: Safety - Adult  Goal: Free from fall injury  Outcome: Progressing

## 2025-02-22 NOTE — PROGRESS NOTES
4 Eyes Skin Assessment     NAME:  Brett Lozada  YOB: 1940  MEDICAL RECORD NUMBER:  [id number]    The patient is being assessed for  Admission    I agree that at least one RN has performed a thorough Head to Toe Skin Assessment on the patient. ALL assessment sites listed below have been assessed.      Areas assessed by both nurses:    Head, Face, Ears, Shoulders, Back, Chest, Arms, Elbows, Hands, Sacrum. Buttock, Coccyx, Ischium, Legs. Feet and Heels, and Under Medical Devices         Does the Patient have a Wound? No noted wound(s)       Bernard Prevention initiated by RN: Yes  Wound Care Orders initiated by RN: No    Pressure Injury (Stage 3,4, Unstageable, DTI, NWPT, and Complex wounds) if present, place Wound referral order by RN under : No    New Ostomies, if present place, Ostomy referral order under : No     Nurse 1 eSignature: Electronically signed by Zulema Quiñonez RN on 2/21/25 at 10:19 PM EST    **SHARE this note so that the co-signing nurse can place an eSignature**    Nurse 2 eSignature: Electronically signed by SHANTELLE KELLER RN on 2/22/25 at 6:03 AM EST

## 2025-02-22 NOTE — PROGRESS NOTES
V2.0    [facility] Progress Note      Name:  Brett Lozada /Age/Sex: 1940  (84 y.o. male)   MRN & CSN:  [id number] & [id number] Encounter Date/Time: 2025 7:24 AM EST   Location:  [address] [place] [address]- PCP: Delfin Gibbons MD     Attending:Naseem Walsh*       Hospital Day: 2    Assessment and Recommendations   Brett Lozada is a 84 y.o. male with pmh of CAD, HTN, HLD, h/o prostate cancer and dementia who presents with Upper GI bleed      Plan:   UGIB  Chronic Anemia  Patient presents with dark stools for the last 3 weeks, hemodynamically stable, Hg 12.9 on admit, baseline of ~ 12. EGD and c-scope without source of bleeding on   --Hgb remains stable, 11.9 this AM. Anemia panel consistent with RAAD  --Currently on plavix however concern as patient may be taking double the dose.Held at this time   --Continue PPI  --GI consult, planned  EGD today     Hx of Malignant neoplasm of prostate   Unintentional weight loss  Follows with urology, currently on Erleada and orgovyx and patient reporting may be contributing to weight loss; states has lost ~30lb in the last 3 months due to lack of appetite.   --Follow up heme/onc consult for assistance      CAD  --Continue plavix, statin, beta-blocker      HTN  --Continue imdur, lisinopril and lopressor      HLD  --Continue statin      Dementia  --Continue aricept       Diet Diet NPO Exceptions are: Sips of Water with Meds   DVT Prophylaxis [x] Lovenox, []  Heparin, [] SCDs, [] Ambulation,  [] Eliquis, [] Xarelto  [] Coumadin   Code Status Full Code   Disposition From: home  Expected Disposition: home  Estimated Date of Discharge: 1-2 days  Patient requires continued admission due to GI work up   Surrogate Decision Maker/ POA  Grandchild     Personally reviewed Lab Studies and Imaging     Discussed management of the case with the IDR team and GI     Drugs that require monitoring for toxicity include lovenox, plavix and lisnopril and the method of monitoring was

## 2025-02-22 NOTE — PROGRESS NOTES
Consult  : patient was seen and examined.  Full consult will be dictated.    Problems:  Melena  Drop in Hemoglobin.  Weight loss.  Plan: EGD to R/O UGI hemorrhage.

## 2025-02-22 NOTE — ANESTHESIA PRE PROCEDURE
Department of Anesthesiology  Preprocedure Note       Name:  Brett Lozada   Age:  84 y.o.  :  1940                                          MRN:  [id number]         Date:  2025      Surgeon: Surgeon(s):  Vicki Lo MD    Procedure: Procedure(s):  ESOPHAGOGASTRODUODENOSCOPY    Medications prior to admission:   Prior to Admission medications    Medication Sig Start Date End Date Taking? Authorizing Provider   isosorbide mononitrate (IMDUR) 30 MG extended release tablet Take 1 tablet by mouth daily 10/3/24  Yes Cain Young MD   lisinopril (PRINIVIL;ZESTRIL) 5 MG tablet Take 1 tablet by mouth daily 24  Yes Kaushal Brooks MD   clopidogrel (PLAVIX) 75 MG tablet Take 1 tablet by mouth daily 24  Yes Kaushal Brooks MD   atorvastatin (LIPITOR) 80 MG tablet Take 1 tablet by mouth daily 24  Yes Kaushal Brooks MD   metoprolol tartrate (LOPRESSOR) 25 MG tablet Take 0.5 tablets by mouth 2 times daily HOLD if HR <55 or Systolic BP <110 24  Yes Berenice English APRN - CNP   omeprazole (PRILOSEC) 40 MG delayed release capsule Take 1 capsule by mouth daily 10/27/23  Yes Sara Rubi MD   donepezil (ARICEPT) 5 MG tablet Take 1 tablet by mouth nightly   Yes Sara Rubi MD   tamsulosin (FLOMAX) 0.4 MG capsule Take 1 capsule by mouth at bedtime 23  Yes Silvio Escoto PA-C   Relugolix (ORGOVYX) 120 MG TABS Take 120 mg by mouth daily  Patient not taking: Reported on 2025    Sara Rubi MD   Apalutamide (ERLEADA) 60 MG TABS Take 240 mg by mouth daily  Patient not taking: Reported on 2025    Sara Rubi MD   citalopram (CELEXA) 10 MG tablet Take 1 tablet by mouth daily  Patient not taking: Reported on 2025    Sara Rubi MD   phenylephrine-mineral oil-petrolatum (PREPARATION H) 0.25-14-74.9 % rectal ointment Place rectally 2 times daily as needed for Hemorrhoids  Patient not taking: Reported on 2025

## 2025-02-22 NOTE — H&P
History and Physical      Name:  Brett Lozada /Age/Sex: 1940  (84 y.o. male)   MRN & CSN:  [id number] & [id number] Encounter Date/Time: 2025 7:55 PM EST   Location:  [facility] PCP: Delfin Gibbons MD       Hospital Day: 1    Assessment and Plan:     Patient is a 84 y.o. male who presented with Dark Stools    UGIB  Chronic Anemia  - Patient presents with dark stools for the last 3 weeks  - Hemodynamically stable, Hg 12.9 on admit, baseline of ~ 12   - Anemia panel consistent with RAAD  - EGD and c-scope without source of bleeding on   - Currently on plavix however concern as patient may be taking double the dose. Low concern for true UGIB bleed at this time given stable Hg and chronicity of symptoms      -Type and screen  - Protonix 40mg BID  - GI consult, NPO for possible EGD    Hx of Malignant neoplasm of prostate   Unintentional weight loss  - follows with urology, currently on Erleada and orgovyx and patient reporting may be contributing to weight loss; states has lost ~30lb in the last 3 months due to lack of appetite. Will consult heme/onc for assistance     CAD  -Continue plavix, statin, beta-blocker      HTN  -Continue imdur, lisinopril and lopressor     HLD  -Continue statin     Dementia  -Continue aricept        Checklist:  Advanced directive: full  Diet: NPO  DVT ppx: Lovenox       Disposition: admit to inpatient.  Estimated discharge: 3  day(s).  Current living situation: home.  Expected disposition: home.    Spoke with ED provider who recommended admission for the patient and I agree with that plan.  Personally reviewed lab studies and imaging.  EKG interpreted personally and results as stated above.  Imaging that was interpreted personally and results as stated above.    History of Present Illness:     Chief Complaint:    Chief Complaint   Patient presents with    Dark Stools       Patient is a 84 y.o. male with a PMHx as above who presented to the ED with dark stools.  12/12/2024 05:30 PM    BACTERIA OCCASIONAL 02/01/2025 01:08 AM    CLARITYU CLEAR 08/17/2024 01:40 AM    LEUKOCYTESUR  02/01/2025 01:08 AM     Results may be affected due to urine color interference.    UROBILINOGEN  02/01/2025 01:08 AM     Results may be affected due to urine color interference.    BILIRUBINUR  02/01/2025 01:08 AM     Results may be affected due to urine color interference.    BLOODU SMALL NUMBER OR AMOUNT OBSERVED 08/17/2024 01:40 AM    GLUCOSEU  02/01/2025 01:08 AM     Results may be affected due to urine color interference.    KETUA  02/01/2025 01:08 AM     Results may be affected due to urine color interference.     Urine Cultures: No results found for: \"LABURIN\"  Blood Cultures: No results found for: \"BC\"  No results found for: \"BLOODCULT2\"  Organism: No results found for: \"ORG\"    Radiology results:  No orders to display       Genie Montoya MD  02/21/25 7:55 PM

## 2025-02-22 NOTE — PROGRESS NOTES
artery disease)     Cancer (HCC)     prostate    Complicated UTI (urinary tract infection) 01/12/2024    H/O echocardiogram 04/29/2019    Patient in atrial fibrillation during exam. Left ventricular function is normal, EF is estimated at 55-60%. Mildly dilated left atrium. Mildly dilated left atrium. Mildli enlarged right ventricle cavity. Trace to mild mitral regurgitation is present. No evidence of pericardial effusion. No evidence of pleural effusion.    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 LVEF is 55 %.Patient tolerated the procedure well.No immediate complications.    History of echocardiogram 01/12/2018    Left ventricular systolic function is normal with an ejection fraction of55-60%. Grade I diastolic dysfunction. Mildly dilated left atrium.Moderate mitral regurgitation.Mild to moderate tricuspid regurgitation.No evidence of pericardial effusion.    HTN (hypertension)     Hyperlipidemia     STEMI (ST elevation myocardial infarction) (HCC) 5/14/2014       Past Surgical History:        Procedure Laterality Date    BACK SURGERY      CARDIAC SURGERY      cabg    COLONOSCOPY  4/14/15    divertics    COLONOSCOPY N/A 6/18/2024    COLONOSCOPY BIOPSY performed by Mikala Manzo MD at [facility] ENDOSCOPY    CORONARY ANGIOPLASTY WITH STENT PLACEMENT  05/14/2014    X2    UPPER GASTROINTESTINAL ENDOSCOPY N/A 6/18/2024    ESOPHAGOGASTRODUODENOSCOPY BIOPSY performed by Mikala Manzo MD at [facility] ENDOSCOPY       Medications Prior to Admission:    Prior to Admission medications    Medication Sig Start Date End Date Taking? Authorizing Provider   isosorbide mononitrate (IMDUR) 30 MG extended release tablet Take 1 tablet by mouth daily 10/3/24  Yes Cain Young MD   lisinopril (PRINIVIL;ZESTRIL) 5 MG  tablet Take 1 tablet by mouth daily 9/11/24  Yes Kaushal Brooks MD   clopidogrel (PLAVIX) 75 MG tablet Take 1 tablet by mouth daily 8/29/24  Yes Kaushal Brooks MD   atorvastatin (LIPITOR) 80 MG tablet Take 1 tablet by mouth daily 8/29/24  Yes Kaushal Brooks MD   metoprolol tartrate (LOPRESSOR) 25 MG tablet Take 0.5 tablets by mouth 2 times daily HOLD if HR <55 or Systolic BP <110 1/25/24  Yes Berenice English APRN - CNP   omeprazole (PRILOSEC) 40 MG delayed release capsule Take 1 capsule by mouth daily 10/27/23  Yes Sara Rubi MD   donepezil (ARICEPT) 5 MG tablet Take 1 tablet by mouth nightly   Yes Sara Rubi MD   tamsulosin (FLOMAX) 0.4 MG capsule Take 1 capsule by mouth at bedtime 5/2/23  Yes Silvio Escoto PA-C   Relugolix (ORGOVYX) 120 MG TABS Take 120 mg by mouth daily  Patient not taking: Reported on 2/21/2025    Sara Rubi MD   Apalutamide (ERLEADA) 60 MG TABS Take 240 mg by mouth daily  Patient not taking: Reported on 2/21/2025    Sara Rubi MD   citalopram (CELEXA) 10 MG tablet Take 1 tablet by mouth daily  Patient not taking: Reported on 2/21/2025    Sara Rubi MD   phenylephrine-mineral oil-petrolatum (PREPARATION H) 0.25-14-74.9 % rectal ointment Place rectally 2 times daily as needed for Hemorrhoids  Patient not taking: Reported on 2/21/2025 4/17/24   Arun Romero DO   ferrous sulfate (IRON 325) 325 (65 Fe) MG tablet Take 1 tablet by mouth every other day  Patient not taking: Reported on 12/23/2024 2/29/24   Hailey Caruso MD   nitroGLYCERIN (NITROSTAT) 0.4 MG SL tablet Place 1 tablet under the tongue once for 1 dose up to max of 3 total doses. If no relief after 1 dose, call 911. 5/2/23 12/23/24  Silvio Escoto PA-C       Allergies:  No Known Allergies.    Social History:    Social History     Socioeconomic History    Marital status:      Spouse name: Not on file    Number of children: Not on file    Years

## 2025-02-23 ENCOUNTER — APPOINTMENT (OUTPATIENT)
Dept: CT IMAGING | Age: 85
DRG: 377 | End: 2025-02-23
Payer: MEDICARE

## 2025-02-23 PROBLEM — E43 SEVERE MALNUTRITION: Chronic | Status: ACTIVE | Noted: 2025-02-23

## 2025-02-23 LAB
ALBUMIN SERPL-MCNC: 3.4 G/DL (ref 3.4–5)
ALBUMIN/GLOB SERPL: 1.3 {RATIO} (ref 1.1–2.2)
ALP SERPL-CCNC: 115 U/L (ref 40–129)
ALT SERPL-CCNC: 11 U/L (ref 10–40)
ANION GAP SERPL CALCULATED.3IONS-SCNC: 10 MMOL/L (ref 9–17)
AST SERPL-CCNC: 27 U/L (ref 15–37)
BASOPHILS # BLD: 0.03 K/UL
BASOPHILS NFR BLD: 0 % (ref 0–1)
BILIRUB SERPL-MCNC: 0.4 MG/DL (ref 0–1)
BUN SERPL-MCNC: 8 MG/DL (ref 7–20)
CALCIUM SERPL-MCNC: 9.6 MG/DL (ref 8.3–10.6)
CHLORIDE SERPL-SCNC: 107 MMOL/L (ref 99–110)
CO2 SERPL-SCNC: 22 MMOL/L (ref 21–32)
CREAT SERPL-MCNC: 0.9 MG/DL (ref 0.8–1.3)
EOSINOPHIL # BLD: 0.25 K/UL
EOSINOPHILS RELATIVE PERCENT: 4 % (ref 0–3)
ERYTHROCYTE [DISTWIDTH] IN BLOOD BY AUTOMATED COUNT: 13.4 % (ref 11.7–14.9)
GFR, ESTIMATED: 85 ML/MIN/1.73M2
GLUCOSE SERPL-MCNC: 86 MG/DL (ref 74–99)
HCT VFR BLD AUTO: 43.6 % (ref 42–52)
HGB BLD-MCNC: 13.8 G/DL (ref 13.5–18)
IMM GRANULOCYTES # BLD AUTO: 0.01 K/UL
IMM GRANULOCYTES NFR BLD: 0 %
LYMPHOCYTES NFR BLD: 1.43 K/UL
LYMPHOCYTES RELATIVE PERCENT: 21 % (ref 24–44)
MCH RBC QN AUTO: 30.9 PG (ref 27–31)
MCHC RBC AUTO-ENTMCNC: 31.7 G/DL (ref 32–36)
MCV RBC AUTO: 97.8 FL (ref 78–100)
MONOCYTES NFR BLD: 0.53 K/UL
MONOCYTES NFR BLD: 8 % (ref 0–4)
NEUTROPHILS NFR BLD: 67 % (ref 36–66)
NEUTS SEG NFR BLD: 4.6 K/UL
PLATELET, FLUORESCENCE: 139 K/UL (ref 140–440)
PMV BLD AUTO: 12 FL (ref 7.5–11.1)
POTASSIUM SERPL-SCNC: 4.2 MMOL/L (ref 3.5–5.1)
PROT SERPL-MCNC: 5.9 G/DL (ref 6.4–8.2)
RBC # BLD AUTO: 4.46 M/UL (ref 4.6–6.2)
SODIUM SERPL-SCNC: 138 MMOL/L (ref 136–145)
WBC OTHER # BLD: 6.9 K/UL (ref 4–10.5)

## 2025-02-23 PROCEDURE — 6370000000 HC RX 637 (ALT 250 FOR IP): Performed by: STUDENT IN AN ORGANIZED HEALTH CARE EDUCATION/TRAINING PROGRAM

## 2025-02-23 PROCEDURE — 6360000002 HC RX W HCPCS: Performed by: STUDENT IN AN ORGANIZED HEALTH CARE EDUCATION/TRAINING PROGRAM

## 2025-02-23 PROCEDURE — 36415 COLL VENOUS BLD VENIPUNCTURE: CPT

## 2025-02-23 PROCEDURE — 74176 CT ABD & PELVIS W/O CONTRAST: CPT

## 2025-02-23 PROCEDURE — 94761 N-INVAS EAR/PLS OXIMETRY MLT: CPT

## 2025-02-23 PROCEDURE — 1200000000 HC SEMI PRIVATE

## 2025-02-23 PROCEDURE — 99233 SBSQ HOSP IP/OBS HIGH 50: CPT | Performed by: INTERNAL MEDICINE

## 2025-02-23 PROCEDURE — 2580000003 HC RX 258: Performed by: STUDENT IN AN ORGANIZED HEALTH CARE EDUCATION/TRAINING PROGRAM

## 2025-02-23 PROCEDURE — 6360000004 HC RX CONTRAST MEDICATION: Performed by: INTERNAL MEDICINE

## 2025-02-23 PROCEDURE — 85025 COMPLETE CBC W/AUTO DIFF WBC: CPT

## 2025-02-23 PROCEDURE — 80053 COMPREHEN METABOLIC PANEL: CPT

## 2025-02-23 RX ORDER — POLYETHYLENE GLYCOL 3350 17 G/17G
238 POWDER, FOR SOLUTION ORAL ONCE
Status: DISCONTINUED | OUTPATIENT
Start: 2025-02-23 | End: 2025-02-24 | Stop reason: HOSPADM

## 2025-02-23 RX ORDER — IOPAMIDOL 755 MG/ML
20 INJECTION, SOLUTION INTRAVASCULAR
Status: COMPLETED | OUTPATIENT
Start: 2025-02-23 | End: 2025-02-23

## 2025-02-23 RX ADMIN — LISINOPRIL 5 MG: 5 TABLET ORAL at 08:00

## 2025-02-23 RX ADMIN — METOPROLOL TARTRATE 12.5 MG: 25 TABLET, FILM COATED ORAL at 22:09

## 2025-02-23 RX ADMIN — METOPROLOL TARTRATE 12.5 MG: 25 TABLET, FILM COATED ORAL at 08:00

## 2025-02-23 RX ADMIN — PANTOPRAZOLE SODIUM 40 MG: 40 INJECTION, POWDER, FOR SOLUTION INTRAVENOUS at 07:59

## 2025-02-23 RX ADMIN — CITALOPRAM HYDROBROMIDE 10 MG: 20 TABLET ORAL at 08:00

## 2025-02-23 RX ADMIN — PANTOPRAZOLE SODIUM 40 MG: 40 INJECTION, POWDER, FOR SOLUTION INTRAVENOUS at 22:09

## 2025-02-23 RX ADMIN — DONEPEZIL HYDROCHLORIDE 5 MG: 5 TABLET, FILM COATED ORAL at 22:09

## 2025-02-23 RX ADMIN — ATORVASTATIN CALCIUM 80 MG: 40 TABLET, FILM COATED ORAL at 07:59

## 2025-02-23 RX ADMIN — IOPAMIDOL 20 ML: 755 INJECTION, SOLUTION INTRAVENOUS at 13:30

## 2025-02-23 RX ADMIN — CLOPIDOGREL BISULFATE 75 MG: 75 TABLET ORAL at 07:59

## 2025-02-23 RX ADMIN — ISOSORBIDE MONONITRATE 30 MG: 30 TABLET, EXTENDED RELEASE ORAL at 07:59

## 2025-02-23 ASSESSMENT — PAIN SCALES - GENERAL: PAINLEVEL_OUTOF10: 0

## 2025-02-23 NOTE — PROGRESS NOTES
V2.0    [facility] [place]      Name:  Brett Lozada /Age/Sex: 1940  (84 y.o. male)   MRN & CSN:  [id number] & [id number] Encounter Date/Time: 2025 7:24 AM EST   Location:  [address] [place] [address]- PCP: Delfin Gibbons MD     Attending:Naseem Walsh*       Hospital Day: 3    Assessment and Recommendations   Brett Lozada is a 84 y.o. male with pmh of CAD, HTN, HLD, h/o prostate cancer and dementia who presents with Upper GI bleed    Plan:   UGIB  Chronic Anemia  Patient presents with dark stools for the last 3 weeks, hemodynamically stable, Hg 12.9 on admit, baseline of ~ 12. EGD and c-scope without source of bleeding on   --He is on plavix however concern as patient may be taking double the dose.Held at this time   --Hgb improved to 13.8 this AM. Anemia panel consistent with RAAD  --Continue PPI  --GI consulted, he did undergo EGD, read pending and will be taking bowel prep tonight for possible C-scope in the AM     Hx of Malignant neoplasm of prostate   Unintentional weight loss  Follows with urology, currently on Erleada and orgovyx and patient reporting may be contributing to weight loss; states has lost ~30lb in the last 3 months due to lack of appetite.   --Appreciate heme/onc recs, follow up on dietitian consult and pending GI studies     CAD  --Continue plavix, statin, beta-blocker      HTN  --Continue imdur, lisinopril and lopressor      HLD  --Continue statin      Dementia  --Continue aricept       Diet ADULT DIET; Clear Liquid   DVT Prophylaxis [x] Lovenox, []  Heparin, [] SCDs, [] Ambulation,  [] Eliquis, [] Xarelto  [] Coumadin   Code Status Full Code   Disposition From: home  Expected Disposition: home  Estimated Date of Discharge: 1-2 days  Patient requires continued admission due to GI work up   Surrogate Decision Maker/ POSTEVEN [place]     Personally reviewed Lab Studies and Imaging     Discussed management of the case with the IDR team and GI     Drugs that require  interference.    BILIRUBINUR  02/01/2025 01:08 AM     Results may be affected due to urine color interference.    BLOODU SMALL NUMBER OR AMOUNT OBSERVED 08/17/2024 01:40 AM    GLUCOSEU  02/01/2025 01:08 AM     Results may be affected due to urine color interference.    KETUA  02/01/2025 01:08 AM     Results may be affected due to urine color interference.     Urine Cultures: No results found for: \"LABURIN\"  Blood Cultures: No results found for: \"BC\"  No results found for: \"BLOODCULT2\"  Organism: No results found for: \"ORG\"      Electronically signed by Naseem Walsh MD on 2/23/2025 at 7:50 AM

## 2025-02-23 NOTE — CONSULTS
HEMATOLOGY ONCOLOGY  Consultation Report    Patient Name:  Brett Lozada  Patient :  1940  Patient MRN:  [id number]     Referring Provider: Delfin Gibbons MD     Date of Service: 2025      Reason for Consult: prostate cancer and weight loss     Chief Complaint:    Chief Complaint   Patient presents with    Dark Stools     Patient Active Problem List:     History of acute myocardial infarction     ASHD (arteriosclerotic heart disease)     S/P CABG x 2     HTN (hypertension)     Hyperlipidemia, mixed     COPD, moderate (HCC)     PVD (peripheral vascular disease)     Exertional dyspnea     Diarrhea     Nausea     Coronary artery disease involving coronary bypass graft of native heart without angina pectoris     Unstable angina (HCC)     SBO (small bowel obstruction) (HCC)     Thrush     PVC (premature ventricular contraction)     Dizziness     Ataxia     Chest pain     Complicated UTI (urinary tract infection)     Generalized weakness     General weakness     Iron deficiency anemia due to chronic blood loss     Intestinal malabsorption     Anemia     Dark stools     Gastroesophageal reflux disease     Altered bowel habits     Upper GI bleed    HPI:   Brett Lozada is a pleasant 84 y.o. male who presented to ED on 25 with c.o dark stools for several weeks. He is on plavix.  Also c/o 30 lbs in the last 3 months with loss of appetite.   He was diagnosed with prostate cancer ~ 20 years ago,   In the last 3 years he has been followed by Dr Fonseca   2024 PSMA PET  1. Focal hypermetabolic uptake identified in the posterior right peripheral zone of the prostate. Malignancy cannot be excluded. Recommend MRI for further characterization.   2. The 0.3 cm lateral left lower lobe nodule, may be too small to characterized. Control with subsequent studies is recommended.   Referred to Dr CAMACHO by Dr Fonseca.  He was started on Orgovyx and Erleada which he attributed to the cause of the weight  loss.    In 6/2024 EGD and colonoscopy with Dr Manzo. No FU routine colonoscopy.     On this admission he was evaluated by Dr Lo, who plans EGD.     2/13/25 PSA 0.04.   2/21/25 WBC 5.5, Hg 12.8, MCV 96.1, plat 148, Ferritin 463, Iron 70, TIBC 193, sat 36.   2/23/25 creat 0.9. LFTs wnl. WBC 6.9. Hg 13.8, MCV 97.8, plat 139.    Recommend nutrition evaluation and urology consult. He c.o urinary incontinence.   Encourage to eat frequent meals.     PAST MEDICAL HISTORY    Past Medical History:   Diagnosis Date    Acute non Q wave MI (myocardial infarction), initial episode of care ([facility]) 5/2/2023    Acute pulmonary embolism ([facility]) 12/26/2023    Acute ST elevation myocardial infarction (STEMI) due to occlusion of distal portion of left anterior descending (LAD) coronary artery ([facility]) 4/29/2023    CAD (coronary artery disease)     Cancer ([facility])     prostate    Complicated UTI (urinary tract infection) 01/12/2024    H/O echocardiogram 04/29/2019    Patient in atrial fibrillation during exam. Left ventricular function is normal, EF is estimated at 55-60%. Mildly dilated left atrium. Mildly dilated left atrium. Mildli enlarged right ventricle cavity. Trace to mild mitral regurgitation is present. No evidence of pericardial effusion. No evidence of pleural effusion.    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 LVEF is 55 %.Patient tolerated the procedure well.No immediate complications.    History of echocardiogram 01/12/2018    Left ventricular systolic function is normal with an ejection fraction of55-60%. Grade I diastolic dysfunction. Mildly dilated left atrium.Moderate mitral regurgitation.Mild to moderate tricuspid regurgitation.No evidence of pericardial effusion.    HTN (hypertension)

## 2025-02-23 NOTE — PROGRESS NOTES
[facility]             MD Mikala Bourne MD Carol Christensen, APRN-CNP             [address] [place] [address]             [phone] fax [phone]      Gastroenterology Progress note . 2/23/2025  Reason for consult:   GI blood loss anemia          Interval H/P  CC today: Patient could not prepare for colonoscopy and he refused to drink the prep.     GI symptoms: none.  ROS:      Physical Exam  Blood pressure 138/86, pulse 93, temperature 97.8 °F (36.6 °C), temperature source Oral, resp. rate 18, height 1.676 m (5' 6\"), weight 68 kg (150 lb), SpO2 99%.  Constitutional: Patient is in no distress.   Eyes: Pupils equal, round.  No icterus.  HENT: Oral mucosa is moist.   Pulmonary: No accessory muscle use. No localizing pulmonary findings.     Cardiovascular: Heart has a regular rate and rhythm, no JVD.   Gastrointestinal: Bowel sounds are present in all four quadrants. Abdomen is soft, nontender, nondistended. Rectal examination deferred.   Skin: No jaundice, spider angiomas, or palmar erythema. No purpura.  Neuro: Awake,alert, and oriented x3. No gross focal neurologic deficits.       Chart and labs reviewed.     Impression  1.  Anemia with GI blood loss concerns.  Patient had EGD the EGD did not show any significant pathology to explain melena and drop in the hemoglobin.  Patient refused to drink the colonoscopy preparation.  Recommended CT abdomen and CT abdomen results to be checked.        Plan  1. Complete CT abdomen manage check the results of CT abdomen for any intra-abdominal malignancy.  Recommend follow up as an OP.     Patient's history, exam, and plan of care were discussed.     Thank you for allowing us to be involved in the management of this patient. We will follow this patient along with you. Please feel free to call with any questions.      Vicki Lo MD  Gastroenterology   2/23/2025   1:38  PM       Recent Labs     02/22/25  0439 02/23/25  0251   AST 21 27   ALT 11 11   ALKPHOS 98 115   BILITOT 0.3 0.4

## 2025-02-23 NOTE — CONSULTS
Comprehensive Nutrition Assessment    Type and Reason for Visit:  Initial, Positive nutrition screen (MST 4, C/o Unintentional wt loss)    Nutrition Recommendations/Plan:   Diet advancement per GI  Please obtain measured weights   Offer fortified gelatein oral nutrition supplement BID   Once diet advancement, may trial high calorie nutrition supplements      Malnutrition Assessment:  Malnutrition Status:  Severe malnutrition (02/23/25 0377)    Context:  Chronic Illness     Findings of the 6 clinical characteristics of malnutrition:  Energy Intake:  75% or less estimated energy requirements for 1 month or longer (3 months)  Weight Loss:  10% over 6 months     Body Fat Loss:  Severe body fat loss Triceps, Fat Overlying Ribs, Buccal region   Muscle Mass Loss:   (Moderate muscle loss) Clavicles (pectoralis & deltoids), Temples (temporalis)  Fluid Accumulation:  No fluid accumulation Extremities   Strength:  Not Performed    Nutrition Assessment:    Admitted with UGI, Anemia, Dark stools. Underwent EGD, no signs of active bleedings, possible plans for cscope. PMH of mild dementia, Prostate ca 25 yrs ago, CAD s/p CABG, Complicated UTI, COPD, PVD, RAAD h/o on iron pills, GERD, Altered bowel habits. Pt reports eating poorly over the last 3 months, attributes to medication use. Per chart review, pt had lost 15lb/10% in 6 months from July to January. Current wt is stated, not measured. Pt appears thin frame and frail. States UBW/ideal wt of 170 lb. Performed NFPE, moderate to severe wasting noted. Meets criteria for malnutrition. Pt agreed to trial fortified gelatein while on clears. Recommend standard high calorie supplements once diet advances per MD. Follow as high nutrition risk.    Nutrition Related Findings:    Hgb >12, +protonix, plavix, glycolax Wound Type: None       Current Nutrition Intake & Therapies:    Average Meal Intake:  (clears-likes the chicken broth)  Average Supplements Intake: None Ordered  ADULT

## 2025-02-23 NOTE — DISCHARGE INSTR - DIET
this diet because you are having difficulty eating enough calories throughout the day, you have lost weight, and/or you need to add protein to your diet.  Sometimes you may not feel like eating, even if you know the importance of good nutrition. The recommendations in this handout can help you with the following:  Regaining your strength and energy  Keeping your body healthy  Healing and recovering from surgery or illness and fighting infection    Tips  Schedule Your Meals and Snacks  Several small meals and snacks are often better tolerated and digested than large meals.  Strategies  Plan to eat 3 meals and 3 snacks daily.  [place] with timing meals to find out when you have a larger appetite.  Appetite may be greatest in the morning after not eating all night so you may prefer to eat your larger meals and snacks in the morning and at lunch.  Breakfast-type foods are often better tolerated so eat foods such as eggs, pancakes, waffles and cereal for any meal or snack.  Carry snacks with you so you are prepared to eat every 2 to 3 hours.  Determine what works best for you if your body’s cues for feeling hungry or full are not working.  Eat a small meal or snack even if you don’t feel hungry.  Set a timer to remind you when it is time to eat.  Take a walk before you eat (with health care provider’s approval).  Light or moderate physical activity can help you maintain muscle and increase your appetite.  Make Eating Enjoyable  Taking steps to make the experience enjoyable may help to increase your interest in eating and improve your appetite.  Strategies:  Eat with others whenever possible.  Include your favorite foods to make meals more enjoyable.  Try new foods.  Save your beverage for the end of the meal so that you have more room for food before you get full.  Add Calories to Your Meals and Snacks  Try adding calorie-dense foods so that each bite provides more nutrition.  Strategies  Drink milk, chocolate milk,  soy milk, or smoothies instead of low-calorie beverages such as diet drinks or water.  Cook with milk or soy milk instead of water when making dishes such as hot cereal, cocoa, or pudding.  Add jelly, jam, honey, butter or margarine to bread and crackers. Add jam or fruit to ice cream and as a topping over cake.  Mix dried fruit, nuts, granola, honey, or dry cereal with yogurt or hot cereals.  Enjoy snacks such as milkshakes, smoothies, pudding, ice cream, or custard.  Blend a fruit smoothie of a banana, frozen berries, milk or soy milk, and 1 tablespoon nonfat powdered milk or protein powder.  Add Protein to Your Meals and Snacks  Choose at least one protein food at each meal and snack to increase your daily intake.  Strategies  Add ¼ cup nonfat dry milk powder or protein powder to make a high-protein milk to drink or to use in recipes that call for milk. Vanilla or peppermint extract or unsweetened cocoa powder could help to boost the flavor.  Add hard-cooked eggs, leftover meat, grated cheese, canned beans or tofu to noodles, rice, salads, sandwiches, soups, casseroles, pasta, tuna and other mixed dishes.  Add powdered milk or protein powder to hot cereals, meatloaf, casseroles, scrambled eggs, sauces, cream soups, and shakes.  Add beans and lentils to salads, soups, casseroles, and vegetable dishes.  Eat cottage cheese or yogurt, especially Greek yogurt, with fruit as a snack or dessert.  Eat peanut or other nut butters on crackers, bread, toast, waffles, apples, bananas or celery sticks. Add it to milkshakes, smoothies, or desserts.  Consider a ready-made protein shake. Your RDN will make recommendations.  Add Fats to Your Meals and Snacks  Try adding fats to your meals and snacks. Fat provides more calories in fewer bites than carbohydrate or protein and adds flavors to your foods.  Strategies  Snack on nuts and seeds or add them to foods like salads, pasta, cereals, yogurt, and ice cream.   Sauté or stir-garg

## 2025-02-24 VITALS
HEART RATE: 63 BPM | SYSTOLIC BLOOD PRESSURE: 142 MMHG | TEMPERATURE: 98.2 F | HEIGHT: 66 IN | OXYGEN SATURATION: 99 % | RESPIRATION RATE: 16 BRPM | WEIGHT: 148.81 LBS | BODY MASS INDEX: 23.92 KG/M2 | DIASTOLIC BLOOD PRESSURE: 84 MMHG

## 2025-02-24 PROBLEM — K92.2 UPPER GI BLEED: Status: RESOLVED | Noted: 2025-02-21 | Resolved: 2025-02-24

## 2025-02-24 LAB
BASOPHILS # BLD: 0.03 K/UL
BASOPHILS NFR BLD: 0 % (ref 0–1)
EOSINOPHIL # BLD: 0.23 K/UL
EOSINOPHILS RELATIVE PERCENT: 3 % (ref 0–3)
ERYTHROCYTE [DISTWIDTH] IN BLOOD BY AUTOMATED COUNT: 13.6 % (ref 11.7–14.9)
HCT VFR BLD AUTO: 38.8 % (ref 42–52)
HGB BLD-MCNC: 12.7 G/DL (ref 13.5–18)
IMM GRANULOCYTES # BLD AUTO: 0.01 K/UL
IMM GRANULOCYTES NFR BLD: 0 %
LYMPHOCYTES NFR BLD: 1.32 K/UL
LYMPHOCYTES RELATIVE PERCENT: 19 % (ref 24–44)
MCH RBC QN AUTO: 31.1 PG (ref 27–31)
MCHC RBC AUTO-ENTMCNC: 32.7 G/DL (ref 32–36)
MCV RBC AUTO: 95.1 FL (ref 78–100)
MONOCYTES NFR BLD: 0.65 K/UL
MONOCYTES NFR BLD: 9 % (ref 0–4)
NEUTROPHILS NFR BLD: 68 % (ref 36–66)
NEUTS SEG NFR BLD: 4.68 K/UL
PLATELET # BLD AUTO: 128 K/UL (ref 140–440)
PMV BLD AUTO: 12.3 FL (ref 7.5–11.1)
RBC # BLD AUTO: 4.08 M/UL (ref 4.6–6.2)
WBC OTHER # BLD: 6.9 K/UL (ref 4–10.5)

## 2025-02-24 PROCEDURE — 2580000003 HC RX 258: Performed by: STUDENT IN AN ORGANIZED HEALTH CARE EDUCATION/TRAINING PROGRAM

## 2025-02-24 PROCEDURE — 36415 COLL VENOUS BLD VENIPUNCTURE: CPT

## 2025-02-24 PROCEDURE — 85025 COMPLETE CBC W/AUTO DIFF WBC: CPT

## 2025-02-24 PROCEDURE — 94761 N-INVAS EAR/PLS OXIMETRY MLT: CPT

## 2025-02-24 PROCEDURE — 6360000002 HC RX W HCPCS: Performed by: STUDENT IN AN ORGANIZED HEALTH CARE EDUCATION/TRAINING PROGRAM

## 2025-02-24 PROCEDURE — 6370000000 HC RX 637 (ALT 250 FOR IP): Performed by: STUDENT IN AN ORGANIZED HEALTH CARE EDUCATION/TRAINING PROGRAM

## 2025-02-24 RX ADMIN — ATORVASTATIN CALCIUM 80 MG: 40 TABLET, FILM COATED ORAL at 09:52

## 2025-02-24 RX ADMIN — PANTOPRAZOLE SODIUM 40 MG: 40 INJECTION, POWDER, FOR SOLUTION INTRAVENOUS at 09:52

## 2025-02-24 RX ADMIN — ISOSORBIDE MONONITRATE 30 MG: 30 TABLET, EXTENDED RELEASE ORAL at 09:47

## 2025-02-24 RX ADMIN — METOPROLOL TARTRATE 12.5 MG: 25 TABLET, FILM COATED ORAL at 09:47

## 2025-02-24 RX ADMIN — LISINOPRIL 5 MG: 5 TABLET ORAL at 09:52

## 2025-02-24 RX ADMIN — CITALOPRAM HYDROBROMIDE 10 MG: 20 TABLET ORAL at 09:47

## 2025-02-24 RX ADMIN — ENOXAPARIN SODIUM 40 MG: 100 INJECTION SUBCUTANEOUS at 09:47

## 2025-02-24 RX ADMIN — CLOPIDOGREL BISULFATE 75 MG: 75 TABLET ORAL at 09:47

## 2025-02-24 ASSESSMENT — PAIN SCALES - GENERAL: PAINLEVEL_OUTOF10: 0

## 2025-02-24 NOTE — PROGRESS NOTES
At 2038 this nurse sent a message via perfect serve to Dr. Lo regarding plans for patient to do a bowel prep tonight and colonoscopy tomorrow. Dr. Lo then called this nurse via [employer] phone. According to GI note from karen in the day, Dr. Lo  recommended CT ABD and f/u OP as patient refused colon prep the night before and stated he did not want to complete a colonoscopy. Dr. Lo asked this nurse how a new prep got ordered. This nruse reviewed patient's orders and informed him that Dr Walsh ordered a different prep (a mirilax base) for pt to start tonight.I informed him it was also noted in Dr. Walsh's note that patient is to colon prep tonight then have colonoscopy 2/24. Dr. Lo stated that that plan of care was never mentioned to him as when he saw patient karen in the day he refused a colonoscopy. He was confused at why another prep was ordered by Dr Walsh. This nurse is bedside with patient at this time and Dr. Lo  asked if the patient was now agreeable to a colonoscopy. Pt stated that the doctor said I need to get it done so I'll do it then. Dr. Lo asks this nurse when pt is to be d/c. I informed him the note stated 1-2 days pending GI work up. He stated that his surgery schedule is booked tomorrow. Pt can be on clears 2/24 and prep 2/24 night. He stated he would like to review the CT abd done on 2/23 and if stable & pt medically stable, then he may have him complete colonoscopy OP. Pt with nurse during entire conversation. All questions answered and pt agreeable with plan.

## 2025-02-24 NOTE — PLAN OF CARE
Problem: Discharge Planning  Goal: Discharge to home or other facility with appropriate resources  2/24/2025 1301 by Aicha Lam RN  Outcome: Adequate for Discharge  2/24/2025 1002 by Aicha Lam RN  Outcome: Progressing  2/24/2025 0004 by Karen De Jesus RN  Outcome: Progressing     Problem: Pain  Goal: Verbalizes/displays adequate comfort level or baseline comfort level  2/24/2025 1301 by Aicha Lam RN  Outcome: Adequate for Discharge  2/24/2025 1002 by Aicha Lam RN  Outcome: Progressing  2/24/2025 0004 by Karen De Jesus RN  Outcome: Progressing  Flowsheets (Taken 2/23/2025 1805 by Deya Sanchez RN)  Verbalizes/displays adequate comfort level or baseline comfort level: Assess pain using appropriate pain scale     Problem: Skin/Tissue Integrity  Goal: Skin integrity remains intact  Description: 1.  Monitor for areas of redness and/or skin breakdown  2.  Assess vascular access sites hourly  3.  Every 4-6 hours minimum:  Change oxygen saturation probe site  4.  Every 4-6 hours:  If on nasal continuous positive airway pressure, respiratory therapy assess nares and determine need for appliance change or resting period  2/24/2025 1301 by Aicha Lam RN  Outcome: Adequate for Discharge  2/24/2025 1002 by Aicha Lam RN  Outcome: Progressing  2/24/2025 0004 by Karen De Jesus RN  Outcome: Progressing     Problem: ABCDS Injury Assessment  Goal: Absence of physical injury  2/24/2025 1301 by Aicha Lam RN  Outcome: Adequate for Discharge  2/24/2025 1002 by Aicha Lam RN  Outcome: Progressing  2/24/2025 0004 by Karen De Jesus RN  Outcome: Progressing     Problem: Safety - Adult  Goal: Free from fall injury  2/24/2025 1301 by Aicha Lam RN  Outcome: Adequate for Discharge  2/24/2025 1002 by Aicha Lam RN  Outcome: Progressing  2/24/2025 0004 by Karen De Jesus RN  Outcome: Progressing     Problem: Nutrition Deficit:  Goal: Optimize  nutritional status  2/24/2025 1301 by Aicha Lam, RN  Outcome: Adequate for Discharge  2/24/2025 1002 by Aicha Lam, RN  Outcome: Progressing

## 2025-02-24 NOTE — PLAN OF CARE
Problem: Discharge Planning  Goal: Discharge to home or other facility with appropriate resources  2/24/2025 1002 by Aicha Lam RN  Outcome: Progressing  2/24/2025 0004 by Karen De Jesus RN  Outcome: Progressing     Problem: Pain  Goal: Verbalizes/displays adequate comfort level or baseline comfort level  2/24/2025 1002 by Aicha Lam RN  Outcome: Progressing  2/24/2025 0004 by Karen De Jesus RN  Outcome: Progressing  Flowsheets (Taken 2/23/2025 1805 by Deya Sanchez, RN)  Verbalizes/displays adequate comfort level or baseline comfort level: Assess pain using appropriate pain scale     Problem: Skin/Tissue Integrity  Goal: Skin integrity remains intact  Description: 1.  Monitor for areas of redness and/or skin breakdown  2.  Assess vascular access sites hourly  3.  Every 4-6 hours minimum:  Change oxygen saturation probe site  4.  Every 4-6 hours:  If on nasal continuous positive airway pressure, respiratory therapy assess nares and determine need for appliance change or resting period  2/24/2025 1002 by Aicha Lam RN  Outcome: Progressing  2/24/2025 0004 by Karen De Jesus RN  Outcome: Progressing     Problem: ABCDS Injury Assessment  Goal: Absence of physical injury  2/24/2025 1002 by Aicha Lam RN  Outcome: Progressing  2/24/2025 0004 by Karen De Jesus RN  Outcome: Progressing     Problem: Safety - Adult  Goal: Free from fall injury  2/24/2025 1002 by Aicha Lam RN  Outcome: Progressing  2/24/2025 0004 by Karen De Jesus RN  Outcome: Progressing     Problem: Nutrition Deficit:  Goal: Optimize nutritional status  Outcome: Progressing

## 2025-02-24 NOTE — DISCHARGE INSTRUCTIONS
Please ensure that you follow up with PCP and GI within 1-2 weeks of discharge  You will likely need a repeat colonoscopy as an outpatient please set up an appointment with GI  Continue to take medications as prescribed   If symptoms worsen or recover, please return to the nearest ED

## 2025-02-24 NOTE — DISCHARGE SUMMARY
V2.0  Discharge Summary    Name:  Brett Lozada /Age/Sex: 1940 (84 y.o. male)   Admit Date: 2025  Discharge Date: 25    MRN & CSN:  [id number] & [id number] Encounter Date and Time 25 11:02 AM EST    Attending:  Naseem Walsh* Discharging Provider: Naseem Walsh MD       Hospital Course:     Brief HPI: Brett Lozada is a 84 y.o. male  with pmh of CAD, HTN, HLD, h/o prostate cancer and dementia who presents with Upper GI bleed. He presented with complaint of dark stools, hemodynamically stable and labs notable for hgb 12.8. he was admitted for GI evaluation and underwent EGD. He is at this time cleared for DC with planned OP follow up. Below is a brief POC from his hospital course;    Brief Problem Based Course:   Concern for UGIB  Chronic Anemia  Patient presents with dark stools for the last 3 weeks, hemodynamically stable, Hg 12.9 on admit, baseline of ~ 12. EGD and c-scope without source of bleeding on   --Hgb stable at 12.7 this AM. Anemia panel consistent with RAAD  --Continue PPI  --GI consulted, he did undergo EGD with no significant findings. Patient unable to tolerate bowel prep and given stability, GI recommending OP follow up and plan to C-Scope as OP.     Hx of Malignant neoplasm of prostate   Unintentional weight loss  Severe malnutrition  Follows with urology, currently on Erleada and orgovyx and patient reporting may be contributing to weight loss; states has lost ~30lb in the last 3 months due to lack of appetite. He has not been taking it  --Appreciate heme/onc recs, and dietitian consult      CAD  --Continue plavix, statin, beta-blocker      HTN  --Continue imdur, lisinopril and lopressor      HLD  --Continue statin      Dementia  --Continue aricept    Patient is medically stable and cleared for DC    The patient expressed appropriate understanding of, and agreement with the discharge recommendations, medications, and plan.     Consults this  and/or kV according to patient size) were used to limit patient radiation dose. FINDINGS: Evaluation of the abdominal viscera is limited due to lack of intravenous contrast. Inferior chest: Minimal subsegmental atelectasis within the right lung base. Hyperdensity within the coronary arteries is consistent with atherosclerosis and/or stents. Mild wall thickening of the distal esophagus, favored to related to underdistention. Liver: No focal hepatic mass or intrahepatic biliary ductal dilation is identified within the limitations of noncontrast imaging. Gallbladder: Unremarkable. Spleen: Calcifications within the spleen are consistent with the sequela of healed granulomatous disease. Pancreas: Unremarkable. Adrenals: There is mild thickening of the bilateral adrenal glands. Kidneys: There is no evidence of hydronephrosis or hydroureter. There is a 1.2 cm low density lesion within the right kidney which measures 10 Hounsfield units  (series 301 image 24), incompletely characterized M.D. lack of intravenous contrast although favored to represent a cyst. There may be subtle urothelial thickening within the right renal pelvis as well as along the right ureter, nonspecific although may be reactive or chronic. Urinary Bladder: Urinary bladder is mildly distended although otherwise unremarkable. Reproductive: Dystrophic prostatic calcifications are evident. There may be TURP  defect within the prostate. Bowel: The small and large bowel are normal in caliber without evidence of obstruction. Scattered colonic diverticula present without adjacent fat stranding. The appendix is nondilated. Mild apparent wall thickening involving the proximal stomach is favored to relate to underdistention. Small amount of fluid within the distal sigmoid colon/rectum. Vasculature: There is atherosclerosis. Lymphatic system: No suspicious lymphadenopathy is demonstrated. Abdominal wall: Small fat-containing umbilical hernia. Bones: Bone marrow is

## 2025-02-25 LAB — SURGICAL PATHOLOGY REPORT: NORMAL

## 2025-02-25 NOTE — PROGRESS NOTES
05/17/2023    LDLDIRECT 68 10/06/2021    TSH 1.73 02/13/2025     Hemoglobin A1C   Date Value Ref Range Status   10/06/2021 6.2 4.2 - 6.3 % Final

## 2025-02-25 NOTE — CARE COORDINATION
Received referral for possible nutritional supplements on Sunday,  2/23/25.  Was not able to review as patient was discharged the next day.

## 2025-02-28 ENCOUNTER — TELEPHONE (OUTPATIENT)
Dept: CARDIOLOGY CLINIC | Age: 85
End: 2025-02-28

## 2025-03-01 NOTE — PROGRESS NOTES
Patient Name:  Brett Lozada  Patient :  1940  Patient MRN:  [id number]     Primary Oncologist: Hailey Caruso MD  Referring Provider: Delfin Gibbons MD     Date of Service: 3/25/2025      Chief Complaint:    No chief complaint on file.    FU visit.     Patient Active Problem List:     STEMI (ST elevation myocardial infarction) (HCC)     ASHD (arteriosclerotic heart disease)     S/P CABG x 2     HTN (hypertension)     Hyperlipidemia, mixed     COPD, moderate (HCC)     PVD (peripheral vascular disease) (HCC)     Coronary artery disease involving coronary bypass graft of native heart without angina pectoris     SBO (small bowel obstruction) (HCC)     PVC (premature ventricular contraction)     Ataxia     ST elevation myocardial infarction (STEMI) due to occlusion of distal portion of left anterior descending (LAD) coronary artery       HPI:   Brett Lozada is a pleasant 83 yo male patient who was referred for evaluation of b/l PE in 2024. He has mild dementia.  He went to ED in 2023 due to not feeling well. He had COVID 3 weeks prior.  2023 CTA chest:   1.  Multiple bilateral pulmonary arterial thromboemboli, right greater than left.  No right ventricular strain.   2.  Trace right pleural effusion.   3.  Right greater than left lower lobe heterogeneous opacities with right basilar consolidation.  Differential considerations include atelectasis, infection and developing infarction.  Currently on DOAC.     2023 WBC 8.4, Hg 13.6, MCV 93.9, plat 129.  2024 WBC 7.4, Hg 12.1, MCV 93.1, plat 217.   1/15/2024 CMP grossly unremarkable.  He has 4 children, 2 biological.  Mother has GI tract ca. No thromboembolism in the family. No prior h/o DVT/PE. Not on testosterone supplement.     He has h/o prostate cancer 25 years ago. FU with urologist.  2024 creat 0.9. B-12 1129. Folate 6.1.   LFTs stable. WBC 6.9, Hg 12, MCV 95.7, plat 117. MPV 12.4.  Ferritin 28, Iron 49, TIBC 248, sat 20.

## 2025-03-13 DIAGNOSIS — D50.0 IRON DEFICIENCY ANEMIA DUE TO CHRONIC BLOOD LOSS: Primary | ICD-10-CM

## 2025-03-18 ENCOUNTER — CLINICAL DOCUMENTATION (OUTPATIENT)
Dept: ONCOLOGY | Age: 85
End: 2025-03-18

## 2025-03-18 ENCOUNTER — HOSPITAL ENCOUNTER (OUTPATIENT)
Dept: INFUSION THERAPY | Age: 85
Discharge: HOME | End: 2025-03-18
Payer: MEDICARE

## 2025-03-18 DIAGNOSIS — D50.0 IRON DEFICIENCY ANEMIA DUE TO CHRONIC BLOOD LOSS: ICD-10-CM

## 2025-03-18 LAB
ALBUMIN SERPL-MCNC: 3.9 G/DL (ref 3.4–5)
ALBUMIN/GLOB SERPL: 1.4 {RATIO} (ref 1.1–2.2)
ALP SERPL-CCNC: 115 U/L (ref 40–129)
ALT SERPL-CCNC: 11 U/L (ref 10–40)
ANION GAP SERPL CALCULATED.3IONS-SCNC: 14 MMOL/L (ref 9–17)
AST SERPL-CCNC: 18 U/L (ref 15–37)
BASOPHILS # BLD: 0.01 K/UL
BASOPHILS NFR BLD: 0 % (ref 0–1)
BILIRUB SERPL-MCNC: 0.4 MG/DL (ref 0–1)
BUN SERPL-MCNC: 13 MG/DL (ref 7–20)
CALCIUM SERPL-MCNC: 9.7 MG/DL (ref 8.3–10.6)
CHLORIDE SERPL-SCNC: 107 MMOL/L (ref 99–110)
CO2 SERPL-SCNC: 23 MMOL/L (ref 21–32)
CREAT SERPL-MCNC: 1.2 MG/DL (ref 0.8–1.3)
EOSINOPHIL # BLD: 0.17 K/UL
EOSINOPHILS RELATIVE PERCENT: 3 % (ref 0–3)
ERYTHROCYTE [DISTWIDTH] IN BLOOD BY AUTOMATED COUNT: 13.9 % (ref 11.7–14.9)
GFR, ESTIMATED: 61 ML/MIN/1.73M2
GLUCOSE SERPL-MCNC: 95 MG/DL (ref 74–99)
HCT VFR BLD AUTO: 45.4 % (ref 42–52)
HGB BLD-MCNC: 14.7 G/DL (ref 13.5–18)
IRON SATN MFR SERPL: 33 % (ref 15–50)
IRON SERPL-MCNC: 70 UG/DL (ref 59–158)
LYMPHOCYTES NFR BLD: 1.63 K/UL
LYMPHOCYTES RELATIVE PERCENT: 27 % (ref 24–44)
MCH RBC QN AUTO: 31.3 PG (ref 27–31)
MCHC RBC AUTO-ENTMCNC: 32.4 G/DL (ref 32–36)
MCV RBC AUTO: 96.8 FL (ref 78–100)
MONOCYTES NFR BLD: 0.53 K/UL
MONOCYTES NFR BLD: 9 % (ref 0–4)
NEUTROPHILS NFR BLD: 62 % (ref 36–66)
NEUTS SEG NFR BLD: 3.75 K/UL
PLATELET # BLD AUTO: 160 K/UL (ref 140–440)
PMV BLD AUTO: 11.9 FL (ref 7.5–11.1)
POTASSIUM SERPL-SCNC: 3.3 MMOL/L (ref 3.5–5.1)
PROT SERPL-MCNC: 6.6 G/DL (ref 6.4–8.2)
RBC # BLD AUTO: 4.69 M/UL (ref 4.6–6.2)
SODIUM SERPL-SCNC: 143 MMOL/L (ref 136–145)
TIBC SERPL-MCNC: 210 UG/DL (ref 260–445)
UNSATURATED IRON BINDING CAPACITY: 140 UG/DL (ref 110–370)
WBC OTHER # BLD: 6.1 K/UL (ref 4–10.5)

## 2025-03-18 PROCEDURE — 80053 COMPREHEN METABOLIC PANEL: CPT

## 2025-03-18 PROCEDURE — 36415 COLL VENOUS BLD VENIPUNCTURE: CPT

## 2025-03-18 PROCEDURE — 85025 COMPLETE CBC W/AUTO DIFF WBC: CPT

## 2025-03-18 PROCEDURE — 83550 IRON BINDING TEST: CPT

## 2025-03-18 PROCEDURE — 83540 ASSAY OF IRON: CPT

## 2025-03-18 RX ORDER — POTASSIUM CHLORIDE 1500 MG/1
20 TABLET, EXTENDED RELEASE ORAL DAILY
Qty: 3 TABLET | Refills: 0 | Status: SHIPPED | OUTPATIENT
Start: 2025-03-18 | End: 2025-03-21

## 2025-03-18 NOTE — PROGRESS NOTES
Lab results from today 03/18/2025. K 3.3. RX for KCL 20 mEq daily x3D e-scribed to [facility] on [address] per physician order. Attempted to call patient @ [phone]; however, no answer.

## 2025-03-19 ENCOUNTER — CLINICAL DOCUMENTATION (OUTPATIENT)
Dept: ONCOLOGY | Age: 85
End: 2025-03-19

## 2025-03-19 RX ORDER — POTASSIUM CHLORIDE 1500 MG/1
20 TABLET, EXTENDED RELEASE ORAL DAILY
Qty: 3 TABLET | Refills: 0 | OUTPATIENT
Start: 2025-03-19 | End: 2025-03-22

## 2025-03-19 RX ORDER — ISOSORBIDE MONONITRATE 30 MG/1
30 TABLET, EXTENDED RELEASE ORAL DAILY
Qty: 30 TABLET | Refills: 5 | Status: SHIPPED | OUTPATIENT
Start: 2025-03-19

## 2025-03-19 NOTE — PROGRESS NOTES
Called patient @ [phone] to notify of lab results and that K was low (3.3). Advised that prescription for KCL 20 mEq daily x3 days was sent to [facility] pharmacy on [address]. Patient voices understanding. No further needs identified at this time.

## 2025-03-24 NOTE — PROGRESS NOTES
Patient Name: Brett Lozada  : 1940  MRN# [id number]    REASON FOR VISIT: 6 month follow  Patient Active Problem List    Diagnosis Date Noted    Unstable angina (HCC) 2017    Exertional dyspnea     Diarrhea     Nausea     Coronary artery disease involving coronary bypass graft of native heart without angina pectoris     Severe malnutrition 2025    Anemia 2024    Dark stools 2024    Gastroesophageal reflux disease 2024    Altered bowel habits 2024    Intestinal malabsorption 2024    Iron deficiency anemia due to chronic blood loss 2024    General weakness 2024    Generalized weakness 2024    Complicated UTI (urinary tract infection) 2024    Chest pain 2023    Ataxia 2023    Dizziness 2021    PVC (premature ventricular contraction)     Thrush 10/15/2018    SBO (small bowel obstruction) ([facility]) 10/04/2018    History of acute myocardial infarction 2014    ASHD (arteriosclerotic heart disease) 2014    S/P CABG x 2 2014    HTN (hypertension) 2014    Hyperlipidemia, mixed 2014    COPD, moderate (HCC) 2014    PVD (peripheral vascular disease) 2014         LABS:  Lab Results   Component Value Date    CHOL 142 2023    TRIG 168 (H) 2023    HDL 51 2023    LDLDIRECT 68 10/06/2021    TSH 1.73 2025     Hemoglobin A1C   Date Value Ref Range Status   10/06/2021 6.2 4.2 - 6.3 % Final

## 2025-03-25 ENCOUNTER — HOSPITAL ENCOUNTER (OUTPATIENT)
Dept: INFUSION THERAPY | Age: 85
Discharge: HOME | End: 2025-03-25
Payer: MEDICARE

## 2025-03-25 ENCOUNTER — OFFICE VISIT (OUTPATIENT)
Dept: ONCOLOGY | Age: 85
End: 2025-03-25
Payer: MEDICARE

## 2025-03-25 VITALS
BODY MASS INDEX: 21.21 KG/M2 | HEART RATE: 74 BPM | OXYGEN SATURATION: 100 % | WEIGHT: 132 LBS | TEMPERATURE: 98 F | SYSTOLIC BLOOD PRESSURE: 169 MMHG | DIASTOLIC BLOOD PRESSURE: 84 MMHG | HEIGHT: 66 IN

## 2025-03-25 DIAGNOSIS — D50.0 IRON DEFICIENCY ANEMIA DUE TO CHRONIC BLOOD LOSS: Primary | ICD-10-CM

## 2025-03-25 PROCEDURE — 3079F DIAST BP 80-89 MM HG: CPT | Performed by: INTERNAL MEDICINE

## 2025-03-25 PROCEDURE — 99212 OFFICE O/P EST SF 10 MIN: CPT

## 2025-03-25 PROCEDURE — 99213 OFFICE O/P EST LOW 20 MIN: CPT | Performed by: INTERNAL MEDICINE

## 2025-03-25 PROCEDURE — 1159F MED LIST DOCD IN RCRD: CPT | Performed by: INTERNAL MEDICINE

## 2025-03-25 PROCEDURE — 1036F TOBACCO NON-USER: CPT | Performed by: INTERNAL MEDICINE

## 2025-03-25 PROCEDURE — 3077F SYST BP >= 140 MM HG: CPT | Performed by: INTERNAL MEDICINE

## 2025-03-25 PROCEDURE — 1111F DSCHRG MED/CURRENT MED MERGE: CPT | Performed by: INTERNAL MEDICINE

## 2025-03-25 PROCEDURE — G8427 DOCREV CUR MEDS BY ELIG CLIN: HCPCS | Performed by: INTERNAL MEDICINE

## 2025-03-25 PROCEDURE — 1125F AMNT PAIN NOTED PAIN PRSNT: CPT | Performed by: INTERNAL MEDICINE

## 2025-03-25 PROCEDURE — G8420 CALC BMI NORM PARAMETERS: HCPCS | Performed by: INTERNAL MEDICINE

## 2025-03-25 PROCEDURE — 1123F ACP DISCUSS/DSCN MKR DOCD: CPT | Performed by: INTERNAL MEDICINE

## 2025-03-25 NOTE — PROGRESS NOTES
MA Rooming Questions  Patient: Brett Lozada  MRN: [id number]    Date: 3/25/2025        1. Do you have any new issues?   yes - Patient states he is losing weight due to not having any appetite and not liking the way anything taste. Patient also states he often has abdominal cramps that cause him to hold his breathe. Patient is having episodes of dizziness due to low oxygen.          2. Do you need any refills on medications?    no    3. Have you had any imaging done since your last visit?   yes - labs 3/18 and ct scan 2/23    4. Have you been hospitalized or seen in the emergency room since your last visit here?   no    5. Did the patient have a depression screening completed today? No    No data recorded     PHQ-9 Given to (if applicable):               PHQ-9 Score (if applicable):                     [] Positive     []  Negative              Does question #9 need addressed (if applicable)                     [] Yes    []  No               Luly Segura, ROLANDO

## 2025-03-27 ENCOUNTER — OFFICE VISIT (OUTPATIENT)
Dept: CARDIOLOGY CLINIC | Age: 85
End: 2025-03-27
Payer: MEDICARE

## 2025-03-27 VITALS
WEIGHT: 131 LBS | HEIGHT: 66 IN | DIASTOLIC BLOOD PRESSURE: 70 MMHG | HEART RATE: 88 BPM | BODY MASS INDEX: 21.05 KG/M2 | SYSTOLIC BLOOD PRESSURE: 132 MMHG

## 2025-03-27 DIAGNOSIS — I10 ESSENTIAL HYPERTENSION: ICD-10-CM

## 2025-03-27 DIAGNOSIS — I25.810 CORONARY ARTERY DISEASE INVOLVING CORONARY BYPASS GRAFT OF NATIVE HEART WITHOUT ANGINA PECTORIS: ICD-10-CM

## 2025-03-27 DIAGNOSIS — I49.3 PVC (PREMATURE VENTRICULAR CONTRACTION): ICD-10-CM

## 2025-03-27 DIAGNOSIS — E78.2 HYPERLIPIDEMIA, MIXED: Chronic | ICD-10-CM

## 2025-03-27 DIAGNOSIS — I73.9 PVD (PERIPHERAL VASCULAR DISEASE): Chronic | ICD-10-CM

## 2025-03-27 DIAGNOSIS — Z95.1 S/P CABG X 2: Chronic | ICD-10-CM

## 2025-03-27 DIAGNOSIS — I25.10 ASHD (ARTERIOSCLEROTIC HEART DISEASE): Primary | Chronic | ICD-10-CM

## 2025-03-27 DIAGNOSIS — I10 PRIMARY HYPERTENSION: Chronic | ICD-10-CM

## 2025-03-27 PROCEDURE — 99214 OFFICE O/P EST MOD 30 MIN: CPT | Performed by: INTERNAL MEDICINE

## 2025-03-27 PROCEDURE — 1123F ACP DISCUSS/DSCN MKR DOCD: CPT | Performed by: INTERNAL MEDICINE

## 2025-03-27 PROCEDURE — 1036F TOBACCO NON-USER: CPT | Performed by: INTERNAL MEDICINE

## 2025-03-27 PROCEDURE — G8427 DOCREV CUR MEDS BY ELIG CLIN: HCPCS | Performed by: INTERNAL MEDICINE

## 2025-03-27 PROCEDURE — 3075F SYST BP GE 130 - 139MM HG: CPT | Performed by: INTERNAL MEDICINE

## 2025-03-27 PROCEDURE — G8420 CALC BMI NORM PARAMETERS: HCPCS | Performed by: INTERNAL MEDICINE

## 2025-03-27 PROCEDURE — 1160F RVW MEDS BY RX/DR IN RCRD: CPT | Performed by: INTERNAL MEDICINE

## 2025-03-27 PROCEDURE — 3078F DIAST BP <80 MM HG: CPT | Performed by: INTERNAL MEDICINE

## 2025-03-27 PROCEDURE — 1159F MED LIST DOCD IN RCRD: CPT | Performed by: INTERNAL MEDICINE

## 2025-03-27 RX ORDER — ESCITALOPRAM OXALATE 10 MG/1
10 TABLET ORAL DAILY
COMMUNITY

## 2025-03-27 RX ORDER — CYCLOBENZAPRINE HCL 10 MG
10 TABLET ORAL 3 TIMES DAILY PRN
COMMUNITY

## 2025-03-27 RX ORDER — ATORVASTATIN CALCIUM 80 MG/1
80 TABLET, FILM COATED ORAL DAILY
Qty: 90 TABLET | Refills: 3 | Status: SHIPPED | OUTPATIENT
Start: 2025-03-27

## 2025-03-27 RX ORDER — METOPROLOL TARTRATE 25 MG/1
25 TABLET, FILM COATED ORAL 2 TIMES DAILY
Qty: 60 TABLET | Refills: 5 | Status: SHIPPED | OUTPATIENT
Start: 2025-03-27

## 2025-03-27 NOTE — PATIENT INSTRUCTIONS
Thank you for allowing us to care for you today!   We want to ensure we can follow your treatment plan and we strive to give you the best outcomes and experience possible.   If you ever have a life threatening emergency and call 911 - for an ambulance (EMS)  REMEMBER  Our providers can only care for you at:   [facility] or [facility]   Even if you have someone take you or you drive yourself we can only care for you in a [facility] facility. Our providers are not setup at the other healthcare locations!    PLEASE CALL OUR OFFICE DURING NORMAL BUSINESS HOURS  Monday through Friday 8 am to 5 pm  AFTER HOURS the physician on-call cannot help with scheduling, rescheduling, procedure instruction questions or any type of medication need or issue.  [facility] P:[phone] - [facility] P:[phone] - [facility] P:[phone]      If you receive a survey:  We would appreciate you taking the time to share your experience concerning your provider visit in the office.    These surveys are confidential!  We are eager to improve and are counting on you to share your feedback so we can ensure you get the best care possible.  CORONARY ARTERY DISEASE:Yes   clinically stable. Patient is on optimal medical regimen ( see medication list above )  -   Patient is currently  asymptomatic from CAD.            - changes in  treatment:   no, on Plavix& Metoprolol.           - Testing ordered:  no  Citizen of [place] classification: 1     CATH 4/2019   POBA OF SVG TO RCA 99% TO 0%   OSTIAL SVG MILD DISESAE     4/29/2022   Normal study.    Normal perfusion study with normal distribution in all coronal, short, and    horizontal axis.    The observed defect is consistent with diaphragmatic attenuation.    Normal LV function. LVEF is > 70 %.      CATH  Coronary angiogram findings, 4/29/2023:  Dominance: Right dominant system  Left main artery: Normal caliber vessel, free of significant

## 2025-03-27 NOTE — PROGRESS NOTES
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 - No  Have you had any Shortness of Breath - Yes, pt states has not worsened.  Have you had any dizziness -  Pt states he is unsteady and has to be careful  If Yes DO ORTHOSTATIC BP including pulse    Have you had any palpitations recently? - No    Do you have any edema - swelling in No      When did you have your last labs drawn 3/18/2025  Do we have the labs in their chart Yes      If we do not have these labs, you are retrieve these labs for the provider!    Do you have a surgery or procedure scheduled in the near future - No        Check medication list thoroughly!!! AND RECONCILE OUTSIDE MEDICATIONS  If dose has changed change the entire order not just the MG  BE SURE TO ASK PATIENT IF THEY NEED MEDICATION REFILLS  Verify Pharmacy and update if incorrect    Add to every patient's \"wrap up\" the following dot phrase AFTERVISITCARDIOHEARTHOUSE and ensure we explain this to our patients

## 2025-03-27 NOTE — PROGRESS NOTES
OFFICE PROGRESS NOTES      Brett is a 84 y.o. male who has    CHIEF COMPLAINT AS FOLLOWS:  CHEST PAIN: Patient denies any C/O chest pains at this time.      SOB:  C/O SOB at this time since after COVID a month.                 LEG EDEMA: minimal leg edema   PALPITATIONS: Denies any C/O Palpitations                                  DIZZINESS: No C/O Dizziness     SYNCOPE: None   OTHER/ ADDITIONAL COMPLAINTS:                                     HPI: Patient is here for F/U on his CAD, Arrhythmia, HTN & Dyslipidemia problems.   CAD: Patient has known CAD. Had CABG in the past.  Arrhythmia: Patient has known ROMELIA.  HTN: Patient has known essential HTN. Has been treated with guideline recommended medical / physical/ diet therapy as stated below.  Dyslipidemia: Patient has known mixed dyslipidemia. Has been treated with guideline recommended medical / physical/ diet therapy as stated below.                Current Outpatient Medications   Medication Sig Dispense Refill    cyclobenzaprine (FLEXERIL) 10 MG tablet Take 1 tablet by mouth 3 times daily as needed for Muscle spasms      escitalopram (LEXAPRO) 10 MG tablet Take 1 tablet by mouth daily      atorvastatin (LIPITOR) 80 MG tablet Take 1 tablet by mouth daily 90 tablet 3    metoprolol tartrate (LOPRESSOR) 25 MG tablet Take 1 tablet by mouth 2 times daily HOLD if HR <55 or Systolic BP <110 60 tablet 5    isosorbide mononitrate (IMDUR) 30 MG extended release tablet Take 1 tablet by mouth daily 30 tablet 5    potassium chloride (KLOR-CON M) 20 MEQ extended release tablet Take 1 tablet by mouth daily for 3 days 3 tablet 0    Relugolix (ORGOVYX) 120 MG TABS Take 1 tablet by mouth daily      Apalutamide (ERLEADA) 60 MG TABS Take 240 mg by mouth daily      lisinopril (PRINIVIL;ZESTRIL) 5 MG tablet Take 1 tablet by mouth daily 90 tablet 1    clopidogrel (PLAVIX) 75 MG tablet Take 1 tablet by mouth daily 90 tablet 3    omeprazole (PRILOSEC) 40 MG delayed release capsule Take 1

## 2025-04-04 ENCOUNTER — HOSPITAL ENCOUNTER (OUTPATIENT)
Age: 85
Discharge: HOME | End: 2025-04-04
Payer: MEDICARE

## 2025-04-04 LAB
25(OH)D3 SERPL-MCNC: 43.7 NG/ML (ref 30–150)
ALBUMIN SERPL-MCNC: 3.3 G/DL (ref 3.4–5)
ALBUMIN/GLOB SERPL: 1.5 {RATIO} (ref 1.1–2.2)
ALP SERPL-CCNC: 90 U/L (ref 40–129)
ALT SERPL-CCNC: 10 U/L (ref 10–40)
ANION GAP SERPL CALCULATED.3IONS-SCNC: 9 MMOL/L (ref 9–17)
AST SERPL-CCNC: 17 U/L (ref 15–37)
BASOPHILS # BLD: 0.02 K/UL
BASOPHILS NFR BLD: 0 % (ref 0–1)
BILIRUB SERPL-MCNC: 0.2 MG/DL (ref 0–1)
BUN SERPL-MCNC: 14 MG/DL (ref 7–20)
CALCIUM SERPL-MCNC: 9 MG/DL (ref 8.3–10.6)
CHLORIDE SERPL-SCNC: 107 MMOL/L (ref 99–110)
CO2 SERPL-SCNC: 25 MMOL/L (ref 21–32)
CREAT SERPL-MCNC: 0.9 MG/DL (ref 0.8–1.3)
EOSINOPHIL # BLD: 0.12 K/UL
EOSINOPHILS RELATIVE PERCENT: 2 % (ref 0–3)
ERYTHROCYTE [DISTWIDTH] IN BLOOD BY AUTOMATED COUNT: 13.6 % (ref 11.7–14.9)
GFR, ESTIMATED: 77 ML/MIN/1.73M2
GLUCOSE SERPL-MCNC: 115 MG/DL (ref 74–99)
HCT VFR BLD AUTO: 38.2 % (ref 42–52)
HGB BLD-MCNC: 11.8 G/DL (ref 13.5–18)
IMM GRANULOCYTES # BLD AUTO: 0.01 K/UL
IMM GRANULOCYTES NFR BLD: 0 %
LYMPHOCYTES NFR BLD: 1.13 K/UL
LYMPHOCYTES RELATIVE PERCENT: 23 % (ref 24–44)
MCH RBC QN AUTO: 30.6 PG (ref 27–31)
MCHC RBC AUTO-ENTMCNC: 30.9 G/DL (ref 32–36)
MCV RBC AUTO: 99 FL (ref 78–100)
MONOCYTES NFR BLD: 0.41 K/UL
MONOCYTES NFR BLD: 8 % (ref 0–4)
NEUTROPHILS NFR BLD: 66 % (ref 36–66)
NEUTS SEG NFR BLD: 3.24 K/UL
PLATELET # BLD AUTO: 157 K/UL (ref 140–440)
PMV BLD AUTO: 11.9 FL (ref 7.5–11.1)
POTASSIUM SERPL-SCNC: 3.3 MMOL/L (ref 3.5–5.1)
PROT SERPL-MCNC: 5.6 G/DL (ref 6.4–8.2)
PSA SERPL-MCNC: 0.02 NG/ML (ref 0–4)
RBC # BLD AUTO: 3.86 M/UL (ref 4.6–6.2)
SODIUM SERPL-SCNC: 142 MMOL/L (ref 136–145)
WBC OTHER # BLD: 4.9 K/UL (ref 4–10.5)

## 2025-04-04 PROCEDURE — 82306 VITAMIN D 25 HYDROXY: CPT

## 2025-04-04 PROCEDURE — 80053 COMPREHEN METABOLIC PANEL: CPT

## 2025-04-04 PROCEDURE — 85025 COMPLETE CBC W/AUTO DIFF WBC: CPT

## 2025-04-04 PROCEDURE — 36415 COLL VENOUS BLD VENIPUNCTURE: CPT

## 2025-04-04 PROCEDURE — 84403 ASSAY OF TOTAL TESTOSTERONE: CPT

## 2025-04-04 PROCEDURE — 84153 ASSAY OF PSA TOTAL: CPT

## 2025-04-06 LAB — TESTOSTERONE TOTAL-MALE: <3 NG/DL (ref 300–720)

## 2025-04-07 RX ORDER — LISINOPRIL 5 MG/1
5 TABLET ORAL DAILY
Qty: 90 TABLET | Refills: 1 | OUTPATIENT
Start: 2025-04-07

## 2025-06-06 ENCOUNTER — APPOINTMENT (OUTPATIENT)
Dept: GENERAL RADIOLOGY | Age: 85
End: 2025-06-06
Payer: MEDICARE

## 2025-06-06 ENCOUNTER — APPOINTMENT (OUTPATIENT)
Dept: CT IMAGING | Age: 85
End: 2025-06-06
Payer: MEDICARE

## 2025-06-06 ENCOUNTER — HOSPITAL ENCOUNTER (EMERGENCY)
Age: 85
Discharge: HOME | End: 2025-06-06
Attending: EMERGENCY MEDICINE
Payer: MEDICARE

## 2025-06-06 VITALS
TEMPERATURE: 98 F | SYSTOLIC BLOOD PRESSURE: 156 MMHG | OXYGEN SATURATION: 97 % | HEART RATE: 59 BPM | DIASTOLIC BLOOD PRESSURE: 76 MMHG | WEIGHT: 145 LBS | RESPIRATION RATE: 12 BRPM | HEIGHT: 65 IN | BODY MASS INDEX: 24.16 KG/M2

## 2025-06-06 DIAGNOSIS — S70.02XA CONTUSION OF LEFT HIP, INITIAL ENCOUNTER: ICD-10-CM

## 2025-06-06 DIAGNOSIS — S09.90XA CLOSED HEAD INJURY, INITIAL ENCOUNTER: Primary | ICD-10-CM
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
## 2025-06-06 PROCEDURE — 70450 CT HEAD/BRAIN W/O DYE: CPT

## 2025-06-06 PROCEDURE — 99284 EMERGENCY DEPT VISIT MOD MDM: CPT

## 2025-06-06 PROCEDURE — 72125 CT NECK SPINE W/O DYE: CPT

## 2025-06-06 PROCEDURE — 73502 X-RAY EXAM HIP UNI 2-3 VIEWS: CPT

## 2025-06-06 ASSESSMENT — PAIN - FUNCTIONAL ASSESSMENT: PAIN_FUNCTIONAL_ASSESSMENT: 0-10

## 2025-06-06 ASSESSMENT — PAIN SCALES - GENERAL: PAINLEVEL_OUTOF10: 0

## 2025-06-06 NOTE — ED NOTES
Ambulated pt for trial. Pt had no difficulties. Dr upton notified at this time. Per MD pt ok to DC

## 2025-06-06 NOTE — ED NOTES
[facility] here to transport patient back home at this time. Discharge instructions and follow up care discussed, patient voices understanding.

## 2025-06-06 NOTE — ED PROVIDER NOTES
CHIEF COMPLAINT    Chief Complaint   Patient presents with    Fall     HPI  Brett Lozada is a 84 y.o. male with history of coronary artery disease with CABG, peripheral vascular disease, COPD, dementia who presents to the ED via EMS with complaints of fall with closed head injury.  Patient states that he lost his balance yesterday at home and fell to the ground and this happened again this evening.  He states that he has no preceding symptoms and simply loses strength in his legs causing him to fall to the ground.  He did strike his left hip during the fall yesterday but was able to get back up and ambulate with his walker and cane.  He did strike his head tonight but states no LOC.  He has not any LOC during any of these events.  He does take Plavix and has been compliant with this therapy at home.  He currently rates his symptoms as mild in severity without relieving or aggravating factors.  He denies headache, neck pain, back pain, hip pain, chest pain, shortness of breath, palpitations, numbness, tingling, nausea, vomiting      REVIEW OF SYSTEMS  Constitutional: No fever, chills    Eye: No visual changes  HENT: No earache or sore throat.  Resp: No SOB or productive cough.  Cardio: No chest pain or palpitations.  GI: No abdominal pain, nausea, vomiting, constipation or diarrhea. No melena.  : No dysuria, urgency or frequency.  Endocrine: No heat intolerance, no cold intolerance, no polydipsia   Lymphatics: No adenopathy  Musculoskeletal: No new muscle aches or joint pain.  Neuro: No headaches.  Psych: No homicidal or suicidal thoughts  Skin: No rash, No itching.  ?  ?  PAST MEDICAL HISTORY  Past Medical History:   Diagnosis Date    Acute non Q wave MI (myocardial infarction), initial episode of care ([facility]) 5/2/2023    Acute pulmonary embolism ([facility]) 12/26/2023    Acute ST elevation myocardial infarction (STEMI) due to occlusion of distal portion of left anterior descending (LAD) coronary artery ([facility]) 4/29/2023     cervical spine as well as x-ray of left hip here are without acute traumatic pathology.  Patient was able to stand and ambulate here with minimal assistance.  At this time given reassuring workup patient appropriate for discharge home.  He feels comfortable with this plan.  Discharged in stable condition.  Return precautions provided        Amount and/or Complexity of Data Reviewed  Clinical lab tests: reviewed  Decide to obtain previous medical records or to obtain history from someone other than the patient: yes       -  Patient seen and evaluated in the emergency department.  -  Triage and nursing notes reviewed and incorporated.  -  Old chart records reviewed and incorporated.  -  Work-up included:  See above      Appropriate PPE utilized as indicated for entire patient encounter?  Time of Disposition: See timeline      Independent Imaging Interpretation by me: X-ray of the left     EKG (if obtained):  None     Chronic conditions affecting care: Dementia, coronary artery disease, peripheral vascular disease     Discussion with Other Profesionals : None       Social Determinants : None          I am the Primary Clinician of Record.    ?  New Prescriptions    No medications on file     FINAL IMPRESSION  1. Closed head injury, initial encounter    2. Fall, initial encounter    3. Contusion of left hip, initial encounter        Electronically signed by: Vadim Greene DO, 6/6/2025         Vadim Greene DO  06/06/25 [id number]

## 2025-06-06 NOTE — ED NOTES
Pt to ed after a fall. Pt states he fell and is on blood thinners and lives alone. Pt states he called his friend who called ems. Pt presents with a scott above left eyebrow, no bleeding or loc just states he lost his balance. Pt denies pain on arrival.

## 2025-06-25 RX ORDER — CLOPIDOGREL BISULFATE 75 MG/1
75 TABLET ORAL DAILY
Qty: 90 TABLET | Refills: 3 | Status: SHIPPED | OUTPATIENT
Start: 2025-06-25

## 2025-07-07 RX ORDER — LISINOPRIL 5 MG/1
5 TABLET ORAL DAILY
Qty: 90 TABLET | Refills: 1 | OUTPATIENT
Start: 2025-07-07

## 2025-07-28 RX ORDER — LISINOPRIL 5 MG/1
5 TABLET ORAL DAILY
Qty: 90 TABLET | Refills: 1 | Status: SHIPPED | OUTPATIENT
Start: 2025-07-28

## 2025-07-28 RX ORDER — ISOSORBIDE MONONITRATE 30 MG/1
30 TABLET, EXTENDED RELEASE ORAL DAILY
Qty: 30 TABLET | Refills: 5 | Status: SHIPPED | OUTPATIENT
Start: 2025-07-28

## 2025-07-28 NOTE — TELEPHONE ENCOUNTER
Pt granddaughter called requesting refills for Imdur and Lisinopril. Requests these refills be sent to [facility].

## 2025-08-15 ENCOUNTER — HOSPITAL ENCOUNTER (INPATIENT)
Age: 85
LOS: 2 days | Discharge: HOME | DRG: 689 | End: 2025-08-17
Attending: STUDENT IN AN ORGANIZED HEALTH CARE EDUCATION/TRAINING PROGRAM | Admitting: STUDENT IN AN ORGANIZED HEALTH CARE EDUCATION/TRAINING PROGRAM
Payer: MEDICARE

## 2025-08-15 ENCOUNTER — APPOINTMENT (OUTPATIENT)
Dept: CT IMAGING | Age: 85
DRG: 689 | End: 2025-08-15
Payer: MEDICARE

## 2025-08-15 DIAGNOSIS — R31.9 URINARY TRACT INFECTION WITH HEMATURIA, SITE UNSPECIFIED: Primary | ICD-10-CM

## 2025-08-15 DIAGNOSIS — N17.9 AKI (ACUTE KIDNEY INJURY): ICD-10-CM

## 2025-08-15 DIAGNOSIS — N39.0 URINARY TRACT INFECTION WITH HEMATURIA, SITE UNSPECIFIED: Primary | ICD-10-CM

## 2025-08-15 DIAGNOSIS — N13.30 HYDRONEPHROSIS, UNSPECIFIED HYDRONEPHROSIS TYPE: ICD-10-CM

## 2025-08-15 LAB
ALBUMIN SERPL-MCNC: 4 G/DL (ref 3.4–5)
ALBUMIN/GLOB SERPL: 1.6 {RATIO} (ref 1.1–2.2)
ALP SERPL-CCNC: 98 U/L (ref 40–129)
ALT SERPL-CCNC: 10 U/L (ref 10–40)
ANION GAP SERPL CALCULATED.3IONS-SCNC: 14 MMOL/L (ref 9–17)
AST SERPL-CCNC: 17 U/L (ref 15–37)
BASOPHILS # BLD: 0.03 K/UL
BASOPHILS NFR BLD: 0 % (ref 0–1)
BILIRUB SERPL-MCNC: 0.4 MG/DL (ref 0–1)
BILIRUB UR QL STRIP: NEGATIVE
BUN SERPL-MCNC: 23 MG/DL (ref 7–20)
CALCIUM SERPL-MCNC: 9.8 MG/DL (ref 8.3–10.6)
CHLORIDE SERPL-SCNC: 97 MMOL/L (ref 99–110)
CLARITY UR: ABNORMAL
CO2 SERPL-SCNC: 26 MMOL/L (ref 21–32)
COLOR UR: YELLOW
CREAT SERPL-MCNC: 1.7 MG/DL (ref 0.8–1.3)
EOSINOPHIL # BLD: 0.15 K/UL
EOSINOPHILS RELATIVE PERCENT: 2 % (ref 0–3)
EPI CELLS #/AREA URNS HPF: <1 /HPF
ERYTHROCYTE [DISTWIDTH] IN BLOOD BY AUTOMATED COUNT: 12.8 % (ref 11.7–14.9)
GFR, ESTIMATED: 39 ML/MIN/1.73M2
GLUCOSE SERPL-MCNC: 112 MG/DL (ref 74–99)
GLUCOSE UR STRIP-MCNC: NEGATIVE MG/DL
HCT VFR BLD AUTO: 36.7 % (ref 42–52)
HGB BLD-MCNC: 12.1 G/DL (ref 13.5–18)
HGB UR QL STRIP.AUTO: ABNORMAL
IMM GRANULOCYTES # BLD AUTO: 0.03 K/UL
IMM GRANULOCYTES NFR BLD: 0 %
KETONES UR STRIP-MCNC: NEGATIVE MG/DL
LEUKOCYTE ESTERASE UR QL STRIP: ABNORMAL
LYMPHOCYTES NFR BLD: 0.67 K/UL
LYMPHOCYTES RELATIVE PERCENT: 9 % (ref 24–44)
MCH RBC QN AUTO: 32.4 PG (ref 27–31)
MCHC RBC AUTO-ENTMCNC: 33 G/DL (ref 32–36)
MCV RBC AUTO: 98.4 FL (ref 78–100)
MONOCYTES NFR BLD: 0.46 K/UL
MONOCYTES NFR BLD: 6 % (ref 0–5)
NEUTROPHILS NFR BLD: 83 % (ref 36–66)
NEUTS SEG NFR BLD: 6.35 K/UL
NITRITE UR QL STRIP: POSITIVE
PH UR STRIP: 6 [PH] (ref 5–8)
PLATELET # BLD AUTO: 243 K/UL (ref 140–440)
PMV BLD AUTO: 11.8 FL (ref 7.5–11.1)
POTASSIUM SERPL-SCNC: 4.6 MMOL/L (ref 3.5–5.1)
PROT SERPL-MCNC: 6.6 G/DL (ref 6.4–8.2)
PROT UR STRIP-MCNC: 100 MG/DL
RBC # BLD AUTO: 3.73 M/UL (ref 4.6–6.2)
RBC #/AREA URNS HPF: 543 /HPF (ref 0–2)
SODIUM SERPL-SCNC: 137 MMOL/L (ref 136–145)
SP GR UR STRIP: 1.01 (ref 1–1.03)
UROBILINOGEN UR STRIP-ACNC: 0.2 EU/DL (ref 0–1)
WBC #/AREA URNS HPF: 435 /HPF (ref 0–5)
WBC OTHER # BLD: 7.7 K/UL (ref 4–10.5)

## 2025-08-15 PROCEDURE — 85025 COMPLETE CBC W/AUTO DIFF WBC: CPT

## 2025-08-15 PROCEDURE — 2500000003 HC RX 250 WO HCPCS: Performed by: PHYSICIAN ASSISTANT

## 2025-08-15 PROCEDURE — 6370000000 HC RX 637 (ALT 250 FOR IP): Performed by: STUDENT IN AN ORGANIZED HEALTH CARE EDUCATION/TRAINING PROGRAM

## 2025-08-15 PROCEDURE — 74176 CT ABD & PELVIS W/O CONTRAST: CPT

## 2025-08-15 PROCEDURE — 81001 URINALYSIS AUTO W/SCOPE: CPT

## 2025-08-15 PROCEDURE — 87086 URINE CULTURE/COLONY COUNT: CPT

## 2025-08-15 PROCEDURE — 87040 BLOOD CULTURE FOR BACTERIA: CPT

## 2025-08-15 PROCEDURE — 2580000003 HC RX 258: Performed by: STUDENT IN AN ORGANIZED HEALTH CARE EDUCATION/TRAINING PROGRAM

## 2025-08-15 PROCEDURE — 80053 COMPREHEN METABOLIC PANEL: CPT

## 2025-08-15 PROCEDURE — 99285 EMERGENCY DEPT VISIT HI MDM: CPT

## 2025-08-15 PROCEDURE — 2580000003 HC RX 258: Performed by: PHYSICIAN ASSISTANT

## 2025-08-15 PROCEDURE — 96374 THER/PROPH/DIAG INJ IV PUSH: CPT

## 2025-08-15 PROCEDURE — 94761 N-INVAS EAR/PLS OXIMETRY MLT: CPT

## 2025-08-15 PROCEDURE — 6370000000 HC RX 637 (ALT 250 FOR IP)

## 2025-08-15 PROCEDURE — 1200000000 HC SEMI PRIVATE

## 2025-08-15 PROCEDURE — 6360000002 HC RX W HCPCS: Performed by: PHYSICIAN ASSISTANT

## 2025-08-15 PROCEDURE — 96375 TX/PRO/DX INJ NEW DRUG ADDON: CPT

## 2025-08-15 RX ORDER — TAMSULOSIN HYDROCHLORIDE 0.4 MG/1
0.4 CAPSULE ORAL NIGHTLY
Status: DISCONTINUED | OUTPATIENT
Start: 2025-08-15 | End: 2025-08-17 | Stop reason: HOSPADM

## 2025-08-15 RX ORDER — CLOPIDOGREL BISULFATE 75 MG/1
75 TABLET ORAL DAILY
Status: DISCONTINUED | OUTPATIENT
Start: 2025-08-16 | End: 2025-08-17 | Stop reason: HOSPADM

## 2025-08-15 RX ORDER — LIDOCAINE HYDROCHLORIDE 20 MG/ML
JELLY TOPICAL PRN
Status: DISCONTINUED | OUTPATIENT
Start: 2025-08-15 | End: 2025-08-17 | Stop reason: HOSPADM

## 2025-08-15 RX ORDER — ACETAMINOPHEN 650 MG/1
650 SUPPOSITORY RECTAL EVERY 6 HOURS PRN
Status: DISCONTINUED | OUTPATIENT
Start: 2025-08-15 | End: 2025-08-17 | Stop reason: HOSPADM

## 2025-08-15 RX ORDER — ENOXAPARIN SODIUM 100 MG/ML
30 INJECTION SUBCUTANEOUS DAILY
Status: DISCONTINUED | OUTPATIENT
Start: 2025-08-16 | End: 2025-08-17 | Stop reason: HOSPADM

## 2025-08-15 RX ORDER — METOPROLOL TARTRATE 25 MG/1
25 TABLET, FILM COATED ORAL 2 TIMES DAILY
Status: DISCONTINUED | OUTPATIENT
Start: 2025-08-15 | End: 2025-08-17 | Stop reason: HOSPADM

## 2025-08-15 RX ORDER — POLYETHYLENE GLYCOL 3350 17 G/17G
17 POWDER, FOR SOLUTION ORAL DAILY PRN
Status: DISCONTINUED | OUTPATIENT
Start: 2025-08-15 | End: 2025-08-17 | Stop reason: HOSPADM

## 2025-08-15 RX ORDER — SODIUM CHLORIDE 0.9 % (FLUSH) 0.9 %
5-40 SYRINGE (ML) INJECTION EVERY 12 HOURS SCHEDULED
Status: DISCONTINUED | OUTPATIENT
Start: 2025-08-15 | End: 2025-08-17 | Stop reason: HOSPADM

## 2025-08-15 RX ORDER — ACETAMINOPHEN 325 MG/1
650 TABLET ORAL EVERY 6 HOURS PRN
Status: DISCONTINUED | OUTPATIENT
Start: 2025-08-15 | End: 2025-08-17 | Stop reason: HOSPADM

## 2025-08-15 RX ORDER — SODIUM CHLORIDE 0.9 % (FLUSH) 0.9 %
5-40 SYRINGE (ML) INJECTION PRN
Status: DISCONTINUED | OUTPATIENT
Start: 2025-08-15 | End: 2025-08-17 | Stop reason: HOSPADM

## 2025-08-15 RX ORDER — DONEPEZIL HYDROCHLORIDE 5 MG/1
5 TABLET, FILM COATED ORAL NIGHTLY
Status: DISCONTINUED | OUTPATIENT
Start: 2025-08-15 | End: 2025-08-17 | Stop reason: HOSPADM

## 2025-08-15 RX ORDER — ONDANSETRON 4 MG/1
4 TABLET, ORALLY DISINTEGRATING ORAL EVERY 8 HOURS PRN
Status: DISCONTINUED | OUTPATIENT
Start: 2025-08-15 | End: 2025-08-17 | Stop reason: HOSPADM

## 2025-08-15 RX ORDER — SODIUM CHLORIDE 9 MG/ML
INJECTION, SOLUTION INTRAVENOUS PRN
Status: DISCONTINUED | OUTPATIENT
Start: 2025-08-15 | End: 2025-08-17 | Stop reason: HOSPADM

## 2025-08-15 RX ORDER — LISINOPRIL 5 MG/1
5 TABLET ORAL DAILY
Status: DISCONTINUED | OUTPATIENT
Start: 2025-08-15 | End: 2025-08-17 | Stop reason: HOSPADM

## 2025-08-15 RX ORDER — ESCITALOPRAM OXALATE 10 MG/1
10 TABLET ORAL DAILY
Status: DISCONTINUED | OUTPATIENT
Start: 2025-08-16 | End: 2025-08-17 | Stop reason: HOSPADM

## 2025-08-15 RX ORDER — ISOSORBIDE MONONITRATE 30 MG/1
30 TABLET, EXTENDED RELEASE ORAL DAILY
Status: DISCONTINUED | OUTPATIENT
Start: 2025-08-16 | End: 2025-08-17 | Stop reason: HOSPADM

## 2025-08-15 RX ORDER — SODIUM CHLORIDE, SODIUM LACTATE, POTASSIUM CHLORIDE, AND CALCIUM CHLORIDE .6; .31; .03; .02 G/100ML; G/100ML; G/100ML; G/100ML
1000 INJECTION, SOLUTION INTRAVENOUS ONCE
Status: COMPLETED | OUTPATIENT
Start: 2025-08-15 | End: 2025-08-15

## 2025-08-15 RX ORDER — SODIUM CHLORIDE 9 MG/ML
INJECTION, SOLUTION INTRAVENOUS CONTINUOUS
Status: DISPENSED | OUTPATIENT
Start: 2025-08-15 | End: 2025-08-16

## 2025-08-15 RX ORDER — KETOROLAC TROMETHAMINE 15 MG/ML
15 INJECTION, SOLUTION INTRAMUSCULAR; INTRAVENOUS ONCE
Status: COMPLETED | OUTPATIENT
Start: 2025-08-15 | End: 2025-08-15

## 2025-08-15 RX ORDER — ONDANSETRON 2 MG/ML
4 INJECTION INTRAMUSCULAR; INTRAVENOUS EVERY 6 HOURS PRN
Status: DISCONTINUED | OUTPATIENT
Start: 2025-08-15 | End: 2025-08-17 | Stop reason: HOSPADM

## 2025-08-15 RX ORDER — ATORVASTATIN CALCIUM 40 MG/1
80 TABLET, FILM COATED ORAL DAILY
Status: DISCONTINUED | OUTPATIENT
Start: 2025-08-16 | End: 2025-08-17 | Stop reason: HOSPADM

## 2025-08-15 RX ADMIN — SODIUM CHLORIDE: 0.9 INJECTION, SOLUTION INTRAVENOUS at 15:55

## 2025-08-15 RX ADMIN — METOPROLOL TARTRATE 25 MG: 25 TABLET, FILM COATED ORAL at 20:49

## 2025-08-15 RX ADMIN — TAMSULOSIN HYDROCHLORIDE 0.4 MG: 0.4 CAPSULE ORAL at 20:49

## 2025-08-15 RX ADMIN — WATER 1000 MG: 1 INJECTION INTRAMUSCULAR; INTRAVENOUS; SUBCUTANEOUS at 11:43

## 2025-08-15 RX ADMIN — DONEPEZIL HYDROCHLORIDE 5 MG: 5 TABLET, FILM COATED ORAL at 20:50

## 2025-08-15 RX ADMIN — SODIUM CHLORIDE, SODIUM LACTATE, POTASSIUM CHLORIDE, AND CALCIUM CHLORIDE 1000 ML: .6; .31; .03; .02 INJECTION, SOLUTION INTRAVENOUS at 11:43

## 2025-08-15 RX ADMIN — MELATONIN TAB 3 MG 3 MG: 3 TAB at 20:49

## 2025-08-15 RX ADMIN — KETOROLAC TROMETHAMINE 15 MG: 15 INJECTION, SOLUTION INTRAMUSCULAR; INTRAVENOUS at 09:32

## 2025-08-15 ASSESSMENT — PAIN SCALES - GENERAL
PAINLEVEL_OUTOF10: 8
PAINLEVEL_OUTOF10: 8
PAINLEVEL_OUTOF10: 0
PAINLEVEL_OUTOF10: 2
PAINLEVEL_OUTOF10: 7

## 2025-08-15 ASSESSMENT — PAIN DESCRIPTION - LOCATION
LOCATION: GROIN
LOCATION: ABDOMEN
LOCATION: ABDOMEN

## 2025-08-15 ASSESSMENT — PAIN - FUNCTIONAL ASSESSMENT
PAIN_FUNCTIONAL_ASSESSMENT: 0-10
PAIN_FUNCTIONAL_ASSESSMENT: 0-10

## 2025-08-15 ASSESSMENT — PAIN DESCRIPTION - DESCRIPTORS: DESCRIPTORS: ACHING;CRAMPING

## 2025-08-15 ASSESSMENT — PAIN DESCRIPTION - ORIENTATION: ORIENTATION: MID

## 2025-08-16 LAB
ANION GAP SERPL CALCULATED.3IONS-SCNC: 10 MMOL/L (ref 9–17)
BASOPHILS # BLD: 0.02 K/UL
BASOPHILS NFR BLD: 0 % (ref 0–1)
BUN SERPL-MCNC: 23 MG/DL (ref 7–20)
CALCIUM SERPL-MCNC: 8.7 MG/DL (ref 8.3–10.6)
CHLORIDE SERPL-SCNC: 104 MMOL/L (ref 99–110)
CO2 SERPL-SCNC: 22 MMOL/L (ref 21–32)
CREAT SERPL-MCNC: 1.5 MG/DL (ref 0.8–1.3)
EOSINOPHIL # BLD: 0.16 K/UL
EOSINOPHILS RELATIVE PERCENT: 2 % (ref 0–3)
ERYTHROCYTE [DISTWIDTH] IN BLOOD BY AUTOMATED COUNT: 12.8 % (ref 11.7–14.9)
GFR, ESTIMATED: 44 ML/MIN/1.73M2
GLUCOSE SERPL-MCNC: 95 MG/DL (ref 74–99)
HCT VFR BLD AUTO: 30.3 % (ref 42–52)
HGB BLD-MCNC: 9.9 G/DL (ref 13.5–18)
IMM GRANULOCYTES # BLD AUTO: 0.02 K/UL
IMM GRANULOCYTES NFR BLD: 0 %
LYMPHOCYTES NFR BLD: 0.95 K/UL
LYMPHOCYTES RELATIVE PERCENT: 14 % (ref 24–44)
MCH RBC QN AUTO: 32.1 PG (ref 27–31)
MCHC RBC AUTO-ENTMCNC: 32.7 G/DL (ref 32–36)
MCV RBC AUTO: 98.4 FL (ref 78–100)
MONOCYTES NFR BLD: 0.57 K/UL
MONOCYTES NFR BLD: 8 % (ref 0–5)
NEUTROPHILS NFR BLD: 75 % (ref 36–66)
NEUTS SEG NFR BLD: 5.13 K/UL
PLATELET # BLD AUTO: 186 K/UL (ref 140–440)
PMV BLD AUTO: 12.1 FL (ref 7.5–11.1)
POTASSIUM SERPL-SCNC: 4.1 MMOL/L (ref 3.5–5.1)
RBC # BLD AUTO: 3.08 M/UL (ref 4.6–6.2)
SODIUM SERPL-SCNC: 136 MMOL/L (ref 136–145)
WBC OTHER # BLD: 6.9 K/UL (ref 4–10.5)

## 2025-08-16 PROCEDURE — 80048 BASIC METABOLIC PNL TOTAL CA: CPT

## 2025-08-16 PROCEDURE — 1200000000 HC SEMI PRIVATE

## 2025-08-16 PROCEDURE — 6370000000 HC RX 637 (ALT 250 FOR IP): Performed by: STUDENT IN AN ORGANIZED HEALTH CARE EDUCATION/TRAINING PROGRAM

## 2025-08-16 PROCEDURE — 2500000003 HC RX 250 WO HCPCS: Performed by: STUDENT IN AN ORGANIZED HEALTH CARE EDUCATION/TRAINING PROGRAM

## 2025-08-16 PROCEDURE — 6370000000 HC RX 637 (ALT 250 FOR IP)

## 2025-08-16 PROCEDURE — 94761 N-INVAS EAR/PLS OXIMETRY MLT: CPT

## 2025-08-16 PROCEDURE — 36415 COLL VENOUS BLD VENIPUNCTURE: CPT

## 2025-08-16 PROCEDURE — 85025 COMPLETE CBC W/AUTO DIFF WBC: CPT

## 2025-08-16 PROCEDURE — 6360000002 HC RX W HCPCS: Performed by: STUDENT IN AN ORGANIZED HEALTH CARE EDUCATION/TRAINING PROGRAM

## 2025-08-16 RX ADMIN — ATORVASTATIN CALCIUM 80 MG: 40 TABLET, FILM COATED ORAL at 12:42

## 2025-08-16 RX ADMIN — MELATONIN TAB 3 MG 3 MG: 3 TAB at 22:04

## 2025-08-16 RX ADMIN — ISOSORBIDE MONONITRATE 30 MG: 30 TABLET, EXTENDED RELEASE ORAL at 12:42

## 2025-08-16 RX ADMIN — ESCITALOPRAM OXALATE 10 MG: 10 TABLET ORAL at 12:42

## 2025-08-16 RX ADMIN — CLOPIDOGREL BISULFATE 75 MG: 75 TABLET, FILM COATED ORAL at 12:42

## 2025-08-16 RX ADMIN — SODIUM CHLORIDE, PRESERVATIVE FREE 10 ML: 5 INJECTION INTRAVENOUS at 22:01

## 2025-08-16 RX ADMIN — DONEPEZIL HYDROCHLORIDE 5 MG: 5 TABLET, FILM COATED ORAL at 22:01

## 2025-08-16 RX ADMIN — METOPROLOL TARTRATE 25 MG: 25 TABLET, FILM COATED ORAL at 22:01

## 2025-08-16 RX ADMIN — TAMSULOSIN HYDROCHLORIDE 0.4 MG: 0.4 CAPSULE ORAL at 22:01

## 2025-08-16 RX ADMIN — ENOXAPARIN SODIUM 30 MG: 100 INJECTION SUBCUTANEOUS at 12:41

## 2025-08-16 RX ADMIN — METOPROLOL TARTRATE 25 MG: 25 TABLET, FILM COATED ORAL at 12:42

## 2025-08-16 RX ADMIN — WATER 1000 MG: 1 INJECTION INTRAMUSCULAR; INTRAVENOUS; SUBCUTANEOUS at 12:43

## 2025-08-16 RX ADMIN — SODIUM CHLORIDE, PRESERVATIVE FREE 10 ML: 5 INJECTION INTRAVENOUS at 12:44

## 2025-08-16 ASSESSMENT — PAIN SCALES - GENERAL: PAINLEVEL_OUTOF10: 0

## 2025-08-17 VITALS
TEMPERATURE: 98.2 F | HEIGHT: 66 IN | DIASTOLIC BLOOD PRESSURE: 73 MMHG | HEART RATE: 63 BPM | RESPIRATION RATE: 16 BRPM | WEIGHT: 124.12 LBS | BODY MASS INDEX: 19.95 KG/M2 | OXYGEN SATURATION: 98 % | SYSTOLIC BLOOD PRESSURE: 162 MMHG

## 2025-08-17 LAB
MICROORGANISM SPEC CULT: NORMAL
SERVICE CMNT-IMP: NORMAL
SPECIMEN DESCRIPTION: NORMAL

## 2025-08-17 PROCEDURE — 6370000000 HC RX 637 (ALT 250 FOR IP): Performed by: STUDENT IN AN ORGANIZED HEALTH CARE EDUCATION/TRAINING PROGRAM

## 2025-08-17 PROCEDURE — 2500000003 HC RX 250 WO HCPCS: Performed by: STUDENT IN AN ORGANIZED HEALTH CARE EDUCATION/TRAINING PROGRAM

## 2025-08-17 PROCEDURE — 6360000002 HC RX W HCPCS: Performed by: STUDENT IN AN ORGANIZED HEALTH CARE EDUCATION/TRAINING PROGRAM

## 2025-08-17 PROCEDURE — 94761 N-INVAS EAR/PLS OXIMETRY MLT: CPT

## 2025-08-17 RX ORDER — CEPHALEXIN 500 MG/1
500 CAPSULE ORAL 2 TIMES DAILY
Qty: 14 CAPSULE | Refills: 0 | Status: SHIPPED | OUTPATIENT
Start: 2025-08-17 | End: 2025-08-24

## 2025-08-17 RX ADMIN — ENOXAPARIN SODIUM 30 MG: 100 INJECTION SUBCUTANEOUS at 09:38

## 2025-08-17 RX ADMIN — ESCITALOPRAM OXALATE 10 MG: 10 TABLET ORAL at 09:38

## 2025-08-17 RX ADMIN — METOPROLOL TARTRATE 25 MG: 25 TABLET, FILM COATED ORAL at 09:38

## 2025-08-17 RX ADMIN — ATORVASTATIN CALCIUM 80 MG: 40 TABLET, FILM COATED ORAL at 09:38

## 2025-08-17 RX ADMIN — CLOPIDOGREL BISULFATE 75 MG: 75 TABLET, FILM COATED ORAL at 09:38

## 2025-08-17 RX ADMIN — ISOSORBIDE MONONITRATE 30 MG: 30 TABLET, EXTENDED RELEASE ORAL at 09:38

## 2025-08-17 RX ADMIN — WATER 1000 MG: 1 INJECTION INTRAMUSCULAR; INTRAVENOUS; SUBCUTANEOUS at 11:04

## 2025-08-17 RX ADMIN — SODIUM CHLORIDE, PRESERVATIVE FREE 10 ML: 5 INJECTION INTRAVENOUS at 09:39

## 2025-08-17 ASSESSMENT — PAIN SCALES - GENERAL: PAINLEVEL_OUTOF10: 0

## 2025-08-22 LAB
MICROORGANISM SPEC CULT: NORMAL
MICROORGANISM SPEC CULT: NORMAL
SERVICE CMNT-IMP: NORMAL
SERVICE CMNT-IMP: NORMAL
SPECIMEN DESCRIPTION: NORMAL
SPECIMEN DESCRIPTION: NORMAL

## (undated) DEVICE — ENDOSCOPY KIT: Brand: MEDLINE INDUSTRIES, INC.

## (undated) DEVICE — FORCEPS BX L240CM JAW DIA2.8MM L CAP W/ NDL MIC MESH TOOTH

## (undated) DEVICE — ENDOSCOPIC KIT 1.1+ OP4 CA DE 2 GWN AAMI LEVEL 3